# Patient Record
Sex: FEMALE | Race: WHITE | Employment: UNEMPLOYED | URBAN - METROPOLITAN AREA
[De-identification: names, ages, dates, MRNs, and addresses within clinical notes are randomized per-mention and may not be internally consistent; named-entity substitution may affect disease eponyms.]

---

## 2019-05-01 ENCOUNTER — OFFICE VISIT (OUTPATIENT)
Dept: FAMILY MEDICINE CLINIC | Facility: CLINIC | Age: 54
End: 2019-05-01
Payer: MEDICAID

## 2019-05-01 VITALS
SYSTOLIC BLOOD PRESSURE: 127 MMHG | WEIGHT: 168 LBS | HEART RATE: 50 BPM | HEIGHT: 61 IN | BODY MASS INDEX: 31.72 KG/M2 | TEMPERATURE: 98 F | DIASTOLIC BLOOD PRESSURE: 80 MMHG

## 2019-05-01 DIAGNOSIS — Z86.19 HISTORY OF HEPATITIS C: ICD-10-CM

## 2019-05-01 DIAGNOSIS — J01.90 ACUTE SINUSITIS, RECURRENCE NOT SPECIFIED, UNSPECIFIED LOCATION: ICD-10-CM

## 2019-05-01 DIAGNOSIS — F32.A ANXIETY AND DEPRESSION: ICD-10-CM

## 2019-05-01 DIAGNOSIS — J45.20 MILD INTERMITTENT ASTHMA WITHOUT COMPLICATION: ICD-10-CM

## 2019-05-01 DIAGNOSIS — F41.9 ANXIETY AND DEPRESSION: ICD-10-CM

## 2019-05-01 PROCEDURE — 99203 OFFICE O/P NEW LOW 30 MIN: CPT | Performed by: FAMILY MEDICINE

## 2019-05-01 PROCEDURE — 99212 OFFICE O/P EST SF 10 MIN: CPT | Performed by: FAMILY MEDICINE

## 2019-05-01 RX ORDER — IBUPROFEN 800 MG/1
800 TABLET ORAL EVERY 6 HOURS PRN
Status: ON HOLD | COMMUNITY
End: 2019-05-25

## 2019-05-01 RX ORDER — FLUTICASONE PROPIONATE AND SALMETEROL 250; 50 UG/1; UG/1
1 POWDER RESPIRATORY (INHALATION)
Status: ON HOLD | COMMUNITY
Start: 2018-05-01 | End: 2019-05-31

## 2019-05-01 RX ORDER — FLUOXETINE HYDROCHLORIDE 20 MG/1
CAPSULE ORAL
Refills: 2 | COMMUNITY
Start: 2019-01-24 | End: 2019-05-01

## 2019-05-01 RX ORDER — FLUOXETINE HYDROCHLORIDE 20 MG/1
60 CAPSULE ORAL DAILY
Qty: 90 CAPSULE | Refills: 2 | Status: SHIPPED | OUTPATIENT
Start: 2019-05-01 | End: 2019-07-02

## 2019-05-01 RX ORDER — FLUOXETINE HYDROCHLORIDE 20 MG/1
20 CAPSULE ORAL
COMMUNITY
End: 2019-05-01

## 2019-05-01 RX ORDER — ALBUTEROL SULFATE 90 UG/1
1 AEROSOL, METERED RESPIRATORY (INHALATION)
COMMUNITY
Start: 2018-05-01 | End: 2019-07-02 | Stop reason: ALTCHOICE

## 2019-05-01 RX ORDER — FLUTICASONE PROPIONATE 50 MCG
1 SPRAY, SUSPENSION (ML) NASAL DAILY
Qty: 1 INHALER | Refills: 2 | Status: SHIPPED | OUTPATIENT
Start: 2019-05-01 | End: 2019-07-02

## 2019-05-01 RX ORDER — BUDESONIDE AND FORMOTEROL FUMARATE DIHYDRATE 160; 4.5 UG/1; UG/1
2 AEROSOL RESPIRATORY (INHALATION)
Status: ON HOLD | COMMUNITY
Start: 2018-04-12 | End: 2019-05-31

## 2019-05-01 RX ORDER — ALBUTEROL SULFATE 90 UG/1
2 AEROSOL, METERED RESPIRATORY (INHALATION)
COMMUNITY
Start: 2018-05-09 | End: 2019-07-02 | Stop reason: ALTCHOICE

## 2019-05-01 RX ORDER — ALPRAZOLAM 1 MG/1
1 TABLET ORAL
COMMUNITY
End: 2019-05-01

## 2019-05-01 RX ORDER — ALPRAZOLAM 1 MG/1
TABLET ORAL
Refills: 0 | COMMUNITY
Start: 2019-02-22 | End: 2019-05-01

## 2019-05-01 RX ORDER — AMOXICILLIN AND CLAVULANATE POTASSIUM 875; 125 MG/1; MG/1
1 TABLET, FILM COATED ORAL 2 TIMES DAILY
Qty: 20 TABLET | Refills: 0 | Status: ON HOLD | OUTPATIENT
Start: 2019-05-01 | End: 2019-05-25

## 2019-05-01 RX ORDER — MONTELUKAST SODIUM 10 MG/1
TABLET ORAL
COMMUNITY
End: 2019-07-02

## 2019-05-01 RX ORDER — ALPRAZOLAM 1 MG/1
1 TABLET ORAL 3 TIMES DAILY PRN
Qty: 90 TABLET | Refills: 0 | Status: SHIPPED | OUTPATIENT
Start: 2019-05-01 | End: 2019-06-01

## 2019-05-01 NOTE — PROGRESS NOTES
HPI:    Patient ID: Cecile Babinski is a 48year old female. Pt presents to establish care and has hx of sinus/ allergy issues. Pt presents with sinus symptoms for several weeks. Pt has had post nasal drainage, congestion, sinus pressure and drainage.  No so Nasal route daily. One spray per each nostril daily. Disp: 1 Inhaler Rfl: 2   FLUoxetine HCl (PROZAC) 20 MG Oral Cap Take 3 capsules (60 mg total) by mouth daily.  Disp: 90 capsule Rfl: 2   ALPRAZolam (XANAX) 1 MG Oral Tab Take 1 tablet (1 mg total) by mout treatment; To call if problems; Routine follow up in 6-12 months. No orders of the defined types were placed in this encounter.       Meds This Visit:  Requested Prescriptions     Signed Prescriptions Disp Refills   • Amoxicillin-Pot Clavulanate Lisa Eaton

## 2019-05-15 ENCOUNTER — NURSE TRIAGE (OUTPATIENT)
Dept: FAMILY MEDICINE CLINIC | Facility: CLINIC | Age: 54
End: 2019-05-15

## 2019-05-15 NOTE — TELEPHONE ENCOUNTER
Action Requested: Summary for Provider     []  Critical Lab, Recommendations Needed  [] Need Additional Advice  [x]   FYI    []   Need Orders  [] Need Medications Sent to Pharmacy  []  Other     SUMMARY: Per protocol advised : OV within 3 days; scheduled o

## 2019-05-17 NOTE — PROGRESS NOTES
HPI:    Patient ID: Javier Candelaria is a 48year old female. Patient presents for follow-up and was given Augmentin about 2 weeks ago. She has not improved since then. She has a cough with yellow mucous.  Reports fever/chills, congestion, sinus pressure, ear Inhalation Aerosol Powder, Breath Activated Inhale 1 puff into the lungs. Disp:  Rfl:    Montelukast Sodium 10 MG Oral Tab Take by mouth. Disp:  Rfl:    ibuprofen 800 MG Oral Tab Take 800 mg by mouth every 6 (six) hours as needed for Pain.  Disp:  Rfl:    A Pulmonary/Chest: Effort normal and breath sounds normal. She has no wheezes. She has no rales. Chest sounds a little congested     Lymphadenopathy:     She has no cervical adenopathy.    Neurological: She is alert and oriented to person, place, and time Referrals:  PSYCHOLOGY - INTERNAL         ID#[id number]

## 2019-05-17 NOTE — TELEPHONE ENCOUNTER
Patient called did not go to appointment yesterday with Dr Hannah Blake because her ride never showed up. Patient currently across the street from [facility] office and wanted to be seen.  Patient still has the cough with yellow phlegm and has audible shortness of kaley

## 2019-05-24 ENCOUNTER — NURSE TRIAGE (OUTPATIENT)
Dept: OTHER | Age: 54
End: 2019-05-24

## 2019-05-24 DIAGNOSIS — R05.9 COUGH: Primary | ICD-10-CM

## 2019-05-24 NOTE — TELEPHONE ENCOUNTER
Message noted. If pain is truly this bad, then best that patient goes to ER to get further evaluation and treatment. She may need CT chest vs CXR. Please advise pt to go to ER for further eval and treatment.

## 2019-05-24 NOTE — TELEPHONE ENCOUNTER
Called and talked to patient. Advised pt that she needs to go to ER if her symptoms worsen between today and seeing me tomorrow. I will place order for CXR for her to get done in morning before seeing me.  She can to either [facility] or [place] to s

## 2019-05-24 NOTE — TELEPHONE ENCOUNTER
Please reply to pool: EM RN TRIAGE    Action Requested: Summary for Provider     []  Critical Lab, Recommendations Needed  [x] Need Additional Advice  []   FYI    [x]   Need Orders  [] Need Medications Sent to Pharmacy  []  Other     SUMMARY: Declines OV t

## 2019-05-25 ENCOUNTER — HOSPITAL ENCOUNTER (INPATIENT)
Facility: HOSPITAL | Age: 54
LOS: 6 days | Discharge: HOME | DRG: 190 | End: 2019-05-31
Attending: EMERGENCY MEDICINE | Admitting: HOSPITALIST
Payer: MEDICAID

## 2019-05-25 ENCOUNTER — APPOINTMENT (OUTPATIENT)
Dept: CT IMAGING | Facility: HOSPITAL | Age: 54
DRG: 190 | End: 2019-05-25
Attending: EMERGENCY MEDICINE
Payer: MEDICAID

## 2019-05-25 ENCOUNTER — APPOINTMENT (OUTPATIENT)
Dept: GENERAL RADIOLOGY | Facility: HOSPITAL | Age: 54
DRG: 190 | End: 2019-05-25
Attending: EMERGENCY MEDICINE
Payer: MEDICAID

## 2019-05-25 DIAGNOSIS — J96.02 ACUTE RESPIRATORY FAILURE WITH HYPOXIA AND HYPERCAPNIA (HCC): ICD-10-CM

## 2019-05-25 DIAGNOSIS — N30.00 ACUTE CYSTITIS WITHOUT HEMATURIA: ICD-10-CM

## 2019-05-25 DIAGNOSIS — J96.01 ACUTE RESPIRATORY FAILURE WITH HYPOXIA AND HYPERCAPNIA (HCC): ICD-10-CM

## 2019-05-25 DIAGNOSIS — J44.1 COPD EXACERBATION (HCC): Primary | ICD-10-CM

## 2019-05-25 DIAGNOSIS — F19.10 POLYSUBSTANCE ABUSE (HCC): ICD-10-CM

## 2019-05-25 PROBLEM — E87.2 RESPIRATORY ACIDOSIS: Status: ACTIVE | Noted: 2019-05-25

## 2019-05-25 PROBLEM — E87.29 RESPIRATORY ACIDOSIS: Status: ACTIVE | Noted: 2019-05-25

## 2019-05-25 PROCEDURE — 99254 IP/OBS CNSLTJ NEW/EST MOD 60: CPT | Performed by: INTERNAL MEDICINE

## 2019-05-25 PROCEDURE — 71046 X-RAY EXAM CHEST 2 VIEWS: CPT | Performed by: EMERGENCY MEDICINE

## 2019-05-25 PROCEDURE — 99222 1ST HOSP IP/OBS MODERATE 55: CPT | Performed by: HOSPITALIST

## 2019-05-25 PROCEDURE — 71260 CT THORAX DX C+: CPT | Performed by: EMERGENCY MEDICINE

## 2019-05-25 RX ORDER — METHADONE HYDROCHLORIDE 10 MG/1
130 TABLET ORAL DAILY
Status: DISCONTINUED | OUTPATIENT
Start: 2019-05-28 | End: 2019-05-31

## 2019-05-25 RX ORDER — METHADONE HYDROCHLORIDE 10 MG/5ML
130 SOLUTION ORAL DAILY
COMMUNITY

## 2019-05-25 RX ORDER — METHADONE HYDROCHLORIDE 10 MG/ML
131 CONCENTRATE ORAL DAILY
Status: DISCONTINUED | OUTPATIENT
Start: 2019-05-25 | End: 2019-05-25

## 2019-05-25 RX ORDER — CLINDAMYCIN HYDROCHLORIDE 300 MG/1
300 CAPSULE ORAL 3 TIMES DAILY
Status: ON HOLD | COMMUNITY
Start: 2019-05-17 | End: 2019-05-31

## 2019-05-25 RX ORDER — FLUOXETINE HYDROCHLORIDE 20 MG/1
60 CAPSULE ORAL DAILY
Status: DISCONTINUED | OUTPATIENT
Start: 2019-05-25 | End: 2019-05-31

## 2019-05-25 RX ORDER — ACETAMINOPHEN 325 MG/1
650 TABLET ORAL EVERY 6 HOURS PRN
Status: DISCONTINUED | OUTPATIENT
Start: 2019-05-25 | End: 2019-05-31

## 2019-05-25 RX ORDER — METHADONE HYDROCHLORIDE 10 MG/ML
131 CONCENTRATE ORAL DAILY
Status: COMPLETED | OUTPATIENT
Start: 2019-05-26 | End: 2019-05-27

## 2019-05-25 RX ORDER — METHYLPREDNISOLONE SODIUM SUCCINATE 125 MG/2ML
125 INJECTION, POWDER, LYOPHILIZED, FOR SOLUTION INTRAMUSCULAR; INTRAVENOUS ONCE
Status: COMPLETED | OUTPATIENT
Start: 2019-05-25 | End: 2019-05-25

## 2019-05-25 RX ORDER — METHOCARBAMOL 500 MG/1
500 TABLET, FILM COATED ORAL 4 TIMES DAILY
Status: DISCONTINUED | OUTPATIENT
Start: 2019-05-25 | End: 2019-05-31

## 2019-05-25 RX ORDER — METHYLPREDNISOLONE SODIUM SUCCINATE 125 MG/2ML
80 INJECTION, POWDER, LYOPHILIZED, FOR SOLUTION INTRAMUSCULAR; INTRAVENOUS EVERY 8 HOURS
Status: DISCONTINUED | OUTPATIENT
Start: 2019-05-25 | End: 2019-05-26

## 2019-05-25 RX ORDER — FLUTICASONE PROPIONATE 50 MCG
1 SPRAY, SUSPENSION (ML) NASAL DAILY
Status: DISCONTINUED | OUTPATIENT
Start: 2019-05-25 | End: 2019-05-31

## 2019-05-25 RX ORDER — IPRATROPIUM BROMIDE AND ALBUTEROL SULFATE 2.5; .5 MG/3ML; MG/3ML
3 SOLUTION RESPIRATORY (INHALATION) ONCE
Status: COMPLETED | OUTPATIENT
Start: 2019-05-25 | End: 2019-05-25

## 2019-05-25 RX ORDER — LORAZEPAM 1 MG/1
0.5 TABLET ORAL ONCE
Status: COMPLETED | OUTPATIENT
Start: 2019-05-25 | End: 2019-05-25

## 2019-05-25 RX ORDER — SODIUM CHLORIDE 0.9 % (FLUSH) 0.9 %
3 SYRINGE (ML) INJECTION AS NEEDED
Status: DISCONTINUED | OUTPATIENT
Start: 2019-05-25 | End: 2019-05-31

## 2019-05-25 RX ORDER — HEPARIN SODIUM 5000 [USP'U]/ML
5000 INJECTION, SOLUTION INTRAVENOUS; SUBCUTANEOUS EVERY 12 HOURS SCHEDULED
Status: DISCONTINUED | OUTPATIENT
Start: 2019-05-25 | End: 2019-05-31

## 2019-05-25 RX ORDER — ALPRAZOLAM 1 MG/1
1 TABLET ORAL 3 TIMES DAILY PRN
Status: DISCONTINUED | OUTPATIENT
Start: 2019-05-25 | End: 2019-05-31

## 2019-05-25 RX ORDER — IPRATROPIUM BROMIDE AND ALBUTEROL SULFATE 2.5; .5 MG/3ML; MG/3ML
3 SOLUTION RESPIRATORY (INHALATION)
Status: DISCONTINUED | OUTPATIENT
Start: 2019-05-25 | End: 2019-05-27

## 2019-05-25 RX ORDER — MONTELUKAST SODIUM 10 MG/1
10 TABLET ORAL NIGHTLY
Status: DISCONTINUED | OUTPATIENT
Start: 2019-05-25 | End: 2019-05-31

## 2019-05-25 NOTE — CONSULTS
[facility]    Consult Note    Date:  5/25/2019  Date of Admission:  5/25/2019      Chief Complaint:   Dallas Domingo is a(n) 48year old female with dyspnea and chest discomfort.     HPI:   The patient has a history of tobacco use smoking less temperature 97.2 °F (36.2 °C), temperature source Temporal, resp. rate 16, height 5' 2\" (1.575 m), weight 168 lb (76.2 kg), SpO2 99 %.      Lethargic white female  HEENT examination is unremarkable with pupils equal round and reactive to light and accommod heparin    3. History of hepatitis C    4. Chest discomforts–easily reproduced with palpation. I strongly suspect costochondritis although the patient certainly is at risk for ischemic cardiomyopathy.     Recommendations: Pain control, consideration to i

## 2019-05-25 NOTE — H&P
[address] [facility] Patient Status:  Emergency    1965 MRN [id number]   Location [address] Attending Tanner Carrion MD   Hosp Day # 0 PCP No primary care provider on file. Smokeless tobacco: Never Used    Alcohol use: Never      Frequency: Never    Drug use: Yes      Types: Cannabis    Allergies/Medications:    Allergies: No Known Allergies    (Not in a hospital admission)    Review of Systems:   Unable to obtain due to ab Ct Chest Pain/pe (iv Only) (cpt=71260)    Result Date: 5/25/2019  CONCLUSION:  1. Cardiomegaly. Extensive coronary artery atherosclerosis. 2. No pulmonary embolism. No aneurysmal dilatation or aortic dissection 3.  Bronchial wall thickening and/or muc

## 2019-05-25 NOTE — ED PROVIDER NOTES
Patient Seen in: [facility] 2w/sw    History   Patient presents with:  Chest Pain Angina (cardiovascular)    Stated Complaint: Chest pain    HPI    The patient is a 59-year-old female with a history of COPD and anxiety who presents with 3 weeks of kg/m²         Physical Exam   Constitutional: She is oriented to person, place, and time. She appears well-developed and well-nourished. She appears ill. Falls asleep mid sentence   HENT:   Head: Normocephalic and atraumatic.    Mouth/Throat: Oropharynx i (*)     Oxygen Content 14.4 (*)     All other components within normal limits   URINALYSIS WITH CULTURE REFLEX - Abnormal; Notable for the following components:    Clarity Urine Hazy (*)     Leukocyte Esterase Urine Large (*)     WBC Urine 21 (*)     Bacte Pulse ox 100% on BiPAP and patient more alert just prior to admission.   Discussed with hospitalist and Dr. Rogers Cruz and IV antibiotic started for bronchitis    Critical care  A total of 45 minutes of critical care time (exclusive of billable procedures) was a Impression:  COPD exacerbation ([facility]  (primary encounter diagnosis)  Acute respiratory failure with hypoxia and hypercapnia ([facility]  [facility]  Acute cystitis without hematuria    Disposition:  Admit  5/25/2019  3:37 pm    Follow-up:  No foll

## 2019-05-25 NOTE — ED INITIAL ASSESSMENT (HPI)
Right side chest pain x 2 days. Pt states she is currently undergoing treatment for bronchitis and COPD exacerbation.

## 2019-05-26 ENCOUNTER — APPOINTMENT (OUTPATIENT)
Dept: CV DIAGNOSTICS | Facility: HOSPITAL | Age: 54
DRG: 190 | End: 2019-05-26
Attending: HOSPITALIST
Payer: MEDICAID

## 2019-05-26 PROCEDURE — 99232 SBSQ HOSP IP/OBS MODERATE 35: CPT | Performed by: INTERNAL MEDICINE

## 2019-05-26 PROCEDURE — 93306 TTE W/DOPPLER COMPLETE: CPT | Performed by: HOSPITALIST

## 2019-05-26 PROCEDURE — 99233 SBSQ HOSP IP/OBS HIGH 50: CPT | Performed by: HOSPITALIST

## 2019-05-26 RX ORDER — POTASSIUM CHLORIDE 20 MEQ/1
40 TABLET, EXTENDED RELEASE ORAL ONCE
Status: COMPLETED | OUTPATIENT
Start: 2019-05-26 | End: 2019-05-26

## 2019-05-26 RX ORDER — IBUPROFEN 400 MG/1
800 TABLET ORAL 3 TIMES DAILY PRN
Status: DISCONTINUED | OUTPATIENT
Start: 2019-05-26 | End: 2019-05-29

## 2019-05-26 RX ORDER — NICOTINE 21 MG/24HR
1 PATCH, TRANSDERMAL 24 HOURS TRANSDERMAL DAILY
Status: DISCONTINUED | OUTPATIENT
Start: 2019-05-26 | End: 2019-05-31

## 2019-05-26 RX ORDER — METHYLPREDNISOLONE SODIUM SUCCINATE 40 MG/ML
40 INJECTION, POWDER, LYOPHILIZED, FOR SOLUTION INTRAMUSCULAR; INTRAVENOUS DAILY
Status: DISCONTINUED | OUTPATIENT
Start: 2019-05-27 | End: 2019-05-29
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
## 2019-05-26 NOTE — PROGRESS NOTES
[facility]    Progress Note      Assessment and Plan:   1. Acute exacerbation of COPD–had failed outpatient antibiotic. Ongoing active tobacco use. Mucus plugging seen on the chest CT. The patient is breathing much better.   She complain Connor Coley MD  Medical Director, Critical Care, [facility]  Medical Director, [address] [place] [address]. [phone]  Pager: [phone]

## 2019-05-27 PROCEDURE — 99233 SBSQ HOSP IP/OBS HIGH 50: CPT | Performed by: INTERNAL MEDICINE

## 2019-05-27 PROCEDURE — 99233 SBSQ HOSP IP/OBS HIGH 50: CPT | Performed by: HOSPITALIST

## 2019-05-27 RX ORDER — IPRATROPIUM BROMIDE AND ALBUTEROL SULFATE 2.5; .5 MG/3ML; MG/3ML
3 SOLUTION RESPIRATORY (INHALATION)
Status: DISCONTINUED | OUTPATIENT
Start: 2019-05-27 | End: 2019-05-31

## 2019-05-27 RX ORDER — IPRATROPIUM BROMIDE AND ALBUTEROL SULFATE 2.5; .5 MG/3ML; MG/3ML
3 SOLUTION RESPIRATORY (INHALATION) EVERY 6 HOURS PRN
Status: DISCONTINUED | OUTPATIENT
Start: 2019-05-27 | End: 2019-05-31

## 2019-05-27 RX ORDER — ONDANSETRON 2 MG/ML
4 INJECTION INTRAMUSCULAR; INTRAVENOUS EVERY 4 HOURS PRN
Status: DISCONTINUED | OUTPATIENT
Start: 2019-05-27 | End: 2019-05-31

## 2019-05-27 RX ORDER — POTASSIUM CHLORIDE 20 MEQ/1
40 TABLET, EXTENDED RELEASE ORAL ONCE
Status: COMPLETED | OUTPATIENT
Start: 2019-05-27 | End: 2019-05-27

## 2019-05-27 NOTE — PROGRESS NOTES
[facility]    Progress Note    Taylor Aldo Patient Status:  Inpatient    1965 MRN [id number]   Location [facility] 5SW/SE Attending Maryam Barry MD   Hosp Day # 1 PCP No primary care provider on file.        Subjective: • Fluticasone Furoate-Vilanterol  1 puff Inhalation Daily   • FLUoxetine HCl  60 mg Oral Daily   • Fluticasone Propionate  1 spray Each Nare Daily   • Montelukast Sodium  10 mg Oral Nightly   • [START ON 5/28/2019] Methadone HCl  130 mg Oral Daily   • me dissection 3. Bronchial wall thickening and/or mucous filled bronchi in the infrahilar and lower lobe regions consistent with bronchitis/bronchiolitis. Minimal reticular nodularity of the right basilar region consistent with bronchiolitis as well.   No acu

## 2019-05-27 NOTE — PROGRESS NOTES
[place] [facility]    Progress Note    [facility] Patient Status:  Inpatient    1965 MRN [id number]   Location [facility] 5SW/SE Attending Deandre Wooten MD   Hosp Day # 2 PCP No primary care provider on file.         Subjective:     C COPD exacerbation  As above   Failed out pt therapy   Since no leukocytosis or infiltrate on ct to avoid antibiotics     3- smoking addiction   Counseling provided   Complete smoking cessation     4- right chest wall pain likely musculoskeletal or pleureti Borderline splenomegaly.     Dictated by (CST): Val Fine MD on 5/25/2019 at 13:32     Approved by (CST): Val Fine MD on 5/25/2019 at 13:46          Ekg 12-lead    Result Date: 5/25/2019  ECG Report  Interpretation  --------------------

## 2019-05-27 NOTE — PLAN OF CARE
Problem: Patient Centered Care  Goal: Patient preferences are identified and integrated in the patient's plan of care  Description  Interventions:  - What would you like us to know as we care for you?  Patient brought in own medications that must be retur Monitor for signs/symptoms of CO2 retention  Outcome: Progressing     Problem: PAIN - ADULT  Goal: Verbalizes/displays adequate comfort level or patient's stated pain goal  Description  INTERVENTIONS:  - Encourage pt to monitor pain and request assistance

## 2019-05-27 NOTE — PLAN OF CARE
Problem: Patient Centered Care  Goal: Patient preferences are identified and integrated in the patient's plan of care  Description  Interventions:  - What would you like us to know as we care for you?   - Provide timely, complete, and accurate informatio is alert and oriented. Anxious at times. Provided reassurance, encouraged coping skills. Given PRN xanax. Reported R chest pain, worsens with deep breathing. Given PRN ibuprofen. Observed to be resting in bed upon reassessment. SCDs in place.  Pt discussed

## 2019-05-27 NOTE — PROGRESS NOTES
[facility]  Progress Note     Alfredo Ramirez  : 1965    Status: Inpatient  Day #: 2    Attending: Agustin Davis MD  PCP: No primary care provider on file.       Assessment and Plan     Acute respiratory failure, hypercapnic, hypoxemic    clubbing  Neurologic:  nonfocal  Psychiatric:  Normal affect, calm and appropriate  Skin:  No rash, no lesion    Intake/Output Summary (Last 24 hours) at 5/27/2019 1005  Last data filed at 5/26/2019 1856  Gross per 24 hour   Intake 1200 ml   Output 500 ml Manny Lorenzo MD on 5/25/2019 at 13:32     Approved by (CST): Manny Lorenzo MD on 5/25/2019 at 13:46          Ekg 12-lead    Result Date: 5/25/2019  ECG Report  Interpretation  -------------------------- Marked sinus Bradycardia BORDERLINE RHYT

## 2019-05-28 ENCOUNTER — APPOINTMENT (OUTPATIENT)
Dept: CV DIAGNOSTICS | Facility: HOSPITAL | Age: 54
DRG: 190 | End: 2019-05-28
Attending: HOSPITALIST
Payer: MEDICAID

## 2019-05-28 ENCOUNTER — APPOINTMENT (OUTPATIENT)
Dept: NUCLEAR MEDICINE | Facility: HOSPITAL | Age: 54
DRG: 190 | End: 2019-05-28
Attending: HOSPITALIST
Payer: MEDICAID

## 2019-05-28 PROCEDURE — 78452 HT MUSCLE IMAGE SPECT MULT: CPT | Performed by: HOSPITALIST

## 2019-05-28 PROCEDURE — 93017 CV STRESS TEST TRACING ONLY: CPT | Performed by: HOSPITALIST

## 2019-05-28 PROCEDURE — 99233 SBSQ HOSP IP/OBS HIGH 50: CPT | Performed by: INTERNAL MEDICINE

## 2019-05-28 PROCEDURE — 99233 SBSQ HOSP IP/OBS HIGH 50: CPT | Performed by: HOSPITALIST

## 2019-05-28 RX ORDER — LEVOFLOXACIN 5 MG/ML
750 INJECTION, SOLUTION INTRAVENOUS EVERY 24 HOURS
Status: DISCONTINUED | OUTPATIENT
Start: 2019-05-28 | End: 2019-05-29

## 2019-05-28 NOTE — CM/SW NOTE
MDO received per protocol. Pt presents w/ COPD. Pt is currently on room air. SW to remain available if needs arise. 3:30PM: MDO for substance abuse resources. Spoke w/ RN & requested psych liaison consult.     Brenda Izquierdo, [address]

## 2019-05-28 NOTE — IMAGING NOTE
1440pm - Patient here in cardiac diagnostic holding awaiting to have stress portion of nuclear regadenoson stress test.  Patient has nicotine patch on. Nicotine patch contraindicated for regadenoson stress test (needs to be off for 12 hours prior).  I call

## 2019-05-28 NOTE — PAYOR COMM NOTE
--------------  ADMISSION REVIEW     Payor: Demarco Mirza #:  [id number]  Authorization Number: [id number]      Admit date: 5/25/19  Admit time: [address]       Admitting Physician: Tiera Pradhan MD  Attending Physic Smokeless tobacco: Never Used    Alcohol use: Never      Frequency: Never    Drug use: Yes      Types: Cannabis      Review of Systems    Positive for stated complaint: Chest pain  Other systems are as noted in HPI.   Constitutional and vital signs revi Psychiatric: She has a normal mood and affect. Judgment normal.   Nursing note and vitals reviewed.     Differential diagnosis includes COPD, PE, medication overdose, pneumonia, CHF/acute coronary syndrome        ED Course     Labs Reviewed   BASIC METABOLI PROCALCITONIN   DRUG CONFIRMATION, COCAINE, UR   CANNABINOID CONFIRMATION, UR   METHADONE AND MET CONF, URN   RAINBOW DRAW BLUE   RAINBOW DRAW LAVENDER   RAINBOW DRAW LIGHT GREEN   RAINBOW DRAW GOLD   ED/MRSA SCREEN BY PCR-CC   URINE CULTURE, ROUTINE   RES CONCLUSION:  1. Cardiomegaly. Extensive coronary artery atherosclerosis. 2. No pulmonary embolism. No aneurysmal dilatation or aortic dissection 3.  Bronchial wall thickening and/or mucous filled bronchi in the infrahilar and lower lobe regions consistent Author:  Christi aFrah MD Service:  Chandler Silva Type:  Physician    Filed:  5/25/2019  3:45 PM Date of Service:  5/25/2019  3:26 PM Status:  Addendum    :  Christi Farah MD (Physician)    Related Notes:  Original Note by Christi Farah MD • PTSD (post-traumatic stress disorder)      Past Surgical History:   Procedure Laterality Date   • APPENDECTOMY       Family History   Problem Relation Age of Onset   • Cancer Father    • Hypertension Father    • Cancer Mother      Social History:  Social CONCLUSION:  1. Borderline cardiomegaly. 2. Atherosclerosis. 3. Hyperinflation. 4. Kyphoscoliosis. 5. Demineralization. 6. Osteoarthritis. 7. Chronic appearing wedging of multiple vertebra. Dictated by (CST):  Moisés Irwin MD on 5/25/2019 at 12:45 -xanax    VTE P: Heparin        Sreekanth Sotomayor MD              Electronically signed by Arlene Schultz MD on 5/25/2019  3:45 PM       Consults signed by Marline Stanley MD at 5/25/2019  4:12 PM     Author: Marline Stanley MD Service: Veronica Myers Smoking status: Current Every Day Smoker      Smokeless tobacco: Never Used    Alcohol use: Never      Frequency: Never    Drug use: Yes      Types: Cannabis     Allergies/Medications:    Allergies: No Known Allergies     (Not in a hospital admission) 3.  Bronchial wall thickening and/or mucous filled bronchi in the infrahilar and lower lobe regions consistent with bronchitis/bronchiolitis.  Minimal reticular nodularity of the right basilar region consistent with bronchiolitis as well.  No acute   pulmon BP: 136/68 122/67 126/61 125/74   BP Location: Right arm Right arm Right arm Right arm   Pulse: 75 68 78 78   Resp: 16 16 16 18   Temp:   97.9 °F (36.6 °C) 98.2 °F (36.8 °C) 97.8 °F (36.6 °C)   TempSrc:   Oral Oral Oral   SpO2: 98% 97% 98% 98%   Weight:     CREATSERUM 0.95 05/26/2019     BUN 14 05/26/2019      05/26/2019     K 3.7 05/26/2019      05/26/2019     CO2 25.0 05/26/2019      (H) 05/26/2019     CA 8.9 05/26/2019     T4F 1.0 05/26/2019     TSH 0.189 (L) 05/26/2019     DDIMER 1.86 Result Date: 5/25/2019  ECG Report  Interpretation  -------------------------- Marked sinus Bradycardia BORDERLINE RHYTHM No previous ECGs available Electronically signed on 05/26/2019 at 12:32 by Marshall Poon M.D.     Assessment and Plan:   Acute respir -was given single dose IV zosyn      Acute metabolic encephalopathy   2/2 hypercapnia   Polysubstance abuse  -mental status much improved today  -BiPAP helped  -monitor  -checked u/a  -utox + cocaine +methadone +cannabinoids     Substance abuse  -has been MCHC 35.2 34.8 34.8   RDW 12.5 12.7 12.6   NEPRELIM 4.35 5.49 5.71      Recent Labs   Lab 05/25/19  1144 05/25/19  1410 05/26/19  0428 05/27/19  0630   BUN 15  --  14 14   CREATSERUM 0.74  --  0.95 0.75   GFRAA 107  --  79 105   GFRNAA 93  --  69 91   CA 9 • methocarbamol  500 mg Oral QID   • Heparin Sodium (Porcine)  5,000 Units Subcutaneous 2 times per day   • ipratropium-albuterol  3 mL Nebulization Q6H WA   • Fluticasone Furoate-Vilanterol  1 puff Inhalation Daily   • FLUoxetine HCl  60 mg Oral Daily   • Neurological: She is oriented to person, place, and time. No sensory deficit or motor deficit.    Skin: Skin is dry.             Assessment and Plan:       1- Acute  On chronic respiratory failure, hypercapnic, hypoxemic   Slow improvement   bipap if needed 5/28/2019 0915 Given 1 puff Inhalation Stephany Georges RN      Fluticasone Propionate (FLONASE) 50 MCG/ACT nasal spray 1 spray     Date Action Dose Route User    5/28/2019 0915 Given 1 spray Each Nare Stephany Georges RN      ipratropium-albuterol (Franco Gould Umeclidinium Bromide (INCRUSE ELLIPTA) 62.5 MCG/INH inhaler 1 puff     Date Action Dose Route User    5/28/2019 0915 Given 1 puff Inhalation Rachel Sainz RN

## 2019-05-28 NOTE — PROGRESS NOTES
[facility]    Progress Note    Patricia Morgan Patient Status:  Inpatient    1965 MRN [id number]   Location [facility] 5SW/SE Attending Sourav Barber MD   Hosp Day # 3 PCP No primary care provider on file.         Subjectiv tele  -stress test      7- dvt prophylaxis    heparin sq                      Results:     Lab Results   Component Value Date    WBC 7.9 05/27/2019    HGB 13.0 05/27/2019    HCT 37.4 05/27/2019    PLT 95.0 (L) 05/27/2019    CREATSERUM 0.75 05/27/2019    BU

## 2019-05-28 NOTE — PLAN OF CARE
Pulmonary Rehab handout given to patient. Discussed features of Pulm Rehab, and answered questions. Instructed patient to discuss rehab options with Doctor after discharge, and obtain an order if appropriate.   Sumanth Handy RN

## 2019-05-28 NOTE — PROGRESS NOTES
[facility]  Progress Note     Chinedu Figueroa  : 1965    Status: Inpatient  Day #: 3    Attending: Zara Vu MD  PCP: No primary care provider on file.       Assessment and Plan     Acute respiratory failure, hypercapnic, hypoxemic    normal bowel sounds  Musculoskeletal:  No joint swelling  Extremities:  No edema, no cyanosis, no clubbing  Neurologic:  nonfocal  Psychiatric:  Normal affect, calm and appropriate  Skin:  No rash, no lesion    Intake/Output Summary (Last 24 hours) at 5/28 coordinating care. Discussed diagnosis, treatment plan, counseled on drug abuse cessation.       Serina Bonds

## 2019-05-29 ENCOUNTER — APPOINTMENT (OUTPATIENT)
Dept: NUCLEAR MEDICINE | Facility: HOSPITAL | Age: 54
DRG: 190 | End: 2019-05-29
Attending: HOSPITALIST
Payer: MEDICAID

## 2019-05-29 ENCOUNTER — APPOINTMENT (OUTPATIENT)
Dept: CV DIAGNOSTICS | Facility: HOSPITAL | Age: 54
DRG: 190 | End: 2019-05-29
Attending: HOSPITALIST
Payer: MEDICAID

## 2019-05-29 PROCEDURE — 99233 SBSQ HOSP IP/OBS HIGH 50: CPT | Performed by: HOSPITALIST

## 2019-05-29 PROCEDURE — 99232 SBSQ HOSP IP/OBS MODERATE 35: CPT | Performed by: INTERNAL MEDICINE

## 2019-05-29 RX ORDER — PREDNISONE 20 MG/1
40 TABLET ORAL
Status: DISCONTINUED | OUTPATIENT
Start: 2019-05-29 | End: 2019-05-29

## 2019-05-29 RX ORDER — ASPIRIN 81 MG/1
324 TABLET, CHEWABLE ORAL ONCE
Status: COMPLETED | OUTPATIENT
Start: 2019-05-30 | End: 2019-05-30

## 2019-05-29 RX ORDER — LEVOFLOXACIN 750 MG/1
750 TABLET ORAL DAILY
Status: DISCONTINUED | OUTPATIENT
Start: 2019-05-29 | End: 2019-05-31

## 2019-05-29 RX ORDER — CHLORHEXIDINE GLUCONATE 4 G/100ML
30 SOLUTION TOPICAL
Status: COMPLETED | OUTPATIENT
Start: 2019-05-30 | End: 2019-05-30

## 2019-05-29 RX ORDER — ASPIRIN 81 MG/1
81 TABLET, CHEWABLE ORAL DAILY
Status: DISCONTINUED | OUTPATIENT
Start: 2019-05-29 | End: 2019-05-31

## 2019-05-29 RX ORDER — PREDNISONE 50 MG/1
50 TABLET ORAL EVERY 6 HOURS
Status: COMPLETED | OUTPATIENT
Start: 2019-05-30 | End: 2019-05-30

## 2019-05-29 RX ORDER — SODIUM CHLORIDE 9 MG/ML
INJECTION, SOLUTION INTRAVENOUS CONTINUOUS
Status: DISCONTINUED | OUTPATIENT
Start: 2019-05-30 | End: 2019-05-31

## 2019-05-29 RX ORDER — PREDNISONE 20 MG/1
40 TABLET ORAL
Status: DISCONTINUED | OUTPATIENT
Start: 2019-05-31 | End: 2019-05-31

## 2019-05-29 RX ORDER — 0.9 % SODIUM CHLORIDE 0.9 %
VIAL (ML) INJECTION
Status: COMPLETED
Start: 2019-05-29 | End: 2019-05-29

## 2019-05-29 RX ORDER — DIPHENHYDRAMINE HCL 50 MG
50 CAPSULE ORAL ONCE
Status: COMPLETED | OUTPATIENT
Start: 2019-05-30 | End: 2019-05-30

## 2019-05-29 NOTE — PLAN OF CARE
Confirmed w/ RN in Stress Lab that patient can receive all her scheduled morning medications/neb tx today.  Nicotine patch to remain off until after test.

## 2019-05-29 NOTE — PLAN OF CARE
Problem: Patient Centered Care  Goal: Patient preferences are identified and integrated in the patient's plan of care  Description  Interventions:  - What would you like us to know as we care for you?  Patient brought in own medications that must be retur Achieves optimal ventilation and oxygenation  Description  INTERVENTIONS:  - Assess for changes in respiratory status  - Assess for changes in mentation and behavior  - Position to facilitate oxygenation and minimize respiratory effort  - Oxygen supplement assessment.  - Educate pt/family on patient safety including physical limitations  - Instruct pt to call for assistance with activity based on assessment  - Modify environment to reduce risk of injury  - Provide assistive devices as appropriate  - Consider

## 2019-05-29 NOTE — PROGRESS NOTES
[facility]  Progress Note     Luisana Regulus  : 1965    Status: Inpatient  Day #: 4    Attending: London Alva MD  PCP: No primary care provider on file.       Assessment and Plan     Acute respiratory failure, hypercapnic, hypoxe nonfocal  Psychiatric:  Normal affect, calm and appropriate  Skin:  No rash, no lesion    Intake/Output Summary (Last 24 hours) at 5/29/2019 1622  Last data filed at 5/29/2019 1254  Gross per 24 hour   Intake 960 ml   Output 2300 ml   Net -1340 ml counseled on drug abuse cessation. Tran Kwon.  Waldo Curling, 05/29/19

## 2019-05-29 NOTE — PROGRESS NOTES
[place] [facility]    Progress Note    Dallas Domingo Patient Status:  Inpatient    1965 MRN [id number]   Location [facility] 5SW/SE Attending Deborah Maxwell MD   Hosp Day # 4 PCP No primary care provider on file.         Subjecti CREATSERUM 0.74 05/29/2019    BUN 18 05/29/2019     05/29/2019    K 4.0 05/29/2019     05/29/2019    CO2 31.0 05/29/2019    GLU 99 05/29/2019    CA 9.0 05/29/2019    T4F 1.0 05/26/2019    TSH 0.189 (L) 05/26/2019    DDIMER 1.86 (H) 05/25/2019

## 2019-05-29 NOTE — BH PROGRESS NOTE
Behavioral Health Note  CHIEF COMPLAINT  Chest Pain Angina    REASON FOR ADMISSION  Chest Pain Angina    SOCIAL HISTORY  Sarah Laguna lives with a roommate/boyfriend.  She is unemployed and has no financial support although she is in the process of applying for d has a signifcant h/o drug addiction. She plans to continue to go to the methadone clinic and recognizes the need to stop using cocaine, but does not have a desire to stop smoking marijuana.   She is receptive to outpatient follow-up for psychiatry and ther

## 2019-05-29 NOTE — CONSULTS
[facility]    Report of Consultation    Nadine Ryan Patient Status:  Inpatient    1965 MRN [id number]   Location [facility] 5SW/SE Attending Ulises Damon MD   Hosp Day # 4 PCP No primary care provider on file.      Date Smoking status: Current Every Day Smoker        Packs/day: 0.50        Years: 38.00        Pack years: 23      Smokeless tobacco: Never Used    Alcohol use: Never      Frequency: Never    Drug use: Yes      Types: Cannabis      Allergies-patient states oral temperature is 98.4 °F (36.9 °C). Her blood pressure is 107/60 and her pulse is 58. Her respiration is 16 and oxygen saturation is 97%.    Physical Exam  Awake alert oriented  Moving around the room walking to the bathroom  Breathing comfortably  Normo orders per PCP    Thrombocytopenia  Per PCP    H/o Hepatitis  Per PCP    H/o reaction to IV contrast  Premeds before IV contrast/cath-RN asked to call update pulmonary with orders      Martin Tang MD  5/29/2019

## 2019-05-30 ENCOUNTER — APPOINTMENT (OUTPATIENT)
Dept: INTERVENTIONAL RADIOLOGY/VASCULAR | Facility: HOSPITAL | Age: 54
DRG: 190 | End: 2019-05-30
Attending: NURSE PRACTITIONER
Payer: MEDICAID

## 2019-05-30 PROCEDURE — 4A023N7 MEASUREMENT OF CARDIAC SAMPLING AND PRESSURE, LEFT HEART, PERCUTANEOUS APPROACH: ICD-10-PCS | Performed by: INTERNAL MEDICINE

## 2019-05-30 PROCEDURE — 99232 SBSQ HOSP IP/OBS MODERATE 35: CPT | Performed by: HOSPITALIST

## 2019-05-30 PROCEDURE — 99232 SBSQ HOSP IP/OBS MODERATE 35: CPT | Performed by: INTERNAL MEDICINE

## 2019-05-30 PROCEDURE — B2111ZZ FLUOROSCOPY OF MULTIPLE CORONARY ARTERIES USING LOW OSMOLAR CONTRAST: ICD-10-PCS | Performed by: INTERNAL MEDICINE

## 2019-05-30 RX ORDER — SODIUM CHLORIDE 9 MG/ML
INJECTION, SOLUTION INTRAVENOUS CONTINUOUS
Status: ACTIVE | OUTPATIENT
Start: 2019-05-30 | End: 2019-05-30

## 2019-05-30 RX ORDER — MIDAZOLAM HYDROCHLORIDE 1 MG/ML
INJECTION INTRAMUSCULAR; INTRAVENOUS
Status: COMPLETED
Start: 2019-05-30 | End: 2019-05-30

## 2019-05-30 RX ORDER — NITROGLYCERIN 20 MG/100ML
INJECTION INTRAVENOUS
Status: COMPLETED
Start: 2019-05-30 | End: 2019-05-30

## 2019-05-30 RX ORDER — LIDOCAINE HYDROCHLORIDE 20 MG/ML
INJECTION, SOLUTION EPIDURAL; INFILTRATION; INTRACAUDAL; PERINEURAL
Status: COMPLETED
Start: 2019-05-30 | End: 2019-05-30

## 2019-05-30 RX ORDER — HEPARIN SODIUM 1000 [USP'U]/ML
INJECTION, SOLUTION INTRAVENOUS; SUBCUTANEOUS
Status: COMPLETED
Start: 2019-05-30 | End: 2019-05-30

## 2019-05-30 RX ORDER — DIPHENHYDRAMINE HCL 50 MG
CAPSULE ORAL
Status: COMPLETED
Start: 2019-05-30 | End: 2019-05-30

## 2019-05-30 RX ORDER — VERAPAMIL HYDROCHLORIDE 2.5 MG/ML
INJECTION, SOLUTION INTRAVENOUS
Status: COMPLETED
Start: 2019-05-30 | End: 2019-05-30

## 2019-05-30 NOTE — PROGRESS NOTES
[facility]  Progress Note     Araceli Hubbard  : 1965    Status: Inpatient  Day #: 5    Attending: Demetrius Jimenez MD  PCP: No primary care provider on file.       Assessment and Plan     Acute respiratory failure, hypercapnic, hypoxe sounds  Musculoskeletal:  No joint swelling  Extremities:  No edema, no cyanosis, no clubbing  Neurologic:  nonfocal  Psychiatric:  Normal affect, calm and appropriate  Skin:  No rash, no lesion    Intake/Output Summary (Last 24 hours) at 5/30/2019 1726  L spray Each Nare Daily   • Montelukast Sodium  10 mg Oral Nightly   • Methadone HCl  130 mg Oral Daily      PRN Meds: ipratropium-albuterol, ondansetron HCl, Normal Saline Flush, acetaminophen, ALPRAZolam      Dimitris Olivo, 05/30/19

## 2019-05-30 NOTE — PROGRESS NOTES
Alberta Layer  [id number]  5/30/2019    Post procedure/ recovery hand-off report given to adalberto KING. Patient's vital signs stable, access site dry and intact, no signs and symptoms of bleeding/ hematoma.     Merton Lesch, RN

## 2019-05-30 NOTE — PROGRESS NOTES
[place] [facility]     Progress Note        Dallas Domingo Patient Status:  Inpatient    1965 MRN [id number]   Location [facility] 5SW/SE Attending Deborah Maxwell MD   Hosp Day # 5 PCP No primary care provider on file.        Subjec Oral Q6H PRN   Fluticasone Furoate-Vilanterol (BREO ELLIPTA) 200-25 MCG/INH inhaler 1 puff 1 puff Inhalation Daily   ALPRAZolam (XANAX) tab 1 mg 1 mg Oral TID PRN   FLUoxetine HCl (PROZAC) cap 60 mg 60 mg Oral Daily   Fluticasone Propionate (FLONASE) 50 MC

## 2019-05-30 NOTE — PLAN OF CARE
Problem: Patient Centered Care  Goal: Patient preferences are identified and integrated in the patient's plan of care  Description  Interventions:  - What would you like us to know as we care for you?  Patient brought in own medications that must be retur difficulty  - Respiratory Therapy support as indicated  - Manage/alleviate anxiety  - Monitor for signs/symptoms of CO2 retention  Outcome: Progressing  Note:   PO prednisone, neb tx     Problem: PAIN - ADULT  Goal: Verbalizes/displays adequate comfort lev

## 2019-05-31 ENCOUNTER — TELEPHONE (OUTPATIENT)
Dept: OTHER | Age: 54
End: 2019-05-31

## 2019-05-31 VITALS
BODY MASS INDEX: 31.78 KG/M2 | WEIGHT: 172.69 LBS | SYSTOLIC BLOOD PRESSURE: 129 MMHG | HEART RATE: 73 BPM | RESPIRATION RATE: 16 BRPM | HEIGHT: 62 IN | DIASTOLIC BLOOD PRESSURE: 71 MMHG | TEMPERATURE: 98 F | OXYGEN SATURATION: 93 %

## 2019-05-31 PROCEDURE — 99232 SBSQ HOSP IP/OBS MODERATE 35: CPT | Performed by: INTERNAL MEDICINE

## 2019-05-31 PROCEDURE — 99239 HOSP IP/OBS DSCHRG MGMT >30: CPT | Performed by: HOSPITALIST

## 2019-05-31 RX ORDER — ONDANSETRON 4 MG/1
4 TABLET, FILM COATED ORAL EVERY 8 HOURS PRN
Qty: 30 TABLET | Refills: 0 | Status: SHIPPED | OUTPATIENT
Start: 2019-05-31 | End: 2020-03-06

## 2019-05-31 RX ORDER — LEVOFLOXACIN 750 MG/1
750 TABLET ORAL DAILY
Qty: 4 TABLET | Refills: 0 | Status: SHIPPED | OUTPATIENT
Start: 2019-06-01 | End: 2019-06-05

## 2019-05-31 RX ORDER — ATORVASTATIN CALCIUM 20 MG/1
20 TABLET, FILM COATED ORAL NIGHTLY
Status: DISCONTINUED | OUTPATIENT
Start: 2019-05-31 | End: 2019-05-31

## 2019-05-31 RX ORDER — ATORVASTATIN CALCIUM 20 MG/1
20 TABLET, FILM COATED ORAL NIGHTLY
Qty: 30 TABLET | Refills: 0 | Status: SHIPPED | OUTPATIENT
Start: 2019-05-31 | End: 2020-04-18

## 2019-05-31 RX ORDER — ALPRAZOLAM 1 MG/1
1 TABLET ORAL NIGHTLY PRN
Qty: 30 TABLET | Refills: 0 | Status: SHIPPED | OUTPATIENT
Start: 2019-05-31 | End: 2019-05-31

## 2019-05-31 RX ORDER — IPRATROPIUM BROMIDE AND ALBUTEROL SULFATE 2.5; .5 MG/3ML; MG/3ML
3 SOLUTION RESPIRATORY (INHALATION) EVERY 6 HOURS PRN
Qty: 1 CONTAINER | Refills: 0 | Status: SHIPPED | OUTPATIENT
Start: 2019-05-31 | End: 2021-09-30 | Stop reason: ALTCHOICE

## 2019-05-31 RX ORDER — METHOCARBAMOL 500 MG/1
500 TABLET, FILM COATED ORAL 4 TIMES DAILY
Qty: 20 TABLET | Refills: 0 | Status: SHIPPED | OUTPATIENT
Start: 2019-05-31 | End: 2019-07-02

## 2019-05-31 RX ORDER — ASPIRIN 81 MG/1
81 TABLET, CHEWABLE ORAL DAILY
Qty: 30 TABLET | Refills: 0 | Status: SHIPPED | OUTPATIENT
Start: 2019-06-01 | End: 2019-11-04

## 2019-05-31 RX ORDER — PREDNISONE 10 MG/1
TABLET ORAL
Qty: 18 TABLET | Refills: 0 | Status: SHIPPED | OUTPATIENT
Start: 2019-05-31 | End: 2019-07-02 | Stop reason: ALTCHOICE

## 2019-05-31 RX ORDER — FLUOXETINE HYDROCHLORIDE 20 MG/1
20 CAPSULE ORAL DAILY
Qty: 30 CAPSULE | Refills: 0 | Status: SHIPPED | OUTPATIENT
Start: 2019-05-31 | End: 2019-05-31

## 2019-05-31 NOTE — PROGRESS NOTES
[facility]    Cardiology Progress Note    Juancarlos Ramirez Patient Status:  Inpatient    1965 MRN [id number]   Location [facility] 3W/SW Attending Pablito Hernandez MD   Hosp Day # 6 PCP No primary care provider on file.      Sub modification- cont aspirin 81 mg po daily, will start low dose statin  - encouraged smoking cessation    Plan:  Stable cardiac status for d/c home when okay with other services. Cardiology will sign off.   Pt will follow-up with [facility] in one week for rad

## 2019-05-31 NOTE — TELEPHONE ENCOUNTER
Message noted. Ok to refill times one with no additional refills. Pt can  scripts from the front office on her way home. Prozac has been sent to pharmacy. She needs to  the script for the Xanax. Please inform patient.

## 2019-05-31 NOTE — CM/SW NOTE
Per MD request, SW met w/ pt to discuss eventual discharge needs. Pt stated that she needs a ride home, walker and nebulizer. SW explained that request will be made w/ MD and that pt's medicar transport can be arranged (covered by insurance).      LEENA spoke

## 2019-05-31 NOTE — DISCHARGE SUMMARY
[facility] - [facility]    Discharge Summary    Juancarlos Ramirez Patient Status:  Inpatient    1965 MRN [id number]   Location [facility] 3W/SW Attending Pablito Hernandez MD   Hosp Day # 6 PCP No primary care provider on file.      Date of pain.  She was seen by her primary care doctor's physician assistant about 8 days ago for a persistent cough and upper respiratory infection that did not respond to Augmentin 2 weeks prior. She was started on clindamycin and given a prednisone taper.   At See above    Complications: None    Discharge Condition: Stable    Discharge Medications:      Discharge Medications      START taking these medications      Instructions Prescription details   aspirin 81 MG Chew      Chew 1 tablet (81 mg total) by mouth d you see here. Inhale 1 puff into the lungs. Refills:  0     PROVENTIL  (90 Base) MCG/ACT Aers  Generic drug:  Albuterol Sulfate HFA  What changed:  Another medication with the same name was removed.  Continue taking this medication, and follow prescription for each of these medications  · aspirin 81 MG Chew  · atorvastatin 20 MG Tabs  · Fluticasone Furoate-Vilanterol 200-25 MCG/INH Aepb  · ipratropium-albuterol 0.5-2.5 (3) MG/3ML Soln  · levofloxacin 750 MG Tabs  · methocarbamol 500 MG Tabs  · N

## 2019-05-31 NOTE — PLAN OF CARE
To whom it may concern:     Javier Mcneal last dose of methadone was 5/31/19 at 9:01. 130mg.      John Vera, [address]

## 2019-05-31 NOTE — PROGRESS NOTES
[facility]    Progress Note    Debra Mesa Patient Status:  Inpatient    1965 MRN [id number]   Location [facility] 3W/SW Attending Joselito Donnelly MD   Hosp Day # 6 PCP No primary care provider on file.         Subjectiv 05/25/2019    TROP <0.045 05/25/2019                        Esther Gandara Arm, MD  5/31/2019

## 2019-05-31 NOTE — TELEPHONE ENCOUNTER
Patient called stating she was admitted to [address] [place] [address] for COPD exacerbation and is being discharged now from the hospital. She states she is due for a refill on her Prozac and Alprazolam. Patient states she would like to  scripts for these medications on t

## 2019-05-31 NOTE — TELEPHONE ENCOUNTER
Patient informed of Charles's note below. Informed her script for alprazolam is ready for pick-up and prozac was sent to her pharmacy. Patient verbalized understanding.  Patient inquired about closing times of office and transferred phone call to office sit

## 2019-05-31 NOTE — PAYOR COMM NOTE
REF: [id number]    APPROVED 5/25/19- 5/30/19 , FAXING CLINICAL UPDATE FOR 5/30/19  REQUESTING ADDITIONAL DAYS        5/30/19   Assessment and Plan      Acute respiratory failure, hypercapnic, hypoxemic   COPD exacerbation  RESOLVED  -BiPAP in ER and PCU, w 5/30/2019 1131      Gross per 24 hour   Intake 480 ml   Output 4275 ml   Net -3795 ml                  Recent Labs   Lab 05/26/19  0428 05/27/19  0630 05/29/19  0630   WBC 5.9 7.9 7.8   HGB 13.9 13.0 14.0   HCT 40.0 37.4 41.2   PLT 90.0* 95.0* 113.0*   RBC

## 2019-06-01 RX ORDER — ALPRAZOLAM 1 MG/1
1 TABLET ORAL 3 TIMES DAILY PRN
Qty: 90 TABLET | Refills: 0 | Status: SHIPPED | OUTPATIENT
Start: 2019-06-01 | End: 2019-07-02

## 2019-06-01 NOTE — TELEPHONE ENCOUNTER
Message noted. Chart reviewed. May refill medication as requested. Script reviewed and signed/ printed. Given to nurse to give to patient who is office today to  #90.

## 2019-06-01 NOTE — TELEPHONE ENCOUNTER
Advised patient of Dr Sandip Liz note. Patient received the new prescription from the office staff and gave the old script for 30 tabs back to staff.

## 2019-06-03 ENCOUNTER — TELEPHONE (OUTPATIENT)
Dept: MEDSURG UNIT | Facility: HOSPITAL | Age: 54
End: 2019-06-03

## 2019-06-03 NOTE — PAYOR COMM NOTE
--------------  DISCHARGE REVIEW    Payor: Demarco Mirza #:  [id number]  Authorization Number: [id number]      Admit date: 5/25/19  Admit time:  5813  Discharge Date: 5/31/2019  7:05 PM     Admitting Physician: Holly Dance Regular rate, regular rhythm  Pulmonary:  B/l rhonchi, no wheeze  Gastrointestinal:  Soft, non-tender, normal bowel sounds  Musculoskeletal:  No joint swelling  Extremities:  No edema, no cyanosis, no clubbing  Neurologic:  nonfocal  Psychiatric:  Normal a outpt support     Substance abuse  -has been on methadone for a long time, also uses marijuana  -admits to smoking cocaine on most weekends - counseled on cessation     R sided chest pain  -suspect pleuritic vs MSK/chostocondritis   -robaxin helping     Br Nebulizer/Adult Mask Kit      1 Kit   Quantity:  1 kit  Refills:  0     Ondansetron HCl 4 mg tablet  Commonly known as:  ZOFRAN      Take 1 tablet (4 mg total) by mouth every 8 (eight) hours as needed for Nausea.    Quantity:  30 tablet  Refills:  0     U Methadone HCl 10 MG/5ML Soln  Commonly known as:  DOLOPHINE      Take 131 mg by mouth daily. Refills:  0     Montelukast Sodium 10 MG Tabs  Commonly known as:  SINGULAIR      Take by mouth.    Refills:  0        STOP taking these medications    Clindamy

## 2019-06-06 ENCOUNTER — LAB ENCOUNTER (OUTPATIENT)
Dept: LAB | Facility: HOSPITAL | Age: 54
End: 2019-06-06
Attending: NURSE PRACTITIONER
Payer: MEDICAID

## 2019-06-06 ENCOUNTER — OFFICE VISIT (OUTPATIENT)
Dept: CARDIOLOGY CLINIC | Facility: HOSPITAL | Age: 54
End: 2019-06-06
Attending: NURSE PRACTITIONER
Payer: MEDICAID

## 2019-06-06 VITALS
OXYGEN SATURATION: 91 % | WEIGHT: 173 LBS | SYSTOLIC BLOOD PRESSURE: 100 MMHG | HEART RATE: 69 BPM | DIASTOLIC BLOOD PRESSURE: 56 MMHG | BODY MASS INDEX: 32 KG/M2

## 2019-06-06 DIAGNOSIS — J44.1 COPD EXACERBATION (HCC): Primary | ICD-10-CM

## 2019-06-06 DIAGNOSIS — J44.9 COPD (CHRONIC OBSTRUCTIVE PULMONARY DISEASE) (HCC): ICD-10-CM

## 2019-06-06 PROBLEM — E87.6 HYPOKALEMIA: Status: ACTIVE | Noted: 2019-06-06

## 2019-06-06 PROCEDURE — 99214 OFFICE O/P EST MOD 30 MIN: CPT | Performed by: NURSE PRACTITIONER

## 2019-06-06 PROCEDURE — 36415 COLL VENOUS BLD VENIPUNCTURE: CPT

## 2019-06-06 PROCEDURE — 80048 BASIC METABOLIC PNL TOTAL CA: CPT

## 2019-06-06 PROCEDURE — 99212 OFFICE O/P EST SF 10 MIN: CPT | Performed by: NURSE PRACTITIONER

## 2019-06-06 PROCEDURE — 85025 COMPLETE CBC W/AUTO DIFF WBC: CPT

## 2019-06-06 RX ORDER — POTASSIUM CHLORIDE 20 MEQ/1
40 TABLET, EXTENDED RELEASE ORAL ONCE
Qty: 2 TABLET | Refills: 0 | Status: SHIPPED | OUTPATIENT
Start: 2019-06-06 | End: 2019-06-06

## 2019-06-06 NOTE — PROGRESS NOTES
[address]le Patient Status:  Outpatient    1965 MRN [id number]   Location [address] [place] [address] No primary care provider on file.          Ron Mariano is a 48year old female who presents to clinic after re CO2 31.0 05/29/2019 06:30 AM    GLU 99 05/29/2019 06:30 AM    CA 9.0 05/29/2019 06:30 AM    AST 34 05/31/2019 11:32 AM    ALT 52 05/31/2019 11:32 AM    PTT 26.7 05/30/2019 07:06 AM    INR 1.19 05/30/2019 07:06 AM    PTP 15.0 (H) 05/30/2019 07:06 AM    T4F use as prescribed.  -Completed abx yesterday  -WBC 7.1 , Hg 13.7  -Keep well hydrated 64-96 oz per day  -instructed to follow up with Dr. Caleb Taylor PCP    Tobacco abuse  -5 minutes counseled on tobacco     Hypokalemia  -K 3.3  -KDur 40 meq x 1 sent to patient's

## 2019-07-02 ENCOUNTER — OFFICE VISIT (OUTPATIENT)
Dept: FAMILY MEDICINE CLINIC | Facility: CLINIC | Age: 54
End: 2019-07-02
Payer: MEDICAID

## 2019-07-02 VITALS
WEIGHT: 172 LBS | DIASTOLIC BLOOD PRESSURE: 63 MMHG | BODY MASS INDEX: 31.65 KG/M2 | HEIGHT: 62 IN | HEART RATE: 60 BPM | SYSTOLIC BLOOD PRESSURE: 107 MMHG

## 2019-07-02 DIAGNOSIS — N76.0 BACTERIAL VAGINOSIS: ICD-10-CM

## 2019-07-02 DIAGNOSIS — J44.1 COPD EXACERBATION (HCC): ICD-10-CM

## 2019-07-02 DIAGNOSIS — F32.A ANXIETY AND DEPRESSION: ICD-10-CM

## 2019-07-02 DIAGNOSIS — B96.89 BACTERIAL VAGINOSIS: ICD-10-CM

## 2019-07-02 DIAGNOSIS — F41.9 ANXIETY AND DEPRESSION: ICD-10-CM

## 2019-07-02 PROCEDURE — 99214 OFFICE O/P EST MOD 30 MIN: CPT | Performed by: FAMILY MEDICINE

## 2019-07-02 RX ORDER — MONTELUKAST SODIUM 10 MG/1
10 TABLET ORAL NIGHTLY
Qty: 30 TABLET | Refills: 2 | Status: SHIPPED | OUTPATIENT
Start: 2019-07-02 | End: 2020-04-18

## 2019-07-02 RX ORDER — ALBUTEROL SULFATE 90 UG/1
2 AEROSOL, METERED RESPIRATORY (INHALATION) EVERY 4 HOURS PRN
Qty: 1 INHALER | Refills: 2 | Status: SHIPPED | OUTPATIENT
Start: 2019-07-02 | End: 2019-10-12

## 2019-07-02 RX ORDER — FLUTICASONE PROPIONATE 50 MCG
1 SPRAY, SUSPENSION (ML) NASAL DAILY
Qty: 1 INHALER | Refills: 2 | Status: SHIPPED | OUTPATIENT
Start: 2019-07-02

## 2019-07-02 RX ORDER — FLUOXETINE HYDROCHLORIDE 20 MG/1
60 CAPSULE ORAL DAILY
Qty: 90 CAPSULE | Refills: 2 | Status: SHIPPED | OUTPATIENT
Start: 2019-07-02 | End: 2019-11-04

## 2019-07-02 RX ORDER — METRONIDAZOLE 500 MG/1
500 TABLET ORAL 2 TIMES DAILY
Qty: 14 TABLET | Refills: 0 | Status: SHIPPED | OUTPATIENT
Start: 2019-07-02 | End: 2019-10-12

## 2019-07-02 RX ORDER — METHOCARBAMOL 500 MG/1
500 TABLET, FILM COATED ORAL 4 TIMES DAILY
Qty: 30 TABLET | Refills: 0 | Status: SHIPPED | OUTPATIENT
Start: 2019-07-02 | End: 2019-07-07

## 2019-07-02 RX ORDER — ALPRAZOLAM 1 MG/1
1 TABLET ORAL 3 TIMES DAILY PRN
Qty: 90 TABLET | Refills: 0 | Status: SHIPPED | OUTPATIENT
Start: 2019-07-02 | End: 2019-07-29

## 2019-07-02 RX ORDER — IBUPROFEN 400 MG/1
400 TABLET ORAL EVERY 6 HOURS PRN
Qty: 45 TABLET | Refills: 0 | Status: SHIPPED | OUTPATIENT
Start: 2019-07-02 | End: 2019-11-04 | Stop reason: CLARIF

## 2019-07-02 RX ORDER — PREDNISONE 10 MG/1
TABLET ORAL
Qty: 18 TABLET | Refills: 0 | Status: SHIPPED | OUTPATIENT
Start: 2019-07-02 | End: 2019-10-12

## 2019-07-02 NOTE — PROGRESS NOTES
HPI:    Patient ID: Cindy Rivera is a 48year old female. Patient is here for follow up for her COPD. Pt was hospitalized about 6 weeks ago. Pt still having some wheezing. Pt has multiple medical issues.  Pt requesting refills on her psych medications as for Pain.  Disp: 45 tablet Rfl: 0   predniSONE 10 MG Oral Tab PLEASE TAKE 30 MG (3 TABS) FOR 3 DAYSTHEN TAKE 20 MG (2 TABS) FOR 3 DAYSTHEN TAKE 10 MG (1 TAB) FOR 3 DAYS THEN STOP Disp: 18 tablet Rfl: 0   metRONIDAZOLE (FLAGYL) 500 MG Oral Tab Take 1 tablet follow up with pulmonary as discussed; Bacterial vaginosis:  - After discussion with patient, flagyl as directed; Over the counter remedies discussed; To call if worse or not better; Follow up in one week if not resolved or as needed if worse.     Anxie

## 2019-07-29 RX ORDER — ALPRAZOLAM 1 MG/1
1 TABLET ORAL 3 TIMES DAILY PRN
Qty: 90 TABLET | Refills: 0 | Status: SHIPPED
Start: 2019-07-29 | End: 2019-08-26

## 2019-07-29 NOTE — TELEPHONE ENCOUNTER
Controlled medication pending for review. If approved needs to be called in or faxed by on-site staff.     Last Rx: 7/2/19  LOV: 7/2/19

## 2019-07-29 NOTE — TELEPHONE ENCOUNTER
Pt is requesting a refill on medication       Current Outpatient Medications:  ALPRAZolam (XANAX) 1 MG Oral Tab Take 1 tablet (1 mg total) by mouth 3 (three) times daily as needed for Anxiety.  Disp: 90 tablet Rfl: 0       Pt stt she is completley out and r

## 2019-07-29 NOTE — TELEPHONE ENCOUNTER
Clinical site staff please f/u on Alprazolam script. Call in RX if need to be and notify patient, thank you.  Please respond to pool: JIHAN Mijares 62 LPN/CMA

## 2019-08-08 ENCOUNTER — HOSPITAL ENCOUNTER (EMERGENCY)
Facility: HOSPITAL | Age: 54
Discharge: HOME | End: 2019-08-09
Attending: EMERGENCY MEDICINE
Payer: MEDICAID

## 2019-08-08 ENCOUNTER — APPOINTMENT (OUTPATIENT)
Dept: GENERAL RADIOLOGY | Facility: HOSPITAL | Age: 54
End: 2019-08-08
Attending: EMERGENCY MEDICINE
Payer: MEDICAID

## 2019-08-08 DIAGNOSIS — E87.6 HYPOKALEMIA: ICD-10-CM

## 2019-08-08 DIAGNOSIS — J44.1 COPD EXACERBATION (HCC): Primary | ICD-10-CM

## 2019-08-08 LAB
ANION GAP SERPL CALC-SCNC: 7 MMOL/L (ref 0–18)
BASOPHILS # BLD AUTO: 0.02 X10(3) UL (ref 0–0.2)
BASOPHILS NFR BLD AUTO: 0.2 %
BILIRUB UR QL: NEGATIVE
BUN BLD-MCNC: 6 MG/DL (ref 7–18)
BUN/CREAT SERPL: 9.4 (ref 10–20)
CALCIUM BLD-MCNC: 9.3 MG/DL (ref 8.5–10.1)
CHLORIDE SERPL-SCNC: 102 MMOL/L (ref 98–112)
CO2 SERPL-SCNC: 32 MMOL/L (ref 21–32)
COLOR UR: YELLOW
CREAT BLD-MCNC: 0.64 MG/DL (ref 0.55–1.02)
DEPRECATED RDW RBC AUTO: 40.7 FL (ref 35.1–46.3)
EOSINOPHIL # BLD AUTO: 0.03 X10(3) UL (ref 0–0.7)
EOSINOPHIL NFR BLD AUTO: 0.3 %
ERYTHROCYTE [DISTWIDTH] IN BLOOD BY AUTOMATED COUNT: 12 % (ref 11–15)
GLUCOSE BLD-MCNC: 132 MG/DL (ref 70–99)
GLUCOSE UR-MCNC: NEGATIVE MG/DL
HCT VFR BLD AUTO: 41.1 % (ref 35–48)
HGB BLD-MCNC: 14.7 G/DL (ref 12–16)
IMM GRANULOCYTES # BLD AUTO: 0.04 X10(3) UL (ref 0–1)
IMM GRANULOCYTES NFR BLD: 0.4 %
KETONES UR-MCNC: 20 MG/DL
LYMPHOCYTES # BLD AUTO: 1.71 X10(3) UL (ref 1–4)
LYMPHOCYTES NFR BLD AUTO: 17.9 %
MCH RBC QN AUTO: 32.9 PG (ref 26–34)
MCHC RBC AUTO-ENTMCNC: 35.8 G/DL (ref 31–37)
MCV RBC AUTO: 91.9 FL (ref 80–100)
MONOCYTES # BLD AUTO: 0.47 X10(3) UL (ref 0.1–1)
MONOCYTES NFR BLD AUTO: 4.9 %
NEUTROPHILS # BLD AUTO: 7.27 X10 (3) UL (ref 1.5–7.7)
NEUTROPHILS # BLD AUTO: 7.27 X10(3) UL (ref 1.5–7.7)
NEUTROPHILS NFR BLD AUTO: 76.3 %
NITRITE UR QL STRIP.AUTO: NEGATIVE
OSMOLALITY SERPL CALC.SUM OF ELEC: 291 MOSM/KG (ref 275–295)
PH UR: 7 [PH] (ref 5–8)
PLATELET # BLD AUTO: 165 10(3)UL (ref 150–450)
POTASSIUM SERPL-SCNC: 2.6 MMOL/L (ref 3.5–5.1)
PROT UR-MCNC: NEGATIVE MG/DL
RBC # BLD AUTO: 4.47 X10(6)UL (ref 3.8–5.3)
RBC #/AREA URNS AUTO: 14 /HPF
SODIUM SERPL-SCNC: 141 MMOL/L (ref 136–145)
SP GR UR STRIP: 1.01 (ref 1–1.03)
TROPONIN I SERPL-MCNC: <0.045 NG/ML (ref ?–0.04)
UROBILINOGEN UR STRIP-ACNC: 4
VIT C UR-MCNC: NEGATIVE MG/DL
WBC # BLD AUTO: 9.5 X10(3) UL (ref 4–11)
WBC #/AREA URNS AUTO: 301 /HPF

## 2019-08-08 PROCEDURE — 94640 AIRWAY INHALATION TREATMENT: CPT

## 2019-08-08 PROCEDURE — 99285 EMERGENCY DEPT VISIT HI MDM: CPT

## 2019-08-08 PROCEDURE — 87086 URINE CULTURE/COLONY COUNT: CPT | Performed by: EMERGENCY MEDICINE

## 2019-08-08 PROCEDURE — 84484 ASSAY OF TROPONIN QUANT: CPT | Performed by: EMERGENCY MEDICINE

## 2019-08-08 PROCEDURE — 85025 COMPLETE CBC W/AUTO DIFF WBC: CPT | Performed by: EMERGENCY MEDICINE

## 2019-08-08 PROCEDURE — 80048 BASIC METABOLIC PNL TOTAL CA: CPT | Performed by: EMERGENCY MEDICINE

## 2019-08-08 PROCEDURE — 93005 ELECTROCARDIOGRAM TRACING: CPT

## 2019-08-08 PROCEDURE — 81001 URINALYSIS AUTO W/SCOPE: CPT | Performed by: EMERGENCY MEDICINE

## 2019-08-08 PROCEDURE — 71045 X-RAY EXAM CHEST 1 VIEW: CPT | Performed by: EMERGENCY MEDICINE

## 2019-08-08 PROCEDURE — 96365 THER/PROPH/DIAG IV INF INIT: CPT

## 2019-08-08 PROCEDURE — 93010 ELECTROCARDIOGRAM REPORT: CPT | Performed by: EMERGENCY MEDICINE

## 2019-08-08 PROCEDURE — 96366 THER/PROPH/DIAG IV INF ADDON: CPT

## 2019-08-08 PROCEDURE — 96375 TX/PRO/DX INJ NEW DRUG ADDON: CPT

## 2019-08-08 RX ORDER — PREDNISONE 20 MG/1
40 TABLET ORAL DAILY
Qty: 6 TABLET | Refills: 0 | Status: SHIPPED | OUTPATIENT
Start: 2019-08-08 | End: 2019-08-11

## 2019-08-08 RX ORDER — IPRATROPIUM BROMIDE AND ALBUTEROL SULFATE 2.5; .5 MG/3ML; MG/3ML
3 SOLUTION RESPIRATORY (INHALATION) ONCE
Status: COMPLETED | OUTPATIENT
Start: 2019-08-08 | End: 2019-08-08

## 2019-08-08 RX ORDER — IPRATROPIUM BROMIDE AND ALBUTEROL SULFATE 2.5; .5 MG/3ML; MG/3ML
3 SOLUTION RESPIRATORY (INHALATION)
Qty: 20 VIAL | Refills: 1 | Status: SHIPPED | OUTPATIENT
Start: 2019-08-08 | End: 2019-10-12

## 2019-08-08 RX ORDER — POTASSIUM CHLORIDE 20 MEQ/1
40 TABLET, EXTENDED RELEASE ORAL ONCE
Status: COMPLETED | OUTPATIENT
Start: 2019-08-08 | End: 2019-08-08

## 2019-08-08 RX ORDER — POTASSIUM CHLORIDE 1.5 G/1.77G
40 POWDER, FOR SOLUTION ORAL 2 TIMES DAILY
Qty: 20 PACKET | Refills: 0 | Status: SHIPPED | OUTPATIENT
Start: 2019-08-08 | End: 2019-08-13

## 2019-08-08 RX ORDER — METHYLPREDNISOLONE SODIUM SUCCINATE 125 MG/2ML
60 INJECTION, POWDER, LYOPHILIZED, FOR SOLUTION INTRAMUSCULAR; INTRAVENOUS ONCE
Status: COMPLETED | OUTPATIENT
Start: 2019-08-08 | End: 2019-08-08

## 2019-08-08 RX ORDER — POTASSIUM CHLORIDE 14.9 MG/ML
20 INJECTION INTRAVENOUS ONCE
Status: COMPLETED | OUTPATIENT
Start: 2019-08-08 | End: 2019-08-09

## 2019-08-09 VITALS
HEIGHT: 62 IN | DIASTOLIC BLOOD PRESSURE: 89 MMHG | OXYGEN SATURATION: 96 % | WEIGHT: 160 LBS | BODY MASS INDEX: 29.44 KG/M2 | SYSTOLIC BLOOD PRESSURE: 125 MMHG | RESPIRATION RATE: 16 BRPM | TEMPERATURE: 99 F | HEART RATE: 51 BPM

## 2019-08-09 NOTE — ED INITIAL ASSESSMENT (HPI)
The patient reports 4 days of productive cough with green sputum, shortness of breath, and mid anterior chest pain that became worse today. The patient also complains of fever at home.

## 2019-08-09 NOTE — ED PROVIDER NOTES
Patient Seen in: [facility] Emergency Department    History   Patient presents with:  Fever (infectious)  Dyspnea CRYSTAL SOB (respiratory)    Stated Complaint: sob, fever,     HPI    HPI: Cecile Babinski is a 47year old female with a history of COPD who 30 MG (3 TABS) FOR 3 DAYS  THEN TAKE 20 MG (2 TABS) FOR 3 DAYS  THEN TAKE 10 MG (1 TAB) FOR 3 DAYS THEN STOP   metRONIDAZOLE (FLAGYL) 500 MG Oral Tab,  Take 1 tablet (500 mg total) by mouth 2 (two) times daily.    aspirin 81 MG Oral Chew Tab,  Chew 1 tablet PULSE OX         Physical Exam  Constitutional: active rhonchorous cough, no sig resp distress, non-toxic appearance. Eyes: EOMI, PERRL, conjunctiva normal.  HENT: Atraumatic, oropharynx moist. Neck supple.   Respiratory: prolonged exp with b exp wheezin [facility]       Monitor Interpretation:  nsr 76    Radiology Interpretation:  Xr Chest Ap Portable  (cpt=71045)    Result Date: 8/8/2019  CONCLUSION: No acute cardiopulmonary abnormality.     Dictated by (CST): Mark Jose MD on 8/08/2019 at 21:27     Approv Oral Powd Pack  Take 40 mEq by mouth 2 (two) times daily for 5 days. Qty: 20 packet Refills: 0    !! - Potential duplicate medications found. Please discuss with provider.

## 2019-08-09 NOTE — CM/SW NOTE
Called by ED Tech for assistance getting patient to her daughters work. Pt is currently staying with her daughter and waited in the waiting room last night as she could not meet her daughter until this morning.  Pt states she has no money, no transportation

## 2019-08-26 RX ORDER — ALPRAZOLAM 1 MG/1
TABLET ORAL
Qty: 90 TABLET | Refills: 0 | Status: SHIPPED
Start: 2019-08-26 | End: 2019-09-24

## 2019-08-26 NOTE — TELEPHONE ENCOUNTER
Medication pending for review. If approved, needs to be called in or faxed by onsite staff. Please respond to pool: JIHAN [facility] LPN/CMA      Last office visit 7/2/19 with Dr Neptali Sky  Last refill 7/29/19 90 tablets with 0 refill.

## 2019-08-26 NOTE — TELEPHONE ENCOUNTER
Patient is out of medication and requesting medication be refilled today and called into the pharmacy

## 2019-09-25 RX ORDER — ALPRAZOLAM 1 MG/1
TABLET ORAL
Qty: 90 TABLET | Refills: 0 | Status: SHIPPED | OUTPATIENT
Start: 2019-09-25 | End: 2019-10-12

## 2019-09-25 NOTE — TELEPHONE ENCOUNTER
Message noted: Chart reviewed and may refill alpraozolam as requested times one. Script sent to listed pharmacy by secure method. Please notify patient. [place]. No concerns noted.

## 2019-09-25 NOTE — TELEPHONE ENCOUNTER
Controlled medications pending for review. If approved needs to be called in or faxed by on site staff.     Last Rx: 8-26-19 # 80  LOV: 7-2-19    Requested Prescriptions     Pending Prescriptions Disp Refills   • ALPRAZOLAM 1 MG Oral Tab [Pharmacy Med Name:

## 2019-09-28 ENCOUNTER — HOSPITAL ENCOUNTER (EMERGENCY)
Facility: HOSPITAL | Age: 54
Discharge: HOME | End: 2019-09-28
Attending: EMERGENCY MEDICINE
Payer: MEDICAID

## 2019-09-28 VITALS
HEIGHT: 62 IN | BODY MASS INDEX: 28.39 KG/M2 | SYSTOLIC BLOOD PRESSURE: 110 MMHG | DIASTOLIC BLOOD PRESSURE: 70 MMHG | RESPIRATION RATE: 18 BRPM | TEMPERATURE: 99 F | WEIGHT: 154.31 LBS | HEART RATE: 72 BPM | OXYGEN SATURATION: 99 %

## 2019-09-28 DIAGNOSIS — J44.1 COPD EXACERBATION (HCC): Primary | ICD-10-CM

## 2019-09-28 PROCEDURE — 94640 AIRWAY INHALATION TREATMENT: CPT

## 2019-09-28 PROCEDURE — 99284 EMERGENCY DEPT VISIT MOD MDM: CPT

## 2019-09-28 RX ORDER — PREDNISONE 20 MG/1
40 TABLET ORAL DAILY
Qty: 10 TABLET | Refills: 0 | Status: SHIPPED | OUTPATIENT
Start: 2019-09-28 | End: 2019-10-03

## 2019-09-28 RX ORDER — PREDNISONE 20 MG/1
40 TABLET ORAL ONCE
Status: COMPLETED | OUTPATIENT
Start: 2019-09-28 | End: 2019-09-28

## 2019-09-28 RX ORDER — IPRATROPIUM BROMIDE AND ALBUTEROL SULFATE 2.5; .5 MG/3ML; MG/3ML
3 SOLUTION RESPIRATORY (INHALATION) ONCE
Status: COMPLETED | OUTPATIENT
Start: 2019-09-28 | End: 2019-09-28

## 2019-09-28 RX ORDER — ALBUTEROL SULFATE 90 UG/1
2 AEROSOL, METERED RESPIRATORY (INHALATION) EVERY 4 HOURS PRN
Qty: 1 INHALER | Refills: 0 | Status: SHIPPED | OUTPATIENT
Start: 2019-09-28 | End: 2019-10-12 | Stop reason: ALTCHOICE

## 2019-09-28 NOTE — ED NOTES
Pt provided with discharge instructions. Verbalized understanding for plan of care at home and follow up. All questions/concerns addressed prior to discharge. Pt ambulatory out of  Er with steady gait.

## 2019-09-28 NOTE — ED INITIAL ASSESSMENT (HPI)
Pt c/o chest congestion and \"I hurt all over\". Pt reports she has been sick with cold symptoms for a couple weeks.

## 2019-09-28 NOTE — ED PROVIDER NOTES
Patient Seen in: [facility] Emergency Department    History   Patient presents with:  Cough/URI      HPI    Patient presents to the ED complaining of chest congestion, coughing and shortness of breath.   She states that she been sick for the past w She appears well-developed and well-nourished. No distress. HENT:   Head: Normocephalic and atraumatic. Eyes: Conjunctivae are normal. Right eye exhibits no discharge. Left eye exhibits no discharge. Neck: No tracheal deviation present.    Cardiovascu exam, expiratory wheezing but no respiratory distress. Given oral steroids and a DuoNeb treatment and symptoms completely resolved.   Patient's lungs clear to auscultation on reexamination, she is comfortable going home with further steroids and bronchial

## 2019-10-07 ENCOUNTER — NURSE TRIAGE (OUTPATIENT)
Dept: FAMILY MEDICINE CLINIC | Facility: CLINIC | Age: 54
End: 2019-10-07

## 2019-10-07 NOTE — TELEPHONE ENCOUNTER
Action Requested: Summary for Provider     []  Critical Lab, Recommendations Needed  [] Need Additional Advice  [x]   FYI    []   Need Orders  [] Need Medications Sent to Pharmacy  []  Other     SUMMARY: Patient c/o shortness of breath for a few weeks.   Sh

## 2019-10-07 NOTE — TELEPHONE ENCOUNTER
Patient states that is congested, but, is also having shortness of breath. Transferred call to triage.

## 2019-10-12 ENCOUNTER — OFFICE VISIT (OUTPATIENT)
Dept: FAMILY MEDICINE CLINIC | Facility: CLINIC | Age: 54
End: 2019-10-12
Payer: MEDICAID

## 2019-10-12 VITALS
HEART RATE: 52 BPM | OXYGEN SATURATION: 92 % | TEMPERATURE: 98 F | HEIGHT: 62 IN | BODY MASS INDEX: 29.81 KG/M2 | DIASTOLIC BLOOD PRESSURE: 76 MMHG | SYSTOLIC BLOOD PRESSURE: 122 MMHG | WEIGHT: 162 LBS

## 2019-10-12 DIAGNOSIS — R30.0 DYSURIA: ICD-10-CM

## 2019-10-12 DIAGNOSIS — J44.1 COPD EXACERBATION (HCC): Primary | ICD-10-CM

## 2019-10-12 PROCEDURE — 81002 URINALYSIS NONAUTO W/O SCOPE: CPT | Performed by: PHYSICIAN ASSISTANT

## 2019-10-12 PROCEDURE — 99213 OFFICE O/P EST LOW 20 MIN: CPT | Performed by: PHYSICIAN ASSISTANT

## 2019-10-12 PROCEDURE — 94640 AIRWAY INHALATION TREATMENT: CPT | Performed by: PHYSICIAN ASSISTANT

## 2019-10-12 RX ORDER — CEPHALEXIN 500 MG/1
500 CAPSULE ORAL 2 TIMES DAILY
Qty: 14 CAPSULE | Refills: 0 | Status: SHIPPED | OUTPATIENT
Start: 2019-10-12 | End: 2019-12-10

## 2019-10-12 RX ORDER — ALBUTEROL SULFATE 90 UG/1
2 AEROSOL, METERED RESPIRATORY (INHALATION) EVERY 4 HOURS PRN
Qty: 1 INHALER | Refills: 2 | Status: SHIPPED | OUTPATIENT
Start: 2019-10-12 | End: 2021-02-23

## 2019-10-12 RX ORDER — AZITHROMYCIN 250 MG/1
TABLET, FILM COATED ORAL
Qty: 6 TABLET | Refills: 0 | Status: SHIPPED | OUTPATIENT
Start: 2019-10-12 | End: 2019-11-04

## 2019-10-12 RX ORDER — ALPRAZOLAM 1 MG/1
TABLET ORAL
Qty: 30 TABLET | Refills: 0 | Status: SHIPPED | OUTPATIENT
Start: 2019-10-12 | End: 2019-11-19

## 2019-10-12 RX ORDER — ALBUTEROL SULFATE 2.5 MG/3ML
2.5 SOLUTION RESPIRATORY (INHALATION) ONCE
Status: COMPLETED | OUTPATIENT
Start: 2019-10-12 | End: 2019-10-12

## 2019-10-12 RX ADMIN — ALBUTEROL SULFATE 2.5 MG: 2.5 SOLUTION RESPIRATORY (INHALATION) at 11:38:00

## 2019-10-12 NOTE — PATIENT INSTRUCTIONS
Z-pack  Take 2 tablets together today, then 1 tablet for the next 4 days. You take it for 5 days, but it lasts in your system for 10 days.      Albuterol as needed for the wheezing    See pulmonology       Keflex   Take 1 pill at night and at 1 pill in the

## 2019-10-12 NOTE — PROGRESS NOTES
HPI:    Patient ID: Ben Shi is a 47year old female. Pt presents for follow up from the urgent care/ ER for chest congestion. Was diagnosed with COPD exacerbation. Patient is being treated with prednisone and albuterol inhaler.  Patient states sympto Tobacco Use      Smoking status: Current Every Day Smoker        Packs/day: 0.50        Years: 38.00        Pack years: 23      Smokeless tobacco: Never Used    Substance and Sexual Activity      Alcohol use: Never        Frequency: Never      Drug use: Clarance Cam    MDM     Vitals    09/28/19  0449 09/28/19  0450 09/28/19  0645  BP: 110/63   110/70  Pulse: 70   72  Resp: 18   18  Temp: 97.9 °F (36.6 °C) 98.6 °F (37 °C)    TempSrc: Temporal Oral    SpO2: 96%   99%  Weight:   70 kg    Height:   157.5 cm (5' 2\")   every 4 (four) hours as needed for Wheezing. Qty: 1 Inhaler Refills: 0     !! - Potential duplicate medications found. Please discuss with provider. Review of Systems   Constitutional: Negative for chills and fever.    HENT: Negative for congestion, Powder, Breath Activated, Inhale 1 puff into the lungs daily. , Disp: 1 each, Rfl: 0  ipratropium-albuterol 0.5-2.5 (3) MG/3ML Inhalation Solution, Take 3 mL by nebulization every 6 (six) hours as needed. , Disp: 1 Container, Rfl: 0  Ondansetron HCl (ZOFRAN) No hernia. Lymphadenopathy:     She has no cervical adenopathy. Neurological: She is alert and oriented to person, place, and time. Skin: Skin is warm and dry. ASSESSMENT/PLAN:   1.  COPD exacerbation ([address]  -After discussion with patient

## 2019-10-15 NOTE — PROGRESS NOTES
Patient's daughter Gerald Owens returned call, not on HIPAA, she explained her mother is currently homeless, has no phone and is using her daughter's phone. Theodore Yeh that a presription for antibiotics was sent to [facility].  She will  the rx for h

## 2019-10-19 RX ORDER — ALPRAZOLAM 1 MG/1
TABLET ORAL
Qty: 90 TABLET | Refills: 0 | OUTPATIENT
Start: 2019-10-19

## 2019-10-21 NOTE — TELEPHONE ENCOUNTER
Per patient the prescription was not written correctly. Per patient it is supposed to be quantity 90 not 30.

## 2019-10-22 RX ORDER — ALPRAZOLAM 1 MG/1
TABLET ORAL
Qty: 90 TABLET | Refills: 0 | Status: SHIPPED | OUTPATIENT
Start: 2019-10-22 | End: 2019-11-04

## 2019-10-22 NOTE — TELEPHONE ENCOUNTER
BRAEDEN Dutton 29 minutes ago (8:23 AM)         Refill noted. Okay to fill times one with no additional refills. Further refills from Dr. Yanni Delcid as he is her PCP.

## 2019-10-22 NOTE — TELEPHONE ENCOUNTER
Refill noted. Okay to fill times one with no additional refills. Further refills from Dr. Camron Kellogg as he is her PCP.

## 2019-10-22 NOTE — TELEPHONE ENCOUNTER
Controlled medication pending for review. Please change to phone in, fax, or print script if not being sent electronically.     Last Rx: 10-12-19 # 30  LOV: 7-2-19    Requested Prescriptions     Pending Prescriptions Disp Refills   • ALPRAZOLAM 1 MG Oral T

## 2019-10-31 ENCOUNTER — TELEPHONE (OUTPATIENT)
Dept: FAMILY MEDICINE CLINIC | Facility: CLINIC | Age: 54
End: 2019-10-31

## 2019-10-31 DIAGNOSIS — R30.0 DYSURIA: Primary | ICD-10-CM

## 2019-10-31 NOTE — TELEPHONE ENCOUNTER
Patient reports seen in [address] [place] [address] Rd 10/12 with Sailaja DERAS, was started on antibiotic for UTI. Pt reports completed treatment however still having vaginal discomfort, pain with urination, reports fever, and back pain, also confirmed had back pain prior to UTI.  Dick

## 2019-10-31 NOTE — TELEPHONE ENCOUNTER
I need a urine culture and UA before I can treat her. I can place the orders she can drop off the sample when she can.

## 2019-10-31 NOTE — TELEPHONE ENCOUNTER
Spoke with patient, informed of the nee to have a UA done, provided with lab location and hours.  Patient verbalizes understanding and agrees

## 2019-11-04 ENCOUNTER — OFFICE VISIT (OUTPATIENT)
Dept: FAMILY MEDICINE CLINIC | Facility: CLINIC | Age: 54
End: 2019-11-04
Payer: MEDICAID

## 2019-11-04 VITALS
TEMPERATURE: 98 F | HEIGHT: 62 IN | HEART RATE: 73 BPM | DIASTOLIC BLOOD PRESSURE: 78 MMHG | SYSTOLIC BLOOD PRESSURE: 117 MMHG | BODY MASS INDEX: 29.81 KG/M2 | WEIGHT: 162 LBS

## 2019-11-04 DIAGNOSIS — F41.9 ANXIETY: ICD-10-CM

## 2019-11-04 DIAGNOSIS — J01.00 ACUTE NON-RECURRENT MAXILLARY SINUSITIS: ICD-10-CM

## 2019-11-04 DIAGNOSIS — R30.9 PAINFUL URINATION: Primary | ICD-10-CM

## 2019-11-04 DIAGNOSIS — R30.0 DYSURIA: ICD-10-CM

## 2019-11-04 PROCEDURE — 99213 OFFICE O/P EST LOW 20 MIN: CPT | Performed by: PHYSICIAN ASSISTANT

## 2019-11-04 RX ORDER — METHOCARBAMOL 500 MG/1
500 TABLET, FILM COATED ORAL 4 TIMES DAILY
Refills: 0 | Status: CANCELLED | OUTPATIENT
Start: 2019-11-04

## 2019-11-04 RX ORDER — METHOCARBAMOL 500 MG/1
500 TABLET, FILM COATED ORAL 4 TIMES DAILY
COMMUNITY
End: 2019-11-04

## 2019-11-04 RX ORDER — METHOCARBAMOL 500 MG/1
500 TABLET, FILM COATED ORAL 4 TIMES DAILY
Qty: 90 TABLET | Refills: 1 | Status: SHIPPED | OUTPATIENT
Start: 2019-11-04 | End: 2020-02-18

## 2019-11-04 RX ORDER — ASPIRIN 81 MG/1
81 TABLET, CHEWABLE ORAL DAILY
Qty: 30 TABLET | Refills: 0 | Status: CANCELLED | OUTPATIENT
Start: 2019-11-04

## 2019-11-04 RX ORDER — LEVOFLOXACIN 250 MG/1
250 TABLET ORAL DAILY
Qty: 7 TABLET | Refills: 0 | Status: SHIPPED | OUTPATIENT
Start: 2019-11-04 | End: 2019-11-11

## 2019-11-04 RX ORDER — ASPIRIN 81 MG/1
81 TABLET, CHEWABLE ORAL DAILY
Qty: 30 TABLET | Refills: 3 | Status: SHIPPED | OUTPATIENT
Start: 2019-11-04

## 2019-11-04 RX ORDER — FLUOXETINE HYDROCHLORIDE 20 MG/1
60 CAPSULE ORAL DAILY
Qty: 90 CAPSULE | Refills: 2 | Status: SHIPPED | OUTPATIENT
Start: 2019-11-04 | End: 2020-04-14

## 2019-11-04 RX ORDER — LEVOFLOXACIN 250 MG/1
250 TABLET ORAL DAILY
Qty: 4 TABLET | Refills: 0 | Status: SHIPPED | OUTPATIENT
Start: 2019-11-04 | End: 2019-11-04

## 2019-11-04 NOTE — TELEPHONE ENCOUNTER
Condition update: Pt finished Keflex as well as zpak. Urinary symptoms persist with dysuria and hematuria, intermittent chills, and flank pain. Not feeling better. Hx of past MRSA and most antibiotics don't work on her.    Did say clindamycin and levaquin w

## 2019-11-04 NOTE — PROGRESS NOTES
HPI:    Patient ID: Lamount Hashimoto is a 47year old female. Patient presents for follow-up on UTI. She has hematuria. She completed the keflex with no relief. She has fever/chills. Patient on refills of her prozac, aspirin, and methocarbamol as well.  Idalmis Grandchild Cap Take 3 capsules (60 mg total) by mouth daily. 90 capsule 2   • levofloxacin 250 MG Oral Tab Take 1 tablet (250 mg total) by mouth daily for 7 days.  7 tablet 0   • Albuterol Sulfate HFA (PROVENTIL HFA) 108 (90 Base) MCG/ACT Inhalation Aero Soln Inhale 2 Tympanic membrane is not erythematous. No cerumen present  Left Ear: Tympanic membrane and ear canal normal. Tympanic membrane is not erythematous.  No cerumen present  Nose: Nose normal.   Mouth/Throat: Oropharynx is clear and moist. No oropharyngeal exuda was agreeable to plan and will comply with discussion above.          Orders Placed This Encounter      POCT urinalysis dipstick      Meds This Visit:  Requested Prescriptions     Signed Prescriptions Disp Refills   • aspirin 81 MG Oral Chew Tab 30 tablet 3

## 2019-11-04 NOTE — PATIENT INSTRUCTIONS
Levaquin   Take 1 tablet by mouth for 7 days. Drink plenty of fluids  Cranberry juice    Return if not improving on Levaquin     Take prozac daily. Do not miss doses.

## 2019-11-04 NOTE — TELEPHONE ENCOUNTER
Called patient's cell phone and daughter Chucky Montejo picked up call. Daughter states her mother is homeless and gave her phone number as contact number. She is not on FYI and no verbal release was signed by patient for staff to speak to her daughter.  Per aravind

## 2019-11-19 ENCOUNTER — TELEPHONE (OUTPATIENT)
Dept: FAMILY MEDICINE CLINIC | Facility: CLINIC | Age: 54
End: 2019-11-19

## 2019-11-19 RX ORDER — ALPRAZOLAM 1 MG/1
TABLET ORAL
Qty: 30 TABLET | Refills: 0 | Status: SHIPPED | OUTPATIENT
Start: 2019-11-19 | End: 2019-12-19

## 2019-11-19 NOTE — TELEPHONE ENCOUNTER
Review pended refill request.  It does not fall under a protocol. Last rx 10/12/19.      Requested Prescriptions     Pending Prescriptions Disp Refills   • ALPRAZolam 1 MG Oral Tab 30 tablet 0     Sig: TAKE 1 TABLET BY MOUTH THREE TIMES DAILY AS NEEDED

## 2019-11-19 NOTE — TELEPHONE ENCOUNTER
Pt called requesting refill for   Current Outpatient Medications   Medication Sig Dispense Refill   • ALPRAZolam 1 MG Oral Tab TAKE 1 TABLET BY MOUTH THREE TIMES DAILY AS NEEDED FOR ANXIETY 30 tablet 0

## 2019-11-21 ENCOUNTER — TELEPHONE (OUTPATIENT)
Dept: FAMILY MEDICINE CLINIC | Facility: CLINIC | Age: 54
End: 2019-11-21

## 2019-11-21 NOTE — TELEPHONE ENCOUNTER
Message noted; will forward to EFREN Bustos to address alprazolam refill as she has been seeing patient.

## 2019-11-21 NOTE — TELEPHONE ENCOUNTER
Patient called and stated she had requested the refill to be 90 tablets and not 30. Requesting to speak with a nurse. Transferring to triage.

## 2019-11-21 NOTE — TELEPHONE ENCOUNTER
Patient called back because her ride is getting ready to leave and she needs the prescription filled now. Requesting to speak with nurse. Transferring to triage.

## 2019-11-21 NOTE — TELEPHONE ENCOUNTER
Patient at pharmacy waiting for response to below message. States \"she needs to wait here until the prescription is sent. \" Linked Dr. Ynes Zamorano.

## 2019-11-21 NOTE — TELEPHONE ENCOUNTER
Per pharmacy on file patient is asking for 90 tabs due to patient is taking 3 times a day NOT once a day.

## 2019-11-21 NOTE — TELEPHONE ENCOUNTER
Pt calling states did not want to  prescription for alprazolam at [facility] because the quantity is for 30 days. Pt states she normally gets a 90 day supply.   Pt states she did not  prescription because it will mess up the way it is prescrib

## 2019-11-21 NOTE — TELEPHONE ENCOUNTER
The patient called back again and stated needs to be refilled;    I called in the #90 day as the order below to the pharmacy. The pharmacy will refill.

## 2019-12-03 ENCOUNTER — TELEPHONE (OUTPATIENT)
Dept: FAMILY MEDICINE CLINIC | Facility: CLINIC | Age: 54
End: 2019-12-03

## 2019-12-03 DIAGNOSIS — N39.0 URINARY TRACT INFECTION WITH HEMATURIA, SITE UNSPECIFIED: Primary | ICD-10-CM

## 2019-12-03 DIAGNOSIS — R31.9 URINARY TRACT INFECTION WITH HEMATURIA, SITE UNSPECIFIED: Primary | ICD-10-CM

## 2019-12-03 NOTE — TELEPHONE ENCOUNTER
Advised patient on Dr. Shyanne Brown information and recommendations. Patient verbalized understanding.

## 2019-12-03 NOTE — TELEPHONE ENCOUNTER
danie seen in [address] on 11/4/19 for painful urination. She called in today with symptoms of abdominal discomfort, frequent urination with hematuria (\"tiny blood only seen on tissue\"), feeling like \"every time she urinates she has to go again. \"  Advised s

## 2019-12-03 NOTE — TELEPHONE ENCOUNTER
Patient states that she still has the feeling of persistent urination and nothing comes out. Per pt she is also having pain on her lower abdomen. Transferred call to triage.

## 2019-12-05 ENCOUNTER — APPOINTMENT (OUTPATIENT)
Dept: LAB | Age: 54
End: 2019-12-05
Attending: FAMILY MEDICINE
Payer: MEDICAID

## 2019-12-05 PROCEDURE — 87086 URINE CULTURE/COLONY COUNT: CPT | Performed by: FAMILY MEDICINE

## 2019-12-10 ENCOUNTER — OFFICE VISIT (OUTPATIENT)
Dept: FAMILY MEDICINE CLINIC | Facility: CLINIC | Age: 54
End: 2019-12-10
Payer: MEDICAID

## 2019-12-10 VITALS
HEART RATE: 64 BPM | TEMPERATURE: 98 F | OXYGEN SATURATION: 93 % | SYSTOLIC BLOOD PRESSURE: 111 MMHG | RESPIRATION RATE: 16 BRPM | HEIGHT: 62 IN | DIASTOLIC BLOOD PRESSURE: 77 MMHG | WEIGHT: 164 LBS | BODY MASS INDEX: 30.18 KG/M2

## 2019-12-10 DIAGNOSIS — J01.90 ACUTE SINUSITIS, RECURRENCE NOT SPECIFIED, UNSPECIFIED LOCATION: ICD-10-CM

## 2019-12-10 DIAGNOSIS — J44.1 COPD EXACERBATION (HCC): ICD-10-CM

## 2019-12-10 PROCEDURE — 99213 OFFICE O/P EST LOW 20 MIN: CPT | Performed by: FAMILY MEDICINE

## 2019-12-10 RX ORDER — LEVOFLOXACIN 500 MG/1
500 TABLET, FILM COATED ORAL DAILY
Qty: 10 TABLET | Refills: 0 | Status: SHIPPED | OUTPATIENT
Start: 2019-12-10 | End: 2020-03-06

## 2019-12-10 RX ORDER — PREDNISONE 20 MG/1
TABLET ORAL
Qty: 12 TABLET | Refills: 0 | Status: SHIPPED | OUTPATIENT
Start: 2019-12-10 | End: 2020-04-18

## 2019-12-10 RX ORDER — ALBUTEROL SULFATE 90 UG/1
2 AEROSOL, METERED RESPIRATORY (INHALATION) EVERY 4 HOURS PRN
Qty: 1 INHALER | Refills: 2 | Status: SHIPPED | OUTPATIENT
Start: 2019-12-10 | End: 2020-04-14

## 2019-12-10 NOTE — PROGRESS NOTES
HPI:    Patient ID: Araceli Ours is a 47year old female. Patient is here breathing issues for the last few days. Pt has hx of COPD. Pt needs a refill for her inhalers. No fevers but some PND / sinus symptoms.     No fevers but has had dental issues and spray by Nasal route daily. One spray per each nostril daily. 1 Inhaler 2   • Montelukast Sodium 10 MG Oral Tab Take 1 tablet (10 mg total) by mouth nightly. 30 tablet 2   • atorvastatin 20 MG Oral Tab Take 1 tablet (20 mg total) by mouth nightly.  30 table Also to follow up with pulmonary for follow up on her COPD. ER if any sig symptoms. No orders of the defined types were placed in this encounter.       Meds This Visit:  Requested Prescriptions     Signed Prescriptions Disp Refills   • predniSONE 20 MG

## 2019-12-19 RX ORDER — ALPRAZOLAM 1 MG/1
TABLET ORAL
Qty: 90 TABLET | Refills: 0 | Status: SHIPPED | OUTPATIENT
Start: 2019-12-19 | End: 2020-01-18

## 2019-12-26 ENCOUNTER — TELEPHONE (OUTPATIENT)
Dept: FAMILY MEDICINE CLINIC | Facility: CLINIC | Age: 54
End: 2019-12-26

## 2019-12-26 NOTE — TELEPHONE ENCOUNTER
Patient is requesting most physical and labs results to be faxed [facility], methadone clinic [address] in [place], [place] for treatment.     [phone], fax #   [phone], main #

## 2020-01-02 NOTE — TELEPHONE ENCOUNTER
Pt is requesting to speak with RN in regards to message below. Pt states she have not received the fax and wants an explanation.  Please advise thank you

## 2020-01-04 NOTE — TELEPHONE ENCOUNTER
Spoke with methadone clinic and informed them patient has not had physical with us, all visits ar were acute since 5/2019 and last labs were from the hospital in august, they said to fax last [address] 5/2019 visit and august labs, they will contact patient to sc

## 2020-01-14 ENCOUNTER — NURSE TRIAGE (OUTPATIENT)
Dept: FAMILY MEDICINE CLINIC | Facility: CLINIC | Age: 55
End: 2020-01-14

## 2020-01-14 NOTE — TELEPHONE ENCOUNTER
If not better, to make appointment for evaluation today or at least go to immediate care to evaluate if no appointments available.  If having sig SOB or exacerbation of COPD should go to ER

## 2020-01-16 NOTE — TELEPHONE ENCOUNTER
Can send letter, but since this is for an acute issue, better to get a hold of patient with recommendations and reassessment

## 2020-01-16 NOTE — TELEPHONE ENCOUNTER
Called patient at [phone] and [phone]    No answer at either number. Able to get in touch with patients daughter, Israel Branham at [phone] who said she would have her mother contact the office after 1pm.    Encounter routed to doctor. If no repsonse received, should we send a certified letter?     Please reply to pool: JIHAN Marte Channel

## 2020-01-16 NOTE — TELEPHONE ENCOUNTER
No response received from patient. Certified letter to be sent to patient.     Tracking number#[id number]    Sent to Sheree James as an [id number] [address]

## 2020-01-17 NOTE — TELEPHONE ENCOUNTER
Controlled medication pending for review. If approved needs to be called in or faxed by on-site staff.     Last Rx: 12/19/19  LOV: 12/10/19

## 2020-01-17 NOTE — TELEPHONE ENCOUNTER
Patient states she requested the refill on the Ibuprofen due to bilateral knee pain. Please advise if refill is appropriate. Refill passed per [facility] protocol.   Refill Protocol Appointment Criteria  · Appointment scheduled in the past 6 months

## 2020-01-18 RX ORDER — IBUPROFEN 400 MG/1
TABLET ORAL
Qty: 45 TABLET | Refills: 0 | Status: SHIPPED | OUTPATIENT
Start: 2020-01-18 | End: 2020-02-19

## 2020-01-18 RX ORDER — ALPRAZOLAM 1 MG/1
TABLET ORAL
Qty: 90 TABLET | Refills: 0 | Status: SHIPPED | OUTPATIENT
Start: 2020-01-18 | End: 2020-02-19

## 2020-01-18 NOTE — TELEPHONE ENCOUNTER
Message noted: Chart reviewed and may refill alprazolam /Iibuprofen as requested times one. Scripts sent to listed pharmacy by secure method. Please notify patient[place]  Triage was trying to contact pt about some acute

## 2020-01-23 ENCOUNTER — OFFICE VISIT (OUTPATIENT)
Dept: FAMILY MEDICINE CLINIC | Facility: CLINIC | Age: 55
End: 2020-01-23
Payer: MEDICAID

## 2020-01-23 VITALS
WEIGHT: 164 LBS | HEIGHT: 62 IN | HEART RATE: 71 BPM | DIASTOLIC BLOOD PRESSURE: 66 MMHG | SYSTOLIC BLOOD PRESSURE: 103 MMHG | TEMPERATURE: 98 F | BODY MASS INDEX: 30.18 KG/M2 | RESPIRATION RATE: 16 BRPM

## 2020-01-23 DIAGNOSIS — J44.1 COPD EXACERBATION (HCC): ICD-10-CM

## 2020-01-23 DIAGNOSIS — R53.81 MALAISE AND FATIGUE: Primary | ICD-10-CM

## 2020-01-23 DIAGNOSIS — R53.83 MALAISE AND FATIGUE: Primary | ICD-10-CM

## 2020-01-23 PROCEDURE — 99213 OFFICE O/P EST LOW 20 MIN: CPT | Performed by: FAMILY MEDICINE

## 2020-01-23 NOTE — PROGRESS NOTES
HPI:    Patient ID: Juancarlos Ramirez is a 47year old female. Pt presents with not feeling well over the last week. Pt has had hx of several medical issues. Pt has hx of CAD, COPD. Pt has had some SOB and dyspnea.  Has not been able to follow up with her va Oral Cap Take 3 capsules (60 mg total) by mouth daily. 90 capsule 2   • Albuterol Sulfate HFA (PROVENTIL HFA) 108 (90 Base) MCG/ACT Inhalation Aero Soln Inhale 2 puffs into the lungs every 4 (four) hours as needed for Wheezing.  1 Inhaler 2   • Fluticasone ER.     No orders of the defined types were placed in this encounter.       Meds This Visit:  Requested Prescriptions      No prescriptions requested or ordered in this encounter       Imaging & Referrals:  None       WD#[id number]

## 2020-01-25 ENCOUNTER — APPOINTMENT (OUTPATIENT)
Dept: GENERAL RADIOLOGY | Facility: HOSPITAL | Age: 55
End: 2020-01-25
Payer: MEDICAID

## 2020-01-25 ENCOUNTER — HOSPITAL ENCOUNTER (EMERGENCY)
Facility: HOSPITAL | Age: 55
Discharge: HOME | End: 2020-01-25
Payer: MEDICAID

## 2020-01-25 VITALS
RESPIRATION RATE: 18 BRPM | BODY MASS INDEX: 30.36 KG/M2 | HEART RATE: 75 BPM | WEIGHT: 165 LBS | TEMPERATURE: 99 F | OXYGEN SATURATION: 96 % | DIASTOLIC BLOOD PRESSURE: 70 MMHG | HEIGHT: 62 IN | SYSTOLIC BLOOD PRESSURE: 110 MMHG

## 2020-01-25 DIAGNOSIS — N30.00 ACUTE CYSTITIS WITHOUT HEMATURIA: Primary | ICD-10-CM

## 2020-01-25 LAB
ANION GAP SERPL CALC-SCNC: 6 MMOL/L (ref 0–18)
BASOPHILS # BLD AUTO: 0.01 X10(3) UL (ref 0–0.2)
BASOPHILS NFR BLD AUTO: 0.3 %
BILIRUB UR QL: NEGATIVE
BUN BLD-MCNC: 15 MG/DL (ref 7–18)
BUN/CREAT SERPL: 16.3 (ref 10–20)
CALCIUM BLD-MCNC: 9.3 MG/DL (ref 8.5–10.1)
CHLORIDE SERPL-SCNC: 101 MMOL/L (ref 98–112)
CLARITY UR: CLEAR
CO2 SERPL-SCNC: 29 MMOL/L (ref 21–32)
COLOR UR: YELLOW
CREAT BLD-MCNC: 0.92 MG/DL (ref 0.55–1.02)
DEPRECATED RDW RBC AUTO: 40.7 FL (ref 35.1–46.3)
EOSINOPHIL # BLD AUTO: 0.09 X10(3) UL (ref 0–0.7)
EOSINOPHIL NFR BLD AUTO: 2.4 %
ERYTHROCYTE [DISTWIDTH] IN BLOOD BY AUTOMATED COUNT: 12.3 % (ref 11–15)
GLUCOSE BLD-MCNC: 110 MG/DL (ref 70–99)
GLUCOSE UR-MCNC: NEGATIVE MG/DL
HCT VFR BLD AUTO: 39.2 % (ref 35–48)
HGB BLD-MCNC: 14.1 G/DL (ref 12–16)
HGB UR QL STRIP.AUTO: NEGATIVE
HYALINE CASTS #/AREA URNS AUTO: 12 /LPF
IMM GRANULOCYTES # BLD AUTO: 0.01 X10(3) UL (ref 0–1)
IMM GRANULOCYTES NFR BLD: 0.3 %
LYMPHOCYTES # BLD AUTO: 0.6 X10(3) UL (ref 1–4)
LYMPHOCYTES NFR BLD AUTO: 16.3 %
MCH RBC QN AUTO: 32.6 PG (ref 26–34)
MCHC RBC AUTO-ENTMCNC: 36 G/DL (ref 31–37)
MCV RBC AUTO: 90.5 FL (ref 80–100)
MONOCYTES # BLD AUTO: 0.34 X10(3) UL (ref 0.1–1)
MONOCYTES NFR BLD AUTO: 9.2 %
NEUTROPHILS # BLD AUTO: 2.64 X10 (3) UL (ref 1.5–7.7)
NEUTROPHILS # BLD AUTO: 2.64 X10(3) UL (ref 1.5–7.7)
NEUTROPHILS NFR BLD AUTO: 71.5 %
NITRITE UR QL STRIP.AUTO: NEGATIVE
OSMOLALITY SERPL CALC.SUM OF ELEC: 283 MOSM/KG (ref 275–295)
PH UR: 5 [PH] (ref 5–8)
PLATELET # BLD AUTO: 101 10(3)UL (ref 150–450)
POTASSIUM SERPL-SCNC: 3.6 MMOL/L (ref 3.5–5.1)
PROT UR-MCNC: 30 MG/DL
RBC # BLD AUTO: 4.33 X10(6)UL (ref 3.8–5.3)
RBC #/AREA URNS AUTO: 4 /HPF
SODIUM SERPL-SCNC: 136 MMOL/L (ref 136–145)
SP GR UR STRIP: 1.03 (ref 1–1.03)
TROPONIN I SERPL-MCNC: <0.045 NG/ML (ref ?–0.04)
UROBILINOGEN UR STRIP-ACNC: 4
WBC # BLD AUTO: 3.7 X10(3) UL (ref 4–11)
WBC #/AREA URNS AUTO: 26 /HPF

## 2020-01-25 PROCEDURE — 85025 COMPLETE CBC W/AUTO DIFF WBC: CPT

## 2020-01-25 PROCEDURE — 93010 ELECTROCARDIOGRAM REPORT: CPT | Performed by: INTERNAL MEDICINE

## 2020-01-25 PROCEDURE — 80048 BASIC METABOLIC PNL TOTAL CA: CPT

## 2020-01-25 PROCEDURE — 99284 EMERGENCY DEPT VISIT MOD MDM: CPT

## 2020-01-25 PROCEDURE — 87086 URINE CULTURE/COLONY COUNT: CPT

## 2020-01-25 PROCEDURE — 81001 URINALYSIS AUTO W/SCOPE: CPT

## 2020-01-25 PROCEDURE — 71046 X-RAY EXAM CHEST 2 VIEWS: CPT

## 2020-01-25 PROCEDURE — 84484 ASSAY OF TROPONIN QUANT: CPT

## 2020-01-25 PROCEDURE — 36415 COLL VENOUS BLD VENIPUNCTURE: CPT

## 2020-01-25 PROCEDURE — 93005 ELECTROCARDIOGRAM TRACING: CPT

## 2020-01-25 RX ORDER — SULFAMETHOXAZOLE AND TRIMETHOPRIM 800; 160 MG/1; MG/1
1 TABLET ORAL ONCE
Status: COMPLETED | OUTPATIENT
Start: 2020-01-25 | End: 2020-01-25

## 2020-01-25 RX ORDER — SULFAMETHOXAZOLE AND TRIMETHOPRIM 800; 160 MG/1; MG/1
1 TABLET ORAL 2 TIMES DAILY
Qty: 6 TABLET | Refills: 0 | Status: SHIPPED | OUTPATIENT
Start: 2020-01-25 | End: 2020-01-28

## 2020-01-25 NOTE — ED INITIAL ASSESSMENT (HPI)
Saw pcp 2 days ago for sob, right foot pain and chest pain. Was told to come to the ER then but didn't d/t not having a car. Also complains of dizziness and mouth pain.

## 2020-01-26 NOTE — ED PROVIDER NOTES
Patient Seen in: [facility] Emergency Department    History   Patient presents with:  Weakness      HPI    Patient presents to the ED complaining of fatigue, mild cough and dull chest pain for the past several days.   Symptoms mild in severity, not Normocephalic and atraumatic. Eyes: Conjunctivae are normal. Right eye exhibits no discharge. Left eye exhibits no discharge. Neck: No tracheal deviation present.    Cardiovascular: Normal rate, regular rhythm, normal heart sounds and intact distal puls Please view results for these tests on the individual orders.    RAINBOW DRAW BLUE   RAINBOW DRAW LAVENDER   RAINBOW DRAW LIGHT GREEN   RAINBOW DRAW GOLD   URINE CULTURE, ROUTINE     EKG    Rate, intervals and a follow-up.     Condition upon leaving the department: Stable    Disposition and Plan     Clinical Impression:  Acute cystitis without hematuria  (primary encounter diagnosis)    Disposition:  Discharge    Follow-up:  MD Jose Rodriguez [address]

## 2020-02-01 ENCOUNTER — TELEPHONE (OUTPATIENT)
Dept: FAMILY MEDICINE CLINIC | Facility: CLINIC | Age: 55
End: 2020-02-01

## 2020-02-01 NOTE — TELEPHONE ENCOUNTER
Patient states was seen in ER on 1/25 for Acute Cystitis and prescribed Bactrim. States \"I'm not any better today and finished my antibiotics. \"  States urine frequency, burning, fever. Denies seeing blood in urine.   Also states she's having a COPD ex

## 2020-02-03 ENCOUNTER — TELEPHONE (OUTPATIENT)
Dept: OTHER | Age: 55
End: 2020-02-03

## 2020-02-03 NOTE — TELEPHONE ENCOUNTER
Patient unable to make appointment today  Called to cancel and reschedule for 2/4/20 with Dr. Jennifer Louis.

## 2020-02-19 RX ORDER — ALPRAZOLAM 1 MG/1
TABLET ORAL
Qty: 90 TABLET | Refills: 0 | Status: SHIPPED | OUTPATIENT
Start: 2020-02-19 | End: 2020-03-17

## 2020-02-19 RX ORDER — IBUPROFEN 400 MG/1
TABLET ORAL
Qty: 45 TABLET | Refills: 0 | Status: SHIPPED | OUTPATIENT
Start: 2020-02-19 | End: 2020-03-17

## 2020-02-19 RX ORDER — METHOCARBAMOL 500 MG/1
TABLET, FILM COATED ORAL
Qty: 90 TABLET | Refills: 0 | Status: SHIPPED | OUTPATIENT
Start: 2020-02-19 | End: 2020-03-20

## 2020-02-19 NOTE — TELEPHONE ENCOUNTER
Review pended refill request as it does not fall under a protocol.     Last Rx: 1/18/20  LOV: 3 weeks ago

## 2020-02-19 NOTE — TELEPHONE ENCOUNTER
Message noted: Chart reviewed and may refill ibuprofen /alprazolam as requested times one. Script sent to listed pharmacy by secure method. Please notify [place] No concerns noted.

## 2020-02-19 NOTE — TELEPHONE ENCOUNTER
Review pended refill request as it does not fall under a protocol.     Last Rx: 11/4/19  LOV: 3 weeks ago

## 2020-02-24 ENCOUNTER — NURSE TRIAGE (OUTPATIENT)
Dept: FAMILY MEDICINE CLINIC | Facility: CLINIC | Age: 55
End: 2020-02-24

## 2020-02-24 ENCOUNTER — OFFICE VISIT (OUTPATIENT)
Dept: INTERNAL MEDICINE CLINIC | Facility: CLINIC | Age: 55
End: 2020-02-24
Payer: MEDICAID

## 2020-02-24 VITALS
TEMPERATURE: 98 F | HEART RATE: 54 BPM | WEIGHT: 169 LBS | DIASTOLIC BLOOD PRESSURE: 65 MMHG | SYSTOLIC BLOOD PRESSURE: 109 MMHG | HEIGHT: 62 IN | BODY MASS INDEX: 31.1 KG/M2

## 2020-02-24 DIAGNOSIS — R39.9 UTI SYMPTOMS: Primary | ICD-10-CM

## 2020-02-24 DIAGNOSIS — R31.0 GROSS HEMATURIA: ICD-10-CM

## 2020-02-24 LAB
MULTISTIX LOT#: ABNORMAL NUMERIC
PH, URINE: 8.5 (ref 4.5–8)
SPECIFIC GRAVITY: 1 (ref 1–1.03)
URINE-COLOR: YELLOW
UROBILINOGEN,SEMI-QN: 0.2 MG/DL (ref 0–1.9)

## 2020-02-24 PROCEDURE — 81002 URINALYSIS NONAUTO W/O SCOPE: CPT | Performed by: PHYSICIAN ASSISTANT

## 2020-02-24 PROCEDURE — 99203 OFFICE O/P NEW LOW 30 MIN: CPT | Performed by: PHYSICIAN ASSISTANT

## 2020-02-24 RX ORDER — NITROFURANTOIN 25; 75 MG/1; MG/1
100 CAPSULE ORAL 2 TIMES DAILY
Qty: 14 CAPSULE | Refills: 0 | Status: SHIPPED | OUTPATIENT
Start: 2020-02-24 | End: 2020-04-18

## 2020-02-24 NOTE — PROGRESS NOTES
HPI:    Patient ID: Chinedu Figueroa is a 47year old female. HPI  Patient presents with weakness, abdominal pain, foul odor in her urine, frequency and blood in her urine. Has had this for a week.  Has had similar episodes in the past. Does admit she does no Fluticasone Propionate 50 MCG/ACT Nasal Suspension 1 spray by Nasal route daily. One spray per each nostril daily. 1 Inhaler 2   • Montelukast Sodium 10 MG Oral Tab Take 1 tablet (10 mg total) by mouth nightly.  30 tablet 2   • atorvastatin 20 MG Oral Tab T She appears well-developed and well-nourished. HENT:   Head: Normocephalic and atraumatic.    Right Ear: Tympanic membrane normal.   Left Ear: Tympanic membrane normal.   Nose: Nose normal.   Mouth/Throat: Oropharynx is clear and moist.   Eyes: Pupils are

## 2020-02-24 NOTE — TELEPHONE ENCOUNTER
Action Requested: Summary for Provider     []  Critical Lab, Recommendations Needed  [] Need Additional Advice  []   FYI    []   Need Orders  [] Need Medications Sent to Pharmacy  []  Other     SUMMARY:  Per protocol advised office visit today.   Scheduled

## 2020-03-05 ENCOUNTER — NURSE TRIAGE (OUTPATIENT)
Dept: INTERNAL MEDICINE CLINIC | Facility: CLINIC | Age: 55
End: 2020-03-05

## 2020-03-05 NOTE — TELEPHONE ENCOUNTER
Action Requested: Summary for Provider     []  Critical Lab, Recommendations Needed  [] Need Additional Advice  []   FYI    []   Need Orders  [] Need Medications Sent to Pharmacy  []  Other     SUMMARY: appt made for Friday with Elizabeth Andersen.     Reason for

## 2020-03-06 ENCOUNTER — LAB ENCOUNTER (OUTPATIENT)
Dept: LAB | Age: 55
End: 2020-03-06
Attending: PHYSICIAN ASSISTANT
Payer: MEDICAID

## 2020-03-06 ENCOUNTER — OFFICE VISIT (OUTPATIENT)
Dept: FAMILY MEDICINE CLINIC | Facility: CLINIC | Age: 55
End: 2020-03-06
Payer: MEDICAID

## 2020-03-06 VITALS
DIASTOLIC BLOOD PRESSURE: 69 MMHG | BODY MASS INDEX: 31.06 KG/M2 | HEIGHT: 62 IN | OXYGEN SATURATION: 96 % | SYSTOLIC BLOOD PRESSURE: 119 MMHG | HEART RATE: 55 BPM | TEMPERATURE: 98 F | WEIGHT: 168.81 LBS

## 2020-03-06 DIAGNOSIS — R06.2 WHEEZING: ICD-10-CM

## 2020-03-06 DIAGNOSIS — R53.83 OTHER FATIGUE: ICD-10-CM

## 2020-03-06 DIAGNOSIS — R00.2 PALPITATIONS: ICD-10-CM

## 2020-03-06 DIAGNOSIS — R30.0 BURNING WITH URINATION: ICD-10-CM

## 2020-03-06 DIAGNOSIS — R30.0 BURNING WITH URINATION: Primary | ICD-10-CM

## 2020-03-06 PROBLEM — Z72.0 TOBACCO ABUSE: Status: ACTIVE | Noted: 2018-04-10

## 2020-03-06 PROBLEM — J96.01 ACUTE RESPIRATORY FAILURE WITH HYPOXIA AND HYPERCAPNIA (HCC): Status: RESOLVED | Noted: 2019-05-25 | Resolved: 2020-03-06

## 2020-03-06 PROBLEM — F14.90 CRACK COCAINE USE: Status: ACTIVE | Noted: 2020-03-06

## 2020-03-06 PROBLEM — E87.2 RESPIRATORY ACIDOSIS: Status: RESOLVED | Noted: 2019-05-25 | Resolved: 2020-03-06

## 2020-03-06 PROBLEM — E87.29 RESPIRATORY ACIDOSIS: Status: RESOLVED | Noted: 2019-05-25 | Resolved: 2020-03-06

## 2020-03-06 PROBLEM — F11.90 METHADONE USE: Status: ACTIVE | Noted: 2018-04-13

## 2020-03-06 PROBLEM — E66.9 OBESITY (BMI 30-39.9): Status: ACTIVE | Noted: 2018-04-10

## 2020-03-06 PROBLEM — J96.02 ACUTE RESPIRATORY FAILURE WITH HYPOXIA AND HYPERCAPNIA (HCC): Status: RESOLVED | Noted: 2019-05-25 | Resolved: 2020-03-06

## 2020-03-06 PROBLEM — R00.1 BRADYCARDIA BY ELECTROCARDIOGRAM: Status: ACTIVE | Noted: 2020-03-06

## 2020-03-06 LAB
ANION GAP SERPL CALC-SCNC: 3 MMOL/L (ref 0–18)
BASOPHILS # BLD AUTO: 0.01 X10(3) UL (ref 0–0.2)
BASOPHILS NFR BLD AUTO: 0.3 %
BILIRUBIN: NEGATIVE
BUN BLD-MCNC: 13 MG/DL (ref 7–18)
BUN/CREAT SERPL: 20.3 (ref 10–20)
CALCIUM BLD-MCNC: 9.4 MG/DL (ref 8.5–10.1)
CHLORIDE SERPL-SCNC: 106 MMOL/L (ref 98–112)
CO2 SERPL-SCNC: 31 MMOL/L (ref 21–32)
CREAT BLD-MCNC: 0.64 MG/DL (ref 0.55–1.02)
DEPRECATED RDW RBC AUTO: 43.5 FL (ref 35.1–46.3)
EOSINOPHIL # BLD AUTO: 0.04 X10(3) UL (ref 0–0.7)
EOSINOPHIL NFR BLD AUTO: 1.1 %
ERYTHROCYTE [DISTWIDTH] IN BLOOD BY AUTOMATED COUNT: 12.7 % (ref 11–15)
GLUCOSE (URINE DIPSTICK): NEGATIVE MG/DL
GLUCOSE BLD-MCNC: 79 MG/DL (ref 70–99)
HCT VFR BLD AUTO: 40.6 % (ref 35–48)
HGB BLD-MCNC: 14.1 G/DL (ref 12–16)
IMM GRANULOCYTES # BLD AUTO: 0.01 X10(3) UL (ref 0–1)
IMM GRANULOCYTES NFR BLD: 0.3 %
KETONES (URINE DIPSTICK): NEGATIVE MG/DL
LYMPHOCYTES # BLD AUTO: 0.92 X10(3) UL (ref 1–4)
LYMPHOCYTES NFR BLD AUTO: 24.7 %
MCH RBC QN AUTO: 32.1 PG (ref 26–34)
MCHC RBC AUTO-ENTMCNC: 34.7 G/DL (ref 31–37)
MCV RBC AUTO: 92.5 FL (ref 80–100)
MONOCYTES # BLD AUTO: 0.21 X10(3) UL (ref 0.1–1)
MONOCYTES NFR BLD AUTO: 5.6 %
NEUTROPHILS # BLD AUTO: 2.53 X10 (3) UL (ref 1.5–7.7)
NEUTROPHILS # BLD AUTO: 2.53 X10(3) UL (ref 1.5–7.7)
NEUTROPHILS NFR BLD AUTO: 68 %
NITRITE, URINE: NEGATIVE
OCCULT BLOOD: NEGATIVE
OSMOLALITY SERPL CALC.SUM OF ELEC: 289 MOSM/KG (ref 275–295)
PATIENT FASTING Y/N/NP: YES
PH, URINE: 8 (ref 4.5–8)
PLATELET # BLD AUTO: 110 10(3)UL (ref 150–450)
POTASSIUM SERPL-SCNC: 3.9 MMOL/L (ref 3.5–5.1)
PROTEIN (URINE DIPSTICK): NEGATIVE MG/DL
RBC # BLD AUTO: 4.39 X10(6)UL (ref 3.8–5.3)
SODIUM SERPL-SCNC: 140 MMOL/L (ref 136–145)
SPECIFIC GRAVITY: 1 (ref 1–1.03)
TSI SER-ACNC: 0.62 MIU/ML (ref 0.36–3.74)
UROBILINOGEN,SEMI-QN: 1 MG/DL (ref 0–1.9)
WBC # BLD AUTO: 3.7 X10(3) UL (ref 4–11)

## 2020-03-06 PROCEDURE — 99213 OFFICE O/P EST LOW 20 MIN: CPT | Performed by: PHYSICIAN ASSISTANT

## 2020-03-06 PROCEDURE — 36415 COLL VENOUS BLD VENIPUNCTURE: CPT

## 2020-03-06 PROCEDURE — 84443 ASSAY THYROID STIM HORMONE: CPT | Performed by: PHYSICIAN ASSISTANT

## 2020-03-06 PROCEDURE — 85025 COMPLETE CBC W/AUTO DIFF WBC: CPT

## 2020-03-06 PROCEDURE — 80048 BASIC METABOLIC PNL TOTAL CA: CPT

## 2020-03-06 PROCEDURE — 87086 URINE CULTURE/COLONY COUNT: CPT

## 2020-03-06 PROCEDURE — 81000 URINALYSIS NONAUTO W/SCOPE: CPT | Performed by: PHYSICIAN ASSISTANT

## 2020-03-06 PROCEDURE — 82306 VITAMIN D 25 HYDROXY: CPT

## 2020-03-06 RX ORDER — METHYLPREDNISOLONE 4 MG/1
TABLET ORAL
Qty: 1 KIT | Refills: 0 | Status: SHIPPED | OUTPATIENT
Start: 2020-03-06 | End: 2020-04-18

## 2020-03-06 NOTE — PROGRESS NOTES
On 3/6/2020 Poc Urine dip  Appearance: clear  Color: yellow  Addended because the encounter was closed

## 2020-03-06 NOTE — PROGRESS NOTES
HPI:    Patient ID: Kirti Santana is a 47year old female. Patient presents for fatigue for the past few months. Has some fever and chills. Does have chronic hepatitis C. No chest pain, shortness of breath, or cough noted. No wheezing noted.  Does have jackelyn MG Oral Tab TAKE 1 TABLET(400 MG) BY MOUTH EVERY 6 HOURS AS NEEDED FOR PAIN 45 tablet 0   • predniSONE 20 MG Oral Tab To take 2 tablets by oral route for 3 days then 1 tablet by oral route for 3 days then 1/2 tablet by oral route for 3 days 12 tablet 0   • Ht 5' 2\" (1.575 m)   Wt 168 lb 12.8 oz (76.6 kg)   LMP 02/21/2020 (Approximate)   SpO2 96%   BMI 30.87 kg/m²   Body mass index is 30.87 kg/m². PHYSICAL EXAM:   Physical Exam    Constitutional: She is oriented to person, place, and time.  She appears well FREE T4  - CBC WITH DIFFERENTIAL WITH PLATELET; Future  - BASIC METABOLIC PANEL (8); Future  - VITAMIN D, 25-HYDROXY; Future    3.  Wheezing  -After discussion with patient, started medrol dose pack.  -Educated pt on how to take medication.   -Told to take

## 2020-03-09 LAB — 25(OH)D3 SERPL-MCNC: 22.1 NG/ML (ref 30–100)

## 2020-03-17 ENCOUNTER — OFFICE VISIT (OUTPATIENT)
Dept: SURGERY | Facility: CLINIC | Age: 55
End: 2020-03-17
Payer: MEDICAID

## 2020-03-17 VITALS — TEMPERATURE: 98 F | DIASTOLIC BLOOD PRESSURE: 64 MMHG | HEART RATE: 60 BPM | SYSTOLIC BLOOD PRESSURE: 103 MMHG

## 2020-03-17 DIAGNOSIS — R35.0 URINARY FREQUENCY: ICD-10-CM

## 2020-03-17 DIAGNOSIS — Z87.891 SMOKING HISTORY: ICD-10-CM

## 2020-03-17 DIAGNOSIS — R31.29 MICROSCOPIC HEMATURIA: ICD-10-CM

## 2020-03-17 DIAGNOSIS — R82.90 URINE FINDING: Primary | ICD-10-CM

## 2020-03-17 DIAGNOSIS — R31.0 GROSS HEMATURIA: ICD-10-CM

## 2020-03-17 LAB
BILIRUB UR QL: NEGATIVE
COLOR UR: YELLOW
GLUCOSE UR-MCNC: NEGATIVE MG/DL
HGB UR QL STRIP.AUTO: NEGATIVE
KETONES UR-MCNC: NEGATIVE MG/DL
MULTISTIX LOT#: NORMAL NUMERIC
NITRITE UR QL STRIP.AUTO: NEGATIVE
PH UR: 5 [PH] (ref 5–8)
PH, URINE: 5.5 (ref 4.5–8)
PROT UR-MCNC: 30 MG/DL
RBC #/AREA URNS AUTO: 2 /HPF
SP GR UR STRIP: 1.02 (ref 1–1.03)
SPECIFIC GRAVITY: >-1.03 (ref 1–1.03)
UROBILINOGEN UR STRIP-ACNC: <2
UROBILINOGEN,SEMI-QN: 0.2 MG/DL (ref 0–1.9)
WBC #/AREA URNS AUTO: 25 /HPF

## 2020-03-17 PROCEDURE — 99243 OFF/OP CNSLTJ NEW/EST LOW 30: CPT | Performed by: UROLOGY

## 2020-03-17 PROCEDURE — 81003 URINALYSIS AUTO W/O SCOPE: CPT | Performed by: UROLOGY

## 2020-03-17 RX ORDER — IBUPROFEN 400 MG/1
TABLET ORAL
Qty: 45 TABLET | Refills: 0 | Status: SHIPPED | OUTPATIENT
Start: 2020-03-17 | End: 2020-04-14

## 2020-03-17 RX ORDER — ALPRAZOLAM 1 MG/1
TABLET ORAL
Qty: 90 TABLET | Refills: 0 | Status: SHIPPED | OUTPATIENT
Start: 2020-03-17 | End: 2020-04-14

## 2020-03-17 NOTE — PROGRESS NOTES
Rooming Clinician:     Kyaw Davis is a 47year old female.   Patient presents with:  UTI: c/o odorous urine, nausea, fatique  Miscellaneous Urology:  Chief Complaint: Patient presents with:  UTI: c/o odorous urine, nausea, fatique    Date of Onset: 3-4 m Suspension 1 spray by Nasal route daily. One spray per each nostril daily. 1 Inhaler 2   • Montelukast Sodium 10 MG Oral Tab Take 1 tablet (10 mg total) by mouth nightly.  30 tablet 2   • Umeclidinium Bromide 62.5 MCG/INH Inhalation Aerosol Powder, Breath A HPI  NEURO: no sensory or motor complaint    EXAM:     /64   Pulse 60   Temp 98.2 °F (36.8 °C)   LMP 02/21/2020 (Approximate)   GENERAL: well developed, well nourished,in no apparent distress  SKIN: no rashes,no suspicious lesions  HEENT: atraumatic, understanding of the diagnosis, causes, and treatment for      I reviewed the patient's smoking history and suggested that the patient stop smoking.  I reviewed various potential health hazards in general with the patient including pulmonary and cardiovascu

## 2020-03-17 NOTE — TELEPHONE ENCOUNTER
Message noted: Chart reviewed and may refill alprazolam/ ibuprofen as requested times one. Script sent to listed pharmacy by secure method. Please notify [place] No concerns noted.

## 2020-03-18 ENCOUNTER — TELEPHONE (OUTPATIENT)
Dept: FAMILY MEDICINE CLINIC | Facility: CLINIC | Age: 55
End: 2020-03-18

## 2020-03-18 ENCOUNTER — TELEPHONE (OUTPATIENT)
Dept: SURGERY | Facility: CLINIC | Age: 55
End: 2020-03-18

## 2020-03-18 DIAGNOSIS — Z01.419 ROUTINE GYNECOLOGICAL EXAMINATION: Primary | ICD-10-CM

## 2020-03-18 NOTE — PROGRESS NOTES
Your recent cytology is negative and is normal.  Recommend follow up in the office as directed.     Sincerely,  Camron Smith MD

## 2020-03-18 NOTE — TELEPHONE ENCOUNTER
Left message to patient to inform that her recent cytology is negative and is normal.    Also reminded patient of her cysto 3/31 at 1pm.

## 2020-03-18 NOTE — TELEPHONE ENCOUNTER
Pt contacted and wanted to schedule well woman exam/ pap smear. Explained that we have been trying to limit routine visits due to the COVID 19. After discussion, information for gyne provided and referral generated. No sig acute issues now as per pt.

## 2020-03-18 NOTE — TELEPHONE ENCOUNTER
Your patient called with UTI symptoms and sore throat,    Primary concern is UTI     Please contact patient to determine if they should be treated over the phone by you or requires further testing. Please refer to the \"Testing and Travel Recommendations\" provided by the health system leadership.     Best contact number for patient is:  [phone]

## 2020-03-20 RX ORDER — METHOCARBAMOL 500 MG/1
TABLET, FILM COATED ORAL
Qty: 90 TABLET | Refills: 0 | Status: SHIPPED | OUTPATIENT
Start: 2020-03-20 | End: 2020-05-11

## 2020-03-23 ENCOUNTER — TELEPHONE (OUTPATIENT)
Dept: SURGERY | Facility: CLINIC | Age: 55
End: 2020-03-23

## 2020-03-24 ENCOUNTER — TELEPHONE (OUTPATIENT)
Dept: SURGERY | Facility: CLINIC | Age: 55
End: 2020-03-24

## 2020-03-24 NOTE — TELEPHONE ENCOUNTER
Left numerical message. Patient requesting to reschedule 3/30 scheduled cysto. RN awaiting call back.

## 2020-03-24 NOTE — TELEPHONE ENCOUNTER
Called patient. Mailbox full. RN will attempt to call at later time to reschedule cysto.     She is currently scheduled on 3/30 930 am.

## 2020-03-25 ENCOUNTER — TELEPHONE (OUTPATIENT)
Dept: SURGERY | Facility: CLINIC | Age: 55
End: 2020-03-25

## 2020-03-25 NOTE — TELEPHONE ENCOUNTER
Spoke to patient. Requesting to change date of cysto because she happened to have her Urogram on the same day, 3/30 at 11am. Her cysto was scheduled at 9am.     She asked to cancel the appointment and she is hoping she does not need it.  She wants to wait f

## 2020-03-27 NOTE — TELEPHONE ENCOUNTER
Certified letter returned. Letter mailed on 01/16/2020. Outpatient Physical Therapy        [x] Phone: [phone]   Fax: [phone]   Pediatric Therapy          [] Phone: [phone]   Fax: [phone]  Pediatric [facility]          [] Phone: [phone]   Fax: [phone]      To: Referring Practitioner: Rodrigo Wu    From: Najma Oneill PT     Patient: Stephania Chen       : 1991  Diagnosis: S/P scope L knee with lateral release   Treatment Diagnosis: s/p scope and lateral release L knee; weakness, swelling, pain, and decreased functional use of L knee   Date: 2017    Physical Therapy Certification/Re-Certification Form  Dear Dr. Benito Lies following patient has been evaluated for physical therapy services and for therapy to continue, Please review the attached evaluation and/or summary of the patient's plan of care, and verify that you agree therapy should continue by signing the attached document and sending it back to our office.     Assessment: Patient is a 33 yo female who presents s/p lateral release L Knee  which impacts on ROM, strength, and functional use of L knee;patient's goal is to decrease pain, swelling, ROM and functional use of L knee ;patient reports that pain, weakness, and swelling limits activities including transfers, gait and functional use of L knee ; PT to address patient's goals, impairments and activity limitations with skilled interventions checked in plan of care;patient's level of function prior to  surgery was limited due to PF pain; did not observe any barriers to learning during PT eval; learning preferences include demonstration, practice, and handouts; patient expressed understanding of HEP; patient appears to be motivated to participate in an active PT program and to be compliant with HEP expectations;patient assisted in developing treatment plan and goals; axillary crutches are currently being used;      Current functional level (based on )   : Eval      Plan of Care/Treatment to date:  [x] Therapeutic Exercise    []

## 2020-04-14 ENCOUNTER — TELEPHONE (OUTPATIENT)
Dept: OBGYN CLINIC | Facility: CLINIC | Age: 55
End: 2020-04-14

## 2020-04-14 ENCOUNTER — TELEPHONE (OUTPATIENT)
Dept: FAMILY MEDICINE CLINIC | Facility: CLINIC | Age: 55
End: 2020-04-14

## 2020-04-14 DIAGNOSIS — N93.9 ABNORMAL VAGINAL BLEEDING: ICD-10-CM

## 2020-04-14 RX ORDER — FLUOXETINE HYDROCHLORIDE 20 MG/1
60 CAPSULE ORAL DAILY
Qty: 90 CAPSULE | Refills: 1 | Status: SHIPPED | OUTPATIENT
Start: 2020-04-14 | End: 2020-09-08

## 2020-04-14 RX ORDER — ALBUTEROL SULFATE 90 UG/1
2 AEROSOL, METERED RESPIRATORY (INHALATION) EVERY 4 HOURS PRN
Qty: 1 INHALER | Refills: 2 | Status: SHIPPED | OUTPATIENT
Start: 2020-04-14 | End: 2020-06-08

## 2020-04-14 RX ORDER — IBUPROFEN 800 MG/1
TABLET ORAL
Qty: 45 TABLET | Refills: 0 | Status: SHIPPED | OUTPATIENT
Start: 2020-04-14 | End: 2020-05-11

## 2020-04-14 RX ORDER — ALPRAZOLAM 1 MG/1
1 TABLET ORAL 3 TIMES DAILY
Qty: 90 TABLET | Refills: 0 | Status: SHIPPED | OUTPATIENT
Start: 2020-04-14 | End: 2020-05-11

## 2020-04-14 NOTE — TELEPHONE ENCOUNTER
Spoke with patient; reviewed medications and situation. Pt has had anxiety due to COVID 19 and current home situation. Discussed medications; alprazolam and fluoxetine and after discussion, will continue medications.   Pt also has had vaginal bleeding wh

## 2020-04-14 NOTE — TELEPHONE ENCOUNTER
MA from Dr. Johnson Citizen office originally contacted the office, pt was referred and wondering if she can be seen for vaginal bleeding. Pt is a NP.  Please advise

## 2020-04-14 NOTE — TELEPHONE ENCOUNTER
Can we call gyne to see if this patient can be seen for vaginal bleeding soon. Referral already generated.

## 2020-04-14 NOTE — TELEPHONE ENCOUNTER
Dr Pablo Rivera, please advise. 1. Refills requested, all reviewed with her. Allergy list, pharmacy verified. See scripts. Regarding the ibuprofen, patient is asking to return to the 800 mg, rather than the 400 mg.  She takes 2-3 day for arthritis, backache, an

## 2020-04-14 NOTE — TELEPHONE ENCOUNTER
PT NEEDS 3 DAYS TO SCHEDULE TRANSPORTATION SO BOOKED APPT WITH KELLY ON SAT, 4-18. C/O HYSTERECTOMY IN 2010 AT [facility], BUT STILL HAS CERVIX AND OVARIES.  2 YEARS AFTER HYST, HAD RIGHT OVARIAN CYST THAT REQUIRED SURGERY AND HAD APPENDIX REMOVED DURING THAT SURGERY. STATES INTERMITTENT RED SPOTTING OVER THE PAST 2 YEARS, USUALLY ON THE DAY FOLLOWING INTERCOURSE.   NOW WITH MOSTLY DARK BROWN, FOUL SMELLING DISCHARGE INTERMITTENTLY OVER THE PAST 2 MONTHS. PT WITH COPD AND HEP C SO HAS SYMPTOMS OF COUGH AND ABDOMINAL PAIN EVEN BEFORE COVID. DENIES ANY OTHER COVID SYMPTOMS AT THIS TIME.

## 2020-04-14 NOTE — TELEPHONE ENCOUNTER
THIS IS PART OF DR. DAVENPORT'S NOTE. 2.  Patient tried to follow-up with GYN for the vaginal bleeding, but they told her that they are not seeing anyone for routine exams (the referral said routine GYN exam). Patient said she is still \"spotting\" with \"old brown-pink, foul smelling\" drainage from somewhere, as she stated she had a hysterectomy. She is asking for help with the referral so she can be seen by GYN. New referral created, see diagnosis, advise.

## 2020-04-18 ENCOUNTER — APPOINTMENT (OUTPATIENT)
Dept: LAB | Facility: HOSPITAL | Age: 55
End: 2020-04-18
Attending: OBSTETRICS & GYNECOLOGY
Payer: MEDICAID

## 2020-04-18 ENCOUNTER — OFFICE VISIT (OUTPATIENT)
Dept: OBGYN CLINIC | Facility: CLINIC | Age: 55
End: 2020-04-18
Payer: MEDICAID

## 2020-04-18 VITALS
BODY MASS INDEX: 30 KG/M2 | HEART RATE: 59 BPM | WEIGHT: 164 LBS | DIASTOLIC BLOOD PRESSURE: 73 MMHG | SYSTOLIC BLOOD PRESSURE: 120 MMHG

## 2020-04-18 DIAGNOSIS — N89.8 VAGINAL DISCHARGE: ICD-10-CM

## 2020-04-18 DIAGNOSIS — N93.0 PCB (POST COITAL BLEEDING): ICD-10-CM

## 2020-04-18 DIAGNOSIS — N95.0 POST-MENOPAUSAL BLEEDING: ICD-10-CM

## 2020-04-18 DIAGNOSIS — N93.9 VAGINAL BLEEDING: Primary | ICD-10-CM

## 2020-04-18 DIAGNOSIS — Z11.3 SCREENING EXAMINATION FOR VENEREAL DISEASE: ICD-10-CM

## 2020-04-18 DIAGNOSIS — Z12.4 SCREENING FOR MALIGNANT NEOPLASM OF CERVIX: ICD-10-CM

## 2020-04-18 PROCEDURE — 99203 OFFICE O/P NEW LOW 30 MIN: CPT | Performed by: OBSTETRICS & GYNECOLOGY

## 2020-04-18 PROCEDURE — 82670 ASSAY OF TOTAL ESTRADIOL: CPT

## 2020-04-18 PROCEDURE — 83001 ASSAY OF GONADOTROPIN (FSH): CPT

## 2020-04-18 PROCEDURE — 36415 COLL VENOUS BLD VENIPUNCTURE: CPT

## 2020-04-18 NOTE — PROGRESS NOTES
HPI:    Patient ID: Ella Hunt is a 47year old female. HPI   and amenorrheic post laparoscopic supra-cervical hyst for fibroids and bleeding in . Ovaries were preserved as well. She is here with several complaints.  First is some intermitte tablet 0   • Albuterol Sulfate  (90 Base) MCG/ACT Inhalation Aero Soln Inhale 2 puffs into the lungs every 4 (four) hours as needed for Wheezing or Shortness of Breath.  1 Inhaler 2   • METHOCARBAMOL 500 MG Oral Tab TAKE 1 TABLET BY MOUTH FOUR TIMES ONLY      Chlamydia/GC PCR Combo      Vaginal Culture      Meds This Visit:  Requested Prescriptions      No prescriptions requested or ordered in this encounter       Imaging & Referrals:  None       BN#[id number]

## 2020-04-19 ENCOUNTER — TELEPHONE (OUTPATIENT)
Dept: OBGYN CLINIC | Facility: CLINIC | Age: 55
End: 2020-04-19

## 2020-04-19 DIAGNOSIS — N93.9 VAGINAL BLEEDING, ABNORMAL: Primary | ICD-10-CM

## 2020-04-19 DIAGNOSIS — N92.4 ABNORMAL PERIMENOPAUSAL BLEEDING: ICD-10-CM

## 2020-04-19 RX ORDER — METRONIDAZOLE 7.5 MG/G
1 GEL VAGINAL NIGHTLY
Qty: 1 TUBE | Refills: 0 | Status: SHIPPED | OUTPATIENT
Start: 2020-04-19 | End: 2020-04-24

## 2020-04-19 NOTE — TELEPHONE ENCOUNTER
Juarez Espinosa was aware of call but not . She paged through service to discuss results. We discussed culture and lab. I also discussed indication for US. Order generated and and please mail to her home. I asked her to schedule in about a month. Thank you.

## 2020-04-21 ENCOUNTER — TELEPHONE (OUTPATIENT)
Dept: OBGYN CLINIC | Facility: CLINIC | Age: 55
End: 2020-04-21

## 2020-04-21 NOTE — TELEPHONE ENCOUNTER
----- Message from Natan Cooper DO sent at 4/21/2020  2:43 PM CDT -----  Please let Martina Zavala know the pap shows ASCUS / positive HPV.  She'll need Colpo but we can easily wait 2 months ( until June )

## 2020-05-11 RX ORDER — IBUPROFEN 800 MG/1
TABLET ORAL
Qty: 45 TABLET | Refills: 0 | Status: SHIPPED | OUTPATIENT
Start: 2020-05-11 | End: 2020-06-10

## 2020-05-11 RX ORDER — METHOCARBAMOL 500 MG/1
TABLET, FILM COATED ORAL
Qty: 90 TABLET | Refills: 0 | Status: SHIPPED | OUTPATIENT
Start: 2020-05-11 | End: 2020-06-10

## 2020-05-11 RX ORDER — ALPRAZOLAM 1 MG/1
TABLET ORAL
Qty: 90 TABLET | Refills: 0 | Status: SHIPPED | OUTPATIENT
Start: 2020-05-11 | End: 2020-06-10

## 2020-05-11 NOTE — TELEPHONE ENCOUNTER
Received a call from the patient requesting a refill on the pended medications. Routed to provider for review.

## 2020-05-11 NOTE — TELEPHONE ENCOUNTER
Message noted: Chart reviewed and may refill medications as requested. Script sent to listed pharmacy by secure method. Please notify pt.

## 2020-05-12 ENCOUNTER — TELEPHONE (OUTPATIENT)
Dept: OBGYN CLINIC | Facility: CLINIC | Age: 55
End: 2020-05-12

## 2020-05-12 NOTE — TELEPHONE ENCOUNTER
Patient has an appointment at [facility] on 05/13 for Elías Minor (cpts [id number] and [id number]). Her [employer] would like a pre authorization from [place]. Please call [phone] to obtain pre auth. Thank you very much.

## 2020-05-20 ENCOUNTER — TELEPHONE (OUTPATIENT)
Dept: OBGYN CLINIC | Facility: CLINIC | Age: 55
End: 2020-05-20

## 2020-05-20 ENCOUNTER — HOSPITAL ENCOUNTER (OUTPATIENT)
Dept: ULTRASOUND IMAGING | Facility: HOSPITAL | Age: 55
Discharge: HOME | End: 2020-05-20
Attending: OBSTETRICS & GYNECOLOGY
Payer: MEDICAID

## 2020-05-20 ENCOUNTER — HOSPITAL ENCOUNTER (OUTPATIENT)
Dept: CT IMAGING | Facility: HOSPITAL | Age: 55
Discharge: HOME | End: 2020-05-20
Attending: UROLOGY
Payer: MEDICAID

## 2020-05-20 DIAGNOSIS — N93.9 VAGINAL BLEEDING, ABNORMAL: ICD-10-CM

## 2020-05-20 DIAGNOSIS — R31.29 MICROSCOPIC HEMATURIA: ICD-10-CM

## 2020-05-20 DIAGNOSIS — R31.0 GROSS HEMATURIA: ICD-10-CM

## 2020-05-20 DIAGNOSIS — N92.4 ABNORMAL PERIMENOPAUSAL BLEEDING: ICD-10-CM

## 2020-05-20 PROCEDURE — 76376 3D RENDER W/INTRP POSTPROCES: CPT | Performed by: UROLOGY

## 2020-05-20 PROCEDURE — 74178 CT ABD&PLV WO CNTR FLWD CNTR: CPT | Performed by: UROLOGY

## 2020-05-20 PROCEDURE — 82565 ASSAY OF CREATININE: CPT

## 2020-05-20 NOTE — TELEPHONE ENCOUNTER
Pt is scheduled for a pelvic US today today and needs a PA through [employer], CPT codes 04491, 48660.  Please advise

## 2020-05-20 NOTE — TELEPHONE ENCOUNTER
VIA [facility] CORE WEBSITE, OBTAINED AUTHORIZATION. REFERRAL TAB UPDATED. APPROVAL TO Sahra Shabazz NOTIFIED.

## 2020-06-03 ENCOUNTER — TELEPHONE (OUTPATIENT)
Dept: SURGERY | Facility: CLINIC | Age: 55
End: 2020-06-03

## 2020-06-03 DIAGNOSIS — R31.9 HEMATURIA, UNSPECIFIED TYPE: ICD-10-CM

## 2020-06-03 DIAGNOSIS — R35.0 URINARY FREQUENCY: Primary | ICD-10-CM

## 2020-06-03 NOTE — TELEPHONE ENCOUNTER
Patient is requesting cystoscopy be done under anesthesia at [facility] instead of in the office. Ok to schedule?

## 2020-06-05 NOTE — TELEPHONE ENCOUNTER
Spoke with patient and she is scheduled for Cysto, poss bx or TURBT at [address] on 6/10/20. Reviewed pre op instructions. Verbalized understanding.   The hospital has been notified that the patient needs to know 3 days prior what time her s 87F walks independent lives in daughter Amirah([phone]) in family house with PMH of restless leg syndrome (on Ropinirole), HTN (was on lisinopril which was discontinued likely 2/2 chronic dry cough), HLD presented with SOB and COLVIN of one day.

## 2020-06-08 NOTE — TELEPHONE ENCOUNTER
She had supracervical hysterectomy and only has a cervix. She will not need pregnancy test. Thank you.

## 2020-06-09 NOTE — H&P
Patient comes to the office with a longstanding history over at least 6 months of urinary frequency and urgency and some dark-colored urine which she thinks may have been blood. She voids every 30 minutes.   She has had multiple urine cultures which ar daily. 1 Inhaler 2   • Montelukast Sodium 10 MG Oral Tab Take 1 tablet (10 mg total) by mouth nightly. 30 tablet 2   • Umeclidinium Bromide 62.5 MCG/INH Inhalation Aerosol Powder, Breath Activated Inhale 1 puff into the lungs daily.  1 each 0   • ipratropiu complaint     EXAM:      /64   Pulse 60   Temp 98.2 °F (36.8 °C)   LMP 02/21/2020 (Approximate)   GENERAL: well developed, well nourished,in no apparent distress  SKIN: no rashes,no suspicious lesions  HEENT: atraumatic, normocephalic,ears and throat origin. KIDNEYS:          No renal or ureteral calculi are identified. There is no hydronephrosis or hydroureter. No perinephric or periureteric fat stranding is appreciated. No suspicious enhancing renal lesions are evident.  The ureters are normal in cou cm (series 7, image 53). BONES:             Slight levoscoliosis of the lumbar spine is present. Multilevel degenerative changes are seen throughout the spine. 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  Sclerosis and vacuum phenomenon of the sa diseases, excessive exercise, prostate enlargement in men, urinary tract obstruction, injuries, hematospermia in men, and faux hematuria.  I discussed the workup for the diagnosis of hematuria including x-rays such as IVP or CT scan or possibly ultrasounds,

## 2020-06-10 ENCOUNTER — HOSPITAL ENCOUNTER (OUTPATIENT)
Facility: HOSPITAL | Age: 55
Setting detail: HOSPITAL OUTPATIENT SURGERY
Discharge: HOME | End: 2020-06-10
Attending: UROLOGY | Admitting: UROLOGY
Payer: MEDICAID

## 2020-06-10 ENCOUNTER — ANESTHESIA (OUTPATIENT)
Dept: SURGERY | Facility: HOSPITAL | Age: 55
End: 2020-06-10
Payer: MEDICAID

## 2020-06-10 ENCOUNTER — ANESTHESIA EVENT (OUTPATIENT)
Dept: SURGERY | Facility: HOSPITAL | Age: 55
End: 2020-06-10
Payer: MEDICAID

## 2020-06-10 ENCOUNTER — TELEPHONE (OUTPATIENT)
Dept: SURGERY | Facility: CLINIC | Age: 55
End: 2020-06-10

## 2020-06-10 VITALS
DIASTOLIC BLOOD PRESSURE: 75 MMHG | HEIGHT: 64 IN | BODY MASS INDEX: 27.63 KG/M2 | WEIGHT: 161.81 LBS | SYSTOLIC BLOOD PRESSURE: 145 MMHG | HEART RATE: 50 BPM | TEMPERATURE: 99 F | OXYGEN SATURATION: 98 % | RESPIRATION RATE: 12 BRPM

## 2020-06-10 DIAGNOSIS — R35.0 URINARY FREQUENCY: ICD-10-CM

## 2020-06-10 DIAGNOSIS — R31.9 HEMATURIA, UNSPECIFIED TYPE: ICD-10-CM

## 2020-06-10 PROCEDURE — 52204 CYSTOSCOPY W/BIOPSY(S): CPT | Performed by: UROLOGY

## 2020-06-10 PROCEDURE — 0T5B8ZZ DESTRUCTION OF BLADDER, VIA NATURAL OR ARTIFICIAL OPENING ENDOSCOPIC: ICD-10-PCS | Performed by: UROLOGY

## 2020-06-10 RX ORDER — HYDROCODONE BITARTRATE AND ACETAMINOPHEN 5; 325 MG/1; MG/1
1 TABLET ORAL AS NEEDED
Status: DISCONTINUED | OUTPATIENT
Start: 2020-06-10 | End: 2020-06-10

## 2020-06-10 RX ORDER — OXYBUTYNIN CHLORIDE 10 MG/1
10 TABLET, EXTENDED RELEASE ORAL DAILY
Qty: 90 TABLET | Refills: 3 | Status: SHIPPED | OUTPATIENT
Start: 2020-06-10 | End: 2021-09-30 | Stop reason: ALTCHOICE

## 2020-06-10 RX ORDER — LIDOCAINE HYDROCHLORIDE 20 MG/ML
JELLY TOPICAL AS NEEDED
Status: DISCONTINUED | OUTPATIENT
Start: 2020-06-10 | End: 2020-06-10 | Stop reason: HOSPADM

## 2020-06-10 RX ORDER — FAMOTIDINE 20 MG/1
20 TABLET ORAL ONCE
Status: DISCONTINUED | OUTPATIENT
Start: 2020-06-10 | End: 2020-06-10 | Stop reason: HOSPADM

## 2020-06-10 RX ORDER — ONDANSETRON 2 MG/ML
4 INJECTION INTRAMUSCULAR; INTRAVENOUS ONCE AS NEEDED
Status: DISCONTINUED | OUTPATIENT
Start: 2020-06-10 | End: 2020-06-10

## 2020-06-10 RX ORDER — NALOXONE HYDROCHLORIDE 0.4 MG/ML
80 INJECTION, SOLUTION INTRAMUSCULAR; INTRAVENOUS; SUBCUTANEOUS AS NEEDED
Status: DISCONTINUED | OUTPATIENT
Start: 2020-06-10 | End: 2020-06-10

## 2020-06-10 RX ORDER — SODIUM CHLORIDE, SODIUM LACTATE, POTASSIUM CHLORIDE, CALCIUM CHLORIDE 600; 310; 30; 20 MG/100ML; MG/100ML; MG/100ML; MG/100ML
INJECTION, SOLUTION INTRAVENOUS CONTINUOUS
Status: DISCONTINUED | OUTPATIENT
Start: 2020-06-10 | End: 2020-06-10

## 2020-06-10 RX ORDER — HYDROMORPHONE HYDROCHLORIDE 1 MG/ML
0.6 INJECTION, SOLUTION INTRAMUSCULAR; INTRAVENOUS; SUBCUTANEOUS EVERY 5 MIN PRN
Status: DISCONTINUED | OUTPATIENT
Start: 2020-06-10 | End: 2020-06-10

## 2020-06-10 RX ORDER — METHOCARBAMOL 500 MG/1
TABLET, FILM COATED ORAL
Qty: 90 TABLET | Refills: 0 | Status: SHIPPED | OUTPATIENT
Start: 2020-06-10 | End: 2020-07-04

## 2020-06-10 RX ORDER — MORPHINE SULFATE 4 MG/ML
2 INJECTION, SOLUTION INTRAMUSCULAR; INTRAVENOUS EVERY 10 MIN PRN
Status: DISCONTINUED | OUTPATIENT
Start: 2020-06-10 | End: 2020-06-10

## 2020-06-10 RX ORDER — CEFAZOLIN SODIUM/WATER 2 G/20 ML
2 SYRINGE (ML) INTRAVENOUS ONCE
Status: COMPLETED | OUTPATIENT
Start: 2020-06-10 | End: 2020-06-10

## 2020-06-10 RX ORDER — ONDANSETRON 2 MG/ML
INJECTION INTRAMUSCULAR; INTRAVENOUS AS NEEDED
Status: DISCONTINUED | OUTPATIENT
Start: 2020-06-10 | End: 2020-06-10 | Stop reason: SURG

## 2020-06-10 RX ORDER — MORPHINE SULFATE 4 MG/ML
4 INJECTION, SOLUTION INTRAMUSCULAR; INTRAVENOUS EVERY 10 MIN PRN
Status: DISCONTINUED | OUTPATIENT
Start: 2020-06-10 | End: 2020-06-10

## 2020-06-10 RX ORDER — LIDOCAINE HYDROCHLORIDE 10 MG/ML
INJECTION, SOLUTION EPIDURAL; INFILTRATION; INTRACAUDAL; PERINEURAL AS NEEDED
Status: DISCONTINUED | OUTPATIENT
Start: 2020-06-10 | End: 2020-06-10 | Stop reason: SURG

## 2020-06-10 RX ORDER — HYDROMORPHONE HYDROCHLORIDE 1 MG/ML
0.4 INJECTION, SOLUTION INTRAMUSCULAR; INTRAVENOUS; SUBCUTANEOUS EVERY 5 MIN PRN
Status: DISCONTINUED | OUTPATIENT
Start: 2020-06-10 | End: 2020-06-10

## 2020-06-10 RX ORDER — IBUPROFEN 800 MG/1
TABLET ORAL
Qty: 45 TABLET | Refills: 0 | Status: SHIPPED | OUTPATIENT
Start: 2020-06-10 | End: 2020-07-04

## 2020-06-10 RX ORDER — DEXAMETHASONE SODIUM PHOSPHATE 4 MG/ML
VIAL (ML) INJECTION AS NEEDED
Status: DISCONTINUED | OUTPATIENT
Start: 2020-06-10 | End: 2020-06-10 | Stop reason: SURG

## 2020-06-10 RX ORDER — PROCHLORPERAZINE EDISYLATE 5 MG/ML
5 INJECTION INTRAMUSCULAR; INTRAVENOUS ONCE AS NEEDED
Status: DISCONTINUED | OUTPATIENT
Start: 2020-06-10 | End: 2020-06-10

## 2020-06-10 RX ORDER — HALOPERIDOL 5 MG/ML
0.25 INJECTION INTRAMUSCULAR ONCE AS NEEDED
Status: DISCONTINUED | OUTPATIENT
Start: 2020-06-10 | End: 2020-06-10

## 2020-06-10 RX ORDER — ALPRAZOLAM 1 MG/1
TABLET ORAL
Qty: 90 TABLET | Refills: 0 | Status: SHIPPED | OUTPATIENT
Start: 2020-06-10 | End: 2020-07-04

## 2020-06-10 RX ORDER — HYDROMORPHONE HYDROCHLORIDE 1 MG/ML
0.2 INJECTION, SOLUTION INTRAMUSCULAR; INTRAVENOUS; SUBCUTANEOUS EVERY 5 MIN PRN
Status: DISCONTINUED | OUTPATIENT
Start: 2020-06-10 | End: 2020-06-10

## 2020-06-10 RX ORDER — HYDROCODONE BITARTRATE AND ACETAMINOPHEN 5; 325 MG/1; MG/1
2 TABLET ORAL AS NEEDED
Status: DISCONTINUED | OUTPATIENT
Start: 2020-06-10 | End: 2020-06-10

## 2020-06-10 RX ORDER — ACETAMINOPHEN 500 MG
1000 TABLET ORAL ONCE
Status: COMPLETED | OUTPATIENT
Start: 2020-06-10 | End: 2020-06-10

## 2020-06-10 RX ORDER — MORPHINE SULFATE 10 MG/ML
6 INJECTION, SOLUTION INTRAMUSCULAR; INTRAVENOUS EVERY 10 MIN PRN
Status: DISCONTINUED | OUTPATIENT
Start: 2020-06-10 | End: 2020-06-10

## 2020-06-10 RX ADMIN — LIDOCAINE HYDROCHLORIDE 50 MG: 10 INJECTION, SOLUTION EPIDURAL; INFILTRATION; INTRACAUDAL; PERINEURAL at 09:16:00

## 2020-06-10 RX ADMIN — SODIUM CHLORIDE, SODIUM LACTATE, POTASSIUM CHLORIDE, CALCIUM CHLORIDE: 600; 310; 30; 20 INJECTION, SOLUTION INTRAVENOUS at 09:13:00

## 2020-06-10 RX ADMIN — CEFAZOLIN SODIUM/WATER 2 G: 2 G/20 ML SYRINGE (ML) INTRAVENOUS at 09:19:00

## 2020-06-10 RX ADMIN — SODIUM CHLORIDE, SODIUM LACTATE, POTASSIUM CHLORIDE, CALCIUM CHLORIDE: 600; 310; 30; 20 INJECTION, SOLUTION INTRAVENOUS at 09:49:00

## 2020-06-10 RX ADMIN — DEXAMETHASONE SODIUM PHOSPHATE 4 MG: 4 MG/ML VIAL (ML) INJECTION at 09:16:00

## 2020-06-10 RX ADMIN — ONDANSETRON 4 MG: 2 INJECTION INTRAMUSCULAR; INTRAVENOUS at 09:16:00

## 2020-06-10 NOTE — ANESTHESIA PREPROCEDURE EVALUATION
Anesthesia PreOp Note    HPI:     Claretta Aschoff is a 47year old female who presents for preoperative consultation requested by: Shyam Randhawa MD    Date of Surgery: 6/10/2020    Procedure(s):  CYSTOSCOPY  Indication: Urinary frequency [R35.0]  Hematuri Oral Tab, TAKE 1 TABLET BY MOUTH FOUR TIMES DAILY, Disp: 90 tablet, Rfl: 0, 6/9/2020 at 1300  ALPRAZOLAM 1 MG Oral Tab, TAKE 1 TABLET(1 MG) BY MOUTH THREE TIMES DAILY (Patient taking differently: 2 mg 3 (three) times daily.  ), Disp: 90 tablet, Rfl: 0, 6/9       Spouse name: Not on file      Number of children: Not on file      Years of education: Not on file      Highest education level: Not on file    Occupational History      Not on file    Social Needs      Financial resource strain: Not on file is 98%.     06/08/20  1436 06/10/20  0734   BP:  130/65   Pulse:  61   Resp:  15   Temp:  99.3 °F (37.4 °C)   TempSrc:  Oral   SpO2:  98%   Weight: 68 kg (150 lb) 73.4 kg (161 lb 12.8 oz)   Height: 1.626 m (5' 4\") 1.626 m (5' 4\")        Anesthesia Evaluat

## 2020-06-10 NOTE — TELEPHONE ENCOUNTER
Message noted: Chart reviewed and may refill alprazolam and medications as requested. Script sent to listed pharmacy by secure method. [place]. No concerns noted. Please notify patient/ Pt notified by [facility].

## 2020-06-10 NOTE — OPERATIVE REPORT
Operative Note    Patient Name: Javier Candelaria    Date of Procedure: 6/10/2020    Preoperative Diagnosis: Microscopic hematuria, urinary frequency    Postoperative Diagnosis: Same, urethral stenosis    Primary Surgeon: Sarah Bee. Rudi Maldonado M.D.      Procedure Pe Patient did have evidence of cystourethrocele, at least grade 1. Then the bladder was hydraulically dilated to a capacity of 900 to 1000 cc without any evidence of any glomerulation of the bladder mucosa.   Then a biopsy of the posterior surface of the kevin

## 2020-06-10 NOTE — INTERVAL H&P NOTE
Pre-op Diagnosis: Urinary frequency [R35.0]  Hematuria, unspecified type [R31.9]    The above referenced H&P was reviewed by Olinda Grover MD on 6/10/2020, the patient was examined and no significant changes have occurred in the patient's condition sin

## 2020-06-10 NOTE — ANESTHESIA PROCEDURE NOTES
Airway  Urgency: Elective      General Information and Staff    Patient location during procedure: OR  Anesthesiologist: Viktoria Brooks MD  Performed: anesthesiologist     Indications and Patient Condition  Indications for airway management: anesthesia  Sp

## 2020-06-10 NOTE — ANESTHESIA POSTPROCEDURE EVALUATION
Patient: Kirti Santana    Procedure Summary     Date:  06/10/20 Room / Location:  [address] [place] [address] MAIN OR 14 / [address] [place] [address] MAIN OR    Anesthesia Start:  7292 Anesthesia Stop:      Procedure:  CYSTOSCOPY (N/A ) Diagnosis:       Urinary frequency      Hematuria, unspecified type

## 2020-06-11 NOTE — PROGRESS NOTES
Your recent letter biopsy is normal.  Shows no evidence of cancer. Recommend follow up in the office as directed.     Sincerely,  Gokul Corral MD

## 2020-06-16 ENCOUNTER — TELEPHONE (OUTPATIENT)
Dept: SURGERY | Facility: CLINIC | Age: 55
End: 2020-06-16

## 2020-06-18 ENCOUNTER — TELEPHONE (OUTPATIENT)
Dept: OBGYN CLINIC | Facility: CLINIC | Age: 55
End: 2020-06-18

## 2020-06-18 NOTE — TELEPHONE ENCOUNTER
Pt had an appt today for a garcia w/ KELLY. He was unable to see her due to her being 45min late. KELLY asked me to have one of his nurses route him a msg to see what slots she can be squeezed into.  Please forward msg and we can contact her with an available appt sometime soon

## 2020-07-03 ENCOUNTER — NURSE TRIAGE (OUTPATIENT)
Dept: FAMILY MEDICINE CLINIC | Facility: CLINIC | Age: 55
End: 2020-07-03

## 2020-07-04 RX ORDER — METHOCARBAMOL 500 MG/1
TABLET, FILM COATED ORAL
Qty: 90 TABLET | Refills: 0 | Status: SHIPPED | OUTPATIENT
Start: 2020-07-04 | End: 2020-08-11

## 2020-07-04 RX ORDER — ALPRAZOLAM 1 MG/1
TABLET ORAL
Qty: 90 TABLET | Refills: 0 | Status: SHIPPED | OUTPATIENT
Start: 2020-07-04 | End: 2020-08-11

## 2020-07-04 RX ORDER — IBUPROFEN 800 MG/1
TABLET ORAL
Qty: 45 TABLET | Refills: 0 | Status: SHIPPED | OUTPATIENT
Start: 2020-07-04 | End: 2020-08-11

## 2020-07-04 NOTE — TELEPHONE ENCOUNTER
Message noted: Chart reviewed and may refill medications as requested. Script sent to listed pharmacy by secure method. Pharmacy to notify pt.

## 2020-07-21 ENCOUNTER — OFFICE VISIT (OUTPATIENT)
Dept: SURGERY | Facility: CLINIC | Age: 55
End: 2020-07-21
Payer: MEDICAID

## 2020-07-21 VITALS — DIASTOLIC BLOOD PRESSURE: 75 MMHG | SYSTOLIC BLOOD PRESSURE: 117 MMHG | HEART RATE: 54 BPM

## 2020-07-21 DIAGNOSIS — R35.0 URINARY FREQUENCY: Primary | ICD-10-CM

## 2020-07-21 DIAGNOSIS — R63.1 POLYDIPSIA: ICD-10-CM

## 2020-07-21 PROCEDURE — 99213 OFFICE O/P EST LOW 20 MIN: CPT | Performed by: UROLOGY

## 2020-07-21 PROCEDURE — 3074F SYST BP LT 130 MM HG: CPT | Performed by: UROLOGY

## 2020-07-21 PROCEDURE — 3078F DIAST BP <80 MM HG: CPT | Performed by: UROLOGY

## 2020-07-21 NOTE — PROGRESS NOTES
Rooming Clinician:     Cindy Rivera is a 47year old female. Patient presents with:  Surgical Followup: s/p cysto 6/10/20. Urine is spraying everywhere since the procedure. HPI:     Patient returns for a postoperative visit.   She has history of daily.          No Known Allergies   Past Medical History:   Diagnosis Date   • Anxiety    • Colitis     ulcerative colitis   • COPD (chronic obstructive pulmonary disease) ([facility] [address]    • Coronary atherosclerosis    • Depression    • Hepatitis     Hep C   • Hep 03/06/2020    .0 (L) 03/06/2020    CREATSERUM 0.64 03/06/2020    BUN 13 03/06/2020     03/06/2020    K 3.9 03/06/2020     03/06/2020    CO2 31.0 03/06/2020    GLU 79 03/06/2020    CA 9.4 03/06/2020    AST 34 05/31/2019    ALT 52 05/31/20

## 2020-08-10 ENCOUNTER — TELEPHONE (OUTPATIENT)
Dept: FAMILY MEDICINE CLINIC | Facility: CLINIC | Age: 55
End: 2020-08-10

## 2020-08-11 RX ORDER — ALPRAZOLAM 1 MG/1
TABLET ORAL
Qty: 90 TABLET | Refills: 0 | Status: SHIPPED
Start: 2020-08-11 | End: 2020-09-08

## 2020-08-11 RX ORDER — IBUPROFEN 800 MG/1
TABLET ORAL
Qty: 45 TABLET | Refills: 0 | Status: SHIPPED | OUTPATIENT
Start: 2020-08-11 | End: 2020-10-05

## 2020-08-11 RX ORDER — METHOCARBAMOL 500 MG/1
TABLET, FILM COATED ORAL
Qty: 90 TABLET | Refills: 0 | Status: SHIPPED | OUTPATIENT
Start: 2020-08-11 | End: 2020-10-05

## 2020-08-11 NOTE — TELEPHONE ENCOUNTER
Message noted: Chart reviewed and may refill medications as requested. Script sent to listed pharmacy by secure method.     Please notify pt to follow up for appointment soon

## 2020-08-12 NOTE — TELEPHONE ENCOUNTER
Called Pt pharmacy to verify Rx order. Alprazolam order was not received . Pharmacist took verbal order. Called Pt to inform that Rx was sent.

## 2020-08-12 NOTE — TELEPHONE ENCOUNTER
Patient is out of meds, has been trying to request through pharmacy for several days. Patient spoke with pharmacy this morning, pharmacy told patient they have not received scripts, please advise.

## 2020-09-08 RX ORDER — ALPRAZOLAM 1 MG/1
TABLET ORAL
Qty: 90 TABLET | Refills: 0 | Status: SHIPPED | OUTPATIENT
Start: 2020-09-08 | End: 2020-10-05

## 2020-09-08 RX ORDER — FLUOXETINE HYDROCHLORIDE 20 MG/1
CAPSULE ORAL
Qty: 90 CAPSULE | Refills: 1 | Status: SHIPPED | OUTPATIENT
Start: 2020-09-08 | End: 2020-12-02

## 2020-09-09 RX ORDER — ALPRAZOLAM 1 MG/1
TABLET ORAL
Qty: 90 TABLET | Refills: 0 | OUTPATIENT
Start: 2020-09-09

## 2020-09-17 ENCOUNTER — TELEPHONE (OUTPATIENT)
Dept: FAMILY MEDICINE CLINIC | Facility: CLINIC | Age: 55
End: 2020-09-17

## 2020-09-17 RX ORDER — PREDNISONE 20 MG/1
TABLET ORAL
Qty: 12 TABLET | Refills: 0 | Status: ON HOLD | OUTPATIENT
Start: 2020-09-17 | End: 2021-08-18

## 2020-09-17 NOTE — TELEPHONE ENCOUNTER
Patient complains of chest congestion, no cough. States she gets this way every fall. No fever. Hx of COPD and she smokes. She is asking for prednisone. States she is due to have a covid test done tomorrow morning at a behavioral health clinic.

## 2020-09-17 NOTE — TELEPHONE ENCOUNTER
If she is COVID positive we cannot see patient in office. . May start prednisone as requested. Erx sent to listed pharmacy.  To follow up for appointment if not better and COVID negative; Please notify patient

## 2020-09-24 ENCOUNTER — TELEPHONE (OUTPATIENT)
Dept: FAMILY MEDICINE CLINIC | Facility: CLINIC | Age: 55
End: 2020-09-24

## 2020-09-24 NOTE — TELEPHONE ENCOUNTER
Patient call message left on pt voice mail to call us back to obtain more information regard her Chest congestion. Per Dr. Janel Oneill want to see if this need to be a Virtual Phone visit or video visit.

## 2020-09-25 NOTE — TELEPHONE ENCOUNTER
Left message to call back, noted appt today 3:10PM, appt was not cancelled, no documentation of telemedicine encounter at this time, advised pt to call back and discuss sx's and if there was an appt to still call back and inform us (so that we may close th

## 2020-10-05 RX ORDER — IBUPROFEN 800 MG/1
TABLET ORAL
Qty: 45 TABLET | Refills: 0 | Status: SHIPPED | OUTPATIENT
Start: 2020-10-05 | End: 2020-11-03

## 2020-10-05 RX ORDER — ALPRAZOLAM 1 MG/1
TABLET ORAL
Qty: 90 TABLET | Refills: 0 | Status: SHIPPED | OUTPATIENT
Start: 2020-10-05 | End: 2020-11-03

## 2020-10-05 RX ORDER — METHOCARBAMOL 500 MG/1
TABLET, FILM COATED ORAL
Qty: 90 TABLET | Refills: 0 | Status: SHIPPED | OUTPATIENT
Start: 2020-10-05 | End: 2020-11-03

## 2020-10-05 NOTE — TELEPHONE ENCOUNTER
Message noted: Chart reviewed and may refill medications as requested. Scripts sent to listed pharmacy by secure method. Pharmacy to notify pt.

## 2020-11-03 RX ORDER — METHOCARBAMOL 500 MG/1
TABLET, FILM COATED ORAL
Qty: 90 TABLET | Refills: 0 | Status: SHIPPED | OUTPATIENT
Start: 2020-11-03 | End: 2020-12-02

## 2020-11-03 RX ORDER — IBUPROFEN 800 MG/1
TABLET ORAL
Qty: 45 TABLET | Refills: 0 | Status: SHIPPED | OUTPATIENT
Start: 2020-11-03 | End: 2020-12-02

## 2020-11-03 RX ORDER — ALPRAZOLAM 1 MG/1
TABLET ORAL
Qty: 90 TABLET | Refills: 0 | Status: SHIPPED | OUTPATIENT
Start: 2020-11-03 | End: 2020-12-02

## 2020-11-12 ENCOUNTER — HOSPITAL ENCOUNTER (EMERGENCY)
Facility: HOSPITAL | Age: 55
Discharge: HOME | End: 2020-11-12
Attending: EMERGENCY MEDICINE
Payer: MEDICAID

## 2020-11-12 ENCOUNTER — APPOINTMENT (OUTPATIENT)
Dept: GENERAL RADIOLOGY | Facility: HOSPITAL | Age: 55
End: 2020-11-12
Attending: EMERGENCY MEDICINE
Payer: MEDICAID

## 2020-11-12 VITALS
SYSTOLIC BLOOD PRESSURE: 135 MMHG | HEART RATE: 64 BPM | DIASTOLIC BLOOD PRESSURE: 69 MMHG | BODY MASS INDEX: 28 KG/M2 | RESPIRATION RATE: 18 BRPM | TEMPERATURE: 99 F | WEIGHT: 160.94 LBS | OXYGEN SATURATION: 99 %

## 2020-11-12 DIAGNOSIS — I83.93 VARICOSE VEINS OF BOTH LOWER EXTREMITIES, UNSPECIFIED WHETHER COMPLICATED: ICD-10-CM

## 2020-11-12 DIAGNOSIS — F32.A DEPRESSION, UNSPECIFIED DEPRESSION TYPE: Primary | ICD-10-CM

## 2020-11-12 DIAGNOSIS — Z59.00 HOMELESSNESS: ICD-10-CM

## 2020-11-12 PROCEDURE — 99285 EMERGENCY DEPT VISIT HI MDM: CPT

## 2020-11-12 PROCEDURE — 93005 ELECTROCARDIOGRAM TRACING: CPT

## 2020-11-12 PROCEDURE — 71045 X-RAY EXAM CHEST 1 VIEW: CPT | Performed by: EMERGENCY MEDICINE

## 2020-11-12 PROCEDURE — 93010 ELECTROCARDIOGRAM REPORT: CPT | Performed by: EMERGENCY MEDICINE

## 2020-11-12 RX ORDER — ACETAMINOPHEN 325 MG/1
650 TABLET ORAL ONCE
Status: COMPLETED | OUTPATIENT
Start: 2020-11-12 | End: 2020-11-12

## 2020-11-12 SDOH — ECONOMIC STABILITY - HOUSING INSECURITY: HOMELESSNESS UNSPECIFIED: Z59.00

## 2020-11-12 NOTE — ED NOTES
Pt's daughter called and spoke with this RN expressing concern that her mother's \"chest pain and shortness of breath\" has not been addressed.  This RN explained to pt's daughter that particular complaint was not communicated to staff in triage or this RN

## 2020-11-12 NOTE — ED PROVIDER NOTES
Patient Seen in: [facility] AND [facility] Emergency Department    History   Patient presents with:  Eval-P    Stated Complaint: \"broken veins rights ankle\"     HPI    71-year-old female with past medical history of colitis, COPD, hepatitis C, opiate abuse on me route for 3 days   FLUOXETINE HCL 20 MG Oral Cap,  TAKE 3 CAPSULES(60 MG) BY MOUTH DAILY   Oxybutynin Chloride ER 10 MG Oral Tablet 24 Hr,  Take 1 tablet (10 mg total) by mouth daily.    aspirin 81 MG Oral Chew Tab,  Chew 1 tablet (81 mg total) by mouth bud (36.6 °C) (Temporal)   Resp 20   Wt 73 kg   LMP 02/21/2020 (Approximate)   SpO2 97%   BMI 27.62 kg/m²         Physical Exam   Constitutional: No distress. Nontoxic, flat affect. HEENT: MMM. Head: Normocephalic. Eyes: No injection.    Cardiovascular: RR Negative for radiographically evident acute intrathoracic process.   Dictated by (CST): Milena Richards MD on 11/12/2020 at 6:26 PM     Finalized by (CST): Milena Richards MD on 11/12/2020 at 6:28 PM               MDM     DIFFERENTIAL DIAGNOSIS: After hist both lower extremities, unspecified whether complicated  Homelessness    Disposition:  Discharge    Follow-up:  Alma Little, [address] [facility] [address] [phone]    Call  As needed for followup and re-evaluation.       Medications Pr

## 2020-11-12 NOTE — CM/SW NOTE
Per ABHILASH, patient stated that she goes to her methodone clinic, [facility] and the [facility] shelter arranged for her to go to post discharge is in [place] and patient does not want to be that far away from her Methadone clinic.     Patient stated josh

## 2020-11-12 NOTE — CM/SW NOTE
CHRISTIAN called [facility] in [place] and inquired on bed status for this pt. Sonu Pradeepjed stated that the pt needs a negative COVID and they will accept her today. I spoke to the pt and she is thrilled to go to [facility].      Dr Jonel Umana and Sixto Cortez RN updated wi
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
## 2020-11-12 NOTE — CM/SW NOTE
Faxed Negative Covid Test results to [facility] in [facility] as requested. Faxed to:  [phone]. Patient has decided to go to [facility] at [address], [facility], [address] [place] [address] tired calling [facility] at [phone] but no reply.

## 2020-11-12 NOTE — ED INITIAL ASSESSMENT (HPI)
Pt states she is currently on a methadone program, is experiencing extreme anxiety, was \"displaced\" due to [place] and has been unable to find shelter so she is on the street. Pt states her legs are swollen and feet are painful to walk on.  Visible varicose

## 2020-11-13 NOTE — CM/SW NOTE
1925: [facility]ANS ordered for patient since there is a time constraint on arrival at [facility] and Ethan Bynum would take too long to arrive in ER. [facility] tried calling [facility] at [phone] but no reply.     Patient received all her belongings, [facility] arrived

## 2020-11-30 ENCOUNTER — NURSE TRIAGE (OUTPATIENT)
Dept: FAMILY MEDICINE CLINIC | Facility: CLINIC | Age: 55
End: 2020-11-30

## 2020-11-30 NOTE — TELEPHONE ENCOUNTER
Action Requested: Summary for Provider     []  Critical Lab, Recommendations Needed  [] Need Additional Advice  []   FYI    []   Need Orders  [] Need Medications Sent to Pharmacy  []  Other     SUMMARY: Patient requesting in office appt with Dr. Ester Vang for si

## 2020-12-02 RX ORDER — FLUOXETINE HYDROCHLORIDE 20 MG/1
CAPSULE ORAL
Qty: 90 CAPSULE | Refills: 1 | Status: SHIPPED | OUTPATIENT
Start: 2020-12-02 | End: 2021-01-28

## 2020-12-02 RX ORDER — METHOCARBAMOL 500 MG/1
TABLET, FILM COATED ORAL
Qty: 90 TABLET | Refills: 0 | Status: SHIPPED | OUTPATIENT
Start: 2020-12-02 | End: 2020-12-30

## 2020-12-02 RX ORDER — ALPRAZOLAM 1 MG/1
TABLET ORAL
Qty: 90 TABLET | Refills: 0 | Status: SHIPPED | OUTPATIENT
Start: 2020-12-02 | End: 2020-12-30

## 2020-12-02 RX ORDER — IBUPROFEN 800 MG/1
TABLET ORAL
Qty: 45 TABLET | Refills: 0 | Status: SHIPPED | OUTPATIENT
Start: 2020-12-02 | End: 2020-12-30

## 2020-12-08 ENCOUNTER — NURSE TRIAGE (OUTPATIENT)
Dept: FAMILY MEDICINE CLINIC | Facility: CLINIC | Age: 55
End: 2020-12-08

## 2020-12-08 NOTE — TELEPHONE ENCOUNTER
Action Requested: Summary for Provider     []  Critical Lab, Recommendations Needed  [] Need Additional Advice  []   FYI    []   Need Orders  [] Need Medications Sent to Pharmacy  []  Other     SUMMARY: facial swelling, onset about 2 days, R side , tender

## 2020-12-09 ENCOUNTER — OFFICE VISIT (OUTPATIENT)
Dept: FAMILY MEDICINE CLINIC | Facility: CLINIC | Age: 55
End: 2020-12-09
Payer: MEDICAID

## 2020-12-09 VITALS
TEMPERATURE: 98 F | HEIGHT: 64 IN | RESPIRATION RATE: 16 BRPM | DIASTOLIC BLOOD PRESSURE: 81 MMHG | SYSTOLIC BLOOD PRESSURE: 124 MMHG | WEIGHT: 157 LBS | BODY MASS INDEX: 26.8 KG/M2 | HEART RATE: 54 BPM

## 2020-12-09 DIAGNOSIS — K04.7 TOOTH INFECTION: ICD-10-CM

## 2020-12-09 DIAGNOSIS — I83.891 VARICOSE VEINS OF RIGHT LEG WITH EDEMA: Primary | ICD-10-CM

## 2020-12-09 PROCEDURE — 3079F DIAST BP 80-89 MM HG: CPT | Performed by: FAMILY MEDICINE

## 2020-12-09 PROCEDURE — 99213 OFFICE O/P EST LOW 20 MIN: CPT | Performed by: FAMILY MEDICINE

## 2020-12-09 PROCEDURE — 3074F SYST BP LT 130 MM HG: CPT | Performed by: FAMILY MEDICINE

## 2020-12-09 PROCEDURE — 3008F BODY MASS INDEX DOCD: CPT | Performed by: FAMILY MEDICINE

## 2020-12-09 RX ORDER — CLINDAMYCIN HYDROCHLORIDE 300 MG/1
300 CAPSULE ORAL 3 TIMES DAILY
Qty: 21 CAPSULE | Refills: 0 | Status: ON HOLD | OUTPATIENT
Start: 2020-12-09 | End: 2021-08-18

## 2020-12-09 NOTE — PROGRESS NOTES
HPI:    Patient ID: Henry Huang is a 54year old female. Pt presents with concerns of varicose veins of right ankles. No more swollen and painful. Pt is currently homeless and is staying in a shelter.    Pt has had tooth issues and was planning of gettin by mouth daily. Allergies:No Known Allergies   PHYSICAL EXAM:   Physical Exam   Constitutional: She appears well-developed and well-nourished. Musculoskeletal:      Comments: Right ankle; sig varicose veins/ spider veins.  No sig pains or tenderne

## 2020-12-30 RX ORDER — ALPRAZOLAM 1 MG/1
TABLET ORAL
Qty: 90 TABLET | Refills: 0 | Status: SHIPPED | OUTPATIENT
Start: 2020-12-30 | End: 2021-01-28

## 2020-12-30 RX ORDER — IBUPROFEN 800 MG/1
TABLET ORAL
Qty: 45 TABLET | Refills: 0 | Status: SHIPPED | OUTPATIENT
Start: 2020-12-30 | End: 2021-01-28

## 2020-12-30 RX ORDER — METHOCARBAMOL 500 MG/1
TABLET, FILM COATED ORAL
Qty: 90 TABLET | Refills: 0 | Status: SHIPPED | OUTPATIENT
Start: 2020-12-30 | End: 2021-01-28

## 2021-01-28 RX ORDER — METHOCARBAMOL 500 MG/1
TABLET, FILM COATED ORAL
Qty: 90 TABLET | Refills: 0 | Status: SHIPPED | OUTPATIENT
Start: 2021-01-28 | End: 2021-02-23

## 2021-01-28 RX ORDER — FLUOXETINE HYDROCHLORIDE 20 MG/1
CAPSULE ORAL
Qty: 90 CAPSULE | Refills: 1 | Status: SHIPPED | OUTPATIENT
Start: 2021-01-28 | End: 2021-03-31

## 2021-01-28 RX ORDER — ALPRAZOLAM 1 MG/1
TABLET ORAL
Qty: 90 TABLET | Refills: 0 | Status: SHIPPED | OUTPATIENT
Start: 2021-01-28 | End: 2021-02-23

## 2021-01-28 RX ORDER — IBUPROFEN 800 MG/1
TABLET ORAL
Qty: 45 TABLET | Refills: 0 | Status: SHIPPED | OUTPATIENT
Start: 2021-01-28 | End: 2021-02-23

## 2021-01-29 NOTE — TELEPHONE ENCOUNTER
Message noted: Chart reviewed and may refill medications as requested. Prescription sent to listed pharmacy. Pharmacy to notify patient.

## 2021-02-01 ENCOUNTER — NURSE TRIAGE (OUTPATIENT)
Dept: FAMILY MEDICINE CLINIC | Facility: CLINIC | Age: 56
End: 2021-02-01

## 2021-02-01 NOTE — TELEPHONE ENCOUNTER
Action Requested: Summary for Provider     []  Critical Lab, Recommendations Needed  [] Need Additional Advice  [x]   FYI    []   Need Orders  [] Need Medications Sent to Pharmacy  []  Other     SUMMARY:     Spoke with pt,  verified, pt c/o left shoulde

## 2021-02-08 ENCOUNTER — VIRTUAL PHONE E/M (OUTPATIENT)
Dept: FAMILY MEDICINE CLINIC | Facility: CLINIC | Age: 56
End: 2021-02-08
Payer: MEDICAID

## 2021-02-08 ENCOUNTER — TELEPHONE (OUTPATIENT)
Dept: FAMILY MEDICINE CLINIC | Facility: CLINIC | Age: 56
End: 2021-02-08

## 2021-02-08 DIAGNOSIS — G62.9 NEUROPATHY: ICD-10-CM

## 2021-02-08 DIAGNOSIS — M25.512 ACUTE PAIN OF LEFT SHOULDER: ICD-10-CM

## 2021-02-08 PROCEDURE — 99213 OFFICE O/P EST LOW 20 MIN: CPT | Performed by: FAMILY MEDICINE

## 2021-02-08 NOTE — PROGRESS NOTES
HPI:    Patient ID: Javier Candelaria is a 54year old female. Virtual Telephone Check-In    Javier Candelaria verbally consents to a Virtual/Telephone Check-In visit on 02/08/21.   Patient has been referred to the Health system website at [email or web address] to review Clindamycin HCl 300 MG Oral Cap Take 1 capsule (300 mg total) by mouth 3 (three) times daily.  21 capsule 0   • predniSONE 20 MG Oral Tab To take 2 tablets by oral route for 3 days then 1 tablet by oral route for 3 days then 1/2 tablet by oral route for 3 d

## 2021-02-17 NOTE — PROCEDURES
[facility] HOSP - [facility]    [facility] Cardiac Cath Procedure Note  Jun Doctor Patient Status:  Inpatient    1965 MRN [id number]   Location [facility] Attending Kashif Matias MD   Hosp Day # 5 PCP No primary care was assessed and monitoring of oxygen, heart rate and blood pressure by nurse and myself during the exam 30 minutes.       Shane Coera MD  05/30/19 menorrhagia

## 2021-02-23 RX ORDER — METHOCARBAMOL 500 MG/1
500 TABLET, FILM COATED ORAL 4 TIMES DAILY
Qty: 90 TABLET | Refills: 0 | Status: SHIPPED | OUTPATIENT
Start: 2021-02-23 | End: 2021-03-30

## 2021-02-23 RX ORDER — ALPRAZOLAM 1 MG/1
1 TABLET ORAL 3 TIMES DAILY
Qty: 90 TABLET | Refills: 0 | Status: SHIPPED | OUTPATIENT
Start: 2021-02-23 | End: 2021-03-30

## 2021-02-23 RX ORDER — IBUPROFEN 800 MG/1
800 TABLET ORAL EVERY 6 HOURS PRN
Qty: 45 TABLET | Refills: 0 | Status: SHIPPED | OUTPATIENT
Start: 2021-02-23 | End: 2021-03-30

## 2021-02-23 RX ORDER — ALBUTEROL SULFATE 90 UG/1
2 AEROSOL, METERED RESPIRATORY (INHALATION) EVERY 4 HOURS PRN
Qty: 1 INHALER | Refills: 2 | Status: SHIPPED | OUTPATIENT
Start: 2021-02-23 | End: 2021-07-28

## 2021-02-23 NOTE — TELEPHONE ENCOUNTER
Current Outpatient Medications:     •  METHOCARBAMOL 500 MG Oral Tab, TAKE 1 TABLET BY MOUTH FOUR TIMES DAILY, Disp: 90 tablet, Rfl: 0    •  IBUPROFEN 800 MG Oral Tab, TAKE 1 TABLET BY MOUTH EVERY 6 HOURS AS NEEDED FOR PAIN, Disp: 45 tablet, Rfl: 0  •  A

## 2021-03-18 ENCOUNTER — TELEPHONE (OUTPATIENT)
Dept: OBGYN CLINIC | Facility: CLINIC | Age: 56
End: 2021-03-18

## 2021-03-18 NOTE — TELEPHONE ENCOUNTER
Pt has appt with MAF today at 4pm for pelvic pain/cramping. Pt stated that about 5 days ago she woke up from her sleep screaming with RLQ pain. Pt stated she is wondering if she had a cyst rupture.  Pt stated that since then she has been experiencing persis

## 2021-03-18 NOTE — TELEPHONE ENCOUNTER
Patient has appointment this evening requesting order for ultra sound for her pain and cramping. Patient believes a cyst may have burst. Please call at:[phone],thanks.

## 2021-03-19 ENCOUNTER — TELEPHONE (OUTPATIENT)
Dept: OBGYN CLINIC | Facility: CLINIC | Age: 56
End: 2021-03-19

## 2021-03-19 ENCOUNTER — HOSPITAL ENCOUNTER (EMERGENCY)
Facility: HOSPITAL | Age: 56
Discharge: HOME | End: 2021-03-19
Attending: EMERGENCY MEDICINE
Payer: MEDICAID

## 2021-03-19 ENCOUNTER — APPOINTMENT (OUTPATIENT)
Dept: CT IMAGING | Facility: HOSPITAL | Age: 56
End: 2021-03-19
Attending: EMERGENCY MEDICINE
Payer: MEDICAID

## 2021-03-19 ENCOUNTER — APPOINTMENT (OUTPATIENT)
Dept: ULTRASOUND IMAGING | Facility: HOSPITAL | Age: 56
End: 2021-03-19
Attending: EMERGENCY MEDICINE
Payer: MEDICAID

## 2021-03-19 ENCOUNTER — APPOINTMENT (OUTPATIENT)
Dept: GENERAL RADIOLOGY | Facility: HOSPITAL | Age: 56
End: 2021-03-19
Attending: EMERGENCY MEDICINE
Payer: MEDICAID

## 2021-03-19 VITALS
HEART RATE: 69 BPM | RESPIRATION RATE: 18 BRPM | OXYGEN SATURATION: 99 % | SYSTOLIC BLOOD PRESSURE: 121 MMHG | TEMPERATURE: 98 F | DIASTOLIC BLOOD PRESSURE: 75 MMHG

## 2021-03-19 DIAGNOSIS — R10.2 PELVIC PAIN IN FEMALE: Primary | ICD-10-CM

## 2021-03-19 LAB
ANION GAP SERPL CALC-SCNC: 6 MMOL/L (ref 0–18)
BASOPHILS # BLD AUTO: 0.01 X10(3) UL (ref 0–0.2)
BASOPHILS NFR BLD AUTO: 0.2 %
BILIRUB UR QL: NEGATIVE
BUN BLD-MCNC: 6 MG/DL (ref 7–18)
BUN/CREAT SERPL: 8.8 (ref 10–20)
CALCIUM BLD-MCNC: 9.3 MG/DL (ref 8.5–10.1)
CHLORIDE SERPL-SCNC: 103 MMOL/L (ref 98–112)
CLARITY UR: CLEAR
CO2 SERPL-SCNC: 27 MMOL/L (ref 21–32)
COLOR UR: YELLOW
CREAT BLD-MCNC: 0.68 MG/DL
DEPRECATED RDW RBC AUTO: 40.5 FL (ref 35.1–46.3)
EOSINOPHIL # BLD AUTO: 0.08 X10(3) UL (ref 0–0.7)
EOSINOPHIL NFR BLD AUTO: 1.5 %
ERYTHROCYTE [DISTWIDTH] IN BLOOD BY AUTOMATED COUNT: 12.3 % (ref 11–15)
GLUCOSE BLD-MCNC: 127 MG/DL (ref 70–99)
GLUCOSE UR-MCNC: NEGATIVE MG/DL
HCT VFR BLD AUTO: 38.7 %
HGB BLD-MCNC: 13.9 G/DL
HGB UR QL STRIP.AUTO: NEGATIVE
IMM GRANULOCYTES # BLD AUTO: 0.01 X10(3) UL (ref 0–1)
IMM GRANULOCYTES NFR BLD: 0.2 %
KETONES UR-MCNC: NEGATIVE MG/DL
LYMPHOCYTES # BLD AUTO: 1.44 X10(3) UL (ref 1–4)
LYMPHOCYTES NFR BLD AUTO: 26.8 %
MCH RBC QN AUTO: 32.3 PG (ref 26–34)
MCHC RBC AUTO-ENTMCNC: 35.9 G/DL (ref 31–37)
MCV RBC AUTO: 90 FL
MONOCYTES # BLD AUTO: 0.3 X10(3) UL (ref 0.1–1)
MONOCYTES NFR BLD AUTO: 5.6 %
NEUTROPHILS # BLD AUTO: 3.54 X10 (3) UL (ref 1.5–7.7)
NEUTROPHILS # BLD AUTO: 3.54 X10(3) UL (ref 1.5–7.7)
NEUTROPHILS NFR BLD AUTO: 65.7 %
NITRITE UR QL STRIP.AUTO: NEGATIVE
OSMOLALITY SERPL CALC.SUM OF ELEC: 281 MOSM/KG (ref 275–295)
PH UR: 7 [PH] (ref 5–8)
PLATELET # BLD AUTO: 110 10(3)UL (ref 150–450)
POTASSIUM SERPL-SCNC: 4 MMOL/L (ref 3.5–5.1)
PROT UR-MCNC: NEGATIVE MG/DL
RBC # BLD AUTO: 4.3 X10(6)UL
SODIUM SERPL-SCNC: 136 MMOL/L (ref 136–145)
SP GR UR STRIP: 1 (ref 1–1.03)
UROBILINOGEN UR STRIP-ACNC: 4
WBC # BLD AUTO: 5.4 X10(3) UL (ref 4–11)

## 2021-03-19 PROCEDURE — 80048 BASIC METABOLIC PNL TOTAL CA: CPT | Performed by: EMERGENCY MEDICINE

## 2021-03-19 PROCEDURE — 96374 THER/PROPH/DIAG INJ IV PUSH: CPT

## 2021-03-19 PROCEDURE — 85025 COMPLETE CBC W/AUTO DIFF WBC: CPT | Performed by: EMERGENCY MEDICINE

## 2021-03-19 PROCEDURE — 76830 TRANSVAGINAL US NON-OB: CPT | Performed by: EMERGENCY MEDICINE

## 2021-03-19 PROCEDURE — 76856 US EXAM PELVIC COMPLETE: CPT | Performed by: EMERGENCY MEDICINE

## 2021-03-19 PROCEDURE — 93975 VASCULAR STUDY: CPT | Performed by: EMERGENCY MEDICINE

## 2021-03-19 PROCEDURE — 99285 EMERGENCY DEPT VISIT HI MDM: CPT

## 2021-03-19 PROCEDURE — 71045 X-RAY EXAM CHEST 1 VIEW: CPT | Performed by: EMERGENCY MEDICINE

## 2021-03-19 PROCEDURE — 74177 CT ABD & PELVIS W/CONTRAST: CPT | Performed by: EMERGENCY MEDICINE

## 2021-03-19 PROCEDURE — 81001 URINALYSIS AUTO W/SCOPE: CPT | Performed by: EMERGENCY MEDICINE

## 2021-03-19 RX ORDER — KETOROLAC TROMETHAMINE 15 MG/ML
15 INJECTION, SOLUTION INTRAMUSCULAR; INTRAVENOUS ONCE
Status: COMPLETED | OUTPATIENT
Start: 2021-03-19 | End: 2021-03-19

## 2021-03-19 NOTE — ED PROVIDER NOTES
Patient Seen in: [facility] AND [facility] Emergency Department      History   Patient presents with:  Pelvic Pain  Abdomen/Flank Pain    Stated Complaint: cyst burst; pt sent here for ultrasound    HPI/Subjective:   HPI  51-year-old female with history of ovari Systems    Positive for stated complaint: cyst burst; pt sent here for ultrasound  Other systems are as noted in HPI. Constitutional and vital signs reviewed. All other systems reviewed and negative except as noted above.     Physical Exam     ED Tria Squamous Epi.  Cells Few (*)     All other components within normal limits   CBC W/ DIFFERENTIAL - Abnormal; Notable for the following components:    .0 (*)     All other components within normal limits   CBC WITH DIFFERENTIAL WITH PLATELET    Narrat

## 2021-03-19 NOTE — TELEPHONE ENCOUNTER
Patient has missed her last two appointments scheduled. Patient informed first available 3/29. Patient is having pelvic pain. Please call at:[phone],thanks.

## 2021-03-19 NOTE — ED INITIAL ASSESSMENT (HPI)
Right lower abd / right pelvic pain x 6 days unrelieved with motrin 800mg today. Advised ED eval by gynecologist office. Reporting low grade temps at home.

## 2021-03-19 NOTE — TELEPHONE ENCOUNTER
Pt reports \"cyst popped on right side\" about 6 days ago. Pt states she has been having RLQ pain x 6 days. Pt states she has been able to make her 2 past appts due to transportation issues. Pt reports RLQ pain and cramping 7-8/10.  Pt states she has taken

## 2021-03-20 NOTE — ED PROVIDER NOTES
Signed out to me to check CT scan which did not have an acute finding to explain her pain.   Patient instructed follow-up with her doctor for other incidental findings on CT scan

## 2021-03-30 RX ORDER — IBUPROFEN 800 MG/1
800 TABLET ORAL EVERY 6 HOURS PRN
Qty: 45 TABLET | Refills: 0 | Status: SHIPPED | OUTPATIENT
Start: 2021-03-30 | End: 2021-04-30

## 2021-03-30 RX ORDER — METHOCARBAMOL 500 MG/1
500 TABLET, FILM COATED ORAL 4 TIMES DAILY
Qty: 90 TABLET | Refills: 0 | Status: SHIPPED | OUTPATIENT
Start: 2021-03-30 | End: 2021-04-30

## 2021-03-30 RX ORDER — ALPRAZOLAM 1 MG/1
1 TABLET ORAL 3 TIMES DAILY
Qty: 90 TABLET | Refills: 0 | Status: SHIPPED | OUTPATIENT
Start: 2021-03-30 | End: 2021-04-30

## 2021-04-01 RX ORDER — FLUOXETINE HYDROCHLORIDE 20 MG/1
60 CAPSULE ORAL DAILY
Qty: 90 CAPSULE | Refills: 1 | Status: SHIPPED | OUTPATIENT
Start: 2021-04-01 | End: 2021-09-27

## 2021-04-30 RX ORDER — ALPRAZOLAM 1 MG/1
1 TABLET ORAL 3 TIMES DAILY
Qty: 90 TABLET | Refills: 0 | Status: SHIPPED | OUTPATIENT
Start: 2021-04-30 | End: 2021-06-01

## 2021-04-30 RX ORDER — IBUPROFEN 800 MG/1
800 TABLET ORAL EVERY 6 HOURS PRN
Qty: 45 TABLET | Refills: 0 | Status: SHIPPED | OUTPATIENT
Start: 2021-04-30 | End: 2021-06-01

## 2021-04-30 RX ORDER — METHOCARBAMOL 500 MG/1
500 TABLET, FILM COATED ORAL 4 TIMES DAILY
Qty: 90 TABLET | Refills: 0 | Status: SHIPPED | OUTPATIENT
Start: 2021-04-30 | End: 2021-06-23

## 2021-04-30 NOTE — TELEPHONE ENCOUNTER
Pt following up on call. States she is leaving for out of town in 2 hours. Pt states she is out of medications, and these medications are not new to her. Informed Pt, request was forwarded to Dr. Corey Saenz and waiting on response.   Pt asking if can send to pro

## 2021-04-30 NOTE — TELEPHONE ENCOUNTER
•  ibuprofen 800 MG Oral Tab, Take 1 tablet (800 mg total) by mouth every 6 (six) hours as needed for Pain., Disp: 45 tablet, Rfl: 0  •  methocarbamol 500 MG Oral Tab, Take 1 tablet (500 mg total) by mouth 4 (four) times daily. , Disp: 90 tablet, Rfl: 0  •

## 2021-05-12 ENCOUNTER — TELEPHONE (OUTPATIENT)
Dept: OBGYN CLINIC | Facility: CLINIC | Age: 56
End: 2021-05-12

## 2021-05-12 NOTE — TELEPHONE ENCOUNTER
Pt's last pap on 4/18/2020 was positive for HPV. KELLY's recs were to have a colpo in 6/2020. This was never done. Message to KELLY, does pt need colpo or repeat pap since it has been over a year since last pap?

## 2021-05-12 NOTE — TELEPHONE ENCOUNTER
Spoke with pt and informed her that she is overdue for her annual.  Pt states she actually wants to make an appt with KELLY for pelvic pain. She has an appt in April but had to cancel. Pt states she is having pain on the right side in her pelvic area.   Pt

## 2021-06-01 RX ORDER — ALPRAZOLAM 1 MG/1
TABLET ORAL
Qty: 90 TABLET | Refills: 0 | Status: SHIPPED | OUTPATIENT
Start: 2021-06-01 | End: 2021-06-23

## 2021-06-01 RX ORDER — IBUPROFEN 800 MG/1
TABLET ORAL
Qty: 45 TABLET | Refills: 0 | Status: SHIPPED | OUTPATIENT
Start: 2021-06-01 | End: 2021-06-23

## 2021-06-02 ENCOUNTER — TELEPHONE (OUTPATIENT)
Dept: FAMILY MEDICINE CLINIC | Facility: CLINIC | Age: 56
End: 2021-06-02

## 2021-06-02 NOTE — TELEPHONE ENCOUNTER
Called pt no answer LVM in regards to patient prescription request approved and refilled sent to pharmacy on file

## 2021-06-03 NOTE — TELEPHONE ENCOUNTER
Patient reports pharmacy denied receiving refill for Alprazolam. Contacted pharmacy, patient's script is ready for , left vm for patient advising script ready.

## 2021-06-23 RX ORDER — METHOCARBAMOL 500 MG/1
TABLET, FILM COATED ORAL
Qty: 90 TABLET | Refills: 0 | Status: SHIPPED | OUTPATIENT
Start: 2021-06-23 | End: 2021-07-28

## 2021-06-23 RX ORDER — IBUPROFEN 800 MG/1
TABLET ORAL
Qty: 45 TABLET | Refills: 0 | Status: SHIPPED | OUTPATIENT
Start: 2021-06-23 | End: 2021-07-28

## 2021-06-23 RX ORDER — ALPRAZOLAM 1 MG/1
TABLET ORAL
Qty: 90 TABLET | Refills: 0 | Status: SHIPPED | OUTPATIENT
Start: 2021-06-23 | End: 2021-07-28

## 2021-07-28 RX ORDER — ALPRAZOLAM 1 MG/1
TABLET ORAL
Qty: 90 TABLET | Refills: 0 | Status: SHIPPED | OUTPATIENT
Start: 2021-07-28 | End: 2021-08-24

## 2021-07-28 RX ORDER — IBUPROFEN 800 MG/1
TABLET ORAL
Qty: 45 TABLET | Refills: 0 | Status: ON HOLD | OUTPATIENT
Start: 2021-07-28 | End: 2021-08-18

## 2021-07-28 RX ORDER — ALBUTEROL SULFATE 90 UG/1
AEROSOL, METERED RESPIRATORY (INHALATION)
Qty: 8.5 G | Refills: 0 | OUTPATIENT
Start: 2021-07-28

## 2021-07-28 RX ORDER — ALBUTEROL SULFATE 90 UG/1
AEROSOL, METERED RESPIRATORY (INHALATION)
Qty: 18 G | Refills: 0 | Status: ON HOLD | OUTPATIENT
Start: 2021-07-28 | End: 2021-08-18

## 2021-07-28 RX ORDER — METHOCARBAMOL 500 MG/1
TABLET, FILM COATED ORAL
Qty: 90 TABLET | Refills: 0 | Status: SHIPPED | OUTPATIENT
Start: 2021-07-28 | End: 2021-10-25

## 2021-07-28 NOTE — TELEPHONE ENCOUNTER
Refill passed per [facility], [facility] protocol.   Requested Prescriptions   Pending Prescriptions Disp Refills    ALBUTEROL SULFATE  (90 Base) MCG/ACT Inhalation Aero Soln [Pharmacy Med Name: ALBUTEROL HFA INH (200 PUFFS)8.5GM] 8.5 g 0     Sig: INHALE 2 year ago Vaginal bleeding    [facility], [address], [place] Aditya Espinoza,     Office Visit    1 year ago Urine finding    Estela Richter MD    Office Visit

## 2021-07-28 NOTE — TELEPHONE ENCOUNTER
Message noted: Chart reviewed and may refill medications as requested. Script sent to listed pharmacy by secure method. Pharmacy to notify.

## 2021-07-28 NOTE — TELEPHONE ENCOUNTER
Please review. Protocol failed / No protocol.   Requested Prescriptions   Pending Prescriptions Disp Refills    IBUPROFEN 800 MG Oral Tab [Pharmacy Med Name: IBUPROFEN 800MG TABLETS] 45 tablet 0     Sig: TAKE 1 TABLET(800 MG) BY MOUTH EVERY 6 HOURS AS NEED Office Visit    1 year ago Vaginal bleeding    [facility], [address] [place], Ryesera Landry DO    Office Visit    1 year ago Urine finding    Jewel Warren MD    Office Visit

## 2021-08-11 ENCOUNTER — APPOINTMENT (OUTPATIENT)
Dept: CT IMAGING | Facility: HOSPITAL | Age: 56
DRG: 384 | End: 2021-08-11
Attending: EMERGENCY MEDICINE
Payer: MEDICAID

## 2021-08-11 ENCOUNTER — APPOINTMENT (OUTPATIENT)
Dept: GENERAL RADIOLOGY | Facility: HOSPITAL | Age: 56
DRG: 384 | End: 2021-08-11
Attending: HOSPITALIST
Payer: MEDICAID

## 2021-08-11 ENCOUNTER — APPOINTMENT (OUTPATIENT)
Dept: GENERAL RADIOLOGY | Facility: HOSPITAL | Age: 56
DRG: 384 | End: 2021-08-11
Attending: EMERGENCY MEDICINE
Payer: MEDICAID

## 2021-08-11 ENCOUNTER — HOSPITAL ENCOUNTER (INPATIENT)
Facility: HOSPITAL | Age: 56
LOS: 6 days | Discharge: HOME | DRG: 384 | End: 2021-08-18
Attending: EMERGENCY MEDICINE | Admitting: HOSPITALIST
Payer: MEDICAID

## 2021-08-11 ENCOUNTER — APPOINTMENT (OUTPATIENT)
Dept: CV DIAGNOSTICS | Facility: HOSPITAL | Age: 56
DRG: 384 | End: 2021-08-11
Attending: HOSPITALIST
Payer: MEDICAID

## 2021-08-11 DIAGNOSIS — R93.3 ABNORMAL MAGNETIC RESONANCE CHOLANGIOPANCREATOGRAPHY (MRCP): ICD-10-CM

## 2021-08-11 DIAGNOSIS — R07.9 EXERTIONAL CHEST PAIN: Primary | ICD-10-CM

## 2021-08-11 LAB
A1AT SERPL-MCNC: 173 MG/DL (ref 90–200)
AFP-TM SERPL-MCNC: 6.1 NG/ML (ref ?–8)
ALBUMIN SERPL-MCNC: 3.6 G/DL (ref 3.4–5)
ALP LIVER SERPL-CCNC: 103 U/L
ALT SERPL-CCNC: 72 U/L
ANION GAP SERPL CALC-SCNC: 3 MMOL/L (ref 0–18)
AST SERPL-CCNC: 84 U/L (ref 15–37)
BASOPHILS # BLD AUTO: 0.01 X10(3) UL (ref 0–0.2)
BASOPHILS NFR BLD AUTO: 0.2 %
BILIRUB DIRECT SERPL-MCNC: 0.4 MG/DL (ref 0–0.2)
BILIRUB SERPL-MCNC: 0.9 MG/DL (ref 0.1–2)
BILIRUB UR QL: NEGATIVE
BUN BLD-MCNC: 11 MG/DL (ref 7–18)
BUN/CREAT SERPL: 17.2 (ref 10–20)
CALCIUM BLD-MCNC: 9.3 MG/DL (ref 8.5–10.1)
CERULOPLASMIN SERPL-MCNC: 29.8 MG/DL (ref 20–60)
CHLORIDE SERPL-SCNC: 103 MMOL/L (ref 98–112)
CHOLEST SMN-MCNC: 118 MG/DL (ref ?–200)
CLARITY UR: CLEAR
CO2 SERPL-SCNC: 33 MMOL/L (ref 21–32)
COLOR UR: YELLOW
CREAT BLD-MCNC: 0.64 MG/DL
D DIMER PPP FEU-MCNC: 1.99 UG/ML FEU (ref ?–0.56)
DEPRECATED RDW RBC AUTO: 42.3 FL (ref 35.1–46.3)
EOSINOPHIL # BLD AUTO: 0.07 X10(3) UL (ref 0–0.7)
EOSINOPHIL NFR BLD AUTO: 1.3 %
ERYTHROCYTE [DISTWIDTH] IN BLOOD BY AUTOMATED COUNT: 12.1 % (ref 11–15)
ERYTHROCYTE [SEDIMENTATION RATE] IN BLOOD: 22 MM/HR
GLUCOSE BLD-MCNC: 70 MG/DL (ref 70–99)
GLUCOSE UR-MCNC: NEGATIVE MG/DL
HAV AB SER QL IA: NONREACTIVE
HBV CORE AB SERPL QL IA: NONREACTIVE
HBV SURFACE AB SER QL: NONREACTIVE
HBV SURFACE AB SERPL IA-ACNC: 3.29 MIU/ML
HBV SURFACE AG SERPL QL IA: NONREACTIVE
HCT VFR BLD AUTO: 40.4 %
HDLC SERPL-MCNC: 31 MG/DL (ref 40–59)
HGB BLD-MCNC: 13.7 G/DL
HGB UR QL STRIP.AUTO: NEGATIVE
IGA SERPL-MCNC: 175 MG/DL (ref 70–312)
IGM SERPL-MCNC: 352 MG/DL (ref 43–279)
IMM GRANULOCYTES # BLD AUTO: 0.02 X10(3) UL (ref 0–1)
IMM GRANULOCYTES NFR BLD: 0.4 %
IMMUNOGLOBULIN PNL SER-MCNC: 1760 MG/DL (ref 791–1643)
INR BLD: 1.3 (ref 0.9–1.2)
IRON SATURATION: 28 %
IRON SERPL-MCNC: 103 UG/DL
KETONES UR-MCNC: NEGATIVE MG/DL
LDLC SERPL CALC-MCNC: 71 MG/DL (ref ?–100)
LIPASE SERPL-CCNC: 119 U/L (ref 73–393)
LYMPHOCYTES # BLD AUTO: 1.75 X10(3) UL (ref 1–4)
LYMPHOCYTES NFR BLD AUTO: 33.4 %
M PROTEIN MFR SERPL ELPH: 8.4 G/DL (ref 6.4–8.2)
MCH RBC QN AUTO: 31.7 PG (ref 26–34)
MCHC RBC AUTO-ENTMCNC: 33.9 G/DL (ref 31–37)
MCV RBC AUTO: 93.5 FL
MONOCYTES # BLD AUTO: 0.34 X10(3) UL (ref 0.1–1)
MONOCYTES NFR BLD AUTO: 6.5 %
NEUTROPHILS # BLD AUTO: 3.05 X10 (3) UL (ref 1.5–7.7)
NEUTROPHILS # BLD AUTO: 3.05 X10(3) UL (ref 1.5–7.7)
NEUTROPHILS NFR BLD AUTO: 58.2 %
NITRITE UR QL STRIP.AUTO: NEGATIVE
NONHDLC SERPL-MCNC: 87 MG/DL (ref ?–130)
OSMOLALITY SERPL CALC.SUM OF ELEC: 286 MOSM/KG (ref 275–295)
PH UR: 6 [PH] (ref 5–8)
PLATELET # BLD AUTO: 126 10(3)UL (ref 150–450)
POTASSIUM SERPL-SCNC: 3.5 MMOL/L (ref 3.5–5.1)
PROT UR-MCNC: NEGATIVE MG/DL
PROTHROMBIN TIME: 16 SECONDS (ref 11.8–14.5)
RBC # BLD AUTO: 4.32 X10(6)UL
SARS-COV-2 RNA RESP QL NAA+PROBE: NOT DETECTED
SODIUM SERPL-SCNC: 139 MMOL/L (ref 136–145)
SP GR UR STRIP: 1.01 (ref 1–1.03)
TOTAL IRON BINDING CAPACITY: 362 UG/DL (ref 240–450)
TRANSFERRIN SERPL-MCNC: 243 MG/DL (ref 200–360)
TRIGL SERPL-MCNC: 79 MG/DL (ref 30–149)
TROPONIN I SERPL-MCNC: <0.045 NG/ML (ref ?–0.04)
UROBILINOGEN UR STRIP-ACNC: 4
VLDLC SERPL CALC-MCNC: 12 MG/DL (ref 0–30)
WBC # BLD AUTO: 5.2 X10(3) UL (ref 4–11)

## 2021-08-11 PROCEDURE — 99244 OFF/OP CNSLTJ NEW/EST MOD 40: CPT | Performed by: SURGERY

## 2021-08-11 PROCEDURE — 71045 X-RAY EXAM CHEST 1 VIEW: CPT | Performed by: EMERGENCY MEDICINE

## 2021-08-11 PROCEDURE — 93306 TTE W/DOPPLER COMPLETE: CPT | Performed by: HOSPITALIST

## 2021-08-11 PROCEDURE — 74177 CT ABD & PELVIS W/CONTRAST: CPT | Performed by: EMERGENCY MEDICINE

## 2021-08-11 PROCEDURE — 71260 CT THORAX DX C+: CPT | Performed by: EMERGENCY MEDICINE

## 2021-08-11 PROCEDURE — 99254 IP/OBS CNSLTJ NEW/EST MOD 60: CPT | Performed by: PHYSICIAN ASSISTANT

## 2021-08-11 PROCEDURE — 99220 INITIAL OBSERVATION CARE,LEVL III: CPT | Performed by: HOSPITALIST

## 2021-08-11 PROCEDURE — 73630 X-RAY EXAM OF FOOT: CPT | Performed by: HOSPITALIST

## 2021-08-11 RX ORDER — OXYBUTYNIN CHLORIDE 10 MG/1
10 TABLET, EXTENDED RELEASE ORAL DAILY
Status: DISCONTINUED | OUTPATIENT
Start: 2021-08-11 | End: 2021-08-11 | Stop reason: ALTCHOICE

## 2021-08-11 RX ORDER — HYDROCODONE BITARTRATE AND ACETAMINOPHEN 5; 325 MG/1; MG/1
2 TABLET ORAL EVERY 4 HOURS PRN
Status: DISCONTINUED | OUTPATIENT
Start: 2021-08-11 | End: 2021-08-11 | Stop reason: ALTCHOICE

## 2021-08-11 RX ORDER — ASPIRIN 325 MG
325 TABLET ORAL DAILY
Status: DISCONTINUED | OUTPATIENT
Start: 2021-08-12 | End: 2021-08-18

## 2021-08-11 RX ORDER — ASPIRIN 81 MG/1
324 TABLET, CHEWABLE ORAL ONCE
Status: COMPLETED | OUTPATIENT
Start: 2021-08-11 | End: 2021-08-11

## 2021-08-11 RX ORDER — ASPIRIN 81 MG/1
81 TABLET, CHEWABLE ORAL DAILY
Status: DISCONTINUED | OUTPATIENT
Start: 2021-08-11 | End: 2021-08-11 | Stop reason: ALTCHOICE

## 2021-08-11 RX ORDER — METHOCARBAMOL 500 MG/1
500 TABLET, FILM COATED ORAL 4 TIMES DAILY
Status: DISCONTINUED | OUTPATIENT
Start: 2021-08-11 | End: 2021-08-18

## 2021-08-11 RX ORDER — ONDANSETRON 2 MG/ML
4 INJECTION INTRAMUSCULAR; INTRAVENOUS EVERY 6 HOURS PRN
Status: DISCONTINUED | OUTPATIENT
Start: 2021-08-11 | End: 2021-08-18

## 2021-08-11 RX ORDER — NITROGLYCERIN 0.4 MG/1
0.4 TABLET SUBLINGUAL EVERY 5 MIN PRN
Status: DISCONTINUED | OUTPATIENT
Start: 2021-08-11 | End: 2021-08-18

## 2021-08-11 RX ORDER — POLYETHYLENE GLYCOL 3350 17 G/17G
17 POWDER, FOR SOLUTION ORAL DAILY PRN
Status: DISCONTINUED | OUTPATIENT
Start: 2021-08-11 | End: 2021-08-15

## 2021-08-11 RX ORDER — POTASSIUM CHLORIDE 20 MEQ/1
40 TABLET, EXTENDED RELEASE ORAL EVERY 4 HOURS
Status: COMPLETED | OUTPATIENT
Start: 2021-08-11 | End: 2021-08-12

## 2021-08-11 RX ORDER — METHADONE HYDROCHLORIDE 10 MG/5ML
117 SOLUTION ORAL DAILY
Status: DISCONTINUED | OUTPATIENT
Start: 2021-08-11 | End: 2021-08-18

## 2021-08-11 RX ORDER — BISACODYL 10 MG
10 SUPPOSITORY, RECTAL RECTAL
Status: DISCONTINUED | OUTPATIENT
Start: 2021-08-11 | End: 2021-08-15

## 2021-08-11 RX ORDER — ACETAMINOPHEN 325 MG/1
650 TABLET ORAL EVERY 4 HOURS PRN
Status: DISCONTINUED | OUTPATIENT
Start: 2021-08-11 | End: 2021-08-18

## 2021-08-11 RX ORDER — PROCHLORPERAZINE EDISYLATE 5 MG/ML
5 INJECTION INTRAMUSCULAR; INTRAVENOUS EVERY 8 HOURS PRN
Status: DISCONTINUED | OUTPATIENT
Start: 2021-08-11 | End: 2021-08-18

## 2021-08-11 RX ORDER — FLUOXETINE HYDROCHLORIDE 20 MG/1
60 CAPSULE ORAL DAILY
Status: DISCONTINUED | OUTPATIENT
Start: 2021-08-11 | End: 2021-08-18

## 2021-08-11 RX ORDER — IBUPROFEN 800 MG/1
800 TABLET ORAL EVERY 6 HOURS PRN
Status: DISCONTINUED | OUTPATIENT
Start: 2021-08-11 | End: 2021-08-14

## 2021-08-11 RX ORDER — IPRATROPIUM BROMIDE AND ALBUTEROL SULFATE 2.5; .5 MG/3ML; MG/3ML
3 SOLUTION RESPIRATORY (INHALATION) EVERY 6 HOURS PRN
Status: DISCONTINUED | OUTPATIENT
Start: 2021-08-11 | End: 2021-08-18

## 2021-08-11 RX ORDER — DOCUSATE SODIUM 100 MG/1
100 CAPSULE, LIQUID FILLED ORAL 2 TIMES DAILY
Status: DISCONTINUED | OUTPATIENT
Start: 2021-08-11 | End: 2021-08-15

## 2021-08-11 RX ORDER — MORPHINE SULFATE 4 MG/ML
4 INJECTION, SOLUTION INTRAMUSCULAR; INTRAVENOUS ONCE
Status: COMPLETED | OUTPATIENT
Start: 2021-08-11 | End: 2021-08-11

## 2021-08-11 RX ORDER — SODIUM PHOSPHATE, DIBASIC AND SODIUM PHOSPHATE, MONOBASIC 7; 19 G/133ML; G/133ML
1 ENEMA RECTAL ONCE AS NEEDED
Status: DISCONTINUED | OUTPATIENT
Start: 2021-08-11 | End: 2021-08-15

## 2021-08-11 RX ORDER — FLUTICASONE PROPIONATE 50 MCG
1 SPRAY, SUSPENSION (ML) NASAL DAILY
Status: DISCONTINUED | OUTPATIENT
Start: 2021-08-11 | End: 2021-08-18

## 2021-08-11 RX ORDER — ALPRAZOLAM 1 MG/1
1 TABLET ORAL 3 TIMES DAILY
Status: DISCONTINUED | OUTPATIENT
Start: 2021-08-11 | End: 2021-08-12

## 2021-08-11 RX ORDER — HYDROCODONE BITARTRATE AND ACETAMINOPHEN 5; 325 MG/1; MG/1
1 TABLET ORAL EVERY 4 HOURS PRN
Status: DISCONTINUED | OUTPATIENT
Start: 2021-08-11 | End: 2021-08-11 | Stop reason: ALTCHOICE

## 2021-08-11 NOTE — ED QUICK NOTES
Orders for admission, patient is aware of plan and ready to go upstairs.  Any questions please call ED RN Carrillo Alvarado at extension [phone]    Type of COVID test sent: covid test negative  COVID Suspicion level: LOW    Titratable drugs infusing:  None  Rate:    LOC

## 2021-08-11 NOTE — BH PROGRESS NOTE
Behavioral Health Note:  REASON FOR ADMISSION:   Chest pain, shortness of breath    REASON FOR CONSULT:  Psychiatry consultation requested for evaluation and advice d/t anxiety and methadone management    OBJECTIVE:  Mone Duenas is a  64year old female intrusive thoughts. Krystal Kim reports that she has hx of sleep apnea but has never had a CPAP or BiPAP and reports that she's open to all recommendations. Otherwise she reports disturbances for the bathroom every hour d/t benign bladder tumor.  Krystal Kim gets now. She has one daughter. Tran Arnold qualified for a medical cannabis card and has a referrals but cannot afford the $300 fee for the card. She uses cannabis daily for pain and appetite stimulation.  She has been clean from cocaine since 1-2/2021 and clean fro [place], [place]

## 2021-08-11 NOTE — CONSULTS
[facility]    Report of Consultation    Kyaw Davis Patient Status:  Observation    1965 MRN [id number]   Location [facility] 3W/SW Attending Scott Taylor MD   Hosp Day # 0 PCP Veronica Villasenor MD     Date of Admission:   Procedure Laterality Date   • ANGIOPLASTY (CORONARY)  2019    3 total   • APPENDECTOMY      2012   • CATH PERCUTANEOUS  TRANSLUMINAL CORONARY ANGIOPLASTY     • HYSTERECTOMY  2010    partial hysterectomy     Family History   Problem Relation Age of Onset daily.  Respiratory Therapy Supplies (NEBULIZER/ADULT MASK) Does not apply Kit, 1 Kit        Review of Systems:   Review of Systems    Physical Exam:   Vital Signs:   weight is 156 lb 15.5 oz (71.2 kg). Her oral temperature is 98.1 °F (36.7 °C).  Her blood 22 08/11/2021    TROP <0.045 08/11/2021       XR FOOT, COMPLETE (MIN 3 VIEWS), RIGHT (CPT=73630)    Result Date: 8/11/2021  CONCLUSION:  1. No acute findings.     Dictated by (CST): Lurdes Warner MD on 8/11/2021 at 12:27 PM     Finalized by (CST): Post hysterectomy. Ovoid calcified focus in the pouch of Royal which may represent calcified ovary or benign calcified adnexal structure.  4.  Short segments of airway occlusion/mucous plugging in the visualized lung bases suggestive of bronchitis or as

## 2021-08-11 NOTE — CONSULTS
[facility]    PATIENT'S NAME: Shira Mendoza   ATTENDING PHYSICIAN: Samia Naylor MD   CONSULTING PHYSICIAN: Paul Mon.  Christina Dailey MD   PATIENT ACCOUNT#:   [id number]    LOCATION:  [address] [place] [address] #:   [id number]       DATE OF BIRTH:  [birth year] except for the above. PHYSICAL EXAMINATION:    GENERAL:  Well-developed, well-nourished female in no acute distress, alert and oriented x3. Patient looks depressed.   VITAL SIGNS:  Blood pressure 150/74; pulse 54, regular rhythm; respirations 15, alesha pericarditis. Thank you, will follow. Dictated By Radha Kaiser.  Rocael Mcclure MD  d: 08/11/2021 11:01:40  t: 08/11/2021 14:16:44  [facility] [id number]  CZSUKUMAR/

## 2021-08-11 NOTE — PLAN OF CARE
Problem: Patient/Family Goals  Goal: Patient/Family Long Term Goal  Description: Patient's Long Term Goal: to be discharge to home    Interventions:  - discharge planning  - provide education re new meds and disease process  - discuss plan of care and on

## 2021-08-11 NOTE — ED PROVIDER NOTES
Patient Seen in: [facility] AND [facility] Emergency Department      History   No chief complaint on file.     Stated Complaint: left breast pain short of breath    HPI/Subjective:   HPI  Patient is a 60-year-old female history of COPD, CAD status post angioplast short of breath  Other systems are as noted in HPI. Constitutional and vital signs reviewed. All other systems reviewed and negative except as noted above.     Physical Exam     ED Triage Vitals [08/11/21 0515]   /87   Pulse 55   Resp    Temp Bilirubin, Direct 0.4 (*)     Total Protein 8.4 (*)     All other components within normal limits   URINALYSIS WITH CULTURE REFLEX - Abnormal; Notable for the following components:    Urobilinogen Urine 4.0 (*)     Leukocyte Esterase Urine Trace (*)     Sq aneurysm or dissection. EKG    Rate, intervals and axes as noted on EKG Report. Rate: 55  Rhythm: Sinus Rhythm  Reading: Sinus bradycardia, normal axis, normal intervals, no ST or T wave changes.                          MDM      Patient is a 56-yea

## 2021-08-11 NOTE — CONSULTS
[facility]   Gastroenterology Consultation Note    Ella Hunt  Patient Status:    Observation  Date of Admission:         8/11/2021, Hospital day #0  Attending:   Gali Kyle MD  PCP:     Nestor Goodpasture, MD    Reason for Consultat Diagnosis Date   • Anxiety    • Colitis     ulcerative colitis   • COPD (chronic obstructive pulmonary disease) ([facility] [address]    • Coronary atherosclerosis    • Depression    • Hepatitis     Hep C   • Hepatitis C     1989   • History of blood transfusion 2010 docusate sodium (COLACE) cap 100 mg, 100 mg, Oral, BID  •  PEG 3350 (MIRALAX) powder packet 17 g, 17 g, Oral, Daily PRN  •  magnesium hydroxide (MILK OF MAGNESIA) 400 MG/5ML suspension 30 mL, 30 mL, Oral, Daily PRN  •  bisacodyl (DULCOLAX) rectal supposit 08/11/2021    .0 08/11/2021    CREATSERUM 0.64 08/11/2021    BUN 11 08/11/2021     08/11/2021    K 3.5 08/11/2021     08/11/2021    CO2 33.0 08/11/2021    GLU 70 08/11/2021    CA 9.3 08/11/2021    ALB 3.6 08/11/2021    ALKPHO 103 08/11/2 PELVIS IV CONTRAST, NO ORAL (ER)    Result Date: 8/11/2021  CONCLUSION:  1. Hepatic cirrhosis. Splenomegaly, suggestive of portal hypertension. Spleen is 15.4 cm in length. 2.  Gallbladder shows prominent distention.   Common bile duct is again enlarged appreciated on physical exam. Suspect 2/2 untreated Hep C, however will obtain chronic LFT w/u to further categorize if other contributing component.   - EV: needs EGD at one point  - Ascites: N/A  - PSE: N/A  - HCC: obtain AFP - no discrete lesion on imag

## 2021-08-11 NOTE — CONSULTS
Cardiology (consult dictated). Assessment:  1. Patient admitted with chest pain. Mostly atypical features. Some features suggest possible pericarditis. 2.  History of mild nonobstructive CAD at a cath 2 years ago. Negative troponin x2.   EKG pendi

## 2021-08-11 NOTE — H&P
[address] [facility] Patient Status:  Observation    1965 MRN [id number]   Location [facility] 3W/SW Attending Logan Overton MD   Hosp Day # 0 PCP Isreal Baca MD     Date:  2021  Date of HYSTERECTOMY  2010    partial hysterectomy     Family History   Problem Relation Age of Onset   • Cancer Father    • Hypertension Father    • Cancer Mother      Social History:  Social History    Tobacco Use      Smoking status: Current Every Day Smoker Signs: Blood pressure 151/74, pulse 56, temperature 98.1 °F (36.7 °C), temperature source Oral, resp. rate 15, SpO2 98 %. GENERAL:  The patient appeared to be in no distress. Lying in bed, comfortably.   SKIN:  Warm and hydrated  PSYCHIATRIC: Calm and segments of airway occlusion/mucous plugging in the peripheral airways. Perihilar bronchial wall thickening. Findings suggest bronchitis or asthma. 2.  No acute pulmonary embolus to the subsegmental pulmonary artery level.   No acute aortic injury; no dis normal EF, mild LVH, mild MR, mild LAE  -given current symptoms plan repeat ekg, echocardiogram and cardiology eval    COPD   -Have wheezing on exam   -But no signs of acute exacerbation, on room air   -Plan nebulizers   -Hold off on steroids    Cholecysti

## 2021-08-12 ENCOUNTER — APPOINTMENT (OUTPATIENT)
Dept: GENERAL RADIOLOGY | Facility: HOSPITAL | Age: 56
DRG: 384 | End: 2021-08-12
Attending: HOSPITALIST
Payer: MEDICAID

## 2021-08-12 PROBLEM — F11.20 POLYSUBSTANCE DEPENDENCE INCLUDING OPIOID DRUG WITH DAILY USE (HCC): Status: ACTIVE | Noted: 2021-08-12

## 2021-08-12 PROBLEM — F19.20 POLYSUBSTANCE DEPENDENCE INCLUDING OPIOID DRUG WITH DAILY USE (HCC): Status: ACTIVE | Noted: 2021-08-12

## 2021-08-12 PROBLEM — F31.9 BIPOLAR DEPRESSION (HCC): Status: ACTIVE | Noted: 2021-08-12

## 2021-08-12 PROBLEM — F31.32 BIPOLAR AFFECTIVE DISORDER, CURRENTLY DEPRESSED, MODERATE (HCC): Status: ACTIVE | Noted: 2021-08-12

## 2021-08-12 PROBLEM — F41.1 GENERALIZED ANXIETY DISORDER: Status: ACTIVE | Noted: 2021-08-12

## 2021-08-12 LAB
ALBUMIN SERPL-MCNC: 3.5 G/DL (ref 3.4–5)
ALBUMIN/GLOB SERPL: 0.7 {RATIO} (ref 1–2)
ALP LIVER SERPL-CCNC: 99 U/L
ALT SERPL-CCNC: 74 U/L
ANION GAP SERPL CALC-SCNC: 5 MMOL/L (ref 0–18)
AST SERPL-CCNC: 92 U/L (ref 15–37)
BASOPHILS # BLD AUTO: 0.01 X10(3) UL (ref 0–0.2)
BASOPHILS NFR BLD AUTO: 0.2 %
BILIRUB SERPL-MCNC: 1 MG/DL (ref 0.1–2)
BUN BLD-MCNC: 9 MG/DL (ref 7–18)
BUN/CREAT SERPL: 13.8 (ref 10–20)
CALCIUM BLD-MCNC: 9.3 MG/DL (ref 8.5–10.1)
CHLORIDE SERPL-SCNC: 104 MMOL/L (ref 98–112)
CO2 SERPL-SCNC: 30 MMOL/L (ref 21–32)
CREAT BLD-MCNC: 0.65 MG/DL
DEPRECATED RDW RBC AUTO: 42.4 FL (ref 35.1–46.3)
EOSINOPHIL # BLD AUTO: 0.11 X10(3) UL (ref 0–0.7)
EOSINOPHIL NFR BLD AUTO: 2.2 %
ERYTHROCYTE [DISTWIDTH] IN BLOOD BY AUTOMATED COUNT: 12.3 % (ref 11–15)
GLOBULIN PLAS-MCNC: 4.7 G/DL (ref 2.8–4.4)
GLUCOSE BLD-MCNC: 94 MG/DL (ref 70–99)
HAV IGM SER QL: 2.2 MG/DL (ref 1.6–2.6)
HCT VFR BLD AUTO: 40.6 %
HGB BLD-MCNC: 13.9 G/DL
IMM GRANULOCYTES # BLD AUTO: 0.01 X10(3) UL (ref 0–1)
IMM GRANULOCYTES NFR BLD: 0.2 %
LYMPHOCYTES # BLD AUTO: 1.56 X10(3) UL (ref 1–4)
LYMPHOCYTES NFR BLD AUTO: 31.2 %
M PROTEIN MFR SERPL ELPH: 8.2 G/DL (ref 6.4–8.2)
MCH RBC QN AUTO: 32 PG (ref 26–34)
MCHC RBC AUTO-ENTMCNC: 34.2 G/DL (ref 31–37)
MCV RBC AUTO: 93.3 FL
MONOCYTES # BLD AUTO: 0.29 X10(3) UL (ref 0.1–1)
MONOCYTES NFR BLD AUTO: 5.8 %
NEUTROPHILS # BLD AUTO: 3.02 X10 (3) UL (ref 1.5–7.7)
NEUTROPHILS # BLD AUTO: 3.02 X10(3) UL (ref 1.5–7.7)
NEUTROPHILS NFR BLD AUTO: 60.4 %
NUCLEAR IGG TITR SER IF: NEGATIVE {TITER}
OSMOLALITY SERPL CALC.SUM OF ELEC: 286 MOSM/KG (ref 275–295)
PLATELET # BLD AUTO: 120 10(3)UL (ref 150–450)
POTASSIUM SERPL-SCNC: 3.8 MMOL/L (ref 3.5–5.1)
POTASSIUM SERPL-SCNC: 3.8 MMOL/L (ref 3.5–5.1)
RBC # BLD AUTO: 4.35 X10(6)UL
SODIUM SERPL-SCNC: 139 MMOL/L (ref 136–145)
WBC # BLD AUTO: 5 X10(3) UL (ref 4–11)

## 2021-08-12 PROCEDURE — 99233 SBSQ HOSP IP/OBS HIGH 50: CPT | Performed by: SURGERY

## 2021-08-12 PROCEDURE — 73560 X-RAY EXAM OF KNEE 1 OR 2: CPT | Performed by: HOSPITALIST

## 2021-08-12 PROCEDURE — 99233 SBSQ HOSP IP/OBS HIGH 50: CPT | Performed by: HOSPITALIST

## 2021-08-12 PROCEDURE — 99232 SBSQ HOSP IP/OBS MODERATE 35: CPT | Performed by: PHYSICIAN ASSISTANT

## 2021-08-12 PROCEDURE — 90792 PSYCH DIAG EVAL W/MED SRVCS: CPT | Performed by: OTHER

## 2021-08-12 RX ORDER — POTASSIUM CHLORIDE 20 MEQ/1
40 TABLET, EXTENDED RELEASE ORAL ONCE
Status: COMPLETED | OUTPATIENT
Start: 2021-08-12 | End: 2021-08-12

## 2021-08-12 RX ORDER — ALPRAZOLAM 1 MG/1
1 TABLET ORAL NIGHTLY
Status: DISCONTINUED | OUTPATIENT
Start: 2021-08-12 | End: 2021-08-18

## 2021-08-12 RX ORDER — ARIPIPRAZOLE 2 MG/1
2 TABLET ORAL EVERY EVENING
Status: DISCONTINUED | OUTPATIENT
Start: 2021-08-12 | End: 2021-08-18

## 2021-08-12 RX ORDER — ALPRAZOLAM 0.5 MG/1
0.5 TABLET ORAL 2 TIMES DAILY
Status: DISCONTINUED | OUTPATIENT
Start: 2021-08-12 | End: 2021-08-17

## 2021-08-12 NOTE — PROGRESS NOTES
[facility]     Gastroenterology Progress Note    Taylor Carlson Patient Status:  Observation    1965 MRN [id number]   Location [facility] 3W/SW Attending Tim Vargas MD   Hosp Day # 0 PCP Loco Stokes MD       Sub cirrhosis. Will need outpatient establishment of care with hepatology (Dr. Lucero Early).      #cirrhosis: with splenomegaly and mild thrombocytopenia - no ascites on imaging nor appreciated on physical exam. Suspect 2/2 untreated Hep C, however will obtain chroni by (CST): Jalyn Warner MD on 8/11/2021 at 12:27 PM     Finalized by (CST): Jalyn Warner MD on 8/11/2021 at 12:28 PM          XR CHEST AP PORTABLE  (CPT=71045)    Result Date: 8/11/2021  CONCLUSION: No acute cardiopulmonary abnormality occlusion/mucous plugging in the visualized lung bases suggestive of bronchitis or asthma. [facility] provided a preliminary report for this examination. This final report agrees with their preliminary findings.    Dictated by (CST): Veronica Sam MD

## 2021-08-12 NOTE — PLAN OF CARE
Plan for abdominal doppler utrasound today. Pt tolerating diet. No nausea no vomiting noted. Continue antibiotics as ordered.    Problem: Patient/Family Goals  Goal: Patient/Family Short Term Goal  Description: Patient's Short Term Goal: no longer have ches

## 2021-08-12 NOTE — PROGRESS NOTES
[facility]    Progress Note    Kim Bradfordrohan Patient Status:  Observation    1965 MRN [id number]   Location [facility] 3W/SW Attending Kyara Bell MD   Hosp Day # 0 PCP Britney Blanton MD     Date of Admission:  2021 3 total   • APPENDECTOMY      2012   • CATH PERCUTANEOUS  TRANSLUMINAL CORONARY ANGIOPLASTY     • HYSTERECTOMY  2010    partial hysterectomy     Family History   Problem Relation Age of Onset   • Cancer Father    • Hypertension Father    • Cancer Mother Kit, 1 Kit        Review of Systems:   Review of Systems    Physical Exam:   Vital Signs:   weight is 168 lb 8 oz (76.4 kg). Her oral temperature is 98 °F (36.7 °C). Her blood pressure is 112/64 and her pulse is 49 (abnormal).  Her respiration is 15 and oxy 05/30/2019    INR 1.30 (H) 08/11/2021    T4F 1.0 05/26/2019    TSH 0.618 03/06/2020     08/11/2021    DDIMER 1.99 (H) 08/11/2021    ESRML 22 08/11/2021    MG 2.2 08/12/2021    TROP <0.045 08/11/2021       XR FOOT, COMPLETE (MIN 3 VIEWS), RIGHT (CPT= diameter. Distal common bile duct appears heterogeneous in density raising the possibility of debris or choledocholithiasis. Correlate for biliary lab abnormality. 3.  Post hysterectomy.   Ovoid calcified focus in the pouch of [place] which may represent

## 2021-08-12 NOTE — PROGRESS NOTES
[facility]    Progress Note    Araceli Hubbard Patient Status:  Observation    1965 MRN [id number]   Location [facility] 3W/SW Attending Hina Solano MD   Hosp Day # 0 PCP Daxa Morton MD       Subjective:   Araceli Hubbard is TP 8.2 08/12/2021    AST 92 (H) 08/12/2021    ALT 74 (H) 08/12/2021    PTT 26.7 05/30/2019    INR 1.30 (H) 08/11/2021    T4F 1.0 05/26/2019    TSH 0.618 03/06/2020     08/11/2021    DDIMER 1.99 (H) 08/11/2021    ESRML 22 08/11/2021    MG 2.2 08/12/2 shows prominent distention. Common bile duct is again enlarged at 16 mm in diameter. Distal common bile duct appears heterogeneous in density raising the possibility of debris or choledocholithiasis. Correlate for biliary lab abnormality.  3.  Post hyste at 16 mm distal common bile duct appears heterogeneous in density raising the possibility of debris or choledocholithiasis  -Appreciate GI evaluation  -Appreciate surgical evaluation  -Clear liquid diet for now  -Plan ultrasound gallbladder liver, possible

## 2021-08-12 NOTE — PLAN OF CARE
No acute changes overnight. IV ABX as ordered. Plan is to have psych and GI consult patient.     Problem: Patient Centered Care  Goal: Patient preferences are identified and integrated in the patient's plan of care  Description: Interventions:  - What would baseline  Description: INTERVENTIONS:  - Continuous cardiac monitoring, monitor vital signs, obtain 12 lead EKG if indicated  - Evaluate effectiveness of antiarrhythmic and heart rate control medications as ordered  - Initiate emergency measures for life t

## 2021-08-12 NOTE — PROGRESS NOTES
[address] PROGRESS NOTE  Elvira Marcum Patient Status:  Observation    1965 MRN [id number]   [facility] 3W/SW Attendin 08/12/2021    K 3.8 08/12/2021    K 3.8 08/12/2021     08/12/2021    CO2 30.0 08/12/2021    BUN 9 08/12/2021    CREATSERUM 0.65 08/12/2021    GLU 94 08/12/2021    MG 2.2 08/12/2021    CA 9.3 08/12/2021    ALT 74 08/12/2021    AST 92 08/12/2021    ALB

## 2021-08-12 NOTE — RESPIRATORY THERAPY NOTE
AMANDA ASSESSMENT:    Pt does have a previous diagnosis of AMANDA. Pt does not routinely use a CPAP device at home. This pt is not suspected to be at high risk for AMANDA and sleep lab packet was not provided to patient for outpatient follow-up.     CPAP INITIATION:

## 2021-08-13 ENCOUNTER — APPOINTMENT (OUTPATIENT)
Dept: ULTRASOUND IMAGING | Facility: HOSPITAL | Age: 56
DRG: 384 | End: 2021-08-13
Attending: INTERNAL MEDICINE
Payer: MEDICAID

## 2021-08-13 LAB
ALBUMIN SERPL-MCNC: 3.4 G/DL (ref 3.4–5)
ALBUMIN/GLOB SERPL: 0.8 {RATIO} (ref 1–2)
ALP LIVER SERPL-CCNC: 96 U/L
ALT SERPL-CCNC: 66 U/L
ANION GAP SERPL CALC-SCNC: 4 MMOL/L (ref 0–18)
AST SERPL-CCNC: 87 U/L (ref 15–37)
BASOPHILS # BLD AUTO: 0.01 X10(3) UL (ref 0–0.2)
BASOPHILS NFR BLD AUTO: 0.2 %
BILIRUB SERPL-MCNC: 0.7 MG/DL (ref 0.1–2)
BUN BLD-MCNC: 15 MG/DL (ref 7–18)
BUN/CREAT SERPL: 21.4 (ref 10–20)
CALCIUM BLD-MCNC: 9.3 MG/DL (ref 8.5–10.1)
CHLORIDE SERPL-SCNC: 104 MMOL/L (ref 98–112)
CO2 SERPL-SCNC: 30 MMOL/L (ref 21–32)
CREAT BLD-MCNC: 0.7 MG/DL
DEPRECATED RDW RBC AUTO: 42.5 FL (ref 35.1–46.3)
EOSINOPHIL # BLD AUTO: 0.07 X10(3) UL (ref 0–0.7)
EOSINOPHIL NFR BLD AUTO: 1.5 %
ERYTHROCYTE [DISTWIDTH] IN BLOOD BY AUTOMATED COUNT: 12.3 % (ref 11–15)
GLOBULIN PLAS-MCNC: 4.4 G/DL (ref 2.8–4.4)
GLUCOSE BLD-MCNC: 78 MG/DL (ref 70–99)
HAV IGM SER QL: 2.3 MG/DL (ref 1.6–2.6)
HCT VFR BLD AUTO: 39.7 %
HCV QNT BY NAAT (LOG IU/ML): 6.27 LOG IU/ML
HCV QNT BY NAAT INTERP: DETECTED
HGB BLD-MCNC: 13.4 G/DL
IMM GRANULOCYTES # BLD AUTO: 0.02 X10(3) UL (ref 0–1)
IMM GRANULOCYTES NFR BLD: 0.4 %
LYMPHOCYTES # BLD AUTO: 1.61 X10(3) UL (ref 1–4)
LYMPHOCYTES NFR BLD AUTO: 35.5 %
M PROTEIN MFR SERPL ELPH: 7.8 G/DL (ref 6.4–8.2)
MCH RBC QN AUTO: 31.8 PG (ref 26–34)
MCHC RBC AUTO-ENTMCNC: 33.8 G/DL (ref 31–37)
MCV RBC AUTO: 94.3 FL
MITOCHONDRIA AB TITR SER: <20 {TITER}
MONOCYTES # BLD AUTO: 0.23 X10(3) UL (ref 0.1–1)
MONOCYTES NFR BLD AUTO: 5.1 %
NEUTROPHILS # BLD AUTO: 2.59 X10 (3) UL (ref 1.5–7.7)
NEUTROPHILS # BLD AUTO: 2.59 X10(3) UL (ref 1.5–7.7)
NEUTROPHILS NFR BLD AUTO: 57.3 %
OSMOLALITY SERPL CALC.SUM OF ELEC: 286 MOSM/KG (ref 275–295)
PLATELET # BLD AUTO: 97 10(3)UL (ref 150–450)
POTASSIUM SERPL-SCNC: 4.6 MMOL/L (ref 3.5–5.1)
RBC # BLD AUTO: 4.21 X10(6)UL
SMOOTH MUSCLE AB TITR SER: <20 {TITER}
SODIUM SERPL-SCNC: 138 MMOL/L (ref 136–145)
WBC # BLD AUTO: 4.5 X10(3) UL (ref 4–11)

## 2021-08-13 PROCEDURE — 99232 SBSQ HOSP IP/OBS MODERATE 35: CPT | Performed by: PHYSICIAN ASSISTANT

## 2021-08-13 PROCEDURE — 76705 ECHO EXAM OF ABDOMEN: CPT | Performed by: INTERNAL MEDICINE

## 2021-08-13 PROCEDURE — 99233 SBSQ HOSP IP/OBS HIGH 50: CPT | Performed by: HOSPITALIST

## 2021-08-13 PROCEDURE — 99233 SBSQ HOSP IP/OBS HIGH 50: CPT | Performed by: SURGERY

## 2021-08-13 RX ORDER — POLYETHYLENE GLYCOL 3350 17 G/17G
17 POWDER, FOR SOLUTION ORAL 2 TIMES DAILY
Status: DISCONTINUED | OUTPATIENT
Start: 2021-08-13 | End: 2021-08-15

## 2021-08-13 NOTE — PROGRESS NOTES
[facility]     Gastroenterology Progress Note    Luisana Regulus Patient Status:  Observation    1965 MRN [id number]   Location [facility] 3W/SW Attending Cristiana Schmitt MD   Hosp Day # 1 PCP Vianca Gdoinez MD       Sub cirrhosis. Will need outpatient establishment of care with hepatology (Dr. Judit Law).      #cirrhosis: with splenomegaly and mild thrombocytopenia - no ascites on imaging nor appreciated on physical exam. Suspect 2/2 untreated Hep C, however will obtain chroni (XZK=11712)    Result Date: 8/12/2021  CONCLUSION:  1. No fracture or dislocation. 2. Moderate to severe tricompartmental right knee joint osteoarthritis with greatest involvement of the lateral and patellofemoral compartments.  3. Small right knee joint ef

## 2021-08-13 NOTE — BH PROGRESS NOTE
Psych Liaison provided outpatient therapy and psychiatry resources previously discussed with [place] and Dr. Madison Ng in Anastasiia’s paper chart standing vertically with pink post-it specifying that the resources need to be provided to her.     Psych Liaison als

## 2021-08-13 NOTE — PROGRESS NOTES
[facility]    Progress Note    Javier Mcneal Patient Status:  Observation    1965 MRN [id number]   Location [facility] 3W/SW Attending Ralph Landis MD   Hosp Day # 1 PCP Diana Zambrano MD     Date of Admission:  2021 3 total   • APPENDECTOMY      2012   • CATH PERCUTANEOUS  TRANSLUMINAL CORONARY ANGIOPLASTY     • HYSTERECTOMY  2010    partial hysterectomy     Family History   Problem Relation Age of Onset   • Cancer Father    • Hypertension Father    • Cancer Mother Kit, 1 Kit        Review of Systems:   Review of Systems    Review of Systems:   GENERAL: feels generally well  SKIN: no ulcerated or worrisome skin lesions  EYES:denies blurred vision or double vision  HEENT: denies new nasal congestion, sinus pain or ST cholecystitis and her clinical condition worsens, percutaneous drainage of the gallbladder would be the best approach as surgery will be very high risk, potentially complicated, and with a high morbidity.   MRCP pending, will review  Continue low fat diet

## 2021-08-13 NOTE — CONSULTS
[facility] - [facility]    Report of Consultation    [facility] Patient Status:  Observation    1965 MRN [id number]   Location [facility] 3W/SW Attending Cliff Jimenez MD   Hosp Day # 0 PCP Fredrick Holloway MD     Date of Admission: daily.    The patient today reporting intense mood instability with increased depressed mood, impulsive behavior, increased anxiety and panicky feeling, leaning to Xanax, marijuana and occasional cocaine recently to help with her emotion.   Patient reported Otherwise she reports disturbances for the bathroom every hour d/t benign bladder tumor.  Dorrine Lefort gets 3-7 hours of unrestful sleep per night.     Dorrine Lefort reports that she's had a poor appetite for a while and this was part of the reason she was referred for cannabis daily for pain and appetite stimulation. She has been clean from cocaine since 1-2/2021 and clean from heroin for 12-13 years and has been on managed methadone since, she smokes 8 cigarettes daily, weaning herself down from 2 packs daily.  She has Nebulization, Q6H PRN  methadone solution (DOLOPHINE) 10 mg/5mL, 117 mg, Oral, Daily  methocarbamol (ROBAXIN) tab 500 mg, 500 mg, Oral, QID  aspirin tab 325 mg, 325 mg, Oral, Daily  nitroGLYCERIN (NITROSTAT) SL tab 0.4 mg, 0.4 mg, Sublingual, Q5 Min PRN  a by oral route for 3 days then 1/2 tablet by oral route for 3 days  Oxybutynin Chloride ER 10 MG Oral Tablet 24 Hr, Take 1 tablet (10 mg total) by mouth daily.   Respiratory Therapy Supplies (NEBULIZER/ADULT MASK) Does not apply Kit, 1 Kit        Allergies this examination. This final report agrees with their preliminary findings.    Dictated by (CST): Maryam Tee MD on 8/11/2021 at 6:07 AM     Finalized by (CST): Maryam Tee MD on 8/11/2021 at 6:07 AM          CT CHEST PE AORTA (IV ONLY) (CPT=71260)    Res Amari Kc MD on 8/11/2021 at 6:35 AM            Vital Signs:     Blood pressure 114/56, pulse (!) 45, temperature 98 °F (36.7 °C), temperature source Oral, resp. rate 18, weight 76.4 kg (168 lb 8 oz), SpO2 96 %.     Mental Status Exam:     Appearance: st severity. At this point, I would recommend the following approach:     1. Focus on education and support. 2.  Focus on insight orientation and confronted the patient that the way she has been treating her mood has not been effective.   3.  Start Abilify

## 2021-08-13 NOTE — PROGRESS NOTES
[facility]    Progress Note    Angelaadan Jose L Patient Status:  Observation    1965 MRN [id number]   Location [facility] 3W/SW Attending Leopold Mcalpine, MD   Hosp Day # 1 PCP Ronnie Smart MD       Subjective:   Angelalamfredrick Domingo is 08/13/2021    CA 9.3 08/13/2021    ALB 3.4 08/13/2021    ALKPHO 96 08/13/2021    BILT 0.7 08/13/2021    TP 7.8 08/13/2021    AST 87 (H) 08/13/2021    ALT 66 (H) 08/13/2021    PTT 26.7 05/30/2019    INR 1.30 (H) 08/11/2021    T4F 1.0 05/26/2019    TSH 0.618 normal EF, mild LVH, mild MR, mild LAE  -jack cardiology eval, likely GI source of pain     COPD   -Have wheezing on exam   -But no signs of acute exacerbation, on room air   -Plan nebulizers   -Hold off on steroids     History of polysubstance drug abuse

## 2021-08-13 NOTE — PLAN OF CARE
Plan for MRCP tomorrow morning, NPO after midnight, patient aware and agreeable. Continue zosyn, will continue to monitor.     Problem: Patient Centered Care  Goal: Patient preferences are identified and integrated in the patient's plan of care  Description arrhythmias or at baseline  Description: INTERVENTIONS:  - Continuous cardiac monitoring, monitor vital signs, obtain 12 lead EKG if indicated  - Evaluate effectiveness of antiarrhythmic and heart rate control medications as ordered  - Initiate emergency m

## 2021-08-13 NOTE — PLAN OF CARE
No complaints overnight. Plan is to have US of the abdomen done today.     Problem: Patient Centered Care  Goal: Patient preferences are identified and integrated in the patient's plan of care  Description: Interventions:  - What would you like us to know a INTERVENTIONS:  - Continuous cardiac monitoring, monitor vital signs, obtain 12 lead EKG if indicated  - Evaluate effectiveness of antiarrhythmic and heart rate control medications as ordered  - Initiate emergency measures for life threatening arrhythmias

## 2021-08-14 ENCOUNTER — APPOINTMENT (OUTPATIENT)
Dept: MRI IMAGING | Facility: HOSPITAL | Age: 56
DRG: 384 | End: 2021-08-14
Attending: PHYSICIAN ASSISTANT
Payer: MEDICAID

## 2021-08-14 LAB
ALBUMIN SERPL-MCNC: 3.8 G/DL (ref 3.4–5)
ALP LIVER SERPL-CCNC: 98 U/L
ALT SERPL-CCNC: 70 U/L
AST SERPL-CCNC: 85 U/L (ref 15–37)
BILIRUB DIRECT SERPL-MCNC: 0.3 MG/DL (ref 0–0.2)
BILIRUB SERPL-MCNC: 0.6 MG/DL (ref 0.1–2)
M PROTEIN MFR SERPL ELPH: 9.2 G/DL (ref 6.4–8.2)

## 2021-08-14 PROCEDURE — 99253 IP/OBS CNSLTJ NEW/EST LOW 45: CPT | Performed by: ORTHOPAEDIC SURGERY

## 2021-08-14 PROCEDURE — 99233 SBSQ HOSP IP/OBS HIGH 50: CPT | Performed by: HOSPITALIST

## 2021-08-14 PROCEDURE — 99232 SBSQ HOSP IP/OBS MODERATE 35: CPT | Performed by: INTERNAL MEDICINE

## 2021-08-14 PROCEDURE — 99232 SBSQ HOSP IP/OBS MODERATE 35: CPT | Performed by: SURGERY

## 2021-08-14 PROCEDURE — 76376 3D RENDER W/INTRP POSTPROCES: CPT | Performed by: PHYSICIAN ASSISTANT

## 2021-08-14 PROCEDURE — 74181 MRI ABDOMEN W/O CONTRAST: CPT | Performed by: PHYSICIAN ASSISTANT

## 2021-08-14 PROCEDURE — 20610 DRAIN/INJ JOINT/BURSA W/O US: CPT | Performed by: ORTHOPAEDIC SURGERY

## 2021-08-14 RX ORDER — LIDOCAINE HYDROCHLORIDE 10 MG/ML
5 INJECTION, SOLUTION EPIDURAL; INFILTRATION; INTRACAUDAL; PERINEURAL ONCE
Status: COMPLETED | OUTPATIENT
Start: 2021-08-14 | End: 2021-08-14

## 2021-08-14 RX ORDER — IBUPROFEN 400 MG/1
600 TABLET ORAL EVERY 8 HOURS PRN
Status: DISCONTINUED | OUTPATIENT
Start: 2021-08-14 | End: 2021-08-18

## 2021-08-14 RX ORDER — TRAMADOL HYDROCHLORIDE 50 MG/1
50 TABLET ORAL EVERY 6 HOURS PRN
Status: DISCONTINUED | OUTPATIENT
Start: 2021-08-14 | End: 2021-08-18

## 2021-08-14 RX ORDER — TRIAMCINOLONE ACETONIDE 40 MG/ML
40 INJECTION, SUSPENSION INTRA-ARTICULAR; INTRAMUSCULAR ONCE
Status: COMPLETED | OUTPATIENT
Start: 2021-08-14 | End: 2021-08-14

## 2021-08-14 NOTE — PROGRESS NOTES
[facility]    Progress Note    Santos Wheeler Patient Status:  Observation    1965 MRN [id number]   Location [facility] 3W/SW Attending Boris Mendes MD   Hosp Day # 2 PCP Rekha Cordero MD       Subjective:   Santos Wheeler is 9.3 08/13/2021    ALB 3.4 08/13/2021    ALKPHO 96 08/13/2021    BILT 0.7 08/13/2021    TP 7.8 08/13/2021    AST 87 (H) 08/13/2021    ALT 66 (H) 08/13/2021    PTT 26.7 05/30/2019    INR 1.30 (H) 08/11/2021    T4F 1.0 05/26/2019    TSH 0.618 03/06/2020    LI Laine Muhammad MD on 8/14/2021 at 8:44 AM     Finalized by (CST): Scot Pruitt MD on 8/14/2021 at 8:54 AM                  Assessment and Plan:      Cholecystitis  Elevated transaminases  Hepatitis C  -Per patient hep C has not been treated in the past has been to marissa.      Bonnetta Najjar MD

## 2021-08-14 NOTE — PLAN OF CARE
IV ABX infusing as ordered. Ibuprofen given for pain. Patient is upset she can't have something stronger prescribed. She states that \"Ibuprofen is worse than alcohol and is ruining my liver. \" Plan is to go for MRCP today. Patient is NPO.     Problem: Serina Angina  - Evaluate fluid balance, assess for edema, trend weights  Outcome: Progressing  Goal: Absence of cardiac arrhythmias or at baseline  Description: INTERVENTIONS:  - Continuous cardiac monitoring, monitor vital signs, obtain 12 lead EKG if indicated pre-medicate as appropriate  Outcome: Progressing

## 2021-08-14 NOTE — CONSULTS
[facility]    Report of Consultation    Elvira Marcum Patient Status:  Inpatient    1965 MRN [id number]   Location [facility] 3W/SW Attending Cindy Aviles MD   Hosp Day # 2 PCP Neville Stanton MD     Date of Admission:   PRN  PEG 3350 (MIRALAX) powder packet 17 g, 17 g, Oral, BID  Pantoprazole Sodium (PROTONIX) 40 mg in Sodium Chloride (PF) 0.9 % 10 mL IV push, 40 mg, Intravenous, Q24H  ALPRAZolam (XANAX) tab 0.5 mg, 0.5 mg, Oral, BID  ALPRAZolam (XANAX) tab 1 mg, 1 mg, Or tablet (81 mg total) by mouth daily. Fluticasone Propionate 50 MCG/ACT Nasal Suspension, 1 spray by Nasal route daily. One spray per each nostril daily.   ipratropium-albuterol 0.5-2.5 (3) MG/3ML Inhalation Solution, Take 3 mL by nebulization every 6 (six) CREATSERUM 0.70 08/13/2021    BUN 15 08/13/2021     08/13/2021    K 4.6 08/13/2021     08/13/2021    CO2 30.0 08/13/2021    GLU 78 08/13/2021    CA 9.3 08/13/2021    ALB 3.4 08/13/2021    ALKPHO 96 08/13/2021    TP 7.8 08/13/2021    AST 87 (H) The knee was injected with 40 mg of Kenalog and 4 mL of 1% lidocaine. The patient tolerated the procedure well. A soft dressing was applied. Thank you for allowing me to participate in the care of your patient.     Lynne Puente MD  8/14/2021

## 2021-08-14 NOTE — PLAN OF CARE
Pain to the R knee. Xray previously done. Orthopedics on board. Corticosteroid shot given by Dr. Raiza Hong to R knee. Consent signed by patient and the doctor. Time out done before procedure. 4x4 gauze to knee in place.    Problem: PAIN - ADULT  Goal: Tracie Ricardo

## 2021-08-14 NOTE — PROGRESS NOTES
[facility]    [facility] Note    Araceli Hubbard Patient Status:  Inpatient    1965 MRN [id number]   Location [facility] 3W/SW Attending Hina Solano MD   Hosp Day # 2 PCP Daxa Morton MD     Assessment and Plan:       Luis Angel Jack 97.0 (L) 08/13/2021    CREATSERUM 0.70 08/13/2021    BUN 15 08/13/2021     08/13/2021    K 4.6 08/13/2021     08/13/2021    CO2 30.0 08/13/2021    GLU 78 08/13/2021    CA 9.3 08/13/2021    ALB 3.4 08/13/2021    ALKPHO 96 08/13/2021    BILT 0.7 the liver. 4.  Splenomegaly. 5.  Subcentimeter foci within the kidneys likely representing cysts however they are too small to definitively characterize.    Dictated by (CST): Osmin Hernandez MD on 8/14/2021 at 8:44 AM     Finalized by (CST): Osmin Hernandez MD o

## 2021-08-14 NOTE — PROGRESS NOTES
[facility]     Gastroenterology Progress Note    Yadirabernadine Mcneal Patient Status:  Inpatient    1965 MRN [id number]   Location [facility] 3W/SW Attending Ralph Landis MD   Hosp Day # 2 PCP MD Aurelia Henry lateral and patellofemoral compartments. 3. Small right knee joint effusion.     Dictated by (CST): Ana Helton MD on 8/12/2021 at 7:39 PM     Finalized by (CST): Ana Helton MD on 8/12/2021 at 7:40 PM          MRI ABDOMEN&MRCP W/3D (CPT=74181 splenomegaly and prominent GB distension in the absence of hypoalbuminemia; possible CBD stone however transaminases are mildly elevated with normal total bilirubin and absence of abdominal pain on admission, now with intermittent RUQ discomfort.     S/p MR patient’s satisfaction. The patient elected to proceed with EUS with intervention [i.e. FNA, etc.] as indicated.     ERCP Consent: I have discussed the risks, benefits, and alternatives to ERCP with the patient [who demonstrated understanding], including bu

## 2021-08-15 LAB
ANION GAP SERPL CALC-SCNC: 7 MMOL/L (ref 0–18)
BASOPHILS # BLD AUTO: 0 X10(3) UL (ref 0–0.2)
BASOPHILS NFR BLD AUTO: 0 %
BUN BLD-MCNC: 24 MG/DL (ref 7–18)
BUN/CREAT SERPL: 28.9 (ref 10–20)
CALCIUM BLD-MCNC: 10.1 MG/DL (ref 8.5–10.1)
CHLORIDE SERPL-SCNC: 99 MMOL/L (ref 98–112)
CO2 SERPL-SCNC: 24 MMOL/L (ref 21–32)
CREAT BLD-MCNC: 0.83 MG/DL
DEPRECATED RDW RBC AUTO: 41 FL (ref 35.1–46.3)
EOSINOPHIL # BLD AUTO: 0 X10(3) UL (ref 0–0.7)
EOSINOPHIL NFR BLD AUTO: 0 %
ERYTHROCYTE [DISTWIDTH] IN BLOOD BY AUTOMATED COUNT: 12.1 % (ref 11–15)
GLUCOSE BLD-MCNC: 165 MG/DL (ref 70–99)
HAV IGM SER QL: 2.4 MG/DL (ref 1.6–2.6)
HCT VFR BLD AUTO: 41.4 %
HGB BLD-MCNC: 14.3 G/DL
IMM GRANULOCYTES # BLD AUTO: 0.04 X10(3) UL (ref 0–1)
IMM GRANULOCYTES NFR BLD: 0.5 %
LYMPHOCYTES # BLD AUTO: 0.92 X10(3) UL (ref 1–4)
LYMPHOCYTES NFR BLD AUTO: 11.7 %
MCH RBC QN AUTO: 31.8 PG (ref 26–34)
MCHC RBC AUTO-ENTMCNC: 34.5 G/DL (ref 31–37)
MCV RBC AUTO: 92.2 FL
MONOCYTES # BLD AUTO: 0.19 X10(3) UL (ref 0.1–1)
MONOCYTES NFR BLD AUTO: 2.4 %
NEUTROPHILS # BLD AUTO: 6.7 X10 (3) UL (ref 1.5–7.7)
NEUTROPHILS # BLD AUTO: 6.7 X10(3) UL (ref 1.5–7.7)
NEUTROPHILS NFR BLD AUTO: 85.4 %
OSMOLALITY SERPL CALC.SUM OF ELEC: 278 MOSM/KG (ref 275–295)
PLATELET # BLD AUTO: 110 10(3)UL (ref 150–450)
POTASSIUM SERPL-SCNC: 4.5 MMOL/L (ref 3.5–5.1)
RBC # BLD AUTO: 4.49 X10(6)UL
SARS-COV-2 RNA RESP QL NAA+PROBE: NOT DETECTED
SODIUM SERPL-SCNC: 130 MMOL/L (ref 136–145)
WBC # BLD AUTO: 7.9 X10(3) UL (ref 4–11)

## 2021-08-15 PROCEDURE — 99232 SBSQ HOSP IP/OBS MODERATE 35: CPT | Performed by: ORTHOPAEDIC SURGERY

## 2021-08-15 PROCEDURE — 99233 SBSQ HOSP IP/OBS HIGH 50: CPT | Performed by: HOSPITALIST

## 2021-08-15 RX ORDER — SODIUM CHLORIDE 1000 MG
1 TABLET, SOLUBLE MISCELLANEOUS
Status: DISPENSED | OUTPATIENT
Start: 2021-08-15 | End: 2021-08-16

## 2021-08-15 NOTE — PLAN OF CARE
Problem: Patient Centered Care  Goal: Patient preferences are identified and integrated in the patient's plan of care  Description: Interventions:  - What would you like us to know as we care for you?  History of opiate abuse, on Methadone  - Provide time interventions  Outcome: Not Progressing     Problem: CARDIOVASCULAR - ADULT  Goal: Maintains optimal cardiac output and hemodynamic stability  Description: INTERVENTIONS:  - Monitor vital signs, rhythm, and trends  - Monitor for bleeding, hypotension and s complain of pain. Medicated patient as ordered.  Continue to monitor patient

## 2021-08-15 NOTE — PLAN OF CARE
Problem: Patient Centered Care  Goal: Patient preferences are identified and integrated in the patient's plan of care  Description: Interventions:  - What would you like us to know as we care for you?  History of opiate abuse, on Methadone  - Provide time EKG if indicated  - Evaluate effectiveness of antiarrhythmic and heart rate control medications as ordered  - Initiate emergency measures for life threatening arrhythmias  - Monitor electrolytes and administer replacement therapy as ordered  Outcome: Not P

## 2021-08-15 NOTE — PROGRESS NOTES
[facility]    Progress Note    [facility] Patient Status:  Inpatient    1965 MRN [id number]   Location [facility] 5SW/SE Attending Luis E Duque Day # 3 PCP Fredrick Holloway MD     HPI/Subjective:     Cons AST 85 (H) 08/14/2021    ALT 70 (H) 08/14/2021    PTT 26.7 05/30/2019    INR 1.30 (H) 08/11/2021    T4F 1.0 05/26/2019    TSH 0.618 03/06/2020     08/11/2021    DDIMER 1.99 (H) 08/11/2021    ESRML 22 08/11/2021    MG 2.4 08/15/2021    TROP <0.045 debris or choledocholithiasis  -Appreciate GI evaluation  -Appreciate surgical evaluation  -With underlying cirrhosis patient is at higher risk for surgery, continue medical management at this point  -iv kym Delgadillo noting dilated bile duct, question

## 2021-08-16 ENCOUNTER — ANESTHESIA (OUTPATIENT)
Dept: ENDOSCOPY | Facility: HOSPITAL | Age: 56
DRG: 384 | End: 2021-08-16
Payer: MEDICAID

## 2021-08-16 ENCOUNTER — ANESTHESIA EVENT (OUTPATIENT)
Dept: ENDOSCOPY | Facility: HOSPITAL | Age: 56
DRG: 384 | End: 2021-08-16
Payer: MEDICAID

## 2021-08-16 LAB
ALBUMIN SERPL-MCNC: 3.9 G/DL (ref 3.4–5)
ALBUMIN/GLOB SERPL: 0.8 {RATIO} (ref 1–2)
ALP LIVER SERPL-CCNC: 96 U/L
ALT SERPL-CCNC: 57 U/L
ANION GAP SERPL CALC-SCNC: 6 MMOL/L (ref 0–18)
AST SERPL-CCNC: 47 U/L (ref 15–37)
BASOPHILS # BLD AUTO: 0.01 X10(3) UL (ref 0–0.2)
BASOPHILS NFR BLD AUTO: 0.1 %
BILIRUB SERPL-MCNC: 0.6 MG/DL (ref 0.1–2)
BUN BLD-MCNC: 20 MG/DL (ref 7–18)
BUN/CREAT SERPL: 30.3 (ref 10–20)
CALCIUM BLD-MCNC: 9.2 MG/DL (ref 8.5–10.1)
CHLORIDE SERPL-SCNC: 103 MMOL/L (ref 98–112)
CO2 SERPL-SCNC: 28 MMOL/L (ref 21–32)
CREAT BLD-MCNC: 0.66 MG/DL
DEPRECATED RDW RBC AUTO: 41.1 FL (ref 35.1–46.3)
EOSINOPHIL # BLD AUTO: 0.01 X10(3) UL (ref 0–0.7)
EOSINOPHIL NFR BLD AUTO: 0.1 %
ERYTHROCYTE [DISTWIDTH] IN BLOOD BY AUTOMATED COUNT: 12.3 % (ref 11–15)
GLOBULIN PLAS-MCNC: 5 G/DL (ref 2.8–4.4)
GLUCOSE BLD-MCNC: 115 MG/DL (ref 70–99)
HAV IGM SER QL: 2.5 MG/DL (ref 1.6–2.6)
HCT VFR BLD AUTO: 41.3 %
HGB BLD-MCNC: 14.2 G/DL
IMM GRANULOCYTES # BLD AUTO: 0.05 X10(3) UL (ref 0–1)
IMM GRANULOCYTES NFR BLD: 0.5 %
LYMPHOCYTES # BLD AUTO: 1.46 X10(3) UL (ref 1–4)
LYMPHOCYTES NFR BLD AUTO: 13.9 %
M PROTEIN MFR SERPL ELPH: 8.9 G/DL (ref 6.4–8.2)
MCH RBC QN AUTO: 31.5 PG (ref 26–34)
MCHC RBC AUTO-ENTMCNC: 34.4 G/DL (ref 31–37)
MCV RBC AUTO: 91.6 FL
MONOCYTES # BLD AUTO: 0.43 X10(3) UL (ref 0.1–1)
MONOCYTES NFR BLD AUTO: 4.1 %
NEUTROPHILS # BLD AUTO: 8.55 X10 (3) UL (ref 1.5–7.7)
NEUTROPHILS # BLD AUTO: 8.55 X10(3) UL (ref 1.5–7.7)
NEUTROPHILS NFR BLD AUTO: 81.3 %
OSMOLALITY SERPL CALC.SUM OF ELEC: 288 MOSM/KG (ref 275–295)
PHOSPHATE SERPL-MCNC: 3.1 MG/DL (ref 2.5–4.9)
PLATELET # BLD AUTO: 143 10(3)UL (ref 150–450)
POTASSIUM SERPL-SCNC: 4.1 MMOL/L (ref 3.5–5.1)
RBC # BLD AUTO: 4.51 X10(6)UL
SODIUM SERPL-SCNC: 137 MMOL/L (ref 136–145)
WBC # BLD AUTO: 10.5 X10(3) UL (ref 4–11)

## 2021-08-16 PROCEDURE — 43259 EGD US EXAM DUODENUM/JEJUNUM: CPT | Performed by: INTERNAL MEDICINE

## 2021-08-16 PROCEDURE — 0DB68ZX EXCISION OF STOMACH, VIA NATURAL OR ARTIFICIAL OPENING ENDOSCOPIC, DIAGNOSTIC: ICD-10-PCS | Performed by: INTERNAL MEDICINE

## 2021-08-16 PROCEDURE — 0DJ08ZZ INSPECTION OF UPPER INTESTINAL TRACT, VIA NATURAL OR ARTIFICIAL OPENING ENDOSCOPIC: ICD-10-PCS | Performed by: INTERNAL MEDICINE

## 2021-08-16 PROCEDURE — 0DB58ZX EXCISION OF ESOPHAGUS, VIA NATURAL OR ARTIFICIAL OPENING ENDOSCOPIC, DIAGNOSTIC: ICD-10-PCS | Performed by: INTERNAL MEDICINE

## 2021-08-16 PROCEDURE — 43239 EGD BIOPSY SINGLE/MULTIPLE: CPT | Performed by: INTERNAL MEDICINE

## 2021-08-16 PROCEDURE — 99233 SBSQ HOSP IP/OBS HIGH 50: CPT | Performed by: HOSPITALIST

## 2021-08-16 PROCEDURE — BD42ZZZ ULTRASONOGRAPHY OF STOMACH: ICD-10-PCS | Performed by: INTERNAL MEDICINE

## 2021-08-16 RX ORDER — SODIUM CHLORIDE, SODIUM LACTATE, POTASSIUM CHLORIDE, CALCIUM CHLORIDE 600; 310; 30; 20 MG/100ML; MG/100ML; MG/100ML; MG/100ML
INJECTION, SOLUTION INTRAVENOUS CONTINUOUS
Status: DISCONTINUED | OUTPATIENT
Start: 2021-08-16 | End: 2021-08-18

## 2021-08-16 RX ORDER — SODIUM CHLORIDE, SODIUM LACTATE, POTASSIUM CHLORIDE, CALCIUM CHLORIDE 600; 310; 30; 20 MG/100ML; MG/100ML; MG/100ML; MG/100ML
INJECTION, SOLUTION INTRAVENOUS CONTINUOUS PRN
Status: DISCONTINUED | OUTPATIENT
Start: 2021-08-16 | End: 2021-08-16 | Stop reason: SURG

## 2021-08-16 RX ORDER — LIDOCAINE HYDROCHLORIDE 10 MG/ML
INJECTION, SOLUTION EPIDURAL; INFILTRATION; INTRACAUDAL; PERINEURAL AS NEEDED
Status: DISCONTINUED | OUTPATIENT
Start: 2021-08-16 | End: 2021-08-16 | Stop reason: SURG

## 2021-08-16 RX ORDER — NALOXONE HYDROCHLORIDE 0.4 MG/ML
80 INJECTION, SOLUTION INTRAMUSCULAR; INTRAVENOUS; SUBCUTANEOUS AS NEEDED
Status: DISCONTINUED | OUTPATIENT
Start: 2021-08-16 | End: 2021-08-16 | Stop reason: HOSPADM

## 2021-08-16 RX ADMIN — SODIUM CHLORIDE, SODIUM LACTATE, POTASSIUM CHLORIDE, CALCIUM CHLORIDE: 600; 310; 30; 20 INJECTION, SOLUTION INTRAVENOUS at 15:07:00

## 2021-08-16 RX ADMIN — SODIUM CHLORIDE, SODIUM LACTATE, POTASSIUM CHLORIDE, CALCIUM CHLORIDE: 600; 310; 30; 20 INJECTION, SOLUTION INTRAVENOUS at 15:31:00

## 2021-08-16 RX ADMIN — LIDOCAINE HYDROCHLORIDE 40 MG: 10 INJECTION, SOLUTION EPIDURAL; INFILTRATION; INTRACAUDAL; PERINEURAL at 15:08:00

## 2021-08-16 NOTE — PROGRESS NOTES
[facility]    Progress Note    [facility] Patient Status:  [facility]    1965 MRN [id number]   Location [facility] 5SW/SE Attending Luis E Duque Day # 4 PCP Marline Merhcant MD     HPI/Subjective:     Cons (H) 08/16/2021    AST 47 (H) 08/16/2021    ALT 57 (H) 08/16/2021    PTT 26.7 05/30/2019    INR 1.30 (H) 08/11/2021    T4F 1.0 05/26/2019    TSH 0.618 03/06/2020     08/11/2021    DDIMER 1.99 (H) 08/11/2021    ESRML 22 08/11/2021    MG 2.5 08/16/2021 steroids    Diarrhea all night from laxatives; held; if fever or if diarrhea persists check cdiff    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

## 2021-08-16 NOTE — PLAN OF CARE
Reminded patient and reinforced that pt is NPO after 0000 for planned ERCP. Pt in noted in bathroom for extended periods of time. Pain managed with Tramadol. Patient's activity/mobility level is independent. Patient is voiding freely.   Fall precautions in sites for bleeding and/or hematoma  - Assess quality of pulses, skin color and temperature  - Assess for signs of decreased coronary artery perfusion - ex.  Angina  - Evaluate fluid balance, assess for edema, trend weights  Outcome: Progressing  Goal: Absen techniques  - Monitor for opioid side effects  - Notify MD/LIP if interventions unsuccessful or patient reports new pain  - Anticipate increased pain with activity and pre-medicate as appropriate  Outcome: Progressing

## 2021-08-16 NOTE — ANESTHESIA PREPROCEDURE EVALUATION
Anesthesia PreOp Note    HPI:     Tasia Basurto is a 64year old female who presents for preoperative consultation requested by: Meryl Noriega MD    Date of Surgery: 8/11/2021 - 8/16/2021    Procedure(s):  ESOPHAGOGASTRODUODENOSCOPY (EGD)  ENDOSCOPIC U ulcerative colitis   • COPD (chronic obstructive pulmonary disease) (HCC)    • Coronary atherosclerosis    • Depression    • Hepatitis     Hep C   • Hepatitis C     1989   • High blood pressure    • History of blood transfusion 2010    no reactions   • Rupert capsule (300 mg total) by mouth 3 (three) times daily. , Disp: 21 capsule, Rfl: 0, Unknown at Unknown time  predniSONE 20 MG Oral Tab, To take 2 tablets by oral route for 3 days then 1 tablet by oral route for 3 days then 1/2 tablet by oral route for 3 days Ha Marion MD  acetaminophen (TYLENOL) tab 650 mg, 650 mg, Oral, Q4H PRN, Arin Muse MD  magnesium hydroxide (MILK OF MAGNESIA) 400 MG/5ML suspension 30 mL, 30 mL, Oral, Daily PRN, Arin Muse MD  ondansetron [facility]) injection 4 mg, 4 mg,   Days of Exercise per Week:       Minutes of Exercise per Session:   Stress:       Feeling of Stress :   Social Connections:       Frequency of Communication with Friends and Family:       Frequency of Social Gatherings with Friends and Family:       Kaylee comment: Current everyday smoker  Cardiovascular - normal exam  (+) hypertension, CAD, CABG/stent (2019),     Neuro/Psych    (+)  anxiety/panic attacks,  bipolar disorder, depression,       GI/Hepatic/Renal    (+) hepatitis C, liver disease,     Endo/Other

## 2021-08-16 NOTE — ANESTHESIA POSTPROCEDURE EVALUATION
Patient: Fatuma Martinez    Procedure Summary     Date: 08/16/21 Room / Location: [facility] ENDOSCOPY 01 / [facility] ENDOSCOPY    Anesthesia Start: 4337 Anesthesia Stop: 9571    Procedures:       ESOPHAGOGASTRODUODENOSCOPY (EGD) (N/A )      ENDOSCOPIC ULTRASOUND (EUS) [10

## 2021-08-16 NOTE — OPERATIVE REPORT
Esophagogastroduodenoscopy (EGD) & Endoscopic Ultrasound (EUS) Report    Elvira Marcum     1965 Age 64year old   PCP Neville Stanton MD Endoscopist Alice Murphy MD     Date of procedure: 21    Procedure: EGD w/ biopsy and EUS esophagus, s junction was noted appeared unremarkable. There were no esophageal varices. There was extensive punctate white exudates throughout the esophagus most consistent with candida esophagitis. Multiple cold forceps biopsies were obtained.  The esophageal mucosa a your pathology results either by phone or letter within 2 weeks, please call our office at [phone].        Inocencio Rai MD  [facility] - Gastroenterology  8/16/2021

## 2021-08-16 NOTE — H&P
History & Physical Examination    Patient Name: Zaki Husbands  MRN: [id number]  CSN: [id number]  YOB: 1965    Diagnosis: RUQ abdominal pain, abnormal MRCP    IBUPROFEN 800 MG Oral Tab, TAKE 1 TABLET(800 MG) BY MOUTH EVERY 6 HOURS AS NEEDED FOR P a month at Unknown time  Respiratory Therapy Supplies (NEBULIZER/ADULT MASK) Does not apply Kit, 1 Kit, Disp: 1 kit, Rfl: 0, Unknown at Unknown time      ibuprofen (MOTRIN) tab 600 mg, 600 mg, Oral, Q8H PRN  traMADol (ULTRAM) tab 50 mg, 50 mg, Oral, Q6H NV APPENDECTOMY      2012   • CATH PERCUTANEOUS  TRANSLUMINAL CORONARY ANGIOPLASTY     • HYSTERECTOMY  2010    partial hysterectomy   • TONSILLECTOMY       Family History   Problem Relation Age of Onset   • Cancer Father    • Hypertension Father    • Cancer M

## 2021-08-16 NOTE — PAYOR COMM NOTE
--------------  ADMISSION REVIEW     Payor: Demarco Mirza #:  [id number]  Authorization Number: [id number]    Admit date: 8/12/21  Admit time:  3:44 PM  Admit Orders (From admission, onward)     Start     Ordered RRR   LUNGS:  B/l wheezing  ABDOMEN: BS+,  Soft +tender. Bowel sounds were present. MUSCULOSKELETAL:  There was no deformity. There was full range of motion in all the extremities.    EXTREMITIES: There was no edema, clubbing or cyanosis  NEUROLOGICAL: 16 mm in diameter. Distal common bile duct appears heterogeneous in density raising the possibility of debris or choledocholithiasis. Correlate for biliary lab abnormality. 3.  Post hysterectomy.   Ovoid calcified focus in the pouch of Royal which may and right upper quadrant pain   chest pain has resolved   right upper quadrant pain persist  now tired and very sleepy at time of exam  Denies shortness of breath or dizziness     Objective:   Blood pressure 112/64, pulse (!) 49, temperature 98 °F (36.7 °C past has been told she had active disease 10 years ago CT abdomen reviewed shows hepatic cirrhosis, Splenomegaly suggestive of portal hypertension,  Gallbladder shows prominent distention Common bile duct is enlarged at 16 mm distal common bile duct appear 08/13/2021     ALB 3.4 08/13/2021     ALKPHO 96 08/13/2021     BILT 0.7 08/13/2021     TP 7.8 08/13/2021     AST 87 (H) 08/13/2021     ALT 66 (H) 08/13/2021     Assessment and Plan:      Cholecystitis  Elevated transaminases  Hepatitis C  -Per patient hep pulse 57, temperature 98.6 °F (37 °C), temperature source Oral, resp. rate 15, weight 169 lb (76.7 kg), SpO2 95 %.     GENERAL:  The patient appeared to be in no distress and was comfortable.   SKIN:  Warm and hydrated  PSYCHIATRIC: Calm and cooperative   H heterogeneous in density raising the possibility of debris or choledocholithiasis  -Appreciate GI evaluation  -Appreciate surgical evaluation  -Clear liquid diet for now  -Plan ultrasound gallbladder liver, possible further imaging with MRI versus HIDA sca Date Action Dose Route User    8/16/2021 0901 Given 117 mg Oral Patricia Mckinney RN      methocarbamol (ROBAXIN) tab 500 mg     Date Action Dose Route User    8/16/2021 0902 Given 500 mg Oral Patricia Mckinney RN    8/15/2021 2016 Given 500 mg Oral Reigle,

## 2021-08-17 LAB
ALBUMIN SERPL-MCNC: 3.5 G/DL (ref 3.4–5)
ALBUMIN/GLOB SERPL: 0.7 {RATIO} (ref 1–2)
ALP LIVER SERPL-CCNC: 86 U/L
ALT SERPL-CCNC: 58 U/L
ANION GAP SERPL CALC-SCNC: 2 MMOL/L (ref 0–18)
AST SERPL-CCNC: 53 U/L (ref 15–37)
BASOPHILS # BLD AUTO: 0.01 X10(3) UL (ref 0–0.2)
BASOPHILS NFR BLD AUTO: 0.2 %
BILIRUB SERPL-MCNC: 0.6 MG/DL (ref 0.1–2)
BUN BLD-MCNC: 18 MG/DL (ref 7–18)
BUN/CREAT SERPL: 22.2 (ref 10–20)
CALCIUM BLD-MCNC: 8.9 MG/DL (ref 8.5–10.1)
CHLORIDE SERPL-SCNC: 103 MMOL/L (ref 98–112)
CO2 SERPL-SCNC: 31 MMOL/L (ref 21–32)
CREAT BLD-MCNC: 0.81 MG/DL
DEPRECATED RDW RBC AUTO: 42.9 FL (ref 35.1–46.3)
EOSINOPHIL # BLD AUTO: 0.02 X10(3) UL (ref 0–0.7)
EOSINOPHIL NFR BLD AUTO: 0.3 %
ERYTHROCYTE [DISTWIDTH] IN BLOOD BY AUTOMATED COUNT: 12.5 % (ref 11–15)
GLOBULIN PLAS-MCNC: 4.7 G/DL (ref 2.8–4.4)
GLUCOSE BLD-MCNC: 133 MG/DL (ref 70–99)
HAV IGM SER QL: 2.2 MG/DL (ref 1.6–2.6)
HCT VFR BLD AUTO: 40 %
HGB BLD-MCNC: 13.8 G/DL
IMM GRANULOCYTES # BLD AUTO: 0.02 X10(3) UL (ref 0–1)
IMM GRANULOCYTES NFR BLD: 0.3 %
LYMPHOCYTES # BLD AUTO: 1.74 X10(3) UL (ref 1–4)
LYMPHOCYTES NFR BLD AUTO: 29 %
M PROTEIN MFR SERPL ELPH: 8.2 G/DL (ref 6.4–8.2)
MCH RBC QN AUTO: 32.3 PG (ref 26–34)
MCHC RBC AUTO-ENTMCNC: 34.5 G/DL (ref 31–37)
MCV RBC AUTO: 93.7 FL
MONOCYTES # BLD AUTO: 0.44 X10(3) UL (ref 0.1–1)
MONOCYTES NFR BLD AUTO: 7.3 %
NEUTROPHILS # BLD AUTO: 3.76 X10 (3) UL (ref 1.5–7.7)
NEUTROPHILS # BLD AUTO: 3.76 X10(3) UL (ref 1.5–7.7)
NEUTROPHILS NFR BLD AUTO: 62.9 %
OSMOLALITY SERPL CALC.SUM OF ELEC: 286 MOSM/KG (ref 275–295)
PHOSPHATE SERPL-MCNC: 2.4 MG/DL (ref 2.5–4.9)
PLATELET # BLD AUTO: 121 10(3)UL (ref 150–450)
POTASSIUM SERPL-SCNC: 4.1 MMOL/L (ref 3.5–5.1)
RBC # BLD AUTO: 4.27 X10(6)UL
SODIUM SERPL-SCNC: 136 MMOL/L (ref 136–145)
WBC # BLD AUTO: 6 X10(3) UL (ref 4–11)

## 2021-08-17 PROCEDURE — 99233 SBSQ HOSP IP/OBS HIGH 50: CPT | Performed by: PHYSICIAN ASSISTANT

## 2021-08-17 PROCEDURE — 99232 SBSQ HOSP IP/OBS MODERATE 35: CPT | Performed by: SURGERY

## 2021-08-17 PROCEDURE — 99233 SBSQ HOSP IP/OBS HIGH 50: CPT | Performed by: HOSPITALIST

## 2021-08-17 RX ORDER — ALPRAZOLAM 0.5 MG/1
0.5 TABLET ORAL 2 TIMES DAILY PRN
Status: DISCONTINUED | OUTPATIENT
Start: 2021-08-17 | End: 2021-08-18

## 2021-08-17 RX ORDER — FLUCONAZOLE 200 MG/1
400 TABLET ORAL ONCE
Status: DISCONTINUED | OUTPATIENT
Start: 2021-08-17 | End: 2021-08-17

## 2021-08-17 NOTE — PROGRESS NOTES
[facility] [address]     Gastroenterology Progress Note    Santos Wheeler Patient Status:  Inpatient    1965 MRN [id number]   Location [facility] 5SW/SE Attending Hunter Fair MD   Hosp Day # 5 PCP MD Griffin Hernandezepjennifer Childress - no ascites on imaging nor appreciated on physical exam. Suspect 2/2 untreated Hep C, however will obtain chronic LFT w/u to further categorize if other contributing component.   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 · No goblet cell metaplasia or dysplasia identified.     Additionally, given concern for borderline prolonged QT and drug interaction of diflucan with methadone, p

## 2021-08-17 NOTE — PLAN OF CARE
Problem: Patient Centered Care  Goal: Patient preferences are identified and integrated in the patient's plan of care  Description: Interventions:  - What would you like us to know as we care for you?  History of opiate abuse, on Methadone  - Provide time EKG if indicated  - Evaluate effectiveness of antiarrhythmic and heart rate control medications as ordered  - Initiate emergency measures for life threatening arrhythmias  - Monitor electrolytes and administer replacement therapy as ordered  Outcome: Progr

## 2021-08-17 NOTE — PAYOR COMM NOTE
--------------  8/15- 16 CONTINUED STAY REVIEW    Payor: Demarco Mirza #:  [id number]  Authorization Number: [id number]       8/15:    HOSPITALIST:  Constitutional: Positive for fatigue.  Negative for chills and diaph dilatation of the common hepatic duct, and prominent distention of the cystic duct. The pattern of ductal dilatation is otherwise similar to prior CT from 3/19/21. Gallbladder is without stones or wall thickening. 3.  Hepatic cirrhosis.   Mild fatty infil of anxiety disorder history of PTSD  -Plan psych evaluation     Right knee pain  X-ray shows severe osteoarthritis  Lidoderm patch     DVT prophylaxis-no chemoprophylaxis with possible surgery scd's     Dispo-pending     MRI images reviewed    NURSING:  Pt airway occlusion and mucous plugging and perihilar bronchial wall thickening, no PE  -plan ekg   -echocardiogram--> normal  -cardiology eval appreciated  -LHC 5/2019 with non-obstructive CAD, 30% narrowing in RCA  -ECHO 5/2019 with normal EF, mild LVH, mil revealed a normal fundus and cardia. There were no gastric varices. 3. Duodenum: There was bile stained mucosa throughout and visualized bile coming from the ampulla.  Otherwise, the duodenal mucosa appeared normal in the 1st and 2nd portion of the duodenu 8/17/2021 0810 Given 1 spray Each Nare Ritika Cat RN      lactated ringers infusion     Date Action Dose Route User    8/16/2021 1507 New Bag (none) Intravenous Nisha Cordoba CRNA      lidocaine PF (XYLOCAINE) 1% injection     Date Action Dose Route mg Oral Leigh Ann Sanchez RN          Procedures:      Plan:

## 2021-08-17 NOTE — PROGRESS NOTES
[facility]    Progress Note    Chinedu Figueroa Patient Status:  Inpatient    1965 MRN [id number]   Location [facility] 5SW/SE Attending Domenico Vanessa MD   Hosp Day # 5 PCP Bethany Ramirez MD       Subjective:   Chinedu Figueroa is a( 08/17/2021    ALT 58 (H) 08/17/2021    PTT 26.7 05/30/2019    INR 1.30 (H) 08/11/2021    T4F 1.0 05/26/2019    TSH 0.618 03/06/2020     08/11/2021    DDIMER 1.99 (H) 08/11/2021    ESRML 22 08/11/2021    MG 2.2 08/17/2021    PHOS 2.4 (L) 08/17/2021

## 2021-08-18 VITALS
SYSTOLIC BLOOD PRESSURE: 132 MMHG | WEIGHT: 160 LBS | HEART RATE: 85 BPM | DIASTOLIC BLOOD PRESSURE: 72 MMHG | BODY MASS INDEX: 29.44 KG/M2 | OXYGEN SATURATION: 90 % | TEMPERATURE: 98 F | HEIGHT: 62 IN | RESPIRATION RATE: 17 BRPM

## 2021-08-18 LAB
ALBUMIN SERPL-MCNC: 3.5 G/DL (ref 3.4–5)
ALBUMIN/GLOB SERPL: 0.7 {RATIO} (ref 1–2)
ALP LIVER SERPL-CCNC: 87 U/L
ALT SERPL-CCNC: 70 U/L
ANION GAP SERPL CALC-SCNC: 7 MMOL/L (ref 0–18)
AST SERPL-CCNC: 72 U/L (ref 15–37)
BILIRUB SERPL-MCNC: 0.6 MG/DL (ref 0.1–2)
BUN BLD-MCNC: 14 MG/DL (ref 7–18)
BUN/CREAT SERPL: 20.6 (ref 10–20)
CALCIUM BLD-MCNC: 9.1 MG/DL (ref 8.5–10.1)
CHLORIDE SERPL-SCNC: 104 MMOL/L (ref 98–112)
CO2 SERPL-SCNC: 27 MMOL/L (ref 21–32)
CREAT BLD-MCNC: 0.68 MG/DL
DEPRECATED RDW RBC AUTO: 42 FL (ref 35.1–46.3)
ERYTHROCYTE [DISTWIDTH] IN BLOOD BY AUTOMATED COUNT: 12.3 % (ref 11–15)
GLOBULIN PLAS-MCNC: 4.7 G/DL (ref 2.8–4.4)
GLUCOSE BLD-MCNC: 95 MG/DL (ref 70–99)
HCT VFR BLD AUTO: 40.1 %
HGB BLD-MCNC: 14.1 G/DL
M PROTEIN MFR SERPL ELPH: 8.2 G/DL (ref 6.4–8.2)
MCH RBC QN AUTO: 32.4 PG (ref 26–34)
MCHC RBC AUTO-ENTMCNC: 35.2 G/DL (ref 31–37)
MCV RBC AUTO: 92.2 FL
OSMOLALITY SERPL CALC.SUM OF ELEC: 286 MOSM/KG (ref 275–295)
PHOSPHATE SERPL-MCNC: 2 MG/DL (ref 2.5–4.9)
PLATELET # BLD AUTO: 128 10(3)UL (ref 150–450)
POTASSIUM SERPL-SCNC: 3.8 MMOL/L (ref 3.5–5.1)
RBC # BLD AUTO: 4.35 X10(6)UL
SODIUM SERPL-SCNC: 138 MMOL/L (ref 136–145)
WBC # BLD AUTO: 4.6 X10(3) UL (ref 4–11)

## 2021-08-18 PROCEDURE — 99239 HOSP IP/OBS DSCHRG MGMT >30: CPT | Performed by: HOSPITALIST

## 2021-08-18 RX ORDER — PANTOPRAZOLE SODIUM 40 MG/1
40 TABLET, DELAYED RELEASE ORAL
Qty: 180 TABLET | Refills: 0 | Status: SHIPPED | OUTPATIENT
Start: 2021-08-18 | End: 2021-11-16

## 2021-08-18 RX ORDER — ALBUTEROL SULFATE 90 UG/1
2 AEROSOL, METERED RESPIRATORY (INHALATION) EVERY 4 HOURS PRN
Qty: 18 G | Refills: 0 | Status: SHIPPED | OUTPATIENT
Start: 2021-08-18 | End: 2021-09-27

## 2021-08-18 RX ORDER — PANTOPRAZOLE SODIUM 40 MG/1
40 TABLET, DELAYED RELEASE ORAL DAILY
Qty: 30 TABLET | Refills: 0 | Status: SHIPPED | OUTPATIENT
Start: 2021-08-18 | End: 2021-08-18

## 2021-08-18 RX ORDER — ARIPIPRAZOLE 2 MG/1
2 TABLET ORAL EVERY EVENING
Qty: 30 TABLET | Refills: 0 | Status: SHIPPED | OUTPATIENT
Start: 2021-08-18

## 2021-08-18 NOTE — PROGRESS NOTES
medicar placed on will call philip pt needs a ride    Address:   [address]      PCS form in the chart  [place] [phone]  Spoke with :Meg Mojica

## 2021-08-18 NOTE — DISCHARGE SUMMARY
[facility]    Discharge Summary    Araceli Hubbard Patient Status:  Inpatient    1965 MRN [id number]   Location [facility] 5SW/SE Attending Janell Easton MD   [facility] Day # 6 PCP Daxa Morton MD     Date of Admission: 2021 appears heterogeneous in density raising the possibility of debris or choledocholithiasis  -Appreciate GI evaluation  -Appreciate surgical evaluation  -With underlying cirrhosis patient is at higher risk for surgery, continue medical management at this poi 8/16/21 ESOPHAGOGASTRODUODENOSCOPY (EGD) Ilan Blanco  [facility] ENDOSCOPY Constanza            Discharge Plan:   Discharge Condition: Stable    Current Discharge Medication List    New Orders    nystatin 036480 UNIT/ML Mouth/Throat Suspension  Take 5 mL (500  (90 Base) MCG/ACT Aers      INHALE 2 PUFFS INTO THE LUNGS EVERY 4 HOURS AS NEEDED FOR WHEEZING   Quantity: 18 g  Refills: 0     ALPRAZolam 1 MG Tabs  Commonly known as: XANAX      TAKE 1 TABLET(1 MG) BY MOUTH THREE TIMES DAILY   Quantity: 90 tablet [id number]  [phone]                   Follow up Labs and imaging: Other Discharge Instructions: To find additional resources to those provided on paper, please go to your insurance company, [employer], web site [email or web address]

## 2021-08-18 NOTE — PROGRESS NOTES
Discharge RN Summary: Patient has discharge order in. Patient to discharge home with friend. IV removed by this RN. Understands to follow up with PCP in 1 week. Patient understands to  meds with printed scripts.  On AVS it was written pt should have

## 2021-08-19 ENCOUNTER — TELEPHONE (OUTPATIENT)
Dept: FAMILY MEDICINE CLINIC | Facility: CLINIC | Age: 56
End: 2021-08-19

## 2021-08-19 ENCOUNTER — PATIENT OUTREACH (OUTPATIENT)
Dept: CASE MANAGEMENT | Age: 56
End: 2021-08-19

## 2021-08-19 DIAGNOSIS — Z02.9 ENCOUNTERS FOR UNSPECIFIED ADMINISTRATIVE PURPOSE: ICD-10-CM

## 2021-08-19 NOTE — TELEPHONE ENCOUNTER
NCM spoke to the patient for a HFU call today. The patient was recently admitted for cholecystitis, elevated transaminases, and hepatitis C. The patient has a HFU appointment scheduled with Dr. Chikis Yeh on 8/26/2021.  The patient declined scheduling with PCP par

## 2021-08-19 NOTE — PROGRESS NOTES
Initial Post Discharge Follow Up   Discharge Date: 8/18/21  Contact Date: 8/19/2021    Consent Verification:  Assessment Completed With: Patient  HIPAA Verified?   Yes    Discharge Dx:     Cholecystitis  Elevated transaminases  Hepatitis C from tatoo on possible left knee injections with the PCP in the near future. NCM did provide the PCP office with a condition update. NCM stressed the importance of picking up newly prescribed medications, and monitoring symptoms closely.  NCM did review sxs that would re Base) MCG/ACT Inhalation Aero Soln Inhale 2 puffs into the lungs every 4 (four) hours as needed for Wheezing. 18 g 0   • pantoprazole 40 MG Oral Tab EC Take 1 tablet (40 mg total) by mouth 2 (two) times daily before meals.  180 tablet 0   • ALPRAZOLAM 1 MG need of a new nebulizer machine. This request was sent to the PCP office. Are you having any concerns with constipation? No    Referrals/orders at D/C:  Home Health/Services ordered at D/C?   No     Except for Home Health Services mentioned above, have yo medication question (the patient was concerned antibiotics were not prescribed at discharge), and a request for a nebulizer machine. The patient is aware the office will FU with the patient directly.       Follow up appointments:      PCP TCM/HFU appointmen understanding and will contact the office with any further questions or concerns.      CCM referral placed:  Not Applicable    BOOK BY DATE: 9/1/2021    [x]  Discharge Summary, Discharge medications reviewed/discussed/and reconciled against outpatient medic

## 2021-08-19 NOTE — TELEPHONE ENCOUNTER
Followed up with patient, she did agree to re-schedule appt 1 day sooner for Wed 8/25 3:30pm with Dr. Max Talavera as recommended by [facility], unable to schedule any sooner appt due to having to arrange transportation.      Patient continues with respiratory symptoms, re

## 2021-08-19 NOTE — TELEPHONE ENCOUNTER
Appointment request: Spoke to the pt today for a HFU call. The patient was recently admitted for cholecystitis, elevated transaminases, and hepatitis C. A HFU appointment (non-TCM) was scheduled for 8/26/2021.  A HFU appt is recommended by 8/25/2021, as the

## 2021-08-20 RX ORDER — NEBULIZER ACCESSORIES
KIT MISCELLANEOUS
Qty: 1 EACH | Refills: 0 | Status: SHIPPED | OUTPATIENT
Start: 2021-08-20

## 2021-08-20 NOTE — TELEPHONE ENCOUNTER
Patient called back to check to see if nebulizer machine order was sent. Order is still pending. Advised patient that order has not been signed yet. Dr. Pablo Rivear is out of the office, sent to provider at the office.      Patient would like a call when order

## 2021-08-23 NOTE — TELEPHONE ENCOUNTER
Advised patient order for neb machine has already been sent to [facility], patient reports they said it was never received.  Contacted [facility] now, order was received on 8/20 however the neb brand they currently have is not covered by patient's insurance,

## 2021-08-23 NOTE — TELEPHONE ENCOUNTER
Patient has appt 8/25 with Dr. Coty Avila. See other encounter 8/19, order for neb has already been sent to pharmacy, Melissa confirmed it was received.

## 2021-08-24 RX ORDER — ALPRAZOLAM 1 MG/1
1 TABLET ORAL 3 TIMES DAILY PRN
Qty: 90 TABLET | Refills: 0 | Status: SHIPPED | OUTPATIENT
Start: 2021-08-24 | End: 2021-09-27

## 2021-08-24 RX ORDER — IBUPROFEN 800 MG/1
800 TABLET ORAL EVERY 8 HOURS PRN
Qty: 45 TABLET | Refills: 0 | Status: SHIPPED | OUTPATIENT
Start: 2021-08-24 | End: 2021-09-27

## 2021-08-24 NOTE — TELEPHONE ENCOUNTER
(late entry from 8/23/21) Patient called to request refill of Alprazolam, also requesting a script for Ibuprofen 800mg, reports chronic pain due to her arthritis. Patient has appt tomorrow with Dr. Pablo Rivera, please advise.

## 2021-08-24 NOTE — TELEPHONE ENCOUNTER
Message noted: Chart reviewed and may refill medications as requested. Script sent to listed pharmacy by secure method.     Pt notified through [facility]

## 2021-08-25 ENCOUNTER — OFFICE VISIT (OUTPATIENT)
Dept: FAMILY MEDICINE CLINIC | Facility: CLINIC | Age: 56
End: 2021-08-25
Payer: MEDICAID

## 2021-08-25 VITALS
WEIGHT: 174 LBS | BODY MASS INDEX: 32.02 KG/M2 | HEIGHT: 62 IN | TEMPERATURE: 97 F | HEART RATE: 65 BPM | RESPIRATION RATE: 16 BRPM | DIASTOLIC BLOOD PRESSURE: 70 MMHG | SYSTOLIC BLOOD PRESSURE: 112 MMHG

## 2021-08-25 DIAGNOSIS — G62.9 NEUROPATHY: ICD-10-CM

## 2021-08-25 DIAGNOSIS — K74.60 CIRRHOSIS OF LIVER WITHOUT ASCITES, UNSPECIFIED HEPATIC CIRRHOSIS TYPE (HCC): ICD-10-CM

## 2021-08-25 DIAGNOSIS — B37.81 ESOPHAGEAL CANDIDIASIS (HCC): ICD-10-CM

## 2021-08-25 DIAGNOSIS — J44.1 COPD EXACERBATION (HCC): ICD-10-CM

## 2021-08-25 DIAGNOSIS — R05.9 COUGH: Primary | ICD-10-CM

## 2021-08-25 DIAGNOSIS — M25.562 LEFT KNEE PAIN, UNSPECIFIED CHRONICITY: ICD-10-CM

## 2021-08-25 PROCEDURE — 3008F BODY MASS INDEX DOCD: CPT | Performed by: FAMILY MEDICINE

## 2021-08-25 PROCEDURE — 3078F DIAST BP <80 MM HG: CPT | Performed by: FAMILY MEDICINE

## 2021-08-25 PROCEDURE — 99214 OFFICE O/P EST MOD 30 MIN: CPT | Performed by: FAMILY MEDICINE

## 2021-08-25 PROCEDURE — 3074F SYST BP LT 130 MM HG: CPT | Performed by: FAMILY MEDICINE

## 2021-08-25 RX ORDER — AMOXICILLIN AND CLAVULANATE POTASSIUM 875; 125 MG/1; MG/1
1 TABLET, FILM COATED ORAL 2 TIMES DAILY
Qty: 20 TABLET | Refills: 0 | Status: SHIPPED | OUTPATIENT
Start: 2021-08-25 | End: 2021-10-27

## 2021-08-25 NOTE — PROGRESS NOTES
HPI/Subjective:   Patient ID: Geoffrey Rodriguez is a 64year old female. Patient is here for follow up from recent hospitalization at [facility]. The patient was admitted for evaluation for exertional chest pains and found to have candidal esophagitis.  Also has hx o affective disorder, currently depressed, moderate (HCC)    Polysubstance dependence including opioid drug with daily use (HCC)    Generalized anxiety disorder    RUQ pain    Primary osteoarthritis of right knee    Abnormal magnetic resonance cholangiopancr EF, mild LVH, mild MR, mild LAE  -jack cardiology eval, likely GI source of pain     COPD   -Have wheezing on exam   -But no signs of acute exacerbation, on room air   -Plan nebulizers   -Hold off on steroids     Diarrhea   - resolved     Candidal esophagit MG Oral Chew Tab  Chew 1 tablet (81 mg total) by mouth daily.     Fluticasone Propionate 50 MCG/ACT Nasal Suspension  1 spray by Nasal route daily.  One spray per each nostril daily.     ipratropium-albuterol 0.5-2.5 (3) MG/3ML Inhalation Solution  Take 3 m (3) MG/3ML Soln  Commonly known as: DUONEB       Take 3 mL by nebulization every 6 (six) hours as needed.    Quantity: 1 Container  Refills: 0     Methadone HCl 10 MG/5ML Soln  Commonly known as: DOLOPHINE       Take 130 mg by mouth daily.    Refills: 0    have had a colonoscopy in the past to this appointment (Dr. Margie Brantley or Purvi MOHAMUD)  - follow up outpatient for repeat upper endoscopy to evaluate for healing of ulcer with GI team  - nystatin 500,000 (swallowed) four times daily x 14 days to MOUTH FOUR TIMES DAILY 90 tablet 0   • FLUoxetine HCl 20 MG Oral Cap Take 3 capsules (60 mg total) by mouth daily. 90 capsule 1   • Oxybutynin Chloride ER 10 MG Oral Tablet 24 Hr Take 1 tablet (10 mg total) by mouth daily.  90 tablet 3   • aspirin 81 MG Ora notes reviewed. - After discussion, will send to Dr Barb Fernández for further evaluation and treatment; To call if any significant symptoms. Letter for ENSURE plus provided as requested.     Left knee pain, unspecified chronicity:  - After discussion, will send to

## 2021-08-26 ENCOUNTER — TELEPHONE (OUTPATIENT)
Dept: FAMILY MEDICINE CLINIC | Facility: CLINIC | Age: 56
End: 2021-08-26

## 2021-08-26 NOTE — TELEPHONE ENCOUNTER
Pharmacy contacted and clarified # for one week for nystatin as original script only good for 3 days.

## 2021-09-27 RX ORDER — ALBUTEROL SULFATE 90 UG/1
AEROSOL, METERED RESPIRATORY (INHALATION)
Qty: 18 G | Refills: 0 | Status: SHIPPED | OUTPATIENT
Start: 2021-09-27 | End: 2021-10-25

## 2021-09-27 RX ORDER — ALPRAZOLAM 1 MG/1
TABLET ORAL
Qty: 90 TABLET | Refills: 0 | Status: SHIPPED | OUTPATIENT
Start: 2021-09-27 | End: 2021-10-19

## 2021-09-27 RX ORDER — IBUPROFEN 800 MG/1
TABLET ORAL
Qty: 45 TABLET | Refills: 0 | Status: SHIPPED | OUTPATIENT
Start: 2021-09-27 | End: 2021-10-25

## 2021-09-27 RX ORDER — FLUOXETINE HYDROCHLORIDE 20 MG/1
CAPSULE ORAL
Qty: 90 CAPSULE | Refills: 1 | Status: SHIPPED | OUTPATIENT
Start: 2021-09-27

## 2021-09-27 NOTE — TELEPHONE ENCOUNTER
Message noted: Chart reviewed and may refill medication times one 90 day supply as requested with 1 additional refill. Prescription sent to listed pharmacy. Pharmacy to notify patient.  Pt notified through [facility]

## 2021-09-27 NOTE — TELEPHONE ENCOUNTER
Message noted: Chart reviewed and may refill medications as requested. Script sent to listed pharmacy by secure method.     Pt notified through [facility]

## 2021-09-30 ENCOUNTER — OFFICE VISIT (OUTPATIENT)
Dept: SURGERY | Facility: CLINIC | Age: 56
End: 2021-09-30
Payer: MEDICAID

## 2021-09-30 VITALS
DIASTOLIC BLOOD PRESSURE: 70 MMHG | RESPIRATION RATE: 16 BRPM | OXYGEN SATURATION: 95 % | HEIGHT: 62 IN | SYSTOLIC BLOOD PRESSURE: 108 MMHG | HEART RATE: 57 BPM | BODY MASS INDEX: 32.02 KG/M2 | WEIGHT: 174 LBS

## 2021-09-30 DIAGNOSIS — B18.2 CHRONIC HEPATITIS C WITHOUT HEPATIC COMA (HCC): Primary | ICD-10-CM

## 2021-09-30 RX ORDER — FUROSEMIDE 20 MG/1
20 TABLET ORAL DAILY
Qty: 30 TABLET | Refills: 11 | Status: SHIPPED | OUTPATIENT
Start: 2021-09-30

## 2021-09-30 NOTE — PROGRESS NOTES
[facility]  [address], [address]  [address], [address]  [phone] ([phone]) Current Outpatient Medications:   •  ALPRAZOLAM 1 MG Oral Tab, TAKE 1 TABLET(1 MG) BY MOUTH THREE TIMES DAILY AS NEEDED, Disp: 90 tablet, Rfl: 0  •  IBUPROFEN 800 MG Oral Tab, TAKE 1 TABLET(800 MG) BY MOUTH EVERY 8 HOURS AS NEEDED FOR PAIN, Disp: 45 swelling; will start lasix 20 mg daily.  Continue Na restriction  - No encephalopathy   - Regarding HCC screening, MRI (Aug '21) with no masses  - Continue to avoid alcohol   - HAV susceptible, HBsAb level low ; would vaccinate against HAV/HBV  - Return in

## 2021-10-19 RX ORDER — ALPRAZOLAM 1 MG/1
TABLET ORAL
Qty: 90 TABLET | Refills: 0 | Status: SHIPPED | OUTPATIENT
Start: 2021-10-19 | End: 2021-11-22

## 2021-10-19 NOTE — TELEPHONE ENCOUNTER
Message noted: Chart reviewed and may refill medication as requested. Script sent to listed pharmacy by secure method.     Pt notified through [facility]

## 2021-10-25 RX ORDER — IBUPROFEN 800 MG/1
TABLET ORAL
Qty: 45 TABLET | Refills: 0 | Status: SHIPPED | OUTPATIENT
Start: 2021-10-25 | End: 2021-11-22

## 2021-10-25 RX ORDER — METHOCARBAMOL 500 MG/1
TABLET, FILM COATED ORAL
Qty: 90 TABLET | Refills: 0 | Status: SHIPPED | OUTPATIENT
Start: 2021-10-25 | End: 2021-11-22

## 2021-10-25 RX ORDER — ALBUTEROL SULFATE 90 UG/1
AEROSOL, METERED RESPIRATORY (INHALATION)
Qty: 18 G | Refills: 0 | Status: SHIPPED | OUTPATIENT
Start: 2021-10-25 | End: 2021-10-27

## 2021-10-25 NOTE — TELEPHONE ENCOUNTER
Message noted: Chart reviewed and may refill medications as requested. Prescription sent to listed pharmacy. Pharmacy to notify patient.  Pt notified through [facility]

## 2021-10-25 NOTE — TELEPHONE ENCOUNTER
The patient is calling asking about her refills.   The Alprazolam.  Per our records was sent on 10/19/2 at 12:40 pm.  She will contact the pharmacy

## 2021-10-27 ENCOUNTER — OFFICE VISIT (OUTPATIENT)
Dept: FAMILY MEDICINE CLINIC | Facility: CLINIC | Age: 56
End: 2021-10-27
Payer: MEDICAID

## 2021-10-27 VITALS
WEIGHT: 177 LBS | OXYGEN SATURATION: 96 % | DIASTOLIC BLOOD PRESSURE: 65 MMHG | RESPIRATION RATE: 16 BRPM | BODY MASS INDEX: 32.57 KG/M2 | SYSTOLIC BLOOD PRESSURE: 100 MMHG | HEART RATE: 60 BPM | HEIGHT: 62 IN | TEMPERATURE: 97 F

## 2021-10-27 DIAGNOSIS — J32.9 RECURRENT SINUSITIS: ICD-10-CM

## 2021-10-27 DIAGNOSIS — J44.9 CHRONIC OBSTRUCTIVE PULMONARY DISEASE, UNSPECIFIED COPD TYPE (HCC): ICD-10-CM

## 2021-10-27 PROCEDURE — 3008F BODY MASS INDEX DOCD: CPT | Performed by: FAMILY MEDICINE

## 2021-10-27 PROCEDURE — 3078F DIAST BP <80 MM HG: CPT | Performed by: FAMILY MEDICINE

## 2021-10-27 PROCEDURE — 3074F SYST BP LT 130 MM HG: CPT | Performed by: FAMILY MEDICINE

## 2021-10-27 PROCEDURE — 99213 OFFICE O/P EST LOW 20 MIN: CPT | Performed by: FAMILY MEDICINE

## 2021-10-27 RX ORDER — ALBUTEROL SULFATE 90 UG/1
2 AEROSOL, METERED RESPIRATORY (INHALATION) EVERY 4 HOURS PRN
Qty: 18 G | Refills: 5 | Status: SHIPPED | OUTPATIENT
Start: 2021-10-27 | End: 2021-12-14

## 2021-10-27 RX ORDER — AMOXICILLIN AND CLAVULANATE POTASSIUM 875; 125 MG/1; MG/1
1 TABLET, FILM COATED ORAL 2 TIMES DAILY
Qty: 20 TABLET | Refills: 0 | Status: SHIPPED | OUTPATIENT
Start: 2021-10-27 | End: 2022-01-13

## 2021-10-27 RX ORDER — PREDNISONE 20 MG/1
TABLET ORAL
Qty: 10 TABLET | Refills: 0 | Status: SHIPPED | OUTPATIENT
Start: 2021-10-27

## 2021-10-27 NOTE — PROGRESS NOTES
Subjective:   Patient ID: Tasia Basurto is a 64year old female. Pt presents with hx of COPD and sinus issues. Pt has had a flare up with the season change recently. No fevers but has had ear aches and also some wheezing/ SOB.  Needs refills on albuterol Oral Chew Tab Chew 1 tablet (81 mg total) by mouth daily. 30 tablet 3   • Fluticasone Propionate 50 MCG/ACT Nasal Suspension 1 spray by Nasal route daily. One spray per each nostril daily.  1 Inhaler 2   • Methadone HCl 10 MG/5ML Oral Solution Take 130 mg b

## 2021-11-02 ENCOUNTER — OFFICE VISIT (OUTPATIENT)
Dept: OTOLARYNGOLOGY | Facility: CLINIC | Age: 56
End: 2021-11-02
Payer: MEDICAID

## 2021-11-02 ENCOUNTER — OFFICE VISIT (OUTPATIENT)
Dept: AUDIOLOGY | Facility: CLINIC | Age: 56
End: 2021-11-02
Payer: MEDICAID

## 2021-11-02 VITALS — BODY MASS INDEX: 32.57 KG/M2 | HEIGHT: 62 IN | WEIGHT: 177 LBS

## 2021-11-02 DIAGNOSIS — H91.90 HEARING LOSS, UNSPECIFIED HEARING LOSS TYPE, UNSPECIFIED LATERALITY: Primary | ICD-10-CM

## 2021-11-02 DIAGNOSIS — H93.13 TINNITUS, BILATERAL: Primary | ICD-10-CM

## 2021-11-02 PROCEDURE — 92567 TYMPANOMETRY: CPT | Performed by: AUDIOLOGIST

## 2021-11-02 PROCEDURE — 3008F BODY MASS INDEX DOCD: CPT | Performed by: OTOLARYNGOLOGY

## 2021-11-02 PROCEDURE — 92557 COMPREHENSIVE HEARING TEST: CPT | Performed by: AUDIOLOGIST

## 2021-11-02 PROCEDURE — 99243 OFF/OP CNSLTJ NEW/EST LOW 30: CPT | Performed by: OTOLARYNGOLOGY

## 2021-11-02 RX ORDER — MELOXICAM 15 MG/1
15 TABLET ORAL DAILY
Qty: 30 TABLET | Refills: 3 | Status: SHIPPED | OUTPATIENT
Start: 2021-11-02

## 2021-11-02 NOTE — PROGRESS NOTES
Kirti Santana is a 64year old female.   Patient presents with:  Ear Problem: Pt was ref by Kwais Marie , Dr told pt that she might have fluid in ears, Pt says she feels a roaring sound in ear       HISTORY OF PRESENT ILLNESS  She presents with a history of fullne reactions   • Migraines    • Osteoarthritis    • PTSD (post-traumatic stress disorder)    • Sleep apnea     NO CPAP       Past Surgical History:   Procedure Laterality Date   • ANGIOPLASTY (CORONARY)  2019    3 total   • APPENDECTOMY      2012   • CATH PER Detail Normal Submental. Submandibular. Anterior cervical. Posterior cervical. Supraclavicular.         Nose/Mouth/Throat Normal External nose - Normal. Lips/teeth-very poor dentition with many missing teeth and roots that appeared to be retained./gums - No Disp: , Rfl:   •  Respiratory Therapy Supplies (NEBULIZER/TUBING/MOUTHPIECE) Does not apply Kit, For use with Albuterol solution as directed (Patient not taking: No sig reported), Disp: 1 each, Rfl: 0  ASSESSMENT AND PLAN    1.  Hearing loss, unspecified he

## 2021-11-22 RX ORDER — METHOCARBAMOL 500 MG/1
TABLET, FILM COATED ORAL
Qty: 90 TABLET | Refills: 0 | Status: SHIPPED | OUTPATIENT
Start: 2021-11-22

## 2021-11-22 RX ORDER — ALPRAZOLAM 1 MG/1
TABLET ORAL
Qty: 90 TABLET | Refills: 0 | Status: SHIPPED | OUTPATIENT
Start: 2021-11-22 | End: 2021-12-14

## 2021-11-22 RX ORDER — IBUPROFEN 800 MG/1
TABLET ORAL
Qty: 45 TABLET | Refills: 0 | Status: SHIPPED | OUTPATIENT
Start: 2021-11-22 | End: 2021-12-14

## 2021-11-23 NOTE — PROGRESS NOTES
[facility]    Progress Note    Juancarlos Ramirez Patient Status:  Inpatient    1965 MRN [id number]   Location [facility] 5SW/SE Attending Corinne Blount MD   Hosp Day # 5 PCP Markie Day MD       Subjective:   Juancarlos Ramirez state note 1 spray Each Nare Daily   • Methadone HCl  117 mg Oral Daily   • methocarbamol  500 mg Oral QID   • aspirin  325 mg Oral Daily   • piperacillin-tazobactam  3.375 g Intravenous Q8H         Assessment and Plan:      Cholecystitis  Elevated transaminases  Hep with possible surgery scd's     Dispo-pending      >35 min spent with patient. > 50% time was spent counseling patient, discussing plan of care, discussing labs and imaging findings. Spoke with consultant. All questions answered.          Kiley Salazar MD

## 2021-12-13 ENCOUNTER — TELEPHONE (OUTPATIENT)
Dept: FAMILY MEDICINE CLINIC | Facility: CLINIC | Age: 56
End: 2021-12-13

## 2021-12-14 RX ORDER — ALBUTEROL SULFATE 90 UG/1
AEROSOL, METERED RESPIRATORY (INHALATION)
Qty: 18 G | Refills: 5 | Status: SHIPPED | OUTPATIENT
Start: 2021-12-14

## 2021-12-14 RX ORDER — IBUPROFEN 800 MG/1
TABLET ORAL
Qty: 45 TABLET | Refills: 0 | Status: SHIPPED | OUTPATIENT
Start: 2021-12-14 | End: 2022-01-18

## 2021-12-14 RX ORDER — ALPRAZOLAM 1 MG/1
TABLET ORAL
Qty: 90 TABLET | Refills: 0 | Status: SHIPPED | OUTPATIENT
Start: 2021-12-14 | End: 2022-01-18

## 2021-12-14 NOTE — TELEPHONE ENCOUNTER
Message noted: Chart reviewed and may refill medications as requested. Script sent to listed pharmacy by secure method.     Pt notified through [facility]

## 2021-12-18 NOTE — PROGRESS NOTES
[facility]  [address], [address]  [address]  [phone] ([phone]) Father    • Hypertension Father    • Cancer Mother       Social History    Tobacco Use      Smoking status: Current Every Day Smoker        Packs/day: 0.50        Years: 42.00        Pack years: 24      Smokeless tobacco: Never Used    Vaping Use      Vapi daily. One spray per each nostril daily. , Disp: 1 Inhaler, Rfl: 2  •  Methadone HCl 10 MG/5ML Oral Solution, Take 130 mg by mouth daily.   , Disp: , Rfl:     Exam:  /72 (BP Location: Right arm, Patient Position: Sitting, Cuff Size: adult)   Pulse 59

## 2021-12-20 ENCOUNTER — LAB ENCOUNTER (OUTPATIENT)
Dept: LAB | Age: 56
End: 2021-12-20
Attending: INTERNAL MEDICINE
Payer: MEDICAID

## 2021-12-20 ENCOUNTER — OFFICE VISIT (OUTPATIENT)
Dept: SURGERY | Facility: CLINIC | Age: 56
End: 2021-12-20
Payer: MEDICAID

## 2021-12-20 ENCOUNTER — TELEPHONE (OUTPATIENT)
Dept: FAMILY MEDICINE CLINIC | Facility: CLINIC | Age: 56
End: 2021-12-20

## 2021-12-20 VITALS
WEIGHT: 187 LBS | HEART RATE: 59 BPM | OXYGEN SATURATION: 96 % | SYSTOLIC BLOOD PRESSURE: 114 MMHG | DIASTOLIC BLOOD PRESSURE: 72 MMHG | RESPIRATION RATE: 16 BRPM | BODY MASS INDEX: 34 KG/M2

## 2021-12-20 DIAGNOSIS — B18.2 CHRONIC HEPATITIS C VIRUS INFECTION WITH CIRRHOSIS (HCC): ICD-10-CM

## 2021-12-20 DIAGNOSIS — K74.60 CHRONIC HEPATITIS C VIRUS INFECTION WITH CIRRHOSIS (HCC): ICD-10-CM

## 2021-12-20 PROCEDURE — 87522 HEPATITIS C REVRS TRNSCRPJ: CPT | Performed by: INTERNAL MEDICINE

## 2021-12-20 PROCEDURE — 85610 PROTHROMBIN TIME: CPT | Performed by: INTERNAL MEDICINE

## 2021-12-20 PROCEDURE — 80053 COMPREHEN METABOLIC PANEL: CPT | Performed by: INTERNAL MEDICINE

## 2021-12-20 PROCEDURE — 85025 COMPLETE CBC W/AUTO DIFF WBC: CPT | Performed by: INTERNAL MEDICINE

## 2021-12-20 PROCEDURE — 36415 COLL VENOUS BLD VENIPUNCTURE: CPT | Performed by: INTERNAL MEDICINE

## 2021-12-20 NOTE — TELEPHONE ENCOUNTER
Dr. Madison Callejas recd release of information requested from [facility].   Release Papers was fax to our JERMAINE dept @ [phone] confirmation recd. @ 10.20am  And set to scanning dept.

## 2021-12-21 NOTE — TELEPHONE ENCOUNTER
PA Required    Key: [id number]      •  ALPRAZOLAM 1 MG Oral Tab, TAKE 1 TABLET(1 MG) BY MOUTH THREE TIMES DAILY AS NEEDED, Disp: 90 tablet, Rfl: 0

## 2021-12-29 ENCOUNTER — TELEPHONE (OUTPATIENT)
Dept: FAMILY MEDICINE CLINIC | Facility: CLINIC | Age: 56
End: 2021-12-29

## 2021-12-29 NOTE — TELEPHONE ENCOUNTER
Dr. Irizarry Po recd request for medical record from [facility].   Form was fax to [address] [facility] to address the original sent for scanning

## 2022-01-13 ENCOUNTER — OFFICE VISIT (OUTPATIENT)
Dept: FAMILY MEDICINE CLINIC | Facility: CLINIC | Age: 57
End: 2022-01-13
Payer: MEDICAID

## 2022-01-13 VITALS
TEMPERATURE: 98 F | BODY MASS INDEX: 34.96 KG/M2 | HEIGHT: 62 IN | DIASTOLIC BLOOD PRESSURE: 77 MMHG | RESPIRATION RATE: 16 BRPM | HEART RATE: 66 BPM | WEIGHT: 190 LBS | SYSTOLIC BLOOD PRESSURE: 131 MMHG

## 2022-01-13 DIAGNOSIS — M62.838 MUSCLE SPASM: Primary | ICD-10-CM

## 2022-01-13 PROCEDURE — 3075F SYST BP GE 130 - 139MM HG: CPT | Performed by: FAMILY MEDICINE

## 2022-01-13 PROCEDURE — 3008F BODY MASS INDEX DOCD: CPT | Performed by: FAMILY MEDICINE

## 2022-01-13 PROCEDURE — 3078F DIAST BP <80 MM HG: CPT | Performed by: FAMILY MEDICINE

## 2022-01-13 PROCEDURE — 99213 OFFICE O/P EST LOW 20 MIN: CPT | Performed by: FAMILY MEDICINE

## 2022-01-13 NOTE — PROGRESS NOTES
Subjective:   Patient ID: Lisette Henderson is a 64year old female. Pt presents some discomfort / spasm of the area under the left arm. Pt denies any trauma or injury. Pt has been carrying some things around a lot. No fall or sig injury.  No chest pains or si • amoxicillin clavulanate (AUGMENTIN) 875-125 MG Oral Tab Take 1 tablet by mouth 2 (two) times daily.  (Patient not taking: Reported on 1/13/2022) 20 tablet 0     Allergies:No Known Allergies    Objective:   Physical Exam  Constitutional:       Appearance

## 2022-01-18 RX ORDER — IBUPROFEN 800 MG/1
800 TABLET ORAL EVERY 8 HOURS PRN
Qty: 45 TABLET | Refills: 1 | Status: SHIPPED | OUTPATIENT
Start: 2022-01-18

## 2022-01-18 RX ORDER — ALPRAZOLAM 1 MG/1
1 TABLET ORAL 3 TIMES DAILY
Qty: 90 TABLET | Refills: 1 | Status: SHIPPED | OUTPATIENT
Start: 2022-01-18

## 2022-01-18 NOTE — TELEPHONE ENCOUNTER
Message noted: Chart reviewed and may refill medication as requested. Script sent to listed pharmacy by secure method.     Pt notified through [facility]

## 2022-01-18 NOTE — TELEPHONE ENCOUNTER
Please review; protocol failed/no protocol.      Requested Prescriptions   Pending Prescriptions Disp Refills    ALPRAZOLAM 1 MG Oral Tab [Pharmacy Med Name: ALPRAZOLAM 1MG TABLETS] 90 tablet 0     Sig: TAKE 1 TABLET(1 MG) BY MOUTH THREE TIMES DAILY AS NEED

## 2022-01-27 ENCOUNTER — TELEMEDICINE (OUTPATIENT)
Dept: FAMILY MEDICINE CLINIC | Facility: CLINIC | Age: 57
End: 2022-01-27

## 2022-01-27 ENCOUNTER — NURSE TRIAGE (OUTPATIENT)
Dept: FAMILY MEDICINE CLINIC | Facility: CLINIC | Age: 57
End: 2022-01-27

## 2022-01-27 ENCOUNTER — PATIENT OUTREACH (OUTPATIENT)
Dept: CASE MANAGEMENT | Age: 57
End: 2022-01-27

## 2022-01-27 ENCOUNTER — PATIENT MESSAGE (OUTPATIENT)
Dept: FAMILY MEDICINE CLINIC | Facility: CLINIC | Age: 57
End: 2022-01-27

## 2022-01-27 DIAGNOSIS — M17.10 ARTHRITIS OF KNEE: Primary | ICD-10-CM

## 2022-01-27 DIAGNOSIS — L03.119 CELLULITIS OF LOWER EXTREMITY, UNSPECIFIED LATERALITY: ICD-10-CM

## 2022-01-27 PROCEDURE — 99213 OFFICE O/P EST LOW 20 MIN: CPT | Performed by: FAMILY MEDICINE

## 2022-01-27 RX ORDER — AMOXICILLIN AND CLAVULANATE POTASSIUM 875; 125 MG/1; MG/1
1 TABLET, FILM COATED ORAL 2 TIMES DAILY
Qty: 14 TABLET | Refills: 0 | Status: SHIPPED | OUTPATIENT
Start: 2022-01-27

## 2022-01-27 NOTE — TELEPHONE ENCOUNTER
Pt stated that she has right knee pain about a 8/10. Pt was wanting to know if she can get injection like she did when she has in the hospital to help with the pain. Pt stated that there is swelling but no redness on the right knee.  Pt also mentioned that

## 2022-01-27 NOTE — PROGRESS NOTES
Subjective:   Patient ID: Adis Vivas is a 64year old female. This visit is conducted using Telemedicine with live, interactive video and audio during this Coronavirus pandemic.     Please note that the following visit was completed using two-way, real- MG Oral Tab Take 1 tablet (1 mg total) by mouth 3 (three) times daily. 90 tablet 1   • ibuprofen 800 MG Oral Tab Take 1 tablet (800 mg total) by mouth every 8 (eight) hours as needed for Pain.  45 tablet 1   • ALBUTEROL 108 (90 Base) MCG/ACT Inhalation Aero counter remedies discussed; To call if worse or not better; Follow up in one week if not resolved or as needed if worse. VIDEO VISIT done. No orders of the defined types were placed in this encounter.       Meds This Visit:  Requested Prescriptions

## 2022-01-27 NOTE — TELEPHONE ENCOUNTER
From: Haleigh Munoz  To: Ani Kwon MD  Sent: 1/27/2022 4:05 PM CST  Subject: Haleigh Munoz Knee    Here are the photos requested.

## 2022-02-01 ENCOUNTER — OFFICE VISIT (OUTPATIENT)
Dept: FAMILY MEDICINE CLINIC | Facility: CLINIC | Age: 57
End: 2022-02-01
Payer: MEDICAID

## 2022-02-01 ENCOUNTER — TELEPHONE (OUTPATIENT)
Dept: FAMILY MEDICINE CLINIC | Facility: CLINIC | Age: 57
End: 2022-02-01

## 2022-02-01 VITALS
SYSTOLIC BLOOD PRESSURE: 124 MMHG | DIASTOLIC BLOOD PRESSURE: 78 MMHG | RESPIRATION RATE: 16 BRPM | HEIGHT: 62 IN | TEMPERATURE: 98 F | HEART RATE: 69 BPM | BODY MASS INDEX: 34.6 KG/M2 | WEIGHT: 188 LBS

## 2022-02-01 DIAGNOSIS — M17.10 ARTHRITIS OF KNEE: ICD-10-CM

## 2022-02-01 DIAGNOSIS — R18.8 CIRRHOSIS OF LIVER WITH ASCITES, UNSPECIFIED HEPATIC CIRRHOSIS TYPE (HCC): ICD-10-CM

## 2022-02-01 DIAGNOSIS — K74.60 CIRRHOSIS OF LIVER WITH ASCITES, UNSPECIFIED HEPATIC CIRRHOSIS TYPE (HCC): ICD-10-CM

## 2022-02-01 DIAGNOSIS — L03.119 CELLULITIS OF LOWER EXTREMITY, UNSPECIFIED LATERALITY: ICD-10-CM

## 2022-02-01 PROCEDURE — 3078F DIAST BP <80 MM HG: CPT | Performed by: FAMILY MEDICINE

## 2022-02-01 PROCEDURE — 99213 OFFICE O/P EST LOW 20 MIN: CPT | Performed by: FAMILY MEDICINE

## 2022-02-01 PROCEDURE — 3074F SYST BP LT 130 MM HG: CPT | Performed by: FAMILY MEDICINE

## 2022-02-01 PROCEDURE — 3008F BODY MASS INDEX DOCD: CPT | Performed by: FAMILY MEDICINE

## 2022-02-01 NOTE — TELEPHONE ENCOUNTER
Patient calling to verify her appointment today. Advised below.      Future Appointments   Date Time Provider Luis E Tuckeristi   2/1/2022  4:10 PM Arleth El MD [facility]   2/14/2022  3:45 PM Ryan Dickerson MD [facility]   2/28/2022  3:30 PM Robert Kruger MD [facility]

## 2022-02-14 ENCOUNTER — OFFICE VISIT (OUTPATIENT)
Dept: ORTHOPEDICS CLINIC | Facility: CLINIC | Age: 57
End: 2022-02-14
Payer: MEDICAID

## 2022-02-14 VITALS — HEART RATE: 48 BPM | SYSTOLIC BLOOD PRESSURE: 102 MMHG | DIASTOLIC BLOOD PRESSURE: 61 MMHG

## 2022-02-14 DIAGNOSIS — M17.11 PRIMARY OSTEOARTHRITIS OF RIGHT KNEE: Primary | ICD-10-CM

## 2022-02-14 PROCEDURE — 20610 DRAIN/INJ JOINT/BURSA W/O US: CPT | Performed by: ORTHOPAEDIC SURGERY

## 2022-02-14 PROCEDURE — 3078F DIAST BP <80 MM HG: CPT | Performed by: ORTHOPAEDIC SURGERY

## 2022-02-14 PROCEDURE — 3074F SYST BP LT 130 MM HG: CPT | Performed by: ORTHOPAEDIC SURGERY

## 2022-02-14 PROCEDURE — 99243 OFF/OP CNSLTJ NEW/EST LOW 30: CPT | Performed by: ORTHOPAEDIC SURGERY

## 2022-02-14 RX ORDER — VELPATASVIR AND SOFOSBUVIR 100; 400 MG/1; MG/1
TABLET, FILM COATED ORAL
Status: ON HOLD | COMMUNITY
Start: 2022-01-14 | End: 2022-02-17

## 2022-02-14 RX ORDER — TRIAMCINOLONE ACETONIDE 40 MG/ML
40 INJECTION, SUSPENSION INTRA-ARTICULAR; INTRAMUSCULAR ONCE
Status: COMPLETED | OUTPATIENT
Start: 2022-02-14 | End: 2022-02-14

## 2022-02-14 NOTE — PROCEDURES
The patient identified the right knee(s) as the correct procedure site. This was verified with the consent and with 2 patient identifiers. The knee injection site was prepped with betadine and alcohol. A 22-gauge needle was introduced into the knee. The knee was  injected with 40 mg of Kenalog and 4 mL of 1% lidocaine. The patient tolerated the procedure well. A soft dressing was applied.

## 2022-02-14 NOTE — PROCEDURES
.Per verbal order draw up of  1 ml of Kenalog 40 and 4 ml Lidocaine  1%  Cortisone Injections   Chanelle, CMA   Education sheet provided to patient

## 2022-02-17 ENCOUNTER — APPOINTMENT (OUTPATIENT)
Dept: CT IMAGING | Facility: HOSPITAL | Age: 57
End: 2022-02-17
Attending: EMERGENCY MEDICINE
Payer: MEDICAID

## 2022-02-17 ENCOUNTER — HOSPITAL ENCOUNTER (OUTPATIENT)
Facility: HOSPITAL | Age: 57
Setting detail: OBSERVATION
Discharge: HOME | End: 2022-02-21
Attending: EMERGENCY MEDICINE | Admitting: HOSPITALIST
Payer: MEDICAID

## 2022-02-17 ENCOUNTER — APPOINTMENT (OUTPATIENT)
Dept: GENERAL RADIOLOGY | Facility: HOSPITAL | Age: 57
End: 2022-02-17
Attending: EMERGENCY MEDICINE
Payer: MEDICAID

## 2022-02-17 DIAGNOSIS — K21.00 REFLUX ESOPHAGITIS: ICD-10-CM

## 2022-02-17 DIAGNOSIS — R10.13 ABDOMINAL PAIN, EPIGASTRIC: Primary | ICD-10-CM

## 2022-02-17 DIAGNOSIS — K44.9 HIATAL HERNIA: ICD-10-CM

## 2022-02-17 DIAGNOSIS — R11.10 INTRACTABLE VOMITING: ICD-10-CM

## 2022-02-17 PROBLEM — R11.2 NAUSEA & VOMITING: Status: ACTIVE | Noted: 2022-02-17

## 2022-02-17 PROBLEM — N17.9 ACUTE KIDNEY INJURY (HCC): Status: ACTIVE | Noted: 2022-02-17

## 2022-02-17 PROBLEM — N17.9 ACUTE KIDNEY INJURY: Status: ACTIVE | Noted: 2022-02-17

## 2022-02-17 PROBLEM — D72.829 LEUKOCYTOSIS: Status: ACTIVE | Noted: 2022-02-17

## 2022-02-17 LAB
ALBUMIN SERPL-MCNC: 3.9 G/DL (ref 3.4–5)
ALBUMIN SERPL-MCNC: 3.9 G/DL (ref 3.4–5)
ALBUMIN/GLOB SERPL: 0.8 {RATIO} (ref 1–2)
ALP LIVER SERPL-CCNC: 93 U/L
ALP LIVER SERPL-CCNC: 93 U/L
ALT SERPL-CCNC: 23 U/L
ALT SERPL-CCNC: 23 U/L
AMMONIA PLAS-MCNC: 26 UMOL/L (ref 11–32)
ANION GAP SERPL CALC-SCNC: 10 MMOL/L (ref 0–18)
APTT PPP: 33.9 SECONDS (ref 23.3–35.6)
AST SERPL-CCNC: 27 U/L (ref 15–37)
AST SERPL-CCNC: 27 U/L (ref 15–37)
BASOPHILS # BLD AUTO: 0.02 X10(3) UL (ref 0–0.2)
BASOPHILS NFR BLD AUTO: 0.1 %
BILIRUB DIRECT SERPL-MCNC: 0.2 MG/DL (ref 0–0.2)
BILIRUB SERPL-MCNC: 1 MG/DL (ref 0.1–2)
BILIRUB SERPL-MCNC: 1 MG/DL (ref 0.1–2)
BILIRUB UR QL: NEGATIVE
BUN BLD-MCNC: 43 MG/DL (ref 7–18)
BUN/CREAT SERPL: 39.8 (ref 10–20)
CALCIUM BLD-MCNC: 9.4 MG/DL (ref 8.5–10.1)
CHLORIDE SERPL-SCNC: 95 MMOL/L (ref 98–112)
CO2 SERPL-SCNC: 28 MMOL/L (ref 21–32)
COLOR UR: YELLOW
CREAT BLD-MCNC: 1.08 MG/DL
DEPRECATED RDW RBC AUTO: 38.3 FL (ref 35.1–46.3)
EOSINOPHIL # BLD AUTO: 0 X10(3) UL (ref 0–0.7)
EOSINOPHIL NFR BLD AUTO: 0 %
ERYTHROCYTE [DISTWIDTH] IN BLOOD BY AUTOMATED COUNT: 11.9 % (ref 11–15)
GLOBULIN PLAS-MCNC: 4.8 G/DL (ref 2.8–4.4)
GLUCOSE BLD-MCNC: 156 MG/DL (ref 70–99)
GLUCOSE UR-MCNC: NEGATIVE MG/DL
HCT VFR BLD AUTO: 53.8 %
HGB BLD-MCNC: 19.5 G/DL
IMM GRANULOCYTES # BLD AUTO: 0.08 X10(3) UL (ref 0–1)
IMM GRANULOCYTES NFR BLD: 0.5 %
INR BLD: 1.47 (ref 0.8–1.2)
KETONES UR-MCNC: NEGATIVE MG/DL
LEUKOCYTE ESTERASE UR QL STRIP.AUTO: NEGATIVE
LIPASE SERPL-CCNC: 335 U/L (ref 73–393)
LYMPHOCYTES # BLD AUTO: 2.5 X10(3) UL (ref 1–4)
LYMPHOCYTES NFR BLD AUTO: 14.1 %
MCH RBC QN AUTO: 31.6 PG (ref 26–34)
MCHC RBC AUTO-ENTMCNC: 36.2 G/DL (ref 31–37)
MCV RBC AUTO: 87.1 FL
MONOCYTES # BLD AUTO: 1.19 X10(3) UL (ref 0.1–1)
MONOCYTES NFR BLD AUTO: 6.7 %
NEUTROPHILS # BLD AUTO: 13.95 X10(3) UL (ref 1.5–7.7)
NEUTROPHILS NFR BLD AUTO: 78.6 %
NITRITE UR QL STRIP.AUTO: NEGATIVE
OSMOLALITY SERPL CALC.SUM OF ELEC: 290 MOSM/KG (ref 275–295)
PH UR: 6 [PH] (ref 5–8)
PLATELET # BLD AUTO: 193 10(3)UL (ref 150–450)
POTASSIUM SERPL-SCNC: 3.3 MMOL/L (ref 3.5–5.1)
PROT SERPL-MCNC: 8.7 G/DL (ref 6.4–8.2)
PROT SERPL-MCNC: 8.7 G/DL (ref 6.4–8.2)
PROT UR-MCNC: >=500 MG/DL
PROTHROMBIN TIME: 18 SECONDS (ref 11.6–14.8)
RBC # BLD AUTO: 6.18 X10(6)UL
SARS-COV-2 RNA RESP QL NAA+PROBE: NOT DETECTED
SODIUM SERPL-SCNC: 133 MMOL/L (ref 136–145)
SP GR UR STRIP: >1.03 (ref 1–1.03)
UROBILINOGEN UR STRIP-ACNC: <2
WBC # BLD AUTO: 17.7 X10(3) UL (ref 4–11)

## 2022-02-17 PROCEDURE — 99220 INITIAL OBSERVATION CARE,LEVL III: CPT | Performed by: HOSPITALIST

## 2022-02-17 PROCEDURE — 71045 X-RAY EXAM CHEST 1 VIEW: CPT | Performed by: EMERGENCY MEDICINE

## 2022-02-17 PROCEDURE — 74177 CT ABD & PELVIS W/CONTRAST: CPT | Performed by: EMERGENCY MEDICINE

## 2022-02-17 PROCEDURE — 99214 OFFICE O/P EST MOD 30 MIN: CPT | Performed by: PHYSICIAN ASSISTANT

## 2022-02-17 RX ORDER — ARIPIPRAZOLE 2 MG/1
2 TABLET ORAL EVERY EVENING
Status: DISCONTINUED | OUTPATIENT
Start: 2022-02-17 | End: 2022-02-17

## 2022-02-17 RX ORDER — MORPHINE SULFATE 4 MG/ML
4 INJECTION, SOLUTION INTRAMUSCULAR; INTRAVENOUS EVERY 2 HOUR PRN
Status: DISCONTINUED | OUTPATIENT
Start: 2022-02-17 | End: 2022-02-21

## 2022-02-17 RX ORDER — METHOCARBAMOL 500 MG/1
500 TABLET, FILM COATED ORAL 3 TIMES DAILY PRN
Status: DISCONTINUED | OUTPATIENT
Start: 2022-02-17 | End: 2022-02-21

## 2022-02-17 RX ORDER — METHADONE HYDROCHLORIDE 10 MG/5ML
110 SOLUTION ORAL DAILY
Status: DISCONTINUED | OUTPATIENT
Start: 2022-02-17 | End: 2022-02-21

## 2022-02-17 RX ORDER — ALPRAZOLAM 0.5 MG/1
1 TABLET ORAL 3 TIMES DAILY
Status: DISCONTINUED | OUTPATIENT
Start: 2022-02-17 | End: 2022-02-21

## 2022-02-17 RX ORDER — FLUOXETINE HYDROCHLORIDE 20 MG/1
60 CAPSULE ORAL DAILY
Status: DISCONTINUED | OUTPATIENT
Start: 2022-02-17 | End: 2022-02-21

## 2022-02-17 RX ORDER — ACETAMINOPHEN 325 MG/1
650 TABLET ORAL EVERY 6 HOURS PRN
Status: DISCONTINUED | OUTPATIENT
Start: 2022-02-17 | End: 2022-02-21

## 2022-02-17 RX ORDER — MORPHINE SULFATE 2 MG/ML
1 INJECTION, SOLUTION INTRAMUSCULAR; INTRAVENOUS EVERY 2 HOUR PRN
Status: DISCONTINUED | OUTPATIENT
Start: 2022-02-17 | End: 2022-02-21

## 2022-02-17 RX ORDER — SODIUM CHLORIDE 9 MG/ML
INJECTION, SOLUTION INTRAVENOUS CONTINUOUS
Status: DISCONTINUED | OUTPATIENT
Start: 2022-02-17 | End: 2022-02-20

## 2022-02-17 RX ORDER — ONDANSETRON 2 MG/ML
4 INJECTION INTRAMUSCULAR; INTRAVENOUS EVERY 6 HOURS PRN
Status: DISCONTINUED | OUTPATIENT
Start: 2022-02-17 | End: 2022-02-18

## 2022-02-17 RX ORDER — SODIUM CHLORIDE 9 MG/ML
INJECTION, SOLUTION INTRAVENOUS CONTINUOUS
Status: ACTIVE | OUTPATIENT
Start: 2022-02-17 | End: 2022-02-17

## 2022-02-17 RX ORDER — PROCHLORPERAZINE EDISYLATE 5 MG/ML
5 INJECTION INTRAMUSCULAR; INTRAVENOUS EVERY 8 HOURS PRN
Status: DISCONTINUED | OUTPATIENT
Start: 2022-02-17 | End: 2022-02-18

## 2022-02-17 RX ORDER — ALBUTEROL SULFATE 90 UG/1
2 AEROSOL, METERED RESPIRATORY (INHALATION) EVERY 4 HOURS PRN
Status: DISCONTINUED | OUTPATIENT
Start: 2022-02-17 | End: 2022-02-21

## 2022-02-17 RX ORDER — MORPHINE SULFATE 2 MG/ML
2 INJECTION, SOLUTION INTRAMUSCULAR; INTRAVENOUS EVERY 2 HOUR PRN
Status: DISCONTINUED | OUTPATIENT
Start: 2022-02-17 | End: 2022-02-21

## 2022-02-17 NOTE — ED QUICK NOTES
Orders for admission, patient is aware of plan and ready to go upstairs. Any questions, please call ED RN Yamile Jones at extension [phone].      Patient Covid vaccination status: Fully vaccinated     COVID Test Ordered in ED: Rapid SARS-CoV-2 by PCR    COVID Suspicion at Admission: Low clinical suspicion for COVID    Running Infusions:      Mental Status/LOC at time of transport: aox3    Other pertinent information: Pt from home, on room air, independent with care  CIWA score: N/A   NIH score:  N/A

## 2022-02-17 NOTE — SPIRITUAL CARE NOTE
Pt lying in bed in a darkened room. Pt being treated for abdominal pain.  introduced herself while offering radical hospitality. The pt politely asked  to come back tomorrow as she was in pain and very tired.  will revisit pt. tomorrow and prayed for the pt outside the room.

## 2022-02-17 NOTE — H&P
[facility]    PATIENT'S NAME: Terryann Merlin   ATTENDING PHYSICIAN: Harjit. Jsoey Henning MD   PATIENT ACCOUNT#:   [id number]    LOCATION:  [address]  MEDICAL RECORD #:   [id number]       YOB: 1965  ADMISSION DATE:       02/17/2022    HISTORY AND PHYSICAL EXAMINATION    CHIEF COMPLAINT:  Intractable nausea and vomiting, dehydration. HISTORY OF PRESENT ILLNESS:  Patient is a 14-year-old  female with known underlying liver cirrhosis secondary to hepatitis C. Came in today to the emergency department for evaluation of 4 days of intractable nausea, vomiting, epigastric pain. CBC showed white blood cell count of 17.7 with left shift, hemoglobin 19.5, hemoconcentrated. Chemistry showed BUN 43 and creatinine 1.08, potassium 3.3, and sodium 133. Patient's liver function tests were unremarkable. Chest x-ray showed no acute findings. INR 1.47. CT scan of the abdomen is still pending. Sinus tachycardia on monitor. PAST MEDICAL HISTORY:  Hepatitis C, treated, with undetected viral load as of December 2021. Liver cirrhosis secondary to hepatitis C, well compensated. Generalized osteoarthritis. Depression. In August 2021, she was hospitalized with similar presentation and was found to have peptic ulcer disease. She has nonobstructive coronary artery disease and obstructive sleep apnea. PAST SURGICAL HISTORY:  Tonsillectomy, hysterectomy, appendectomy, cystoscopy and urethral dilation. ALLERGIES:  No known drug allergies. FAMILY HISTORY:  Mother had cancer. Father had hypertension and cancer. SOCIAL HISTORY:  Chronic tobacco use. No alcohol use. She had chronic drug use, multisubstance, including cocaine and history of heroin use, currently on methadone therapy. REVIEW OF SYSTEMS:  Patient reports that she does have chronic arthritis pain and she takes ibuprofen 800 mg twice a day on a chronic basis.   She had nausea, vomiting, and epigastric pain associated with dehydration, inability to have any oral intake for the last 3 to 4 days. She is passing a small amount of gas but no bowel movement. She has not eaten anything in the last 4 days per her report. Other 12-point review of systems is negative. PHYSICAL EXAMINATION:    GENERAL:  Alert. Oriented to time, place, and person. Restless, in moderate distress. VITAL SIGNS:  Temperature 98.3, pulse 78, respiratory rate 13, blood pressure 134/88, pulse ox 92% on room air. HEENT:  Atraumatic. Oropharynx clear. Dry mucous membranes. Normal hard and soft palate. Eyes:  Anicteric sclerae. NECK:  Supple. No lymphadenopathy. Trachea midline. Full range of motion. LUNGS:  Clear to auscultation bilaterally. Normal respiratory effort. HEART:  Regular rate and rhythm. S1, S2 auscultated. Tachycardic. ABDOMEN:  Soft, nondistended. No significant fluid wave, ascites. EXTREMITIES:  No peripheral edema, clubbing, or cyanosis. Varicose veins noted. NEUROLOGIC:  Motor and sensory intact. ASSESSMENT:    1. Intractable nausea, vomiting, and dehydration. Considering her recent use of NSAIDs, rule out recurrent peptic ulcer disease. 2.   Dehydration and hypokalemia. 3.   Compensated liver cirrhosis. PLAN:  Patient will be admitted to general medical floor, n.p.o. except sips of clear liquid, IV fluids, IV Protonix. Gastroenterology consult. Pain and nausea control. Further recommendations to follow.     Dictated By Alonso Walters MD  d: 02/17/2022 13:59:55  t: 02/17/2022 14:20:50  Job [id number]  /

## 2022-02-18 ENCOUNTER — ANESTHESIA EVENT (OUTPATIENT)
Dept: ENDOSCOPY | Facility: HOSPITAL | Age: 57
End: 2022-02-18
Payer: MEDICAID

## 2022-02-18 ENCOUNTER — ANESTHESIA (OUTPATIENT)
Dept: ENDOSCOPY | Facility: HOSPITAL | Age: 57
End: 2022-02-18
Payer: MEDICAID

## 2022-02-18 LAB
BASOPHILS # BLD AUTO: 0.02 X10(3) UL (ref 0–0.2)
BASOPHILS NFR BLD AUTO: 0.2 %
BUN BLD-MCNC: 24 MG/DL (ref 7–18)
BUN/CREAT SERPL: 31.2 (ref 10–20)
CALCIUM BLD-MCNC: 8.7 MG/DL (ref 8.5–10.1)
CHLORIDE SERPL-SCNC: 100 MMOL/L (ref 98–112)
CO2 SERPL-SCNC: 30 MMOL/L (ref 21–32)
CREAT BLD-MCNC: 0.77 MG/DL
DEPRECATED RDW RBC AUTO: 41.2 FL (ref 35.1–46.3)
EOSINOPHIL # BLD AUTO: 0.03 X10(3) UL (ref 0–0.7)
EOSINOPHIL NFR BLD AUTO: 0.3 %
ERYTHROCYTE [DISTWIDTH] IN BLOOD BY AUTOMATED COUNT: 12 % (ref 11–15)
GLUCOSE BLD-MCNC: 125 MG/DL (ref 70–99)
HCT VFR BLD AUTO: 48.2 %
HGB BLD-MCNC: 17.2 G/DL
IMM GRANULOCYTES # BLD AUTO: 0.05 X10(3) UL (ref 0–1)
IMM GRANULOCYTES NFR BLD: 0.4 %
LYMPHOCYTES # BLD AUTO: 3.09 X10(3) UL (ref 1–4)
LYMPHOCYTES NFR BLD AUTO: 27.2 %
MCH RBC QN AUTO: 33 PG (ref 26–34)
MCHC RBC AUTO-ENTMCNC: 35.7 G/DL (ref 31–37)
MCV RBC AUTO: 92.5 FL
MONOCYTES # BLD AUTO: 0.75 X10(3) UL (ref 0.1–1)
MONOCYTES NFR BLD AUTO: 6.6 %
NEUTROPHILS # BLD AUTO: 7.44 X10 (3) UL (ref 1.5–7.7)
NEUTROPHILS # BLD AUTO: 7.44 X10(3) UL (ref 1.5–7.7)
NEUTROPHILS NFR BLD AUTO: 65.3 %
OSMOLALITY SERPL CALC.SUM OF ELEC: 292 MOSM/KG (ref 275–295)
PLATELET # BLD AUTO: 134 10(3)UL (ref 150–450)
POTASSIUM SERPL-SCNC: 3.4 MMOL/L (ref 3.5–5.1)
RBC # BLD AUTO: 5.21 X10(6)UL
SODIUM SERPL-SCNC: 138 MMOL/L (ref 136–145)
WBC # BLD AUTO: 11.4 X10(3) UL (ref 4–11)

## 2022-02-18 PROCEDURE — 43239 EGD BIOPSY SINGLE/MULTIPLE: CPT | Performed by: INTERNAL MEDICINE

## 2022-02-18 PROCEDURE — 99213 OFFICE O/P EST LOW 20 MIN: CPT | Performed by: PHYSICIAN ASSISTANT

## 2022-02-18 PROCEDURE — 0DJ08ZZ INSPECTION OF UPPER INTESTINAL TRACT, VIA NATURAL OR ARTIFICIAL OPENING ENDOSCOPIC: ICD-10-PCS | Performed by: INTERNAL MEDICINE

## 2022-02-18 PROCEDURE — 99226 SUBSEQUENT OBSERVATION CARE: CPT | Performed by: HOSPITALIST

## 2022-02-18 RX ORDER — ONDANSETRON 2 MG/ML
8 INJECTION INTRAMUSCULAR; INTRAVENOUS EVERY 4 HOURS PRN
Status: DISCONTINUED | OUTPATIENT
Start: 2022-02-18 | End: 2022-02-21

## 2022-02-18 RX ORDER — PROCHLORPERAZINE EDISYLATE 5 MG/ML
10 INJECTION INTRAMUSCULAR; INTRAVENOUS EVERY 6 HOURS PRN
Status: DISCONTINUED | OUTPATIENT
Start: 2022-02-18 | End: 2022-02-21

## 2022-02-18 RX ORDER — SODIUM CHLORIDE, SODIUM LACTATE, POTASSIUM CHLORIDE, CALCIUM CHLORIDE 600; 310; 30; 20 MG/100ML; MG/100ML; MG/100ML; MG/100ML
INJECTION, SOLUTION INTRAVENOUS CONTINUOUS PRN
Status: DISCONTINUED | OUTPATIENT
Start: 2022-02-18 | End: 2022-02-18 | Stop reason: SURG

## 2022-02-18 RX ORDER — GLYCOPYRROLATE 0.2 MG/ML
INJECTION, SOLUTION INTRAMUSCULAR; INTRAVENOUS AS NEEDED
Status: DISCONTINUED | OUTPATIENT
Start: 2022-02-18 | End: 2022-02-18 | Stop reason: SURG

## 2022-02-18 RX ORDER — SODIUM CHLORIDE, SODIUM LACTATE, POTASSIUM CHLORIDE, CALCIUM CHLORIDE 600; 310; 30; 20 MG/100ML; MG/100ML; MG/100ML; MG/100ML
INJECTION, SOLUTION INTRAVENOUS CONTINUOUS
OUTPATIENT
Start: 2022-02-18

## 2022-02-18 RX ORDER — POTASSIUM CHLORIDE 20 MEQ/1
40 TABLET, EXTENDED RELEASE ORAL ONCE
Status: COMPLETED | OUTPATIENT
Start: 2022-02-18 | End: 2022-02-18

## 2022-02-18 RX ORDER — HYDRALAZINE HYDROCHLORIDE 20 MG/ML
10 INJECTION INTRAMUSCULAR; INTRAVENOUS EVERY 4 HOURS PRN
Status: DISCONTINUED | OUTPATIENT
Start: 2022-02-18 | End: 2022-02-21

## 2022-02-18 RX ORDER — CLONIDINE 0.1 MG/24H
1 PATCH, EXTENDED RELEASE TRANSDERMAL WEEKLY
Status: DISCONTINUED | OUTPATIENT
Start: 2022-02-18 | End: 2022-02-21

## 2022-02-18 RX ORDER — LIDOCAINE HYDROCHLORIDE 10 MG/ML
INJECTION, SOLUTION EPIDURAL; INFILTRATION; INTRACAUDAL; PERINEURAL AS NEEDED
Status: DISCONTINUED | OUTPATIENT
Start: 2022-02-18 | End: 2022-02-18 | Stop reason: SURG

## 2022-02-18 RX ORDER — NALOXONE HYDROCHLORIDE 0.4 MG/ML
80 INJECTION, SOLUTION INTRAMUSCULAR; INTRAVENOUS; SUBCUTANEOUS AS NEEDED
OUTPATIENT
Start: 2022-02-18 | End: 2022-02-19

## 2022-02-18 RX ADMIN — GLYCOPYRROLATE 0.2 MG: 0.2 INJECTION, SOLUTION INTRAMUSCULAR; INTRAVENOUS at 16:05:00

## 2022-02-18 RX ADMIN — SODIUM CHLORIDE, SODIUM LACTATE, POTASSIUM CHLORIDE, CALCIUM CHLORIDE: 600; 310; 30; 20 INJECTION, SOLUTION INTRAVENOUS at 16:00:00

## 2022-02-18 RX ADMIN — LIDOCAINE HYDROCHLORIDE 50 MG: 10 INJECTION, SOLUTION EPIDURAL; INFILTRATION; INTRACAUDAL; PERINEURAL at 16:07:00

## 2022-02-18 NOTE — SPIRITUAL CARE NOTE
Pt was sitting up in bed in a partially lit room watching TV. Pt being treated for abdominal pain.  introduced herself, while offering radical hospitality, which the pt requested bedside prayer, which the  provided. Pt stated she was having a lot of pain.  left the pt with a blessing and will revisit.  updated RN on pt's pain.

## 2022-02-18 NOTE — PLAN OF CARE
CLD, tolerating well. Bed in lowest position and call light within reach.    Problem: Patient Centered Care  Goal: Patient preferences are identified and integrated in the patient's plan of care  Description: Interventions:  - What would you like us to know as we care for you?   - Provide timely, complete, and accurate information to patient/family  - Incorporate patient and family knowledge, values, beliefs, and cultural backgrounds into the planning and delivery of care  - Encourage patient/family to participate in care and decision-making at the level they choose  - Honor patient and family perspectives and choices  Outcome: Progressing     Problem: PAIN - ADULT  Goal: Verbalizes/displays adequate comfort level or patient's stated pain goal  Description: INTERVENTIONS:  - Encourage pt to monitor pain and request assistance  - Assess pain using appropriate pain scale  - Administer analgesics based on type and severity of pain and evaluate response  - Implement non-pharmacological measures as appropriate and evaluate response  - Consider cultural and social influences on pain and pain management  - Manage/alleviate anxiety  - Utilize distraction and/or relaxation techniques  - Monitor for opioid side effects  - Notify MD/LIP if interventions unsuccessful or patient reports new pain  - Anticipate increased pain with activity and pre-medicate as appropriate  Outcome: Progressing     Problem: DISCHARGE PLANNING  Goal: Discharge to home or other facility with appropriate resources  Description: INTERVENTIONS:  - Identify barriers to discharge w/pt and caregiver  - Include patient/family/discharge partner in discharge planning  - Arrange for needed discharge resources and transportation as appropriate  - Identify discharge learning needs (meds, wound care, etc)  - Arrange for interpreters to assist at discharge as needed  - Consider post-discharge preferences of patient/family/discharge partner  - Complete POLST form as appropriate  - Assess patient's ability to be responsible for managing their own health  - Refer to Case Management Department for coordinating discharge planning if the patient needs post-hospital services based on physician/LIP order or complex needs related to functional status, cognitive ability or social support system  Outcome: Progressing     Problem: GASTROINTESTINAL - ADULT  Goal: Minimal or absence of nausea and vomiting  Description: INTERVENTIONS:  - Maintain adequate hydration with IV or PO as ordered and tolerated  - Nasogastric tube to low intermittent suction as ordered  - Evaluate effectiveness of ordered antiemetic medications  - Provide nonpharmacologic comfort measures as appropriate  - Advance diet as tolerated, if ordered  - Obtain nutritional consult as needed  - Evaluate fluid balance  Outcome: Progressing  Goal: Maintains or returns to baseline bowel function  Description: INTERVENTIONS:  - Assess bowel function  - Maintain adequate hydration with IV or PO as ordered and tolerated  - Evaluate effectiveness of GI medications  - Encourage mobilization and activity  - Obtain nutritional consult as needed  - Establish a toileting routine/schedule  - Consider collaborating with pharmacy to review patient's medication profile  Outcome: Progressing

## 2022-02-18 NOTE — ANESTHESIA POSTPROCEDURE EVALUATION
Patient: Nitza Maharaj    Procedure Summary     Date: 02/18/22 Room / Location: [address] [place] [address] ENDOSCOPY 04 / [address] [place] [address] ENDOSCOPY    Anesthesia Start: 1600 Anesthesia Stop: 0019    Procedure: ESOPHAGOGASTRODUODENOSCOPY (EGD) (N/A ) Diagnosis: (severe reflux esophagitis,  hiatal hernia)    Surgeons: Marleni Huston MD Anesthesiologist: Segundo Mercado CRNA    Anesthesia Type: MAC ASA Status: 3          Anesthesia Type: MAC    PACU Vitals    BP 91/55 (02/18/22 1619)    Temp      Pulse 70 (02/18/22 1618)   Resp 12 (02/18/22 1618)    SpO2 95 % (02/18/22 1618)        [facility] AN Post Evaluation:   Patient Evaluated in PACU  Patient Participation: complete - patient participated  Level of Consciousness: sleepy but conscious  Pain Management: adequate  Airway Patency:patent  Yes    Cardiovascular Status: acceptable  Respiratory Status: acceptable and face mask  Postoperative Hydration acceptable      Will Wu CRNA  2/18/2022 4:21 PM

## 2022-02-18 NOTE — PLAN OF CARE
Received pt in stable condition. 0.9 % running continuously at 100 ml/hr. Currently, on clear liquid diet. CT of abdomen negative, CXR negative. Frequent rounding on pt. Will continue to monitor for any new clinical changes/updates  Problem: Patient Centered Care  Goal: Patient preferences are identified and integrated in the patient's plan of care  Description: Interventions:  - What would you like us to know as we care for you?  I want to get better and go back home with my family.  - Provide timely, complete, and accurate information to patient/family  - Incorporate patient and family knowledge, values, beliefs, and cultural backgrounds into the planning and delivery of care  - Encourage patient/family to participate in care and decision-making at the level they choose  - Honor patient and family perspectives and choices  Outcome: Progressing     Problem: PAIN - ADULT  Goal: Verbalizes/displays adequate comfort level or patient's stated pain goal  Description: INTERVENTIONS:  - Encourage pt to monitor pain and request assistance  - Assess pain using appropriate pain scale  - Administer analgesics based on type and severity of pain and evaluate response  - Implement non-pharmacological measures as appropriate and evaluate response  - Consider cultural and social influences on pain and pain management  - Manage/alleviate anxiety  - Utilize distraction and/or relaxation techniques  - Monitor for opioid side effects  - Notify MD/LIP if interventions unsuccessful or patient reports new pain  - Anticipate increased pain with activity and pre-medicate as appropriate  Outcome: Progressing     Problem: DISCHARGE PLANNING  Goal: Discharge to home or other facility with appropriate resources  Description: INTERVENTIONS:  - Identify barriers to discharge w/pt and caregiver  - Include patient/family/discharge partner in discharge planning  - Arrange for needed discharge resources and transportation as appropriate  - Identify discharge learning needs (meds, wound care, etc)  - Arrange for interpreters to assist at discharge as needed  - Consider post-discharge preferences of patient/family/discharge partner  - Complete POLST form as appropriate  - Assess patient's ability to be responsible for managing their own health  - Refer to Case Management Department for coordinating discharge planning if the patient needs post-hospital services based on physician/LIP order or complex needs related to functional status, cognitive ability or social support system  Outcome: Not Progressing     Problem: GASTROINTESTINAL - ADULT  Goal: Minimal or absence of nausea and vomiting  Description: INTERVENTIONS:  - Maintain adequate hydration with IV or PO as ordered and tolerated  - Nasogastric tube to low intermittent suction as ordered  - Evaluate effectiveness of ordered antiemetic medications  - Provide nonpharmacologic comfort measures as appropriate  - Advance diet as tolerated, if ordered  - Obtain nutritional consult as needed  - Evaluate fluid balance  Outcome: Progressing  Goal: Maintains or returns to baseline bowel function  Description: INTERVENTIONS:  - Assess bowel function  - Maintain adequate hydration with IV or PO as ordered and tolerated  - Evaluate effectiveness of GI medications  - Encourage mobilization and activity  - Obtain nutritional consult as needed  - Establish a toileting routine/schedule  - Consider collaborating with pharmacy to review patient's medication profile  Outcome: Progressing

## 2022-02-19 LAB
ALBUMIN SERPL-MCNC: 3.2 G/DL (ref 3.4–5)
ALBUMIN/GLOB SERPL: 1 {RATIO} (ref 1–2)
ALP LIVER SERPL-CCNC: 74 U/L
ALT SERPL-CCNC: 32 U/L
ANION GAP SERPL CALC-SCNC: 5 MMOL/L (ref 0–18)
AST SERPL-CCNC: 28 U/L (ref 15–37)
BASOPHILS # BLD AUTO: 0.01 X10(3) UL (ref 0–0.2)
BASOPHILS NFR BLD AUTO: 0.2 %
BILIRUB SERPL-MCNC: 0.9 MG/DL (ref 0.1–2)
BUN BLD-MCNC: 10 MG/DL (ref 7–18)
BUN/CREAT SERPL: 14.9 (ref 10–20)
CALCIUM BLD-MCNC: 8.6 MG/DL (ref 8.5–10.1)
CHLORIDE SERPL-SCNC: 104 MMOL/L (ref 98–112)
CO2 SERPL-SCNC: 31 MMOL/L (ref 21–32)
CREAT BLD-MCNC: 0.67 MG/DL
DEPRECATED RDW RBC AUTO: 41.7 FL (ref 35.1–46.3)
EOSINOPHIL # BLD AUTO: 0.07 X10(3) UL (ref 0–0.7)
EOSINOPHIL NFR BLD AUTO: 1.2 %
ERYTHROCYTE [DISTWIDTH] IN BLOOD BY AUTOMATED COUNT: 11.9 % (ref 11–15)
GLOBULIN PLAS-MCNC: 3.3 G/DL (ref 2.8–4.4)
GLUCOSE BLD-MCNC: 88 MG/DL (ref 70–99)
HCT VFR BLD AUTO: 43 %
HGB BLD-MCNC: 14.7 G/DL
IMM GRANULOCYTES # BLD AUTO: 0.03 X10(3) UL (ref 0–1)
IMM GRANULOCYTES NFR BLD: 0.5 %
LYMPHOCYTES # BLD AUTO: 2.28 X10(3) UL (ref 1–4)
LYMPHOCYTES NFR BLD AUTO: 38.1 %
MAGNESIUM SERPL-MCNC: 2.2 MG/DL (ref 1.6–2.6)
MCH RBC QN AUTO: 32.2 PG (ref 26–34)
MCHC RBC AUTO-ENTMCNC: 34.2 G/DL (ref 31–37)
MCV RBC AUTO: 94.3 FL
MONOCYTES # BLD AUTO: 0.4 X10(3) UL (ref 0.1–1)
MONOCYTES NFR BLD AUTO: 6.7 %
NEUTROPHILS # BLD AUTO: 3.19 X10 (3) UL (ref 1.5–7.7)
NEUTROPHILS # BLD AUTO: 3.19 X10(3) UL (ref 1.5–7.7)
NEUTROPHILS NFR BLD AUTO: 53.3 %
OSMOLALITY SERPL CALC.SUM OF ELEC: 288 MOSM/KG (ref 275–295)
PHOSPHATE SERPL-MCNC: 2.2 MG/DL (ref 2.5–4.9)
PLATELET # BLD AUTO: 82 10(3)UL (ref 150–450)
POTASSIUM SERPL-SCNC: 3.8 MMOL/L (ref 3.5–5.1)
PROT SERPL-MCNC: 6.5 G/DL (ref 6.4–8.2)
RBC # BLD AUTO: 4.56 X10(6)UL
SODIUM SERPL-SCNC: 140 MMOL/L (ref 136–145)
WBC # BLD AUTO: 6 X10(3) UL (ref 4–11)

## 2022-02-19 PROCEDURE — 99226 SUBSEQUENT OBSERVATION CARE: CPT | Performed by: HOSPITALIST

## 2022-02-19 RX ORDER — NICOTINE POLACRILEX 4 MG/1
20 GUM, CHEWING ORAL 2 TIMES DAILY
Qty: 60 TABLET | Refills: 0 | Status: SHIPPED | OUTPATIENT
Start: 2022-02-19 | End: 2022-02-23

## 2022-02-19 RX ORDER — POTASSIUM CHLORIDE 750 MG/1
10 TABLET, EXTENDED RELEASE ORAL
Qty: 15 TABLET | Refills: 0 | Status: SHIPPED | OUTPATIENT
Start: 2022-02-21 | End: 2022-02-24

## 2022-02-19 NOTE — PLAN OF CARE
Problem: PAIN - ADULT  Goal: Verbalizes/displays adequate comfort level or patient's stated pain goal  Description: INTERVENTIONS:  - Encourage pt to monitor pain and request assistance  - Assess pain using appropriate pain scale  - Administer analgesics based on type and severity of pain and evaluate response  - Implement non-pharmacological measures as appropriate and evaluate response  - Consider cultural and social influences on pain and pain management  - Manage/alleviate anxiety  - Utilize distraction and/or relaxation techniques  - Monitor for opioid side effects  - Notify MD/LIP if interventions unsuccessful or patient reports new pain  - Anticipate increased pain with activity and pre-medicate as appropriate  Outcome: Progressing     Problem: DISCHARGE PLANNING  Goal: Discharge to home or other facility with appropriate resources  Description: INTERVENTIONS:  - Identify barriers to discharge w/pt and caregiver  - Include patient/family/discharge partner in discharge planning  - Arrange for needed discharge resources and transportation as appropriate  - Identify discharge learning needs (meds, wound care, etc)  - Arrange for interpreters to assist at discharge as needed  - Consider post-discharge preferences of patient/family/discharge partner  - Complete POLST form as appropriate  - Assess patient's ability to be responsible for managing their own health  - Refer to Case Management Department for coordinating discharge planning if the patient needs post-hospital services based on physician/LIP order or complex needs related to functional status, cognitive ability or social support system  Outcome: Progressing     Problem: GASTROINTESTINAL - ADULT  Goal: Minimal or absence of nausea and vomiting  Description: INTERVENTIONS:  - Maintain adequate hydration with IV or PO as ordered and tolerated  - Nasogastric tube to low intermittent suction as ordered  - Evaluate effectiveness of ordered antiemetic medications  - Provide nonpharmacologic comfort measures as appropriate  - Advance diet as tolerated, if ordered  - Obtain nutritional consult as needed  - Evaluate fluid balance  Outcome: Progressing  Goal: Maintains or returns to baseline bowel function  Description: INTERVENTIONS:  - Assess bowel function  - Maintain adequate hydration with IV or PO as ordered and tolerated  - Evaluate effectiveness of GI medications  - Encourage mobilization and activity  - Obtain nutritional consult as needed  - Establish a toileting routine/schedule  - Consider collaborating with pharmacy to review patient's medication profile  Outcome: Progressing     Vital signs stable on room air. Methadone given as scheduled for chronic pain. Safety precautions in place, comfort rounding completed, call light within reach.

## 2022-02-19 NOTE — PLAN OF CARE
Received pt in stable condition. 0.9 % running continuously at 100 ml/hr. Upgraded to Regular, general diet with 2 Grams sodium. EGD completed today, showed Gastric reflux and hiatal hernia. Clonidine patch currently on R. Chest for elevated blood pressure. Pt has PRN IV Hydralazine 10 mg for Hypertension. Frequent rounding on pt. Will continue to monitor for any new clinical changes/updates  Problem: Patient Centered Care  Goal: Patient preferences are identified and integrated in the patient's plan of care  Description: Interventions:  - What would you like us to know as we care for you? I live at home alone, and 1 daughter, we are very close.   - Provide timely, complete, and accurate information to patient/family  - Incorporate patient and family knowledge, values, beliefs, and cultural backgrounds into the planning and delivery of care  - Encourage patient/family to participate in care and decision-making at the level they choose  - Honor patient and family perspectives and choices  Outcome: Progressing     Problem: PAIN - ADULT  Goal: Verbalizes/displays adequate comfort level or patient's stated pain goal  Description: INTERVENTIONS:  - Encourage pt to monitor pain and request assistance  - Assess pain using appropriate pain scale  - Administer analgesics based on type and severity of pain and evaluate response  - Implement non-pharmacological measures as appropriate and evaluate response  - Consider cultural and social influences on pain and pain management  - Manage/alleviate anxiety  - Utilize distraction and/or relaxation techniques  - Monitor for opioid side effects  - Notify MD/LIP if interventions unsuccessful or patient reports new pain  - Anticipate increased pain with activity and pre-medicate as appropriate  Outcome: Progressing     Problem: DISCHARGE PLANNING  Goal: Discharge to home or other facility with appropriate resources  Description: INTERVENTIONS:  - Identify barriers to discharge w/pt and caregiver  - Include patient/family/discharge partner in discharge planning  - Arrange for needed discharge resources and transportation as appropriate  - Identify discharge learning needs (meds, wound care, etc)  - Arrange for interpreters to assist at discharge as needed  - Consider post-discharge preferences of patient/family/discharge partner  - Complete POLST form as appropriate  - Assess patient's ability to be responsible for managing their own health  - Refer to Case Management Department for coordinating discharge planning if the patient needs post-hospital services based on physician/LIP order or complex needs related to functional status, cognitive ability or social support system  Outcome: Progressing     Problem: GASTROINTESTINAL - ADULT  Goal: Minimal or absence of nausea and vomiting  Description: INTERVENTIONS:  - Maintain adequate hydration with IV or PO as ordered and tolerated  - Nasogastric tube to low intermittent suction as ordered  - Evaluate effectiveness of ordered antiemetic medications  - Provide nonpharmacologic comfort measures as appropriate  - Advance diet as tolerated, if ordered  - Obtain nutritional consult as needed  - Evaluate fluid balance  Outcome: Progressing  Goal: Maintains or returns to baseline bowel function  Description: INTERVENTIONS:  - Assess bowel function  - Maintain adequate hydration with IV or PO as ordered and tolerated  - Evaluate effectiveness of GI medications  - Encourage mobilization and activity  - Obtain nutritional consult as needed  - Establish a toileting routine/schedule  - Consider collaborating with pharmacy to review patient's medication profile  Outcome: Progressing

## 2022-02-19 NOTE — PLAN OF CARE
Pt w/ persistent nausea this AM. PRNs utilized. EGD complete today. IV fluids complete.     Problem: Patient Centered Care  Goal: Patient preferences are identified and integrated in the patient's plan of care  Description: Interventions:  - Provide timely, complete, and accurate information to patient/family  - Incorporate patient and family knowledge, values, beliefs, and cultural backgrounds into the planning and delivery of care  - Encourage patient/family to participate in care and decision-making at the level they choose  - Honor patient and family perspectives and choices  Outcome: Progressing     Problem: PAIN - ADULT  Goal: Verbalizes/displays adequate comfort level or patient's stated pain goal  Description: INTERVENTIONS:  - Encourage pt to monitor pain and request assistance  - Assess pain using appropriate pain scale  - Administer analgesics based on type and severity of pain and evaluate response  - Implement non-pharmacological measures as appropriate and evaluate response  - Consider cultural and social influences on pain and pain management  - Manage/alleviate anxiety  - Utilize distraction and/or relaxation techniques  - Monitor for opioid side effects  - Notify MD/LIP if interventions unsuccessful or patient reports new pain  - Anticipate increased pain with activity and pre-medicate as appropriate  Outcome: Progressing     Problem: DISCHARGE PLANNING  Goal: Discharge to home or other facility with appropriate resources  Description: INTERVENTIONS:  - Identify barriers to discharge w/pt and caregiver  - Include patient/family/discharge partner in discharge planning  - Arrange for needed discharge resources and transportation as appropriate  - Identify discharge learning needs (meds, wound care, etc)  - Arrange for interpreters to assist at discharge as needed  - Consider post-discharge preferences of patient/family/discharge partner  - Complete POLST form as appropriate  - Assess patient's ability to be responsible for managing their own health  - Refer to Case Management Department for coordinating discharge planning if the patient needs post-hospital services based on physician/LIP order or complex needs related to functional status, cognitive ability or social support system  Outcome: Progressing     Problem: GASTROINTESTINAL - ADULT  Goal: Minimal or absence of nausea and vomiting  Description: INTERVENTIONS:  - Maintain adequate hydration with IV or PO as ordered and tolerated  - Nasogastric tube to low intermittent suction as ordered  - Evaluate effectiveness of ordered antiemetic medications  - Provide nonpharmacologic comfort measures as appropriate  - Advance diet as tolerated, if ordered  - Obtain nutritional consult as needed  - Evaluate fluid balance  Outcome: Progressing  Goal: Maintains or returns to baseline bowel function  Description: INTERVENTIONS:  - Assess bowel function  - Maintain adequate hydration with IV or PO as ordered and tolerated  - Evaluate effectiveness of GI medications  - Encourage mobilization and activity  - Obtain nutritional consult as needed  - Establish a toileting routine/schedule  - Consider collaborating with pharmacy to review patient's medication profile  Outcome: Progressing

## 2022-02-19 NOTE — DISCHARGE SUMMARY
Dc summary#  > 30 min spent on [address] Discharge Diagnoses: gastritis/esophagitis    Lace+ Score: 64  59-90 High Risk  29-58 Medium Risk  0-28   Low Risk. TCM Follow-Up Recommendation:  LACE > 58:  High Risk of readmission after discharge from the hospital.tcm fu recommended

## 2022-02-20 LAB
ANION GAP SERPL CALC-SCNC: 7 MMOL/L (ref 0–18)
BASOPHILS # BLD AUTO: 0.01 X10(3) UL (ref 0–0.2)
BUN BLD-MCNC: 10 MG/DL (ref 7–18)
BUN/CREAT SERPL: 15.9 (ref 10–20)
CALCIUM BLD-MCNC: 8.2 MG/DL (ref 8.5–10.1)
CHLORIDE SERPL-SCNC: 108 MMOL/L (ref 98–112)
CO2 SERPL-SCNC: 29 MMOL/L (ref 21–32)
CREAT BLD-MCNC: 0.63 MG/DL
DEPRECATED RDW RBC AUTO: 40.3 FL (ref 35.1–46.3)
EOSINOPHIL # BLD AUTO: 0.06 X10(3) UL (ref 0–0.7)
EOSINOPHIL NFR BLD AUTO: 1.4 %
ERYTHROCYTE [DISTWIDTH] IN BLOOD BY AUTOMATED COUNT: 11.9 % (ref 11–15)
GLUCOSE BLD-MCNC: 98 MG/DL (ref 70–99)
HCT VFR BLD AUTO: 35.4 %
HGB BLD-MCNC: 12.2 G/DL
IMM GRANULOCYTES # BLD AUTO: 0.02 X10(3) UL (ref 0–1)
IMM GRANULOCYTES NFR BLD: 0.5 %
LYMPHOCYTES # BLD AUTO: 1.83 X10(3) UL (ref 1–4)
LYMPHOCYTES NFR BLD AUTO: 41.8 %
MAGNESIUM SERPL-MCNC: 1.9 MG/DL (ref 1.6–2.6)
MCH RBC QN AUTO: 31.8 PG (ref 26–34)
MCHC RBC AUTO-ENTMCNC: 34.5 G/DL (ref 31–37)
MCV RBC AUTO: 92.2 FL
MONOCYTES # BLD AUTO: 0.25 X10(3) UL (ref 0.1–1)
MONOCYTES NFR BLD AUTO: 5.7 %
NEUTROPHILS # BLD AUTO: 2.21 X10 (3) UL (ref 1.5–7.7)
NEUTROPHILS # BLD AUTO: 2.21 X10(3) UL (ref 1.5–7.7)
NEUTROPHILS NFR BLD AUTO: 50.4 %
OSMOLALITY SERPL CALC.SUM OF ELEC: 297 MOSM/KG (ref 275–295)
PHOSPHATE SERPL-MCNC: 2.9 MG/DL (ref 2.5–4.9)
PLATELET # BLD AUTO: 64 10(3)UL (ref 150–450)
POTASSIUM SERPL-SCNC: 3.7 MMOL/L (ref 3.5–5.1)
RBC # BLD AUTO: 3.84 X10(6)UL
SODIUM SERPL-SCNC: 144 MMOL/L (ref 136–145)

## 2022-02-20 PROCEDURE — 99226 SUBSEQUENT OBSERVATION CARE: CPT | Performed by: HOSPITALIST

## 2022-02-20 RX ORDER — METHADONE HYDROCHLORIDE 10 MG/5ML
110 SOLUTION ORAL DAILY
Qty: 55 ML | Refills: 0 | Status: SHIPPED | OUTPATIENT
Start: 2022-02-20 | End: 2022-02-20

## 2022-02-20 RX ORDER — POLYETHYLENE GLYCOL 3350 17 G/17G
17 POWDER, FOR SOLUTION ORAL DAILY
Qty: 30 EACH | Refills: 0 | Status: SHIPPED | OUTPATIENT
Start: 2022-02-20 | End: 2022-02-24

## 2022-02-20 RX ORDER — NALOXONE HYDROCHLORIDE 4 MG/.1ML
4 SPRAY, METERED NASAL AS NEEDED
Qty: 1 KIT | Refills: 0 | Status: SHIPPED | OUTPATIENT
Start: 2022-02-20

## 2022-02-20 RX ORDER — POLYETHYLENE GLYCOL 3350 17 G/17G
17 POWDER, FOR SOLUTION ORAL DAILY
Status: DISCONTINUED | OUTPATIENT
Start: 2022-02-20 | End: 2022-02-21

## 2022-02-20 NOTE — CM/SW NOTE
CM received consult to assist with discharge and per scription for daily maintained dose of Methadone. This CM contacted patients regular Methadone Clinic and was informed by administrative staff, they are closed to patients until Tuesday February 22, due to Presidents Day. And will re-open to patients on Tuesday. Typically patients are given extra doses by the clinic for long Holiday weekends, however patient has been hospitalized and has not been to the Clinic since last   Thursday. CM also contacted the out patient Pharmacy at [facility] to determine if they  can dispense a out patient doses of methadone. they are also closed today and will not open until tomorrow. [address] [facility] open   Monday thru Friday 8 am to 6 pm    Informed patient and treatment team, patient will not be able to be discharge today, and can discharge tomorrow after  Morning does of  Methadone,  due to the long [address] weekend, Patient cannot miss a dose of her Methadone due to risk of withdrawal.       Plan: Discharge 2/21/21 after am Methadone Dose    F/U with your Methadone Clinic on Tuesday 2/22    [address] [facility] [address] [facility], [address]    / to remain available for support and/or discharge planning.        Rolepastor Louis RN Case Manager   Ext. [phone]

## 2022-02-20 NOTE — PLAN OF CARE
Problem: PAIN - ADULT  Goal: Verbalizes/displays adequate comfort level or patient's stated pain goal  Description: INTERVENTIONS:  - Encourage pt to monitor pain and request assistance  - Assess pain using appropriate pain scale  - Administer analgesics based on type and severity of pain and evaluate response  - Implement non-pharmacological measures as appropriate and evaluate response  - Consider cultural and social influences on pain and pain management  - Manage/alleviate anxiety  - Utilize distraction and/or relaxation techniques  - Monitor for opioid side effects  - Notify MD/LIP if interventions unsuccessful or patient reports new pain  - Anticipate increased pain with activity and pre-medicate as appropriate  Outcome: Progressing     Problem: GASTROINTESTINAL - ADULT  Goal: Minimal or absence of nausea and vomiting  Description: INTERVENTIONS:  - Maintain adequate hydration with IV or PO as ordered and tolerated  - Nasogastric tube to low intermittent suction as ordered  - Evaluate effectiveness of ordered antiemetic medications  - Provide nonpharmacologic comfort measures as appropriate  - Advance diet as tolerated, if ordered  - Obtain nutritional consult as needed  - Evaluate fluid balance  Outcome: Progressing  Goal: Maintains or returns to baseline bowel function  Description: INTERVENTIONS:  - Assess bowel function  - Maintain adequate hydration with IV or PO as ordered and tolerated  - Evaluate effectiveness of GI medications  - Encourage mobilization and activity  - Obtain nutritional consult as needed  - Establish a toileting routine/schedule  - Consider collaborating with pharmacy to review patient's medication profile  Outcome: Progressing     Vital signs stable on room air. Methadone given as scheduled for chronic pain. Tolerating general diet. Safety precautions in place, comfort rounding completed, call light within reach.

## 2022-02-20 NOTE — DISCHARGE SUMMARY
Dc summary#  > 30 min spent on [address] Discharge Diagnoses: esophagitis from vomiting    Lace+ Score: 65  59-90 High Risk  29-58 Medium Risk  0-28   Low Risk. TCM Follow-Up Recommendation:  LACE > 58:  High Risk of readmission after discharge from the hospital.tcm fu recommended

## 2022-02-20 NOTE — PLAN OF CARE
A/Ox4, IV morphine given once for epigastric pain with improvement. No nausea or vomiting noted overnight. Tolerating general diet. Call light within reach, frequent rounding done.     Problem: Patient Centered Care  Goal: Patient preferences are identified and integrated in the patient's plan of care  Description: Interventions:  - What would you like us to know as we care for you?   - Provide timely, complete, and accurate information to patient/family  - Incorporate patient and family knowledge, values, beliefs, and cultural backgrounds into the planning and delivery of care  - Encourage patient/family to participate in care and decision-making at the level they choose  - Honor patient and family perspectives and choices  Outcome: Progressing     Problem: PAIN - ADULT  Goal: Verbalizes/displays adequate comfort level or patient's stated pain goal  Description: INTERVENTIONS:  - Encourage pt to monitor pain and request assistance  - Assess pain using appropriate pain scale  - Administer analgesics based on type and severity of pain and evaluate response  - Implement non-pharmacological measures as appropriate and evaluate response  - Consider cultural and social influences on pain and pain management  - Manage/alleviate anxiety  - Utilize distraction and/or relaxation techniques  - Monitor for opioid side effects  - Notify MD/LIP if interventions unsuccessful or patient reports new pain  - Anticipate increased pain with activity and pre-medicate as appropriate  Outcome: Progressing     Problem: DISCHARGE PLANNING  Goal: Discharge to home or other facility with appropriate resources  Description: INTERVENTIONS:  - Identify barriers to discharge w/pt and caregiver  - Include patient/family/discharge partner in discharge planning  - Arrange for needed discharge resources and transportation as appropriate  - Identify discharge learning needs (meds, wound care, etc)  - Arrange for interpreters to assist at discharge as needed  - Consider post-discharge preferences of patient/family/discharge partner  - Complete POLST form as appropriate  - Assess patient's ability to be responsible for managing their own health  - Refer to Case Management Department for coordinating discharge planning if the patient needs post-hospital services based on physician/LIP order or complex needs related to functional status, cognitive ability or social support system  Outcome: Progressing     Problem: GASTROINTESTINAL - ADULT  Goal: Minimal or absence of nausea and vomiting  Description: INTERVENTIONS:  - Maintain adequate hydration with IV or PO as ordered and tolerated  - Nasogastric tube to low intermittent suction as ordered  - Evaluate effectiveness of ordered antiemetic medications  - Provide nonpharmacologic comfort measures as appropriate  - Advance diet as tolerated, if ordered  - Obtain nutritional consult as needed  - Evaluate fluid balance  Outcome: Progressing  Goal: Maintains or returns to baseline bowel function  Description: INTERVENTIONS:  - Assess bowel function  - Maintain adequate hydration with IV or PO as ordered and tolerated  - Evaluate effectiveness of GI medications  - Encourage mobilization and activity  - Obtain nutritional consult as needed  - Establish a toileting routine/schedule  - Consider collaborating with pharmacy to review patient's medication profile  Outcome: Progressing

## 2022-02-21 VITALS
TEMPERATURE: 98 F | SYSTOLIC BLOOD PRESSURE: 128 MMHG | BODY MASS INDEX: 36.8 KG/M2 | RESPIRATION RATE: 18 BRPM | HEART RATE: 57 BPM | HEIGHT: 62 IN | WEIGHT: 200 LBS | DIASTOLIC BLOOD PRESSURE: 72 MMHG | OXYGEN SATURATION: 95 %

## 2022-02-21 LAB
ANION GAP SERPL CALC-SCNC: 4 MMOL/L (ref 0–18)
BASOPHILS # BLD AUTO: 0.01 X10(3) UL (ref 0–0.2)
BASOPHILS NFR BLD AUTO: 0.2 %
BUN BLD-MCNC: 10 MG/DL (ref 7–18)
BUN/CREAT SERPL: 13.9 (ref 10–20)
CALCIUM BLD-MCNC: 9.1 MG/DL (ref 8.5–10.1)
CHLORIDE SERPL-SCNC: 105 MMOL/L (ref 98–112)
CO2 SERPL-SCNC: 33 MMOL/L (ref 21–32)
CREAT BLD-MCNC: 0.72 MG/DL
DEPRECATED RDW RBC AUTO: 41.1 FL (ref 35.1–46.3)
EOSINOPHIL # BLD AUTO: 0.09 X10(3) UL (ref 0–0.7)
EOSINOPHIL NFR BLD AUTO: 1.9 %
ERYTHROCYTE [DISTWIDTH] IN BLOOD BY AUTOMATED COUNT: 11.8 % (ref 11–15)
GLUCOSE BLD-MCNC: 96 MG/DL (ref 70–99)
HCT VFR BLD AUTO: 38.3 %
HGB BLD-MCNC: 13.1 G/DL
IMM GRANULOCYTES # BLD AUTO: 0.01 X10(3) UL (ref 0–1)
IMM GRANULOCYTES NFR BLD: 0.2 %
LYMPHOCYTES # BLD AUTO: 2.19 X10(3) UL (ref 1–4)
LYMPHOCYTES NFR BLD AUTO: 45.9 %
MAGNESIUM SERPL-MCNC: 1.9 MG/DL (ref 1.6–2.6)
MCH RBC QN AUTO: 32.5 PG (ref 26–34)
MCHC RBC AUTO-ENTMCNC: 34.2 G/DL (ref 31–37)
MCV RBC AUTO: 95 FL
MONOCYTES # BLD AUTO: 0.33 X10(3) UL (ref 0.1–1)
MONOCYTES NFR BLD AUTO: 6.9 %
NEUTROPHILS # BLD AUTO: 2.14 X10 (3) UL (ref 1.5–7.7)
NEUTROPHILS # BLD AUTO: 2.14 X10(3) UL (ref 1.5–7.7)
NEUTROPHILS NFR BLD AUTO: 44.9 %
OSMOLALITY SERPL CALC.SUM OF ELEC: 293 MOSM/KG (ref 275–295)
PHOSPHATE SERPL-MCNC: 3.8 MG/DL (ref 2.5–4.9)
PLATELET # BLD AUTO: 74 10(3)UL (ref 150–450)
POTASSIUM SERPL-SCNC: 4.1 MMOL/L (ref 3.5–5.1)
RBC # BLD AUTO: 4.03 X10(6)UL
SODIUM SERPL-SCNC: 142 MMOL/L (ref 136–145)
WBC # BLD AUTO: 4.8 X10(3) UL (ref 4–11)

## 2022-02-21 PROCEDURE — 99213 OFFICE O/P EST LOW 20 MIN: CPT | Performed by: PHYSICIAN ASSISTANT

## 2022-02-21 PROCEDURE — 99217 OBSERVATION CARE DISCHARGE: CPT | Performed by: HOSPITALIST

## 2022-02-21 NOTE — DISCHARGE SUMMARY
Dc summary#[id number]  > 30 min spent on [address] [facility] Discharge Diagnoses: esophagitis    Lace+ Score: 66  59-90 High Risk  29-58 Medium Risk  0-28   Low Risk. TCM Follow-Up Recommendation:  LACE > 58:  High Risk of readmission after discharge from the hospital.tcm fu recommended

## 2022-02-21 NOTE — PLAN OF CARE
Problem: Patient Centered Care  Goal: Patient preferences are identified and integrated in the patient's plan of care  Description: Interventions:  - What would you like us to know as we care for you?  From home alone  - Provide timely, complete, and accurate information to patient/family  - Incorporate patient and family knowledge, values, beliefs, and cultural backgrounds into the planning and delivery of care  - Encourage patient/family to participate in care and decision-making at the level they choose  - Honor patient and family perspectives and choices  Outcome: Progressing     Problem: PAIN - ADULT  Goal: Verbalizes/displays adequate comfort level or patient's stated pain goal  Description: INTERVENTIONS:  - Encourage pt to monitor pain and request assistance  - Assess pain using appropriate pain scale  - Administer analgesics based on type and severity of pain and evaluate response  - Implement non-pharmacological measures as appropriate and evaluate response  - Consider cultural and social influences on pain and pain management  - Manage/alleviate anxiety  - Utilize distraction and/or relaxation techniques  - Monitor for opioid side effects  - Notify MD/LIP if interventions unsuccessful or patient reports new pain  - Anticipate increased pain with activity and pre-medicate as appropriate  Outcome: Progressing     Problem: DISCHARGE PLANNING  Goal: Discharge to home or other facility with appropriate resources  Description: INTERVENTIONS:  - Identify barriers to discharge w/pt and caregiver  - Include patient/family/discharge partner in discharge planning  - Arrange for needed discharge resources and transportation as appropriate  - Identify discharge learning needs (meds, wound care, etc)  - Arrange for interpreters to assist at discharge as needed  - Consider post-discharge preferences of patient/family/discharge partner  - Complete POLST form as appropriate  - Assess patient's ability to be responsible for managing their own health  - Refer to Case Management Department for coordinating discharge planning if the patient needs post-hospital services based on physician/LIP order or complex needs related to functional status, cognitive ability or social support system  Outcome: Progressing     Problem: GASTROINTESTINAL - ADULT  Goal: Minimal or absence of nausea and vomiting  Description: INTERVENTIONS:  - Maintain adequate hydration with IV or PO as ordered and tolerated  - Nasogastric tube to low intermittent suction as ordered  - Evaluate effectiveness of ordered antiemetic medications  - Provide nonpharmacologic comfort measures as appropriate  - Advance diet as tolerated, if ordered  - Obtain nutritional consult as needed  - Evaluate fluid balance  Outcome: Progressing  Goal: Maintains or returns to baseline bowel function  Description: INTERVENTIONS:  - Assess bowel function  - Maintain adequate hydration with IV or PO as ordered and tolerated  - Evaluate effectiveness of GI medications  - Encourage mobilization and activity  - Obtain nutritional consult as needed  - Establish a toileting routine/schedule  - Consider collaborating with pharmacy to review patient's medication profile  Outcome: Progressing     Problem: SAFETY ADULT - FALL  Goal: Free from fall injury  Description: INTERVENTIONS:  - Assess pt frequently for physical needs  - Identify cognitive and physical deficits and behaviors that affect risk of falls.   - [place] fall precautions as indicated by assessment.  - Educate pt/family on patient safety including physical limitations  - Instruct pt to call for assistance with activity based on assessment  - Modify environment to reduce risk of injury  - Provide assistive devices as appropriate  - Consider OT/PT consult to assist with strengthening/mobility  - Encourage toileting schedule  Outcome: Progressing     Problem: SKIN/TISSUE INTEGRITY - ADULT  Goal: Skin integrity remains intact  Description: INTERVENTIONS  - Assess and document risk factors for pressure ulcer development  - Assess and document skin integrity  - Monitor for areas of redness and/or skin breakdown  - Initiate interventions, skin care algorithm/standards of care as needed  Outcome: Progressing     Problem: Patient/Family Goals  Goal: Patient/Family Long Term Goal  Description: Patient's Long Term Goal: Go back home    Interventions:  - Monitor vital signs  - Monitor appropriate labs  - Pain management  - Administer medications per order  - Follow MD orders  - Diagnostics per order  - Update / inform patient and family on plan of care  - Discharge planning  - See additional Care Plan goals for specific interventions  Outcome: Progressing  Goal: Patient/Family Short Term Goal  Description: Patient's Short Term Goal: Feel better    Interventions:   - Monitor vital signs  - Monitor appropriate labs  - Pain management  - Administer medications per order  - Follow MD orders  - Diagnostics per order  - Update / inform patient and family on plan of care  - See additional Care Plan goals for specific interventions  Outcome: Progressing      Monitoring vital signs- stable at this time. Pain medication provided as needed. No acute changes noted at this time. Safety and fall precautions maintained- I-bed awareness on, bed locked in lowest position, call light within reach. Frequent rounding by nursing staff.

## 2022-02-21 NOTE — CM/SW NOTE
SW received MDO for discharge. Patient was discussed during rounds and RN reports that patient requesting transportation assistance home. SW contacted patient to confirm need for transportation and address. Patient confirmed both. Patient reports that she needs to stop at [facility] for prescription. SW notified patient that Tamra Kramer typically does not make stops during transport. Patient understands and is agreeable to plan. Plan: Home  [facility] arranged via [facility] for next available ETA 60-90 minutes. PCS complete. [facility] confirmed that no stops can be made during transport. RN/patient informed. SW/CM to remain available for support and/or discharge planning.      LAURA Seo, [facility]   [id number]

## 2022-02-21 NOTE — PLAN OF CARE
Pt ok to discharge per MD order. Iv removed by this rn. All paperwork discussed with pt. All questions answered. Pt taken to home via medicar  Problem: Patient Centered Care  Goal: Patient preferences are identified and integrated in the patient's plan of care  Description: Interventions:  - What would you like us to know as we care for you?  From home alone  - Provide timely, complete, and accurate information to patient/family  - Incorporate patient and family knowledge, values, beliefs, and cultural backgrounds into the planning and delivery of care  - Encourage patient/family to participate in care and decision-making at the level they choose  - Honor patient and family perspectives and choices  Outcome: Adequate for Discharge     Problem: PAIN - ADULT  Goal: Verbalizes/displays adequate comfort level or patient's stated pain goal  Description: INTERVENTIONS:  - Encourage pt to monitor pain and request assistance  - Assess pain using appropriate pain scale  - Administer analgesics based on type and severity of pain and evaluate response  - Implement non-pharmacological measures as appropriate and evaluate response  - Consider cultural and social influences on pain and pain management  - Manage/alleviate anxiety  - Utilize distraction and/or relaxation techniques  - Monitor for opioid side effects  - Notify MD/LIP if interventions unsuccessful or patient reports new pain  - Anticipate increased pain with activity and pre-medicate as appropriate  Outcome: Adequate for Discharge     Problem: DISCHARGE PLANNING  Goal: Discharge to home or other facility with appropriate resources  Description: INTERVENTIONS:  - Identify barriers to discharge w/pt and caregiver  - Include patient/family/discharge partner in discharge planning  - Arrange for needed discharge resources and transportation as appropriate  - Identify discharge learning needs (meds, wound care, etc)  - Arrange for interpreters to assist at discharge as needed  - Consider post-discharge preferences of patient/family/discharge partner  - Complete POLST form as appropriate  - Assess patient's ability to be responsible for managing their own health  - Refer to Case Management Department for coordinating discharge planning if the patient needs post-hospital services based on physician/LIP order or complex needs related to functional status, cognitive ability or social support system  Outcome: Adequate for Discharge     Problem: GASTROINTESTINAL - ADULT  Goal: Minimal or absence of nausea and vomiting  Description: INTERVENTIONS:  - Maintain adequate hydration with IV or PO as ordered and tolerated  - Nasogastric tube to low intermittent suction as ordered  - Evaluate effectiveness of ordered antiemetic medications  - Provide nonpharmacologic comfort measures as appropriate  - Advance diet as tolerated, if ordered  - Obtain nutritional consult as needed  - Evaluate fluid balance  Outcome: Adequate for Discharge  Goal: Maintains or returns to baseline bowel function  Description: INTERVENTIONS:  - Assess bowel function  - Maintain adequate hydration with IV or PO as ordered and tolerated  - Evaluate effectiveness of GI medications  - Encourage mobilization and activity  - Obtain nutritional consult as needed  - Establish a toileting routine/schedule  - Consider collaborating with pharmacy to review patient's medication profile  Outcome: Adequate for Discharge     Problem: SKIN/TISSUE INTEGRITY - ADULT  Goal: Skin integrity remains intact  Description: INTERVENTIONS  - Assess and document risk factors for pressure ulcer development  - Assess and document skin integrity  - Monitor for areas of redness and/or skin breakdown  - Initiate interventions, skin care algorithm/standards of care as needed  Outcome: Adequate for Discharge     Problem: Patient/Family Goals  Goal: Patient/Family Long Term Goal  Description: Patient's Long Term Goal: Go back home    Interventions:  - Monitor vital signs  - Monitor appropriate labs  - Pain management  - Administer medications per order  - Follow MD orders  - Diagnostics per order  - Update / inform patient and family on plan of care  - Discharge planning  - See additional Care Plan goals for specific interventions  Outcome: Adequate for Discharge  Goal: Patient/Family Short Term Goal  Description: Patient's Short Term Goal: Feel better    Interventions:   - Monitor vital signs  - Monitor appropriate labs  - Pain management  - Administer medications per order  - Follow MD orders  - Diagnostics per order  - Update / inform patient and family on plan of care  - See additional Care Plan goals for specific interventions  Outcome: Adequate for Discharge

## 2022-02-22 ENCOUNTER — TELEPHONE (OUTPATIENT)
Dept: FAMILY MEDICINE CLINIC | Facility: CLINIC | Age: 57
End: 2022-02-22

## 2022-02-22 ENCOUNTER — PATIENT OUTREACH (OUTPATIENT)
Dept: CASE MANAGEMENT | Age: 57
End: 2022-02-22

## 2022-02-22 RX ORDER — AMOXICILLIN AND CLAVULANATE POTASSIUM 875; 125 MG/1; MG/1
1 TABLET, FILM COATED ORAL 2 TIMES DAILY
COMMUNITY
Start: 2022-02-21

## 2022-02-22 RX ORDER — METHOCARBAMOL 500 MG/1
500 TABLET, FILM COATED ORAL 4 TIMES DAILY
Qty: 90 TABLET | Refills: 0 | Status: SHIPPED | OUTPATIENT
Start: 2022-02-22

## 2022-02-22 NOTE — TELEPHONE ENCOUNTER
Please review. Protocol failed / No protocol.     Requested Prescriptions   Pending Prescriptions Disp Refills    METHOCARBAMOL 500 MG Oral Tab [Pharmacy Med Name: METHOCARBAMOL 500MG TABLETS] 90 tablet 0     Sig: TAKE 1 TABLET(500 MG) BY MOUTH FOUR TIMES DAILY        There is no refill protocol information for this order           Future Appointments         Provider Department Appt Notes    In 6 days Glen Urbano MD [facility] F/U    In 1 week [address]             Recent Outpatient Visits              1 week ago Primary osteoarthritis of right knee    [facility] for Francisco Guerra MD    Office Visit    3 weeks ago Cirrhosis of liver with ascites, unspecified hepatic cirrhosis type [facility])    Tania Doherty MD    Office Visit    3 weeks ago Arthritis of knee    Tania Doherty MD    Telemedicine    1 month ago Muscle spasm    Tania Doherty MD    Office Visit    2 months ago Chronic hepatitis C virus infection with cirrhosis [facility])    [facility] Glen Urbano MD    Office Visit

## 2022-02-22 NOTE — TELEPHONE ENCOUNTER
Appointment: Spoke to the pt today for a HFU call. The patient was recently hospitalized for esophagitis/dehydration. The pt does has a HFU appt scheduled for 3/1/2022, however, a (non TCM) HFU appt is recommended by 2/28/2022 as the pt is a high risk for readmission. The patient was unable to be seen at an earlier date. The patient's transportation through [facility] requires a 3 day notice. The patient also has conflicting appointments on 2/28/2022. Please advise. Order request: The patient is requesting orders for the following supplies:     Shower chair  Walker with chair    TRIAGE:  Please f/u with the pt about the requested orders. Thank you!

## 2022-02-22 NOTE — DISCHARGE SUMMARY
[facility]    PATIENT'S NAME: MARIE REAL   ATTENDING PHYSICIAN: Kishan Duque MD   PATIENT ACCOUNT#:   [id number]    LOCATION:  [address] [place] [address] #:   [id number]       YOB: 1965  ADMISSION DATE:       02/17/2022      DISCHARGE DATE:  02/21/2022    DISCHARGE SUMMARY      Please note that I spent 35 minutes on this discharge. This discharge was significantly delayed by the inability to obtain methadone for the patient while her methadone clinic was closed on Sunday, the 20th and Monday, the 21st.  She could have been discharged actually on the 19th had methadone been obtainable for her. HISTORY AND HOSPITAL COURSE:  This is a very pleasant 59-year-old white female well known to me from previous hospitalizations who presents with a history of nausea, vomiting, and dehydration. She had severe abdominal pain, and she had recently tried to use NSAIDs for pain. She was admitted to the hospital and seen by GI who performed an endoscopy and showed esophagitis probably from vomiting. With proper treatment with proton pump inhibitor, the patient much improved and would have been able to be discharged home. She had been on methadone for many, many years and was afraid stopping the methadone would have created another cycle of nausea and vomiting necessitating readmission. We tried to see if [facility] could give her the methadone and if our cancer center would be able to do it. None of those proved fruitful, and she stayed here until she was able to go home and follow up with her clinic the next day. PHYSICAL EXAMINATION:    VITAL SIGNS:  On discharge temperature 98.2, pulse 57, respiratory rate 18, 128/72, 95%. LUNGS:  Clear. HEART:  Normal S1, S2. No S3.  ABDOMEN:  Soft. EXTREMITIES:  Without edema. NEUROLOGIC:  Alert, oriented, friendly, and cooperative. LABORATORY STUDIES:  Please see chart. ASSESSMENT AND PLAN:    1.    Esophagitis causing nausea and vomiting. Probably from NSAIDs. Now allowed to heal.  We will see how she does. 2.   Dehydration and hypokalemia. Corrected. 3.   Liver cirrhosis from hepatitis C.  4.   Chronic pain. On methadone. 5.   Tattoos which were the cause of her hepatitis C. Per the patient, she found out about these after seeing Arnoldo Krishna, the musician, discussing how he got hepatitis C from amateur and unclean tattoos. 6.   Obesity, body mass index 36.58. May need AMANDA workup. CONDITION ON DISCHARGE:  Stable. CODE STATUS:  Full Code. DISCHARGE MEDICATIONS:    1. Aripiprazole 2 mg every evening. Watch for severe muscle stiffness and fever. 2.   Fluticasone 1 spray each nostril daily. 3.   Lasix 20 mg daily. 4.   Alprazolam 1 mg 3 times a day as needed for anxiety. 5.   Ventolin 2 puffs every 4 hours as needed. 6.   Fluoxetine 20 mg daily. 7.   Methadone 110 mg daily to be only dosed by her methadone clinic. 8.   Methocarbamol 500 mg 3 to 4 times a day as needed. Watch for drowsiness. No alcohol. 9.   MiraLAX 17 g daily. 10.   Naloxone Narcan as needed. 11.   Omeprazole 20 mg twice a day. 12.   Potassium chloride 10 mEq 3 times a week. DIET:  As tolerated. ACTIVITY:  As tolerated. FOLLOWUP:  Dr. Yisel Monique in a few weeks. Follow up with Dr. Hina Quintanilla in a few days for labs and BP check. No more Motrin. Follow up with methadone clinic on Tuesday, 02/22/2022. Resume Ecotrin in about 1 week if there is no more nausea or vomiting. Return if there are problems. RISK OF READMISSION:  Very high. TCM followup recommended. Dictated By Vance Willis.  MD Dunia  d: 02/21/2022 15:58:34  t: 02/22/2022 14:35:27  Job [id number]  TRUMAN/

## 2022-02-22 NOTE — TELEPHONE ENCOUNTER
Message noted: Chart reviewed and may refill medication as requested. Prescription sent to listed pharmacy. Pharmacy to notify patient.  Pt notified through [facility]

## 2022-02-23 NOTE — PROGRESS NOTES
1st attempt GI apt request    Dr.Christopher Nieves Moseley   [address]  [address] [facility] [address]  [phone]  Apt made for Wed. Aug. 31st @10:30am- added patient to wait list    Unable to contact patient with apt details. LMTCB with any other questions or concerns.

## 2022-02-23 NOTE — TELEPHONE ENCOUNTER
Spoke to patient. Relayed that there is a note in computer from Dr. Daron Jean-Baptiste from 2/1/22 that patient was advised by liver specialist not to take more than one Furosemide pill. She will double check with Dr. Josseline Mays about her diuretics. Routing medication through protocol. She said she never received any prescriptions for the medications. Dr. Daron Jean-Baptiste, please advise on pended medications.  They were ordered in hospital.

## 2022-02-24 RX ORDER — NICOTINE POLACRILEX 4 MG/1
20 GUM, CHEWING ORAL 2 TIMES DAILY
Qty: 60 TABLET | Refills: 0 | Status: SHIPPED | OUTPATIENT
Start: 2022-02-24 | End: 2022-03-01

## 2022-02-24 RX ORDER — POTASSIUM CHLORIDE 750 MG/1
10 TABLET, EXTENDED RELEASE ORAL
Qty: 15 TABLET | Refills: 0 | Status: SHIPPED | OUTPATIENT
Start: 2022-02-25

## 2022-02-24 RX ORDER — POLYETHYLENE GLYCOL 3350 17 G/17G
17 POWDER, FOR SOLUTION ORAL DAILY
Qty: 30 EACH | Refills: 0 | Status: SHIPPED | OUTPATIENT
Start: 2022-02-24

## 2022-02-24 RX ORDER — FLUTICASONE PROPIONATE 50 MCG
1 SPRAY, SUSPENSION (ML) NASAL DAILY
Qty: 1 EACH | Refills: 0 | Status: SHIPPED | OUTPATIENT
Start: 2022-02-24 | End: 2022-03-07

## 2022-02-24 NOTE — TELEPHONE ENCOUNTER
Message noted: Chart reviewed and may refill medications as requested. Prescription sent to listed pharmacy. Pharmacy to notify patient.    Pt notified through [facility]

## 2022-02-28 ENCOUNTER — LAB ENCOUNTER (OUTPATIENT)
Dept: LAB | Facility: HOSPITAL | Age: 57
End: 2022-02-28
Attending: INTERNAL MEDICINE
Payer: MEDICAID

## 2022-02-28 ENCOUNTER — OFFICE VISIT (OUTPATIENT)
Dept: SURGERY | Facility: CLINIC | Age: 57
End: 2022-02-28
Payer: MEDICAID

## 2022-02-28 VITALS
OXYGEN SATURATION: 94 % | WEIGHT: 181 LBS | SYSTOLIC BLOOD PRESSURE: 110 MMHG | HEART RATE: 56 BPM | RESPIRATION RATE: 16 BRPM | BODY MASS INDEX: 33 KG/M2 | DIASTOLIC BLOOD PRESSURE: 73 MMHG

## 2022-02-28 DIAGNOSIS — R52 PAIN: ICD-10-CM

## 2022-02-28 DIAGNOSIS — K74.60 CHRONIC HEPATITIS C VIRUS INFECTION WITH CIRRHOSIS (HCC): Primary | ICD-10-CM

## 2022-02-28 DIAGNOSIS — K74.60 CHRONIC HEPATITIS C VIRUS INFECTION WITH CIRRHOSIS (HCC): ICD-10-CM

## 2022-02-28 DIAGNOSIS — K21.00 GASTROESOPHAGEAL REFLUX DISEASE WITH ESOPHAGITIS WITHOUT HEMORRHAGE: ICD-10-CM

## 2022-02-28 DIAGNOSIS — B18.2 CHRONIC HEPATITIS C VIRUS INFECTION WITH CIRRHOSIS (HCC): Primary | ICD-10-CM

## 2022-02-28 DIAGNOSIS — B18.2 CHRONIC HEPATITIS C VIRUS INFECTION WITH CIRRHOSIS (HCC): ICD-10-CM

## 2022-02-28 LAB
AFP-TM SERPL-MCNC: 3.5 NG/ML (ref ?–8)
ALBUMIN SERPL-MCNC: 4 G/DL (ref 3.4–5)
ALBUMIN/GLOB SERPL: 1.2 {RATIO} (ref 1–2)
ALP LIVER SERPL-CCNC: 74 U/L
ALT SERPL-CCNC: 19 U/L
ANION GAP SERPL CALC-SCNC: 5 MMOL/L (ref 0–18)
AST SERPL-CCNC: 23 U/L (ref 15–37)
BASOPHILS # BLD AUTO: 0.02 X10(3) UL (ref 0–0.2)
BASOPHILS NFR BLD AUTO: 0.2 %
BILIRUB SERPL-MCNC: 0.6 MG/DL (ref 0.1–2)
BUN BLD-MCNC: 7 MG/DL (ref 7–18)
BUN/CREAT SERPL: 8.8 (ref 10–20)
CALCIUM BLD-MCNC: 9.3 MG/DL (ref 8.5–10.1)
CHLORIDE SERPL-SCNC: 105 MMOL/L (ref 98–112)
CO2 SERPL-SCNC: 28 MMOL/L (ref 21–32)
CREAT BLD-MCNC: 0.8 MG/DL
DEPRECATED RDW RBC AUTO: 40.7 FL (ref 35.1–46.3)
EOSINOPHIL # BLD AUTO: 0.13 X10(3) UL (ref 0–0.7)
EOSINOPHIL NFR BLD AUTO: 1.4 %
ERYTHROCYTE [DISTWIDTH] IN BLOOD BY AUTOMATED COUNT: 12.4 % (ref 11–15)
FASTING STATUS PATIENT QL REPORTED: NO
GLOBULIN PLAS-MCNC: 3.3 G/DL (ref 2.8–4.4)
HCT VFR BLD AUTO: 40.7 %
HGB BLD-MCNC: 14.1 G/DL
IMM GRANULOCYTES # BLD AUTO: 0.03 X10(3) UL (ref 0–1)
IMM GRANULOCYTES NFR BLD: 0.3 %
INR BLD: 1.22 (ref 0.8–1.2)
LYMPHOCYTES # BLD AUTO: 3.47 X10(3) UL (ref 1–4)
LYMPHOCYTES NFR BLD AUTO: 36.1 %
MCH RBC QN AUTO: 32 PG (ref 26–34)
MCHC RBC AUTO-ENTMCNC: 34.6 G/DL (ref 31–37)
MCV RBC AUTO: 92.5 FL
MONOCYTES # BLD AUTO: 0.5 X10(3) UL (ref 0.1–1)
MONOCYTES NFR BLD AUTO: 5.2 %
NEUTROPHILS # BLD AUTO: 5.46 X10 (3) UL (ref 1.5–7.7)
NEUTROPHILS # BLD AUTO: 5.46 X10(3) UL (ref 1.5–7.7)
NEUTROPHILS NFR BLD AUTO: 56.8 %
OSMOLALITY SERPL CALC.SUM OF ELEC: 283 MOSM/KG (ref 275–295)
PLATELET # BLD AUTO: 175 10(3)UL (ref 150–450)
POTASSIUM SERPL-SCNC: 3.6 MMOL/L (ref 3.5–5.1)
PROTHROMBIN TIME: 15.6 SECONDS (ref 11.6–14.8)
RBC # BLD AUTO: 4.4 X10(6)UL
SODIUM SERPL-SCNC: 138 MMOL/L (ref 136–145)
WBC # BLD AUTO: 9.6 X10(3) UL (ref 4–11)

## 2022-02-28 PROCEDURE — 82105 ALPHA-FETOPROTEIN SERUM: CPT

## 2022-02-28 PROCEDURE — 85610 PROTHROMBIN TIME: CPT | Performed by: INTERNAL MEDICINE

## 2022-02-28 PROCEDURE — 87522 HEPATITIS C REVRS TRNSCRPJ: CPT | Performed by: INTERNAL MEDICINE

## 2022-02-28 PROCEDURE — 36415 COLL VENOUS BLD VENIPUNCTURE: CPT | Performed by: INTERNAL MEDICINE

## 2022-02-28 PROCEDURE — 85025 COMPLETE CBC W/AUTO DIFF WBC: CPT | Performed by: INTERNAL MEDICINE

## 2022-02-28 PROCEDURE — 80053 COMPREHEN METABOLIC PANEL: CPT | Performed by: INTERNAL MEDICINE

## 2022-03-01 RX ORDER — NICOTINE POLACRILEX 4 MG/1
20 GUM, CHEWING ORAL 2 TIMES DAILY
Qty: 60 TABLET | Refills: 11 | Status: SHIPPED | OUTPATIENT
Start: 2022-03-01 | End: 2022-03-31

## 2022-03-01 RX ORDER — ACETAMINOPHEN 500 MG
500 TABLET ORAL 2 TIMES DAILY PRN
Qty: 30 TABLET | Refills: 5 | Status: SHIPPED | OUTPATIENT
Start: 2022-03-01

## 2022-03-05 LAB
HCV QNT BY NAAT (IU/ML): NOT DETECTED IU/ML
HCV QNT BY NAAT (LOG IU/ML): NOT DETECTED LOG IU/ML
HCV QNT BY NAAT INTERP: NOT DETECTED

## 2022-03-07 RX ORDER — FLUTICASONE PROPIONATE 50 MCG
1 SPRAY, SUSPENSION (ML) NASAL DAILY
Qty: 16 G | Refills: 1 | Status: SHIPPED | OUTPATIENT
Start: 2022-03-07 | End: 2022-09-26
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
## 2022-03-16 RX ORDER — ALBUTEROL SULFATE 90 UG/1
AEROSOL, METERED RESPIRATORY (INHALATION)
Qty: 18 G | Refills: 5 | Status: SHIPPED | OUTPATIENT
Start: 2022-03-16

## 2022-03-16 RX ORDER — ALPRAZOLAM 1 MG/1
TABLET ORAL
Qty: 90 TABLET | Refills: 0 | Status: SHIPPED | OUTPATIENT
Start: 2022-03-16

## 2022-03-16 NOTE — TELEPHONE ENCOUNTER
Message noted: Chart reviewed and may refill medications as requested. Script sent to listed pharmacy by secure method.     Pt notified through [facility]

## 2022-03-23 RX ORDER — FLUOXETINE HYDROCHLORIDE 20 MG/1
60 CAPSULE ORAL DAILY
Qty: 270 CAPSULE | Refills: 1 | Status: SHIPPED | OUTPATIENT
Start: 2022-03-23 | End: 2022-10-11
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
## 2022-04-16 RX ORDER — METHOCARBAMOL 500 MG/1
500 TABLET, FILM COATED ORAL 4 TIMES DAILY
Qty: 120 TABLET | Refills: 1 | Status: SHIPPED | OUTPATIENT
Start: 2022-04-16

## 2022-04-16 NOTE — TELEPHONE ENCOUNTER
Message noted: Chart reviewed and may refill medication as requested. Prescription sent to listed pharmacy. Pharmacy to notify patient.  Pt notified through [facility]
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
## 2022-04-20 RX ORDER — ALPRAZOLAM 1 MG/1
1 TABLET ORAL 3 TIMES DAILY
Qty: 90 TABLET | Refills: 0 | Status: SHIPPED | OUTPATIENT
Start: 2022-04-20

## 2022-05-08 ENCOUNTER — APPOINTMENT (OUTPATIENT)
Dept: CT IMAGING | Facility: HOSPITAL | Age: 57
End: 2022-05-08
Attending: EMERGENCY MEDICINE
Payer: MEDICAID

## 2022-05-08 ENCOUNTER — APPOINTMENT (OUTPATIENT)
Dept: GENERAL RADIOLOGY | Facility: HOSPITAL | Age: 57
End: 2022-05-08
Attending: EMERGENCY MEDICINE
Payer: MEDICAID

## 2022-05-08 ENCOUNTER — HOSPITAL ENCOUNTER (EMERGENCY)
Facility: HOSPITAL | Age: 57
Discharge: HOME | End: 2022-05-08
Attending: EMERGENCY MEDICINE
Payer: MEDICAID

## 2022-05-08 VITALS
WEIGHT: 170 LBS | DIASTOLIC BLOOD PRESSURE: 93 MMHG | HEART RATE: 77 BPM | TEMPERATURE: 99 F | BODY MASS INDEX: 31.28 KG/M2 | SYSTOLIC BLOOD PRESSURE: 156 MMHG | RESPIRATION RATE: 22 BRPM | HEIGHT: 62 IN | OXYGEN SATURATION: 96 %

## 2022-05-08 DIAGNOSIS — R11.2 NAUSEA AND VOMITING IN ADULT: ICD-10-CM

## 2022-05-08 DIAGNOSIS — R10.9 ABDOMINAL PAIN OF UNKNOWN ETIOLOGY: Primary | ICD-10-CM

## 2022-05-08 LAB
ALBUMIN SERPL-MCNC: 4.1 G/DL (ref 3.4–5)
ALP LIVER SERPL-CCNC: 81 U/L
ALT SERPL-CCNC: 21 U/L
ANION GAP SERPL CALC-SCNC: 7 MMOL/L (ref 0–18)
AST SERPL-CCNC: 47 U/L (ref 15–37)
BASOPHILS # BLD AUTO: 0.02 X10(3) UL (ref 0–0.2)
BASOPHILS NFR BLD AUTO: 0.1 %
BILIRUB DIRECT SERPL-MCNC: 0.2 MG/DL (ref 0–0.2)
BILIRUB SERPL-MCNC: 1.1 MG/DL (ref 0.1–2)
BILIRUB UR QL: NEGATIVE
BUN BLD-MCNC: 25 MG/DL (ref 7–18)
BUN/CREAT SERPL: 23.8 (ref 10–20)
CALCIUM BLD-MCNC: 9.6 MG/DL (ref 8.5–10.1)
CHLORIDE SERPL-SCNC: 98 MMOL/L (ref 98–112)
CO2 SERPL-SCNC: 26 MMOL/L (ref 21–32)
COLOR UR: YELLOW
CREAT BLD-MCNC: 1.05 MG/DL
DEPRECATED RDW RBC AUTO: 39.8 FL (ref 35.1–46.3)
EOSINOPHIL # BLD AUTO: 0.01 X10(3) UL (ref 0–0.7)
EOSINOPHIL NFR BLD AUTO: 0.1 %
ERYTHROCYTE [DISTWIDTH] IN BLOOD BY AUTOMATED COUNT: 12.6 % (ref 11–15)
GLUCOSE BLD-MCNC: 132 MG/DL (ref 70–99)
GLUCOSE UR-MCNC: NEGATIVE MG/DL
HCT VFR BLD AUTO: 52.4 %
HGB BLD-MCNC: 18.8 G/DL
IMM GRANULOCYTES # BLD AUTO: 0.08 X10(3) UL (ref 0–1)
IMM GRANULOCYTES NFR BLD: 0.5 %
KETONES UR-MCNC: NEGATIVE MG/DL
LYMPHOCYTES # BLD AUTO: 3.12 X10(3) UL (ref 1–4)
LYMPHOCYTES NFR BLD AUTO: 18.7 %
MCH RBC QN AUTO: 31.2 PG (ref 26–34)
MCHC RBC AUTO-ENTMCNC: 35.9 G/DL (ref 31–37)
MCV RBC AUTO: 87 FL
MONOCYTES # BLD AUTO: 0.97 X10(3) UL (ref 0.1–1)
MONOCYTES NFR BLD AUTO: 5.8 %
NEUTROPHILS # BLD AUTO: 12.45 X10 (3) UL (ref 1.5–7.7)
NEUTROPHILS # BLD AUTO: 12.45 X10(3) UL (ref 1.5–7.7)
NEUTROPHILS NFR BLD AUTO: 74.8 %
NITRITE UR QL STRIP.AUTO: NEGATIVE
OSMOLALITY SERPL CALC.SUM OF ELEC: 278 MOSM/KG (ref 275–295)
PH UR: 6 [PH] (ref 5–8)
PLATELET # BLD AUTO: 219 10(3)UL (ref 150–450)
POTASSIUM SERPL-SCNC: 3.5 MMOL/L (ref 3.5–5.1)
PROT SERPL-MCNC: 8.4 G/DL (ref 6.4–8.2)
PROT UR-MCNC: 100 MG/DL
RBC # BLD AUTO: 6.02 X10(6)UL
SODIUM SERPL-SCNC: 131 MMOL/L (ref 136–145)
SP GR UR STRIP: 1.02 (ref 1–1.03)
UROBILINOGEN UR STRIP-ACNC: 2
VIT C UR-MCNC: NEGATIVE MG/DL
WBC # BLD AUTO: 16.7 X10(3) UL (ref 4–11)

## 2022-05-08 PROCEDURE — 74177 CT ABD & PELVIS W/CONTRAST: CPT | Performed by: EMERGENCY MEDICINE

## 2022-05-08 PROCEDURE — 81001 URINALYSIS AUTO W/SCOPE: CPT | Performed by: EMERGENCY MEDICINE

## 2022-05-08 PROCEDURE — 71045 X-RAY EXAM CHEST 1 VIEW: CPT | Performed by: EMERGENCY MEDICINE

## 2022-05-08 PROCEDURE — 93010 ELECTROCARDIOGRAM REPORT: CPT | Performed by: EMERGENCY MEDICINE

## 2022-05-08 PROCEDURE — 93005 ELECTROCARDIOGRAM TRACING: CPT

## 2022-05-08 PROCEDURE — 99285 EMERGENCY DEPT VISIT HI MDM: CPT

## 2022-05-08 PROCEDURE — 96374 THER/PROPH/DIAG INJ IV PUSH: CPT

## 2022-05-08 PROCEDURE — 85025 COMPLETE CBC W/AUTO DIFF WBC: CPT | Performed by: EMERGENCY MEDICINE

## 2022-05-08 PROCEDURE — 80076 HEPATIC FUNCTION PANEL: CPT | Performed by: EMERGENCY MEDICINE

## 2022-05-08 PROCEDURE — 80048 BASIC METABOLIC PNL TOTAL CA: CPT | Performed by: EMERGENCY MEDICINE

## 2022-05-08 PROCEDURE — 87086 URINE CULTURE/COLONY COUNT: CPT | Performed by: EMERGENCY MEDICINE

## 2022-05-08 PROCEDURE — 96361 HYDRATE IV INFUSION ADD-ON: CPT

## 2022-05-08 RX ORDER — ONDANSETRON 4 MG/1
4 TABLET, ORALLY DISINTEGRATING ORAL EVERY 4 HOURS PRN
Qty: 15 TABLET | Refills: 0 | Status: SHIPPED | OUTPATIENT
Start: 2022-05-08

## 2022-05-08 RX ORDER — ONDANSETRON 4 MG/1
4 TABLET, ORALLY DISINTEGRATING ORAL ONCE
Status: COMPLETED | OUTPATIENT
Start: 2022-05-08 | End: 2022-05-08

## 2022-05-08 RX ORDER — KETOROLAC TROMETHAMINE 15 MG/ML
15 INJECTION, SOLUTION INTRAMUSCULAR; INTRAVENOUS ONCE
Status: COMPLETED | OUTPATIENT
Start: 2022-05-08 | End: 2022-05-08

## 2022-05-08 RX ORDER — ONDANSETRON 4 MG/1
TABLET, ORALLY DISINTEGRATING ORAL
Status: COMPLETED
Start: 2022-05-08 | End: 2022-05-08

## 2022-05-12 RX ORDER — ALPRAZOLAM 1 MG/1
TABLET ORAL
Qty: 90 TABLET | Refills: 0 | Status: SHIPPED | OUTPATIENT
Start: 2022-05-12

## 2022-05-14 ENCOUNTER — LAB ENCOUNTER (OUTPATIENT)
Dept: LAB | Facility: HOSPITAL | Age: 57
End: 2022-05-14
Attending: INTERNAL MEDICINE
Payer: MEDICAID

## 2022-05-14 DIAGNOSIS — B18.2 CHRONIC HEPATITIS C VIRUS INFECTION WITH CIRRHOSIS (HCC): ICD-10-CM

## 2022-05-14 DIAGNOSIS — K74.60 CHRONIC HEPATITIS C VIRUS INFECTION WITH CIRRHOSIS (HCC): ICD-10-CM

## 2022-05-14 LAB
ALBUMIN SERPL-MCNC: 3.9 G/DL (ref 3.4–5)
ALBUMIN/GLOB SERPL: 1.1 {RATIO} (ref 1–2)
ALP LIVER SERPL-CCNC: 69 U/L
ALT SERPL-CCNC: 21 U/L
ANION GAP SERPL CALC-SCNC: 7 MMOL/L (ref 0–18)
AST SERPL-CCNC: 27 U/L (ref 15–37)
BASOPHILS # BLD AUTO: 0.01 X10(3) UL (ref 0–0.2)
BASOPHILS NFR BLD AUTO: 0.2 %
BILIRUB SERPL-MCNC: 0.6 MG/DL (ref 0.1–2)
BUN BLD-MCNC: 8 MG/DL (ref 7–18)
BUN/CREAT SERPL: 11.8 (ref 10–20)
CALCIUM BLD-MCNC: 9.5 MG/DL (ref 8.5–10.1)
CHLORIDE SERPL-SCNC: 100 MMOL/L (ref 98–112)
CO2 SERPL-SCNC: 35 MMOL/L (ref 21–32)
CREAT BLD-MCNC: 0.68 MG/DL
DEPRECATED RDW RBC AUTO: 41.1 FL (ref 35.1–46.3)
EOSINOPHIL # BLD AUTO: 0.08 X10(3) UL (ref 0–0.7)
EOSINOPHIL NFR BLD AUTO: 1.4 %
ERYTHROCYTE [DISTWIDTH] IN BLOOD BY AUTOMATED COUNT: 12.6 % (ref 11–15)
FASTING STATUS PATIENT QL REPORTED: YES
GLOBULIN PLAS-MCNC: 3.5 G/DL (ref 2.8–4.4)
GLUCOSE BLD-MCNC: 87 MG/DL (ref 70–99)
HCT VFR BLD AUTO: 41.9 %
HGB BLD-MCNC: 14.6 G/DL
IMM GRANULOCYTES # BLD AUTO: 0.01 X10(3) UL (ref 0–1)
IMM GRANULOCYTES NFR BLD: 0.2 %
INR BLD: 1.33 (ref 0.8–1.2)
LYMPHOCYTES # BLD AUTO: 2.29 X10(3) UL (ref 1–4)
LYMPHOCYTES NFR BLD AUTO: 39.3 %
MCH RBC QN AUTO: 31.3 PG (ref 26–34)
MCHC RBC AUTO-ENTMCNC: 34.8 G/DL (ref 31–37)
MCV RBC AUTO: 89.9 FL
MONOCYTES # BLD AUTO: 0.35 X10(3) UL (ref 0.1–1)
MONOCYTES NFR BLD AUTO: 6 %
NEUTROPHILS # BLD AUTO: 3.08 X10 (3) UL (ref 1.5–7.7)
NEUTROPHILS # BLD AUTO: 3.08 X10(3) UL (ref 1.5–7.7)
NEUTROPHILS NFR BLD AUTO: 52.9 %
OSMOLALITY SERPL CALC.SUM OF ELEC: 292 MOSM/KG (ref 275–295)
PLATELET # BLD AUTO: 144 10(3)UL (ref 150–450)
POTASSIUM SERPL-SCNC: 3.2 MMOL/L (ref 3.5–5.1)
PROT SERPL-MCNC: 7.4 G/DL (ref 6.4–8.2)
PROTHROMBIN TIME: 16.6 SECONDS (ref 11.6–14.8)
RBC # BLD AUTO: 4.66 X10(6)UL
SODIUM SERPL-SCNC: 142 MMOL/L (ref 136–145)
WBC # BLD AUTO: 5.8 X10(3) UL (ref 4–11)

## 2022-05-14 PROCEDURE — 87522 HEPATITIS C REVRS TRNSCRPJ: CPT

## 2022-05-14 PROCEDURE — 85025 COMPLETE CBC W/AUTO DIFF WBC: CPT | Performed by: INTERNAL MEDICINE

## 2022-05-14 PROCEDURE — 80053 COMPREHEN METABOLIC PANEL: CPT | Performed by: INTERNAL MEDICINE

## 2022-05-14 PROCEDURE — 85610 PROTHROMBIN TIME: CPT | Performed by: INTERNAL MEDICINE

## 2022-05-14 PROCEDURE — 36415 COLL VENOUS BLD VENIPUNCTURE: CPT | Performed by: INTERNAL MEDICINE

## 2022-05-23 ENCOUNTER — OFFICE VISIT (OUTPATIENT)
Dept: SURGERY | Facility: CLINIC | Age: 57
End: 2022-05-23
Payer: MEDICAID

## 2022-05-23 ENCOUNTER — TELEPHONE (OUTPATIENT)
Dept: FAMILY MEDICINE CLINIC | Facility: CLINIC | Age: 57
End: 2022-05-23

## 2022-05-23 ENCOUNTER — MED REC SCAN ONLY (OUTPATIENT)
Dept: FAMILY MEDICINE CLINIC | Facility: CLINIC | Age: 57
End: 2022-05-23

## 2022-05-23 VITALS
HEART RATE: 55 BPM | TEMPERATURE: 98 F | WEIGHT: 177 LBS | OXYGEN SATURATION: 97 % | BODY MASS INDEX: 32 KG/M2 | DIASTOLIC BLOOD PRESSURE: 82 MMHG | SYSTOLIC BLOOD PRESSURE: 136 MMHG

## 2022-05-23 DIAGNOSIS — K74.60 CHRONIC HEPATITIS C VIRUS INFECTION WITH CIRRHOSIS (HCC): Primary | ICD-10-CM

## 2022-05-23 DIAGNOSIS — B18.2 CHRONIC HEPATITIS C VIRUS INFECTION WITH CIRRHOSIS (HCC): Primary | ICD-10-CM

## 2022-05-23 NOTE — TELEPHONE ENCOUNTER
Signed Medical Record Release Form successfully faxed to Medical Records at [phone]. Confirmation # and forms placed in scanning.

## 2022-06-15 RX ORDER — ALPRAZOLAM 1 MG/1
TABLET ORAL
Qty: 90 TABLET | Refills: 0 | Status: SHIPPED | OUTPATIENT
Start: 2022-06-15

## 2022-06-16 RX ORDER — ALPRAZOLAM 1 MG/1
TABLET ORAL
Qty: 90 TABLET | Refills: 0 | OUTPATIENT
Start: 2022-06-16

## 2022-06-16 RX ORDER — MELOXICAM 15 MG/1
TABLET ORAL
Qty: 90 TABLET | Refills: 1 | Status: SHIPPED | OUTPATIENT
Start: 2022-06-16

## 2022-07-13 RX ORDER — ALPRAZOLAM 1 MG/1
TABLET ORAL
Qty: 90 TABLET | Refills: 0 | Status: SHIPPED | OUTPATIENT
Start: 2022-07-13

## 2022-08-13 RX ORDER — METHOCARBAMOL 500 MG/1
TABLET, FILM COATED ORAL
Qty: 120 TABLET | Refills: 1 | Status: SHIPPED | OUTPATIENT
Start: 2022-08-13

## 2022-08-13 NOTE — TELEPHONE ENCOUNTER
Message noted: Chart reviewed and may refill medication as requested. Prescription sent to listed pharmacy. Pharmacy to notify patient.  Pt notified through [facility]

## 2022-08-15 RX ORDER — ALPRAZOLAM 1 MG/1
TABLET ORAL
Qty: 90 TABLET | Refills: 0 | Status: SHIPPED | OUTPATIENT
Start: 2022-08-15 | End: 2022-09-09

## 2022-08-15 RX ORDER — ALPRAZOLAM 1 MG/1
TABLET ORAL
Qty: 90 TABLET | Refills: 0 | OUTPATIENT
Start: 2022-08-15

## 2022-08-15 NOTE — TELEPHONE ENCOUNTER
Message noted: Chart reviewed and may refill medication as requested. Script sent to listed pharmacy by secure method.     Pt notified through [facility]

## 2022-09-07 ENCOUNTER — TELEPHONE (OUTPATIENT)
Dept: INTERNAL MEDICINE CLINIC | Facility: CLINIC | Age: 57
End: 2022-09-07

## 2022-09-07 ENCOUNTER — NURSE TRIAGE (OUTPATIENT)
Dept: FAMILY MEDICINE CLINIC | Facility: CLINIC | Age: 57
End: 2022-09-07

## 2022-09-08 ENCOUNTER — TELEMEDICINE (OUTPATIENT)
Dept: FAMILY MEDICINE CLINIC | Facility: CLINIC | Age: 57
End: 2022-09-08
Payer: MEDICAID

## 2022-09-08 DIAGNOSIS — Z53.21 PATIENT LEFT WITHOUT BEING SEEN: Primary | ICD-10-CM

## 2022-09-09 ENCOUNTER — TELEMEDICINE (OUTPATIENT)
Dept: FAMILY MEDICINE CLINIC | Facility: CLINIC | Age: 57
End: 2022-09-09
Payer: MEDICAID

## 2022-09-09 DIAGNOSIS — J44.1 COPD EXACERBATION (HCC): Primary | ICD-10-CM

## 2022-09-09 DIAGNOSIS — F41.1 GENERALIZED ANXIETY DISORDER: ICD-10-CM

## 2022-09-09 RX ORDER — ALBUTEROL SULFATE 90 UG/1
2 AEROSOL, METERED RESPIRATORY (INHALATION) EVERY 4 HOURS PRN
Qty: 18 G | Refills: 0 | Status: SHIPPED | OUTPATIENT
Start: 2022-09-09

## 2022-09-09 RX ORDER — IPRATROPIUM BROMIDE AND ALBUTEROL SULFATE 2.5; .5 MG/3ML; MG/3ML
3 SOLUTION RESPIRATORY (INHALATION) EVERY 6 HOURS PRN
Qty: 84 ML | Refills: 0 | Status: SHIPPED | OUTPATIENT
Start: 2022-09-09

## 2022-09-09 RX ORDER — AMOXICILLIN AND CLAVULANATE POTASSIUM 875; 125 MG/1; MG/1
1 TABLET, FILM COATED ORAL 2 TIMES DAILY
Qty: 10 TABLET | Refills: 0 | Status: SHIPPED | OUTPATIENT
Start: 2022-09-09 | End: 2022-09-14

## 2022-09-09 RX ORDER — PREDNISONE 20 MG/1
TABLET ORAL
Qty: 12 TABLET | Refills: 0 | Status: SHIPPED | OUTPATIENT
Start: 2022-09-09

## 2022-09-09 RX ORDER — ALPRAZOLAM 1 MG/1
1 TABLET ORAL 3 TIMES DAILY
Qty: 90 TABLET | Refills: 0 | Status: SHIPPED | OUTPATIENT
Start: 2022-09-15

## 2022-09-09 RX ORDER — NEBULIZER ACCESSORIES
KIT MISCELLANEOUS
Qty: 1 EACH | Refills: 0 | Status: SHIPPED | OUTPATIENT
Start: 2022-09-09

## 2022-09-26 ENCOUNTER — TELEMEDICINE (OUTPATIENT)
Dept: FAMILY MEDICINE CLINIC | Facility: CLINIC | Age: 57
End: 2022-09-26

## 2022-09-26 ENCOUNTER — MED REC SCAN ONLY (OUTPATIENT)
Dept: FAMILY MEDICINE CLINIC | Facility: CLINIC | Age: 57
End: 2022-09-26

## 2022-09-26 DIAGNOSIS — H93.8X3 CONGESTION OF BOTH EARS: Primary | ICD-10-CM

## 2022-09-26 DIAGNOSIS — J44.1 COPD EXACERBATION (HCC): ICD-10-CM

## 2022-09-26 PROBLEM — J44.9 CHRONIC OBSTRUCTIVE PULMONARY DISEASE (HCC): Status: ACTIVE | Noted: 2022-09-26

## 2022-09-26 RX ORDER — AMOXICILLIN AND CLAVULANATE POTASSIUM 875; 125 MG/1; MG/1
1 TABLET, FILM COATED ORAL 2 TIMES DAILY
Qty: 10 TABLET | Refills: 0 | Status: SHIPPED | OUTPATIENT
Start: 2022-09-26 | End: 2022-10-01

## 2022-09-26 RX ORDER — FLUTICASONE PROPIONATE 50 MCG
1 SPRAY, SUSPENSION (ML) NASAL DAILY
Qty: 16 G | Refills: 1 | Status: SHIPPED | OUTPATIENT
Start: 2022-09-26

## 2022-10-11 ENCOUNTER — TELEPHONE (OUTPATIENT)
Dept: FAMILY MEDICINE CLINIC | Facility: CLINIC | Age: 57
End: 2022-10-11

## 2022-10-11 DIAGNOSIS — B18.2 CHRONIC HEPATITIS C WITHOUT HEPATIC COMA (HCC): ICD-10-CM

## 2022-10-11 RX ORDER — FUROSEMIDE 20 MG/1
20 TABLET ORAL DAILY
Qty: 30 TABLET | Refills: 11 | OUTPATIENT
Start: 2022-10-11

## 2022-10-11 RX ORDER — FLUOXETINE HYDROCHLORIDE 20 MG/1
60 CAPSULE ORAL DAILY
Qty: 270 CAPSULE | Refills: 1 | Status: SHIPPED | OUTPATIENT
Start: 2022-10-11

## 2022-10-11 RX ORDER — METHOCARBAMOL 500 MG/1
500 TABLET, FILM COATED ORAL 4 TIMES DAILY
Qty: 120 TABLET | Refills: 1 | Status: SHIPPED | OUTPATIENT
Start: 2022-10-11

## 2022-10-11 RX ORDER — ALPRAZOLAM 1 MG/1
1 TABLET ORAL 3 TIMES DAILY
Qty: 90 TABLET | Refills: 0 | Status: SHIPPED | OUTPATIENT
Start: 2022-10-11 | End: 2022-10-17

## 2022-10-12 NOTE — TELEPHONE ENCOUNTER
Please advise patient to call Dr. Tonia Polo office for refill of furosemide.    Other refills sent to pharmacy

## 2022-10-13 NOTE — TELEPHONE ENCOUNTER
Attempted to call patient x 2, phone rings then sent to busy tone. Noted [employer] already sent still not read at this time.

## 2022-10-14 RX ORDER — ALPRAZOLAM 1 MG/1
TABLET ORAL
Qty: 90 TABLET | Refills: 0 | OUTPATIENT
Start: 2022-10-14

## 2022-10-17 ENCOUNTER — TELEPHONE (OUTPATIENT)
Dept: FAMILY MEDICINE CLINIC | Facility: CLINIC | Age: 57
End: 2022-10-17

## 2022-10-17 RX ORDER — ALPRAZOLAM 1 MG/1
1 TABLET ORAL 3 TIMES DAILY
Qty: 90 TABLET | Refills: 0 | Status: SHIPPED | OUTPATIENT
Start: 2022-10-17

## 2022-10-17 NOTE — TELEPHONE ENCOUNTER
Message noted: Chart reviewed and may refill medication as requested. Script sent to listed pharmacy by secure method.     Pt notified through [facility]

## 2022-10-17 NOTE — TELEPHONE ENCOUNTER
Dr Marivel Sarkar, please advise    Medication needs to be reordered to the corrected pharmacy. Patient needs her Rx to be sent to the [facility] in [place]. It was sent to the incorrect pharmacy. Pharmacy and allergies verified.

## 2022-10-20 ENCOUNTER — TELEPHONE (OUTPATIENT)
Dept: FAMILY MEDICINE CLINIC | Facility: CLINIC | Age: 57
End: 2022-10-20

## 2022-10-21 ENCOUNTER — TELEPHONE (OUTPATIENT)
Dept: FAMILY MEDICINE CLINIC | Facility: CLINIC | Age: 57
End: 2022-10-21

## 2022-10-21 ENCOUNTER — TELEMEDICINE (OUTPATIENT)
Dept: FAMILY MEDICINE CLINIC | Facility: CLINIC | Age: 57
End: 2022-10-21
Payer: MEDICAID

## 2022-10-21 DIAGNOSIS — J44.9 CHRONIC OBSTRUCTIVE PULMONARY DISEASE, UNSPECIFIED COPD TYPE (HCC): Primary | ICD-10-CM

## 2022-10-21 PROCEDURE — 99213 OFFICE O/P EST LOW 20 MIN: CPT

## 2022-10-21 RX ORDER — ALBUTEROL SULFATE 90 UG/1
2 AEROSOL, METERED RESPIRATORY (INHALATION) EVERY 4 HOURS PRN
Qty: 18 G | Refills: 0 | Status: SHIPPED | OUTPATIENT
Start: 2022-10-21

## 2022-10-21 NOTE — TELEPHONE ENCOUNTER
Patient has been seen twice within the last month for COPD exacerbation. Has been given two rounds of steroids and azithromycin with mild improvement in symptoms. Was also given script for albuterol-ipratropium solution and nebulizer but insurance not covering medication. Was seen today and given Atrovent inhaler. Advised to make appointment with Dr. Elenita Bird.      Will route to pulmonology clinical staff for expedited appointment for uncontrolled COPD

## 2022-10-27 ENCOUNTER — OFFICE VISIT (OUTPATIENT)
Dept: FAMILY MEDICINE CLINIC | Facility: CLINIC | Age: 57
End: 2022-10-27
Payer: MEDICAID

## 2022-10-27 VITALS
DIASTOLIC BLOOD PRESSURE: 64 MMHG | BODY MASS INDEX: 31.18 KG/M2 | HEART RATE: 55 BPM | WEIGHT: 163 LBS | SYSTOLIC BLOOD PRESSURE: 106 MMHG | RESPIRATION RATE: 16 BRPM | HEIGHT: 60.5 IN | TEMPERATURE: 98 F

## 2022-10-27 DIAGNOSIS — M17.10 ARTHRITIS OF KNEE: ICD-10-CM

## 2022-10-27 DIAGNOSIS — Z12.31 VISIT FOR SCREENING MAMMOGRAM: ICD-10-CM

## 2022-10-27 DIAGNOSIS — Z00.00 ROUTINE PHYSICAL EXAMINATION: ICD-10-CM

## 2022-10-27 DIAGNOSIS — Z12.11 COLON CANCER SCREENING: ICD-10-CM

## 2022-10-27 DIAGNOSIS — K74.60 CIRRHOSIS OF LIVER WITH ASCITES, UNSPECIFIED HEPATIC CIRRHOSIS TYPE (HCC): ICD-10-CM

## 2022-10-27 DIAGNOSIS — J44.9 CHRONIC OBSTRUCTIVE PULMONARY DISEASE, UNSPECIFIED COPD TYPE (HCC): ICD-10-CM

## 2022-10-27 DIAGNOSIS — R18.8 CIRRHOSIS OF LIVER WITH ASCITES, UNSPECIFIED HEPATIC CIRRHOSIS TYPE (HCC): ICD-10-CM

## 2022-10-27 DIAGNOSIS — R21 RASH AND NONSPECIFIC SKIN ERUPTION: ICD-10-CM

## 2022-10-27 PROCEDURE — 3074F SYST BP LT 130 MM HG: CPT | Performed by: FAMILY MEDICINE

## 2022-10-27 PROCEDURE — 99396 PREV VISIT EST AGE 40-64: CPT | Performed by: FAMILY MEDICINE

## 2022-10-27 PROCEDURE — 3008F BODY MASS INDEX DOCD: CPT | Performed by: FAMILY MEDICINE

## 2022-10-27 PROCEDURE — 3078F DIAST BP <80 MM HG: CPT | Performed by: FAMILY MEDICINE

## 2022-10-27 RX ORDER — CETIRIZINE HYDROCHLORIDE 10 MG/1
10 TABLET ORAL DAILY
Qty: 30 TABLET | Refills: 1 | Status: SHIPPED | OUTPATIENT
Start: 2022-10-27

## 2022-10-30 ENCOUNTER — HOSPITAL ENCOUNTER (EMERGENCY)
Facility: HOSPITAL | Age: 57
Discharge: HOME | End: 2022-10-31
Payer: MEDICAID

## 2022-10-30 ENCOUNTER — APPOINTMENT (OUTPATIENT)
Dept: GENERAL RADIOLOGY | Facility: HOSPITAL | Age: 57
End: 2022-10-30
Attending: NURSE PRACTITIONER
Payer: MEDICAID

## 2022-10-30 DIAGNOSIS — J44.1 COPD EXACERBATION (HCC): Primary | ICD-10-CM

## 2022-10-30 LAB
BASOPHILS # BLD AUTO: 0.02 X10(3) UL (ref 0–0.2)
BASOPHILS NFR BLD AUTO: 0.3 %
DEPRECATED RDW RBC AUTO: 44.4 FL (ref 35.1–46.3)
EOSINOPHIL # BLD AUTO: 0.12 X10(3) UL (ref 0–0.7)
EOSINOPHIL NFR BLD AUTO: 1.9 %
ERYTHROCYTE [DISTWIDTH] IN BLOOD BY AUTOMATED COUNT: 12.9 % (ref 11–15)
HCT VFR BLD AUTO: 39.7 %
HGB BLD-MCNC: 13.3 G/DL
IMM GRANULOCYTES # BLD AUTO: 0.01 X10(3) UL (ref 0–1)
IMM GRANULOCYTES NFR BLD: 0.2 %
LYMPHOCYTES # BLD AUTO: 2.12 X10(3) UL (ref 1–4)
LYMPHOCYTES NFR BLD AUTO: 33.4 %
MCH RBC QN AUTO: 31.4 PG (ref 26–34)
MCHC RBC AUTO-ENTMCNC: 33.5 G/DL (ref 31–37)
MCV RBC AUTO: 93.9 FL
MONOCYTES # BLD AUTO: 0.39 X10(3) UL (ref 0.1–1)
MONOCYTES NFR BLD AUTO: 6.2 %
NEUTROPHILS # BLD AUTO: 3.68 X10 (3) UL (ref 1.5–7.7)
NEUTROPHILS # BLD AUTO: 3.68 X10(3) UL (ref 1.5–7.7)
NEUTROPHILS NFR BLD AUTO: 58 %
PLATELET # BLD AUTO: 107 10(3)UL (ref 150–450)
RBC # BLD AUTO: 4.23 X10(6)UL
WBC # BLD AUTO: 6.3 X10(3) UL (ref 4–11)

## 2022-10-30 PROCEDURE — 85025 COMPLETE CBC W/AUTO DIFF WBC: CPT | Performed by: NURSE PRACTITIONER

## 2022-10-30 PROCEDURE — 93005 ELECTROCARDIOGRAM TRACING: CPT

## 2022-10-30 PROCEDURE — 84484 ASSAY OF TROPONIN QUANT: CPT | Performed by: NURSE PRACTITIONER

## 2022-10-30 PROCEDURE — 83880 ASSAY OF NATRIURETIC PEPTIDE: CPT | Performed by: NURSE PRACTITIONER

## 2022-10-30 PROCEDURE — 93010 ELECTROCARDIOGRAM REPORT: CPT | Performed by: NURSE PRACTITIONER

## 2022-10-30 PROCEDURE — 85379 FIBRIN DEGRADATION QUANT: CPT | Performed by: NURSE PRACTITIONER

## 2022-10-30 PROCEDURE — 94644 CONT INHLJ TX 1ST HOUR: CPT

## 2022-10-30 PROCEDURE — 36415 COLL VENOUS BLD VENIPUNCTURE: CPT

## 2022-10-30 PROCEDURE — 99285 EMERGENCY DEPT VISIT HI MDM: CPT

## 2022-10-30 PROCEDURE — 80048 BASIC METABOLIC PNL TOTAL CA: CPT | Performed by: NURSE PRACTITIONER

## 2022-10-30 PROCEDURE — 71045 X-RAY EXAM CHEST 1 VIEW: CPT | Performed by: NURSE PRACTITIONER

## 2022-10-31 ENCOUNTER — APPOINTMENT (OUTPATIENT)
Dept: CT IMAGING | Facility: HOSPITAL | Age: 57
End: 2022-10-31
Attending: NURSE PRACTITIONER
Payer: MEDICAID

## 2022-10-31 VITALS
BODY MASS INDEX: 31.41 KG/M2 | RESPIRATION RATE: 15 BRPM | OXYGEN SATURATION: 94 % | WEIGHT: 160 LBS | SYSTOLIC BLOOD PRESSURE: 108 MMHG | HEART RATE: 49 BPM | HEIGHT: 60 IN | DIASTOLIC BLOOD PRESSURE: 62 MMHG | TEMPERATURE: 98 F

## 2022-10-31 LAB
ANION GAP SERPL CALC-SCNC: 6 MMOL/L (ref 0–18)
BUN BLD-MCNC: 14 MG/DL (ref 7–18)
BUN/CREAT SERPL: 14.9 (ref 10–20)
CALCIUM BLD-MCNC: 9 MG/DL (ref 8.5–10.1)
CHLORIDE SERPL-SCNC: 105 MMOL/L (ref 98–112)
CO2 SERPL-SCNC: 28 MMOL/L (ref 21–32)
CREAT BLD-MCNC: 0.94 MG/DL
D DIMER PPP FEU-MCNC: 0.61 UG/ML FEU (ref ?–0.57)
GFR SERPLBLD BASED ON 1.73 SQ M-ARVRAT: 71 ML/MIN/1.73M2 (ref 60–?)
GLUCOSE BLD-MCNC: 99 MG/DL (ref 70–99)
NT-PROBNP SERPL-MCNC: 612 PG/ML (ref ?–125)
OSMOLALITY SERPL CALC.SUM OF ELEC: 289 MOSM/KG (ref 275–295)
POTASSIUM SERPL-SCNC: 3.7 MMOL/L (ref 3.5–5.1)
SODIUM SERPL-SCNC: 139 MMOL/L (ref 136–145)
TROPONIN I HIGH SENSITIVITY: 5 NG/L

## 2022-10-31 PROCEDURE — 71260 CT THORAX DX C+: CPT | Performed by: NURSE PRACTITIONER

## 2022-10-31 RX ORDER — PREDNISONE 20 MG/1
40 TABLET ORAL DAILY
Qty: 10 TABLET | Refills: 0 | Status: SHIPPED | OUTPATIENT
Start: 2022-10-31 | End: 2022-11-05

## 2022-10-31 NOTE — ED INITIAL ASSESSMENT (HPI)
Patient arrives ambulatory to triage with c/o chest pain shortness of breath a dental problem. Patient with history of COPD.

## 2022-11-04 ENCOUNTER — OFFICE VISIT (OUTPATIENT)
Dept: PULMONOLOGY | Facility: CLINIC | Age: 57
End: 2022-11-04
Payer: MEDICAID

## 2022-11-04 VITALS
RESPIRATION RATE: 16 BRPM | HEART RATE: 76 BPM | SYSTOLIC BLOOD PRESSURE: 122 MMHG | DIASTOLIC BLOOD PRESSURE: 82 MMHG | OXYGEN SATURATION: 94 % | WEIGHT: 158 LBS | HEIGHT: 60 IN | BODY MASS INDEX: 31.02 KG/M2

## 2022-11-04 DIAGNOSIS — J44.1 CHRONIC OBSTRUCTIVE PULMONARY DISEASE WITH ACUTE EXACERBATION (HCC): Primary | ICD-10-CM

## 2022-11-04 DIAGNOSIS — Z87.891 PERSONAL HISTORY OF TOBACCO USE, PRESENTING HAZARDS TO HEALTH: ICD-10-CM

## 2022-11-04 RX ORDER — BUDESONIDE AND FORMOTEROL FUMARATE DIHYDRATE 160; 4.5 UG/1; UG/1
2 AEROSOL RESPIRATORY (INHALATION) 2 TIMES DAILY
Qty: 1 EACH | Refills: 3 | Status: SHIPPED | OUTPATIENT
Start: 2022-11-04 | End: 2023-11-04

## 2022-11-04 RX ORDER — PREDNISONE 10 MG/1
TABLET ORAL
Qty: 18 TABLET | Refills: 0 | Status: SHIPPED | OUTPATIENT
Start: 2022-11-04

## 2022-11-04 RX ORDER — LEVOFLOXACIN 500 MG/1
500 TABLET, FILM COATED ORAL DAILY
Qty: 7 TABLET | Refills: 0 | Status: SHIPPED | OUTPATIENT
Start: 2022-11-04

## 2022-11-04 RX ORDER — IPRATROPIUM BROMIDE AND ALBUTEROL SULFATE 2.5; .5 MG/3ML; MG/3ML
3 SOLUTION RESPIRATORY (INHALATION)
Qty: 120 EACH | Refills: 1 | Status: SHIPPED | OUTPATIENT
Start: 2022-11-04

## 2022-11-08 ENCOUNTER — TELEPHONE (OUTPATIENT)
Dept: PULMONOLOGY | Facility: CLINIC | Age: 57
End: 2022-11-08

## 2022-11-08 NOTE — TELEPHONE ENCOUNTER
Received fax from [facility] noting Symbicort PA approval.  Case number: [id number]  8/7/22-11/5/23    Sent to scanning.

## 2022-11-11 RX ORDER — ALPRAZOLAM 1 MG/1
TABLET ORAL
Qty: 90 TABLET | Refills: 0 | Status: SHIPPED | OUTPATIENT
Start: 2022-11-11

## 2022-11-15 ENCOUNTER — TELEPHONE (OUTPATIENT)
Dept: PULMONOLOGY | Facility: CLINIC | Age: 57
End: 2022-11-15

## 2022-11-18 ENCOUNTER — TELEPHONE (OUTPATIENT)
Dept: PULMONOLOGY | Facility: CLINIC | Age: 57
End: 2022-11-18

## 2022-11-18 RX ORDER — LEVOFLOXACIN 500 MG/1
500 TABLET, FILM COATED ORAL DAILY
Qty: 7 TABLET | Refills: 0 | Status: SHIPPED | OUTPATIENT
Start: 2022-11-18

## 2022-11-18 NOTE — TELEPHONE ENCOUNTER
Dr. Felecia Jacobsen had office visit 11/4/22 and levofloxacin 500 mg x 7 days was ordered. Patient requesting another round of ABT because she \"had MRSA in 1990's and is very resistant to ABT\".

## 2022-11-21 ENCOUNTER — TELEPHONE (OUTPATIENT)
Dept: PULMONOLOGY | Facility: CLINIC | Age: 57
End: 2022-11-21

## 2022-11-21 DIAGNOSIS — J44.9 CHRONIC OBSTRUCTIVE PULMONARY DISEASE, UNSPECIFIED COPD TYPE (HCC): Primary | ICD-10-CM

## 2022-11-21 NOTE — TELEPHONE ENCOUNTER
Lesly/ Representative of [employer] is requesting documentation stating patient's nebulizer is broken beyond repair for insurance to cover nebulizer order.     Fax# [phone]

## 2022-11-29 NOTE — TELEPHONE ENCOUNTER
Revised progress note from Dr. Chucky Mcwilliams stating nebulizer is broken beyond repair faxed to [facility] [phone]. Confirmation received.

## 2022-11-29 NOTE — TELEPHONE ENCOUNTER
Received another [facility] request confirming that the patients current nebulizer is broken beyond repair since they just supplied one 6/6/2019. [facility] stated this will be the last time they attempt to retrieve this information. Dr. Adriel Castillo- Can you please addend your last office notes to include that pt's nebulizer is broken beyond repair.

## 2022-12-02 NOTE — TELEPHONE ENCOUNTER
Received new order that was revised and signed by Dr. Leslie Parish faxed to [facility] [phone]. Confirmation received.

## 2022-12-12 RX ORDER — ALPRAZOLAM 1 MG/1
TABLET ORAL
Qty: 90 TABLET | Refills: 0 | Status: SHIPPED | OUTPATIENT
Start: 2022-12-12

## 2022-12-12 NOTE — TELEPHONE ENCOUNTER
Verified name and . Patient calling to follow up on refill request for Alprazolam. She states she is out of medication now- requesting high priority message be sent. Please see pended medication.

## 2023-01-10 RX ORDER — ALPRAZOLAM 1 MG/1
TABLET ORAL
Qty: 90 TABLET | Refills: 0 | Status: SHIPPED | OUTPATIENT
Start: 2023-01-10

## 2023-01-11 NOTE — PROGRESS NOTES
[facility]    Progress Note    Fatuma Martinez Patient Status:  Inpatient    1965 MRN [id number]   Location [facility] 5SW/SE Attending Luis E Duque Day # 3 PCP Neftali Rose MD        Subjective:   Lord Screws 08/15/2021    BUN 24 (H) 08/15/2021     (L) 08/15/2021    K 4.5 08/15/2021    CL 99 08/15/2021    CO2 24.0 08/15/2021     (H) 08/15/2021    CA 10.1 08/15/2021    ALB 3.8 08/14/2021    ALKPHO 98 08/14/2021    BILT 0.6 08/14/2021    TP 9.2 (H) 0 13:00:37 No change Electronically signed on 08/15/2021 at 10:37 by Megha Ziegler MD  8/15/2021 Drysol Counseling:  I discussed with the patient the risks of drysol/aluminum chloride including but not limited to skin rash, itching, irritation, burning.

## 2023-02-08 RX ORDER — ALPRAZOLAM 1 MG/1
1 TABLET ORAL 3 TIMES DAILY
Qty: 90 TABLET | Refills: 0 | Status: SHIPPED | OUTPATIENT
Start: 2023-02-08

## 2023-02-08 NOTE — TELEPHONE ENCOUNTER
Please review refill protocol failed/ no protocol  Requested Prescriptions   Pending Prescriptions Disp Refills    ALPRAZOLAM 1 MG Oral Tab [Pharmacy Med Name: ALPRAZOLAM 1MG TABLETS] 90 tablet 0     Sig: TAKE 1 TABLET(1 MG) BY MOUTH THREE TIMES DAILY       There is no refill protocol information for this order

## 2023-03-09 DIAGNOSIS — B18.2 CHRONIC HEPATITIS C WITHOUT HEPATIC COMA (HCC): ICD-10-CM

## 2023-03-09 RX ORDER — ALPRAZOLAM 1 MG/1
TABLET ORAL
Qty: 90 TABLET | Refills: 0 | Status: SHIPPED | OUTPATIENT
Start: 2023-03-09

## 2023-03-09 NOTE — TELEPHONE ENCOUNTER
Message noted: Chart reviewed and may refill medication as requested. Script sent to listed pharmacy by secure method.     Pt notified through [facility]

## 2023-03-09 NOTE — TELEPHONE ENCOUNTER
Please review. Protocol failed / No Protocol.     Requested Prescriptions   Pending Prescriptions Disp Refills    ALPRAZOLAM 1 MG Oral Tab [Pharmacy Med Name: ALPRAZOLAM 1MG TABLETS] 90 tablet 0     Sig: TAKE 1 TABLET(1 MG) BY MOUTH THREE TIMES DAILY       There is no refill protocol information for this order

## 2023-03-10 RX ORDER — FUROSEMIDE 20 MG/1
20 TABLET ORAL DAILY
Qty: 30 TABLET | Refills: 1 | Status: SHIPPED | OUTPATIENT
Start: 2023-03-10

## 2023-04-12 RX ORDER — ALPRAZOLAM 1 MG/1
1 TABLET ORAL 3 TIMES DAILY
Qty: 90 TABLET | Refills: 5 | Status: SHIPPED | OUTPATIENT
Start: 2023-04-12

## 2023-04-12 NOTE — TELEPHONE ENCOUNTER
Please review refill failed/no protocol     Requested Prescriptions     Pending Prescriptions Disp Refills    ALPRAZOLAM 1 MG Oral Tab [Pharmacy Med Name: ALPRAZOLAM 1MG TABLETS] 90 tablet 0     Sig: TAKE 1 TABLET(1 MG) BY MOUTH THREE TIMES DAILY         Recent Visits  Date Type Provider Dept   10/27/22 Office Visit Faisal Maya MD [facility]   10/19/22 Appointment ONE[facility] Sanchez [facility]   03/10/22 Appointment Faisal Maya MD [facility]   02/01/22 Office Visit Faisal Maya MD [facility]   01/13/22 Office Visit Faisal Maya MD [facility]   10/27/21 Office Visit Faisal Maya MD [facility]   Showing recent visits within past 540 days with a meds authorizing provider and meeting all other requirements  Future Appointments  No visits were found meeting these conditions.   Showing future appointments within next 150 days with a meds authorizing provider and meeting all other requirements    Requested Prescriptions   Pending Prescriptions Disp Refills    ALPRAZOLAM 1 MG Oral Tab [Pharmacy Med Name: ALPRAZOLAM 1MG TABLETS] 90 tablet 0     Sig: TAKE 1 TABLET(1 MG) BY MOUTH THREE TIMES DAILY       There is no refill protocol information for this order

## 2023-04-12 NOTE — TELEPHONE ENCOUNTER
Message noted: Chart reviewed and may refill medication as requested. Script sent to listed pharmacy by secure method.     Pt notified through [facility]

## 2023-04-27 DIAGNOSIS — K74.60 CHRONIC HEPATITIS C VIRUS INFECTION WITH CIRRHOSIS (HCC): Primary | ICD-10-CM

## 2023-04-27 DIAGNOSIS — B18.2 CHRONIC HEPATITIS C VIRUS INFECTION WITH CIRRHOSIS (HCC): Primary | ICD-10-CM

## 2023-05-05 NOTE — TELEPHONE ENCOUNTER
Please review. Protocol failed / No protocol.     Requested Prescriptions   Pending Prescriptions Disp Refills    FLUOXETINE 20 MG Oral Cap [Pharmacy Med Name: FLUOXETINE 20MG CAPSULES] 270 capsule 1     Sig: TAKE 3 CAPSULES(60 MG) BY MOUTH DAILY       Psychiatric Non-Scheduled (Anti-Anxiety) Failed - 5/4/2023  5:45 AM        Failed - In person appointment or virtual visit in the past 6 mos or appointment in next 3 mos     Recent Outpatient Visits              6 months ago Chronic obstructive pulmonary disease with acute exacerbation [facility])    [facility], [address] [facility], Charanjit Hester MD    Office Visit    6 months ago Routine physical examination    Ralph Davila MD    Office Visit    6 months ago Chronic obstructive pulmonary disease, unspecified COPD type [facility])    [facility], [facility] ONEIDA Lacye    Telemedicine    7 months ago Congestion of both ears    Igor Negron APRN    Telemedicine    7 months ago COPD exacerbation [facility])    [address], [facility], [facility]    Telemedicine          Future Appointments         Provider Department Appt Notes    In 1 week Home Sidhu MD [address], [facility] [facility] F/U                    Recent Outpatient Visits              6 months ago Chronic obstructive pulmonary disease with acute exacerbation [facility])    [address], [address] [facility] Jamel Pal MD    Office Visit    6 months ago Routine physical examination    Ralph Davila MD    Office Visit    6 months ago Chronic obstructive pulmonary disease, unspecified COPD type [facility])    Luh Negron Virgin Hansen, APRN Telemedicine    7 months ago Congestion of both ears    [facility], [facility] ONEIDA Castano    Telemedicine    7 months ago COPD exacerbation [facility])    Luh Beasley Brinda Gallus, ONEIDA    Telemedicine            Future Appointments         Provider Department Appt Notes    In 1 week Roney Sanches MD [address], [address] F/U

## 2023-05-06 RX ORDER — FLUOXETINE HYDROCHLORIDE 20 MG/1
60 CAPSULE ORAL DAILY
Qty: 270 CAPSULE | Refills: 3 | Status: SHIPPED | OUTPATIENT
Start: 2023-05-06

## 2023-05-06 NOTE — TELEPHONE ENCOUNTER
Message noted: Chart reviewed and may refill medication as requested. Prescription sent to listed pharmacy. Pharmacy to notify patient.  Pt notified through [facility]

## 2023-05-15 ENCOUNTER — LAB ENCOUNTER (OUTPATIENT)
Dept: LAB | Facility: HOSPITAL | Age: 58
End: 2023-05-15
Attending: INTERNAL MEDICINE
Payer: MEDICAID

## 2023-05-15 ENCOUNTER — OFFICE VISIT (OUTPATIENT)
Dept: SURGERY | Facility: CLINIC | Age: 58
End: 2023-05-15
Payer: MEDICAID

## 2023-05-15 VITALS
RESPIRATION RATE: 18 BRPM | OXYGEN SATURATION: 94 % | DIASTOLIC BLOOD PRESSURE: 85 MMHG | BODY MASS INDEX: 28.51 KG/M2 | HEIGHT: 60 IN | WEIGHT: 145.19 LBS | SYSTOLIC BLOOD PRESSURE: 129 MMHG | HEART RATE: 73 BPM

## 2023-05-15 DIAGNOSIS — B18.2 CHRONIC HEPATITIS C WITHOUT HEPATIC COMA (HCC): ICD-10-CM

## 2023-05-15 DIAGNOSIS — R21 RASH: ICD-10-CM

## 2023-05-15 DIAGNOSIS — K74.60 CIRRHOSIS OF LIVER WITHOUT ASCITES, UNSPECIFIED HEPATIC CIRRHOSIS TYPE (HCC): Primary | ICD-10-CM

## 2023-05-15 DIAGNOSIS — B18.2 CHRONIC HEPATITIS C WITH HEPATIC COMA (HCC): ICD-10-CM

## 2023-05-15 DIAGNOSIS — K74.60 CIRRHOSIS (HCC): Primary | ICD-10-CM

## 2023-05-15 DIAGNOSIS — K74.60 CIRRHOSIS OF LIVER WITHOUT ASCITES, UNSPECIFIED HEPATIC CIRRHOSIS TYPE (HCC): ICD-10-CM

## 2023-05-15 LAB
AFP-TM SERPL-MCNC: 1.9 NG/ML (ref ?–8)
ALBUMIN SERPL-MCNC: 4 G/DL (ref 3.4–5)
ALBUMIN/GLOB SERPL: 1 {RATIO} (ref 1–2)
ALP LIVER SERPL-CCNC: 78 U/L
ALT SERPL-CCNC: 14 U/L
ANION GAP SERPL CALC-SCNC: 2 MMOL/L (ref 0–18)
AST SERPL-CCNC: 18 U/L (ref 15–37)
BASOPHILS # BLD AUTO: 0.01 X10(3) UL (ref 0–0.2)
BASOPHILS NFR BLD AUTO: 0.2 %
BILIRUB SERPL-MCNC: 0.4 MG/DL (ref 0.1–2)
BUN BLD-MCNC: 8 MG/DL (ref 7–18)
CALCIUM BLD-MCNC: 9.8 MG/DL (ref 8.5–10.1)
CHLORIDE SERPL-SCNC: 108 MMOL/L (ref 98–112)
CO2 SERPL-SCNC: 28 MMOL/L (ref 21–32)
CREAT BLD-MCNC: 0.69 MG/DL
EOSINOPHIL # BLD AUTO: 0.03 X10(3) UL (ref 0–0.7)
EOSINOPHIL NFR BLD AUTO: 0.5 %
ERYTHROCYTE [DISTWIDTH] IN BLOOD BY AUTOMATED COUNT: 11.9 %
FASTING STATUS PATIENT QL REPORTED: NO
GFR SERPLBLD BASED ON 1.73 SQ M-ARVRAT: 101 ML/MIN/1.73M2 (ref 60–?)
GLOBULIN PLAS-MCNC: 4 G/DL (ref 2.8–4.4)
GLUCOSE BLD-MCNC: 83 MG/DL (ref 70–99)
HCT VFR BLD AUTO: 41.7 %
HGB BLD-MCNC: 14.8 G/DL
IMM GRANULOCYTES # BLD AUTO: 0.01 X10(3) UL (ref 0–1)
IMM GRANULOCYTES NFR BLD: 0.2 %
INR BLD: 1.3 (ref 0.85–1.16)
LYMPHOCYTES # BLD AUTO: 1.76 X10(3) UL (ref 1–4)
LYMPHOCYTES NFR BLD AUTO: 30.3 %
MCH RBC QN AUTO: 31.4 PG (ref 26–34)
MCHC RBC AUTO-ENTMCNC: 35.5 G/DL (ref 31–37)
MCV RBC AUTO: 88.3 FL
MONOCYTES # BLD AUTO: 0.23 X10(3) UL (ref 0.1–1)
MONOCYTES NFR BLD AUTO: 4 %
NEUTROPHILS # BLD AUTO: 3.77 X10 (3) UL (ref 1.5–7.7)
NEUTROPHILS # BLD AUTO: 3.77 X10(3) UL (ref 1.5–7.7)
NEUTROPHILS NFR BLD AUTO: 64.8 %
OSMOLALITY SERPL CALC.SUM OF ELEC: 283 MOSM/KG (ref 275–295)
PLATELET # BLD AUTO: 143 10(3)UL (ref 150–450)
POTASSIUM SERPL-SCNC: 3.4 MMOL/L (ref 3.5–5.1)
PROT SERPL-MCNC: 8 G/DL (ref 6.4–8.2)
PROTHROMBIN TIME: 16.2 SECONDS (ref 11.6–14.8)
RBC # BLD AUTO: 4.72 X10(6)UL
SODIUM SERPL-SCNC: 138 MMOL/L (ref 136–145)
WBC # BLD AUTO: 5.8 X10(3) UL (ref 4–11)

## 2023-05-15 PROCEDURE — 87522 HEPATITIS C REVRS TRNSCRPJ: CPT

## 2023-05-15 PROCEDURE — 82595 ASSAY OF CRYOGLOBULIN: CPT

## 2023-05-15 PROCEDURE — 85610 PROTHROMBIN TIME: CPT | Performed by: INTERNAL MEDICINE

## 2023-05-15 PROCEDURE — 36415 COLL VENOUS BLD VENIPUNCTURE: CPT

## 2023-05-15 PROCEDURE — 82105 ALPHA-FETOPROTEIN SERUM: CPT | Performed by: INTERNAL MEDICINE

## 2023-05-15 PROCEDURE — 80053 COMPREHEN METABOLIC PANEL: CPT | Performed by: INTERNAL MEDICINE

## 2023-05-15 PROCEDURE — 85025 COMPLETE CBC W/AUTO DIFF WBC: CPT | Performed by: INTERNAL MEDICINE

## 2023-06-05 ENCOUNTER — HOSPITAL ENCOUNTER (EMERGENCY)
Facility: HOSPITAL | Age: 58
Discharge: HOME | End: 2023-06-05
Attending: EMERGENCY MEDICINE
Payer: MEDICAID

## 2023-06-05 VITALS
WEIGHT: 155 LBS | OXYGEN SATURATION: 98 % | HEART RATE: 94 BPM | SYSTOLIC BLOOD PRESSURE: 112 MMHG | RESPIRATION RATE: 22 BRPM | BODY MASS INDEX: 30 KG/M2 | TEMPERATURE: 98 F | DIASTOLIC BLOOD PRESSURE: 80 MMHG

## 2023-06-05 DIAGNOSIS — L50.9 URTICARIA: Primary | ICD-10-CM

## 2023-06-05 DIAGNOSIS — L03.114 CELLULITIS OF LEFT UPPER EXTREMITY: ICD-10-CM

## 2023-06-05 PROCEDURE — 99283 EMERGENCY DEPT VISIT LOW MDM: CPT

## 2023-06-05 PROCEDURE — 99284 EMERGENCY DEPT VISIT MOD MDM: CPT

## 2023-06-05 RX ORDER — METHADONE HYDROCHLORIDE 10 MG/1
120 TABLET ORAL ONCE
Status: COMPLETED | OUTPATIENT
Start: 2023-06-05 | End: 2023-06-05

## 2023-06-05 RX ORDER — HYDROXYZINE HYDROCHLORIDE 25 MG/1
TABLET, FILM COATED ORAL EVERY 6 HOURS PRN
Qty: 20 TABLET | Refills: 0 | Status: SHIPPED | OUTPATIENT
Start: 2023-06-05 | End: 2023-07-05

## 2023-06-05 RX ORDER — CEPHALEXIN 500 MG/1
500 CAPSULE ORAL 3 TIMES DAILY
Qty: 21 CAPSULE | Refills: 0 | Status: SHIPPED | OUTPATIENT
Start: 2023-06-05 | End: 2023-06-12

## 2023-06-05 NOTE — DISCHARGE INSTRUCTIONS
Use the medication prescribed to you today as needed for itching. Take the antibiotic prescribed to you today as directed. Make sure to finish the entire course even if you start to feel better. Try to avoid itching the skin, use a cool compress to help with itching. Use a gentle moisturizer such as Cetaphil cream 1-2 times a day.     See the dermatology referral placed for you by your liver specialist.

## 2023-06-05 NOTE — ED INITIAL ASSESSMENT (HPI)
Patient complains of urticaria on body for six weeks, states it began after there was a change in her methadone regimen

## 2023-07-25 ENCOUNTER — OFFICE VISIT (OUTPATIENT)
Dept: DERMATOLOGY CLINIC | Facility: CLINIC | Age: 58
End: 2023-07-25

## 2023-07-25 DIAGNOSIS — K74.69 OTHER CIRRHOSIS OF LIVER (HCC): Primary | ICD-10-CM

## 2023-07-25 DIAGNOSIS — L29.9 PRURITUS: ICD-10-CM

## 2023-07-25 PROCEDURE — 99204 OFFICE O/P NEW MOD 45 MIN: CPT | Performed by: STUDENT IN AN ORGANIZED HEALTH CARE EDUCATION/TRAINING PROGRAM

## 2023-07-25 RX ORDER — ACETAMINOPHEN AND CODEINE PHOSPHATE 300; 30 MG/1; MG/1
TABLET ORAL
COMMUNITY
Start: 2023-05-17

## 2023-07-25 RX ORDER — CLOBETASOL PROPIONATE 0.5 MG/G
OINTMENT TOPICAL
Qty: 60 G | Refills: 1 | Status: SHIPPED | OUTPATIENT
Start: 2023-07-25

## 2023-07-25 RX ORDER — AMOXICILLIN 500 MG/1
500 CAPSULE ORAL 3 TIMES DAILY
COMMUNITY
Start: 2023-02-27

## 2023-07-25 RX ORDER — HYDROXYZINE HYDROCHLORIDE 25 MG/1
25 TABLET, FILM COATED ORAL EVERY 8 HOURS PRN
Qty: 90 TABLET | Refills: 1 | Status: SHIPPED | OUTPATIENT
Start: 2023-07-25

## 2023-07-25 NOTE — PROGRESS NOTES
July 25, 2023    New patient     Referred by:   No referring provider defined for this encounter. CHIEF COMPLAINT: Rash     HISTORY OF PRESENT ILLNESS: Jessi Umana is a 62year old female here for evaluation of rash. Location: All over body  Duration: 7-8 months   Signs and symptoms: Itchy, discharge and painful  Current treatment: Acetaminophen    Personal Dermatologic History  History of chronic skin disease: No    Family History  History of chronic skin disease: No  History autoimmune diseases:  No    Past Medical History  Past Medical History:   Diagnosis Date    Anxiety     Colitis     ulcerative colitis    COPD (chronic obstructive pulmonary disease) (HCC)     Coronary atherosclerosis     Depression     Essential hypertension     Hepatitis     Hep C    Hepatitis C     1989    High blood pressure     History of blood transfusion 2010    no reactions    Migraines     Osteoarthritis     PTSD (post-traumatic stress disorder)     Sleep apnea     NO CPAP       REVIEW OF SYSTEMS:  Constitutional: Denies fever, chills, unintentional weight loss. Skin as per HPI    Medications  Current Outpatient Medications   Medication Sig Dispense Refill    FLUoxetine 20 MG Oral Cap Take 3 capsules (60 mg total) by mouth daily. 270 capsule 3    ALPRAZolam 1 MG Oral Tab Take 1 tablet (1 mg total) by mouth 3 (three) times daily. 90 tablet 5    furosemide 20 MG Oral Tab Take 1 tablet (20 mg total) by mouth daily. 30 tablet 1    ipratropium (ATROVENT HFA) 17 MCG/ACT Inhalation Aero Soln Inhale 2 puffs into the lungs every 6 (six) hours. 12.9 g 2    levoFLOXacin 500 MG Oral Tab Take 1 tablet (500 mg total) by mouth daily. 7 tablet 0    ipratropium-albuterol 0.5-2.5 (3) MG/3ML Inhalation Solution Take 3 mL by nebulization 4 (four) times daily. 120 each 1    Budesonide-Formoterol Fumarate (SYMBICORT) 160-4.5 MCG/ACT Inhalation Aerosol Inhale 2 puffs into the lungs in the morning and 2 puffs before bedtime.  1 each 3 predniSONE 10 MG Oral Tab Take 3 tabs (30mg) daily for 3 days, then take 2 tabs (20mg) daily for 3 days, then take 1 tab (10mg) daily for 3 days. 18 tablet 0    levoFLOXacin 500 MG Oral Tab Take 1 tablet (500 mg total) by mouth in the morning. 7 tablet 0    cetirizine (ZYRTEC ALLERGY) 10 MG Oral Tab Take 1 tablet (10 mg total) by mouth in the morning. 30 tablet 1    albuterol 108 (90 Base) MCG/ACT Inhalation Aero Soln Inhale 2 puffs into the lungs every 4 (four) hours as needed. 18 g 0    methocarbamol 500 MG Oral Tab Take 1 tablet (500 mg total) by mouth 4 (four) times daily. 120 tablet 1    fluticasone propionate 50 MCG/ACT Nasal Suspension 1 spray by Nasal route daily. 16 g 1    predniSONE 20 MG Oral Tab To take 2 tablets by oral route for 3 days then 1 tablet by oral route for 3 days then 1/2 tablet by oral route for 3 days 12 tablet 0    Spacer/Aero-Holding Chambers Does not apply Device Use as needed with inhaler 1 each 0    Respiratory Therapy Supplies (NEBULIZER/TUBING/MOUTHPIECE) Does not apply Kit Use with nebulizer solution as needed 1 each 0    MELOXICAM 15 MG Oral Tab TAKE 1 TABLET(15 MG) BY MOUTH DAILY 90 tablet 1    ondansetron 4 MG Oral Tablet Dispersible Take 1 tablet (4 mg total) by mouth every 4 (four) hours as needed for Nausea. 15 tablet 0    ALBUTEROL 108 (90 Base) MCG/ACT Inhalation Aero Soln INHALE 2 PUFFS INTO THE LUNGS EVERY 4 HOURS AS NEEDED FOR WHEEZING 18 g 5    acetaminophen 500 MG Oral Tab Take 1 tablet (500 mg total) by mouth 2 (two) times daily as needed for Pain. 30 tablet 5    Polyethylene Glycol 3350 17 g Oral Powd Pack Take 17 g by mouth daily. 30 each 0    potassium chloride 10 MEQ Oral Tab CR Take 1 tablet (10 mEq total) by mouth 3 (three) times a week. 15 tablet 0    Naloxone HCl 4 MG/0.1ML Nasal Liquid 4 mg by Nasal route as needed. If patient remains unresponsive, repeat dose in other nostril 2-5 minutes after first dose.  1 kit 0    Respiratory Therapy Supplies (NEBULIZER/TUBING/MOUTHPIECE) Does not apply Kit For use with Albuterol solution as directed 1 each 0    ARIPiprazole 2 MG Oral Tab Take 1 tablet (2 mg total) by mouth every evening. 30 tablet 0    Methadone HCl 10 MG/5ML Oral Solution Take 65 mL (130 mg total) by mouth daily. PHYSICAL EXAM:  General: awake, alert, no acute distress  Skin: Skin exam was performed today including the following: extremities. Pertinent findings include:   - extremities with numerous excoriated papules    ASSESSMENT & PLAN:  Pathophysiology of diagnoses discussed with patient. Therapeutic options reviewed. Risks, benefits, and alternatives discussed with patient. Instructions reviewed at length. #Rash and nonspecific skin eruption  #Prurutis  #Cirrhosis   - clobetasol 0.05% twice daily to affected areas Monday-Friday. Take weekends off. Avoid use on face, breasts, groin, or axillae.   - Patient currently on methadone and not candidate for natlrexone  - In reserve: phototherapy     Return to clinic:  6 weeks  or sooner if something concerning arises    Jeronimo Love MD

## 2023-08-27 ENCOUNTER — HOSPITAL ENCOUNTER (INPATIENT)
Facility: HOSPITAL | Age: 58
LOS: 5 days | Discharge: HOME HEALTH | End: 2023-09-01
Attending: EMERGENCY MEDICINE | Admitting: HOSPITALIST
Payer: MEDICAID

## 2023-08-27 ENCOUNTER — HOSPITAL ENCOUNTER (INPATIENT)
Facility: HOSPITAL | Age: 58
LOS: 5 days | Discharge: HOME HEALTH | DRG: 603 | End: 2023-09-01
Attending: EMERGENCY MEDICINE | Admitting: HOSPITALIST
Payer: MEDICAID

## 2023-08-27 DIAGNOSIS — L03.90 CELLULITIS, UNSPECIFIED CELLULITIS SITE: Primary | ICD-10-CM

## 2023-08-27 LAB
ALBUMIN SERPL-MCNC: 2.9 G/DL (ref 3.4–5)
ALBUMIN/GLOB SERPL: 0.7 {RATIO} (ref 1–2)
ALP LIVER SERPL-CCNC: 83 U/L
ALT SERPL-CCNC: 13 U/L
ANION GAP SERPL CALC-SCNC: 2 MMOL/L (ref 0–18)
AST SERPL-CCNC: 12 U/L (ref 15–37)
BASOPHILS # BLD AUTO: 0.01 X10(3) UL (ref 0–0.2)
BASOPHILS NFR BLD AUTO: 0.2 %
BILIRUB SERPL-MCNC: 0.2 MG/DL (ref 0.1–2)
BUN BLD-MCNC: 12 MG/DL (ref 7–18)
BUN/CREAT SERPL: 15.6 (ref 10–20)
CALCIUM BLD-MCNC: 8.7 MG/DL (ref 8.5–10.1)
CHLORIDE SERPL-SCNC: 106 MMOL/L (ref 98–112)
CO2 SERPL-SCNC: 30 MMOL/L (ref 21–32)
CREAT BLD-MCNC: 0.77 MG/DL
DEPRECATED RDW RBC AUTO: 42.1 FL (ref 35.1–46.3)
EGFRCR SERPLBLD CKD-EPI 2021: 89 ML/MIN/1.73M2 (ref 60–?)
EOSINOPHIL # BLD AUTO: 0.07 X10(3) UL (ref 0–0.7)
EOSINOPHIL NFR BLD AUTO: 1.1 %
ERYTHROCYTE [DISTWIDTH] IN BLOOD BY AUTOMATED COUNT: 12.7 % (ref 11–15)
GLOBULIN PLAS-MCNC: 4.3 G/DL (ref 2.8–4.4)
GLUCOSE BLD-MCNC: 115 MG/DL (ref 70–99)
HCT VFR BLD AUTO: 37.9 %
HGB BLD-MCNC: 12.7 G/DL
IMM GRANULOCYTES # BLD AUTO: 0.02 X10(3) UL (ref 0–1)
IMM GRANULOCYTES NFR BLD: 0.3 %
LYMPHOCYTES # BLD AUTO: 1.5 X10(3) UL (ref 1–4)
LYMPHOCYTES NFR BLD AUTO: 24.5 %
MCH RBC QN AUTO: 30.8 PG (ref 26–34)
MCHC RBC AUTO-ENTMCNC: 33.5 G/DL (ref 31–37)
MCV RBC AUTO: 92 FL
MONOCYTES # BLD AUTO: 0.23 X10(3) UL (ref 0.1–1)
MONOCYTES NFR BLD AUTO: 3.8 %
NEUTROPHILS # BLD AUTO: 4.28 X10 (3) UL (ref 1.5–7.7)
NEUTROPHILS # BLD AUTO: 4.28 X10(3) UL (ref 1.5–7.7)
NEUTROPHILS NFR BLD AUTO: 70.1 %
OSMOLALITY SERPL CALC.SUM OF ELEC: 287 MOSM/KG (ref 275–295)
PLATELET # BLD AUTO: 173 10(3)UL (ref 150–450)
POTASSIUM SERPL-SCNC: 3.9 MMOL/L (ref 3.5–5.1)
PROT SERPL-MCNC: 7.2 G/DL (ref 6.4–8.2)
RBC # BLD AUTO: 4.12 X10(6)UL
SODIUM SERPL-SCNC: 138 MMOL/L (ref 136–145)
WBC # BLD AUTO: 6.1 X10(3) UL (ref 4–11)

## 2023-08-27 PROCEDURE — 99223 1ST HOSP IP/OBS HIGH 75: CPT | Performed by: HOSPITALIST

## 2023-08-27 RX ORDER — ARIPIPRAZOLE 2 MG/1
2 TABLET ORAL EVERY EVENING
Status: DISCONTINUED | OUTPATIENT
Start: 2023-08-27 | End: 2023-09-01

## 2023-08-27 RX ORDER — ONDANSETRON 2 MG/ML
4 INJECTION INTRAMUSCULAR; INTRAVENOUS EVERY 6 HOURS PRN
Status: DISCONTINUED | OUTPATIENT
Start: 2023-08-27 | End: 2023-09-02

## 2023-08-27 RX ORDER — HEPARIN SODIUM 5000 [USP'U]/ML
5000 INJECTION, SOLUTION INTRAVENOUS; SUBCUTANEOUS EVERY 12 HOURS SCHEDULED
Status: DISCONTINUED | OUTPATIENT
Start: 2023-08-28 | End: 2023-09-02

## 2023-08-27 RX ORDER — CEFAZOLIN SODIUM/WATER 2 G/20 ML
2 SYRINGE (ML) INTRAVENOUS EVERY 8 HOURS
Status: DISCONTINUED | OUTPATIENT
Start: 2023-08-28 | End: 2023-09-02

## 2023-08-27 RX ORDER — KETOROLAC TROMETHAMINE 15 MG/ML
30 INJECTION, SOLUTION INTRAMUSCULAR; INTRAVENOUS EVERY 6 HOURS PRN
Status: DISPENSED | OUTPATIENT
Start: 2023-08-27 | End: 2023-08-29

## 2023-08-27 RX ORDER — MAGNESIUM HYDROXIDE/ALUMINUM HYDROXICE/SIMETHICONE 120; 1200; 1200 MG/30ML; MG/30ML; MG/30ML
30 SUSPENSION ORAL 4 TIMES DAILY PRN
Status: DISCONTINUED | OUTPATIENT
Start: 2023-08-27 | End: 2023-09-02

## 2023-08-27 RX ORDER — PANTOPRAZOLE SODIUM 40 MG/1
40 TABLET, DELAYED RELEASE ORAL
Status: DISCONTINUED | OUTPATIENT
Start: 2023-08-28 | End: 2023-09-02

## 2023-08-27 RX ORDER — CEFAZOLIN SODIUM/WATER 2 G/20 ML
2 SYRINGE (ML) INTRAVENOUS ONCE
Status: COMPLETED | OUTPATIENT
Start: 2023-08-27 | End: 2023-08-27

## 2023-08-27 RX ORDER — CLOBETASOL PROPIONATE 0.5 MG/G
OINTMENT TOPICAL 2 TIMES DAILY
Status: DISCONTINUED | OUTPATIENT
Start: 2023-08-27 | End: 2023-09-02

## 2023-08-27 RX ORDER — FLUOXETINE HYDROCHLORIDE 20 MG/1
60 CAPSULE ORAL DAILY
Status: DISCONTINUED | OUTPATIENT
Start: 2023-08-28 | End: 2023-09-02

## 2023-08-27 RX ORDER — ACETAMINOPHEN 500 MG
500 TABLET ORAL EVERY 6 HOURS PRN
Status: DISCONTINUED | OUTPATIENT
Start: 2023-08-27 | End: 2023-09-02

## 2023-08-27 RX ORDER — METHADONE HYDROCHLORIDE 10 MG/5ML
130 SOLUTION ORAL DAILY
Status: DISCONTINUED | OUTPATIENT
Start: 2023-08-28 | End: 2023-08-28

## 2023-08-27 RX ORDER — FLUTICASONE FUROATE AND VILANTEROL 200; 25 UG/1; UG/1
1 POWDER RESPIRATORY (INHALATION) DAILY
Status: DISCONTINUED | OUTPATIENT
Start: 2023-08-28 | End: 2023-09-02

## 2023-08-27 RX ORDER — POLYETHYLENE GLYCOL 3350 17 G/17G
17 POWDER, FOR SOLUTION ORAL DAILY
Status: DISCONTINUED | OUTPATIENT
Start: 2023-08-28 | End: 2023-09-02

## 2023-08-27 RX ORDER — IPRATROPIUM BROMIDE AND ALBUTEROL SULFATE 2.5; .5 MG/3ML; MG/3ML
3 SOLUTION RESPIRATORY (INHALATION) EVERY 6 HOURS PRN
Status: DISCONTINUED | OUTPATIENT
Start: 2023-08-27 | End: 2023-09-02

## 2023-08-27 RX ORDER — HYDROXYZINE HYDROCHLORIDE 25 MG/1
25 TABLET, FILM COATED ORAL EVERY 6 HOURS PRN
Status: DISCONTINUED | OUTPATIENT
Start: 2023-08-27 | End: 2023-08-28

## 2023-08-27 RX ORDER — ALPRAZOLAM 0.5 MG/1
1 TABLET ORAL 3 TIMES DAILY
Status: DISCONTINUED | OUTPATIENT
Start: 2023-08-27 | End: 2023-08-28

## 2023-08-27 RX ORDER — DIPHENHYDRAMINE HYDROCHLORIDE 50 MG/ML
25 INJECTION INTRAMUSCULAR; INTRAVENOUS EVERY 4 HOURS PRN
Status: DISCONTINUED | OUTPATIENT
Start: 2023-08-27 | End: 2023-08-28

## 2023-08-27 NOTE — ED INITIAL ASSESSMENT (HPI)
S: pt presents to ed with c/o infection to sores bilateral legs and left arm. Pt states the sores are caused by liver failure. Endorses subjective fevers at home. B: see chart    A: redness to the medial portion of right leg with an ulceration in the center, redness to the left lateral lower leg with ulceration as well. And left arm has a large bandage, but noticeable redness and swelling to posterior forearm.     R: protocol

## 2023-08-28 PROCEDURE — 99233 SBSQ HOSP IP/OBS HIGH 50: CPT | Performed by: HOSPITALIST

## 2023-08-28 RX ORDER — DIPHENHYDRAMINE HCL 25 MG
25 CAPSULE ORAL EVERY 6 HOURS PRN
Status: DISCONTINUED | OUTPATIENT
Start: 2023-08-28 | End: 2023-08-30

## 2023-08-28 RX ORDER — METHADONE HYDROCHLORIDE 10 MG/5ML
120 SOLUTION ORAL DAILY
Status: DISCONTINUED | OUTPATIENT
Start: 2023-08-29 | End: 2023-08-28

## 2023-08-28 RX ORDER — SODIUM CHLORIDE 9 MG/ML
INJECTION, SOLUTION INTRAVENOUS CONTINUOUS
Status: DISCONTINUED | OUTPATIENT
Start: 2023-08-28 | End: 2023-08-29

## 2023-08-28 RX ORDER — ALPRAZOLAM 0.5 MG/1
0.5 TABLET ORAL 3 TIMES DAILY
Status: DISCONTINUED | OUTPATIENT
Start: 2023-08-28 | End: 2023-08-29

## 2023-08-28 RX ORDER — METHADONE HYDROCHLORIDE 10 MG/5ML
80 SOLUTION ORAL DAILY
Status: DISCONTINUED | OUTPATIENT
Start: 2023-08-29 | End: 2023-08-29

## 2023-08-28 RX ORDER — METHADONE HYDROCHLORIDE 10 MG/5ML
120 SOLUTION ORAL DAILY
Status: DISCONTINUED | OUTPATIENT
Start: 2023-08-28 | End: 2023-08-28

## 2023-08-28 NOTE — PROGRESS NOTES
WOUND CARE NOTE    History:  Past Medical History:   Diagnosis Date    Anxiety     Bipolar affective disorder ([facility]     Cirrhosis of liver ([facility] [address]     Colitis     ulcerative colitis    COPD (chronic obstructive pulmonary disease) ([facility] [address]     Coronary atherosclerosis     Crack cocaine use     Depression     Essential hypertension     JOHN (generalized anxiety disorder)     Hepatitis     Hep C    Hepatitis C     1989    High blood pressure     History of blood transfusion 2010    no reactions    Methadone use     Migraines     Osteoarthritis     Pneumonia due to organism     Polysubstance dependence including opioid drug with daily use (HCC)     PTSD (post-traumatic stress disorder)     Sleep apnea     NO CPAP     Past Surgical History:   Procedure Laterality Date    ANGIOPLASTY (CORONARY)  2019    3 total    APPENDECTOMY      2012    CATH PERCUTANEOUS  TRANSLUMINAL CORONARY ANGIOPLASTY      HYSTERECTOMY  2010    partial hysterectomy    TONSILLECTOMY      TOTAL ABDOM HYSTERECTOMY        Social History     Socioeconomic History    Marital status:    Tobacco Use    Smoking status: Every Day     Packs/day: 0.50     Years: 42.00     Pack years: 21.00     Types: Cigarettes    Smokeless tobacco: Never   Vaping Use    Vaping Use: Every day    Substances: THC, CBD   Substance and Sexual Activity    Alcohol use: Not Currently    Drug use: Yes     Frequency: 7.0 times per week     Types: Cannabis     Comment: vapes marijuana oil about 5 x /month   Other Topics Concern    Reaction to local anesthetic No    Pt has a pacemaker No    Pt has a defibrillator No       PLAN   Recommendations:  Lotion to dry skin to prevent the pt. from scratching her dry skin and opening up more sores  Heels elevated using pillows to offload heels  To prevent sliding: decrease head of bed and elevate foot of bed as medical condition tolerates  Glucose control to help promote wound healing    Wound(s)  Location: Left arm and RLE  Cleansing  Saline Dressings xeroform gauze  Secure with Border foams  Frequency daily and prn       Discharge Recommendations: The pt. may need assist with supplies after discharge    SUBJECTIVE     Hello, I am hungry  OBJECTIVE   RN Consult new left arm and BLE      ASSESSMENT   Ellis Score:  Ellis Scale Score: 19    Chart Reviewed: yes    Wound(s):  The pt. was resting in bed, she is asking to eat, assisted her with the menu and ordering. See the wound assessments, per the pt. she has not had open wounds in awhile, she does scratch her dry skin, lotion applied and dressings were changed. No questions, call light in reach. Spoke with the nurse. Pt. was asking about dressings for after discharge. Allergies: Patient has no known allergies. Labs:   Lab Results   Component Value Date    WBC 6.1 08/27/2023    HGB 12.7 08/27/2023    HCT 37.9 08/27/2023    .0 08/27/2023    CREATSERUM 0.77 08/27/2023    BUN 12 08/27/2023     08/27/2023    K 3.9 08/27/2023     08/27/2023    CO2 30.0 08/27/2023     (H) 08/27/2023    CA 8.7 08/27/2023    ALB 2.9 (L) 08/27/2023    ALKPHO 83 08/27/2023    BILT 0.2 08/27/2023    TP 7.2 08/27/2023    AST 12 (L) 08/27/2023    ALT 13 08/27/2023     02/17/2022    MG 1.9 02/21/2022    PHOS 3.8 02/21/2022     No results found for: PREALBUMIN      Time Spent 20 Minutes. Rosmery Boucher RN  [facility] AND [address]  [phone]     08/28/23 1132   Wound 08/27/23 Other (comment) Leg Right; Lower   Date First Assessed/Time First Assessed: 08/27/23 2206   Present on Original Admission: Yes  Primary Wound Type: Other (comment)  Location: Leg  Wound Location Orientation: Right; Lower   Wound Image    Site Assessment Fragile; Moist;Painful;Pink;Red   Drainage Amount Small   Drainage Description Serosanguineous   Treatments Cleansed;Site Care   Dressing Aquacel Foam;Xeroform   Dressing Changed Changed   Dressing Status Clean;Dry; Intact;Dressing Changed   Non-staged Wound Description Partial thickness   Racquel-wound Assessment Dry; Intact;Fragile;Pink;Red   Wound Odor None   State of Healing Early/partial granulation   Wound 08/27/23 Other (comment) Arm Anterior;Left;Lower   Date First Assessed/Time First Assessed: 08/27/23 2206   Present on Original Admission: Yes  Primary Wound Type: Other (comment)  Location: Arm  Wound Location Orientation: Anterior;Left;Lower  Wound Description (Comments): weeping   Wound Image    Site Assessment Painful;Fragile;Red;Pink;Black   Drainage Amount Small   Drainage Description Yellow;Serosanguineous   Treatments Cleansed;Site Care   Dressing Aquacel Foam;Xeroform   Dressing Changed Changed   Dressing Status Clean;Dry; Intact;Dressing Changed   Wound Depth (cm) 0 cm   Non-staged Wound Description Partial thickness   Racquel-wound Assessment Edema;Fragile;Pink;Red   Wound Odor None   State of Healing Non-healing   Wound 08/27/23 Other (comment) Foot Right;Dorsal   Date First Assessed/Time First Assessed: 08/27/23 2206   Present on Original Admission: Yes  Primary Wound Type: Other (comment)  Location: Foot  Wound Location Orientation: Right;Dorsal   Wound Image    Site Assessment Dry;Fragile;Pink   Drainage Amount None   Treatments Cleansed;Site Care  (lotion to dry skin)   Dressing Open to air   Non-staged Wound Description Partial thickness   Racquel-wound Assessment Dry; Intact;Fragile;Pink   Wound Odor None   State of Healing   (dry healing)   Wound 08/28/23 Other (comment) Leg Left; Lower; Posterior   Date First Assessed/Time First Assessed: 08/28/23 1143   Present on Original Admission: Yes  Primary Wound Type: Other (comment)  Location: Leg  Wound Location Orientation: Left; Lower; Posterior  Wound Description (Comments): dry wounds   Wound Image    Site Assessment Dry;Fragile;Pink  (scab)   Drainage Amount None   Treatments Cleansed;Site Care  (lotion applied to dry skin)   Dressing Open to air   Non-staged Wound Description Partial thickness   Racquel-wound Assessment Dry; Intact;Fragile;Pink  (resolving erythema, itchy per pt)   Wound Odor None   State of Healing   (dry scab)   Wound Follow Up   Follow up needed Yes

## 2023-08-28 NOTE — ED QUICK NOTES
Orders for admission, patient is aware of plan and ready to go upstairs. Any questions, please call ED RN Bryan Calderon at extension [phone].      Patient Covid vaccination status: Fully vaccinated     COVID Test Ordered in ED: None    COVID Suspicion at Admission: N/A    Running Infusions:  None    Mental Status/LOC at time of transport: A&O x 3/3    Other pertinent information:   CIWA score: N/A   NIH score:  N/A

## 2023-08-28 NOTE — PLAN OF CARE
Problem: Patient Centered Care  Goal: Patient preferences are identified and integrated in the patient's plan of care  Description: Interventions:  - What would you like us to know as we care for you?  From home alone, takes methadone.  - Provide timely, complete, and accurate information to patient/family  - Incorporate patient and family knowledge, values, beliefs, and cultural backgrounds into the planning and delivery of care  - Encourage patient/family to participate in care and decision-making at the level they choose  - Honor patient and family perspectives and choices  Outcome: Progressing     Problem: Patient/Family Goals  Goal: Patient/Family Long Term Goal  Description: Patient's Long Term Goal: Discharge from the hospital    Interventions:  - Monitor vital signs  - Monitor appropriate labs  - Wound care  - Pain management  - Administer medications per order  - Follow MD orders  - Diagnostics per order  - Update / inform patient and family on plan of care  - Discharge planning  - See additional Care Plan goals for specific interventions  Outcome: Progressing  Goal: Patient/Family Short Term Goal  Description: Patient's Short Term Goal: Improve pain and redness to extremities    Interventions:   - Monitor vital signs  - Monitor appropriate labs  - Wound care  - Pain management  - Administer medications per order  - Follow MD orders  - Diagnostics per order  - Update / inform patient and family on plan of care  - See additional Care Plan goals for specific interventions  Outcome: Progressing     Problem: PAIN - ADULT  Goal: Verbalizes/displays adequate comfort level or patient's stated pain goal  Description: INTERVENTIONS:  - Encourage pt to monitor pain and request assistance  - Assess pain using appropriate pain scale  - Administer analgesics based on type and severity of pain and evaluate response  - Implement non-pharmacological measures as appropriate and evaluate response  - Consider cultural and social influences on pain and pain management  - Manage/alleviate anxiety  - Utilize distraction and/or relaxation techniques  - Monitor for opioid side effects  - Notify MD/LIP if interventions unsuccessful or patient reports new pain  - Anticipate increased pain with activity and pre-medicate as appropriate  Outcome: Progressing     Problem: SAFETY ADULT - FALL  Goal: Free from fall injury  Description: INTERVENTIONS:  - Assess pt frequently for physical needs  - Identify cognitive and physical deficits and behaviors that affect risk of falls.   - [place] fall precautions as indicated by assessment.  - Educate pt/family on patient safety including physical limitations  - Instruct pt to call for assistance with activity based on assessment  - Modify environment to reduce risk of injury  - Provide assistive devices as appropriate  - Consider OT/PT consult to assist with strengthening/mobility  - Encourage toileting schedule  Outcome: Progressing     Problem: DISCHARGE PLANNING  Goal: Discharge to home or other facility with appropriate resources  Description: INTERVENTIONS:  - Identify barriers to discharge w/pt and caregiver  - Include patient/family/discharge partner in discharge planning  - Arrange for needed discharge resources and transportation as appropriate  - Identify discharge learning needs (meds, wound care, etc)  - Arrange for interpreters to assist at discharge as needed  - Consider post-discharge preferences of patient/family/discharge partner  - Complete POLST form as appropriate  - Assess patient's ability to be responsible for managing their own health  - Refer to Case Management Department for coordinating discharge planning if the patient needs post-hospital services based on physician/LIP order or complex needs related to functional status, cognitive ability or social support system  Outcome: Progressing     Problem: SKIN/TISSUE INTEGRITY - ADULT  Goal: Skin integrity remains intact  Description: INTERVENTIONS  - Assess and document risk factors for pressure ulcer development  - Assess and document skin integrity  - Monitor for areas of redness and/or skin breakdown  - Initiate interventions, skin care algorithm/standards of care as needed  Outcome: Progressing  Goal: Incision(s), wounds(s) or drain site(s) healing without S/S of infection  Description: INTERVENTIONS:  - Assess and document risk factors for pressure ulcer development  - Assess and document skin integrity  - Assess and document dressing/incision, wound bed, drain sites and surrounding tissue  - Implement wound care per orders  - Initiate isolation precautions as appropriate  - Initiate Pressure Ulcer prevention bundle as indicated  Outcome: Progressing      Monitoring vital signs- stable at this time. Pain medication provided as needed. No acute changes noted at this moment. Safety and fall precautions maintained- bed alarm on, bed locked in lowest position, call light within reach. Frequent rounding by nursing staff.

## 2023-08-28 NOTE — PLAN OF CARE
Patient got up to bathroom this evening, setting bed alarm off. Bed alarm was turned on earlier due to drowsiness. Patient irritated about bed alarm being on. Education provided on safety and keeping bed alarm on to prevent falls due to drowsiness and lower BP & heart rate. Patient stated, \"I will fight you on this one- call the doctor, do whatever you need to do, I do not want alarms on. \" Oriented x 3. Patient wanted to sit up in the chair for dinner, adamantly refusing alarms. Nonskid socks on, call light within reach, and belongings with reach. Educated patient to call for assistance when getting up. She verbalized understanding.

## 2023-08-28 NOTE — PLAN OF CARE
Drowsy throughout most of the day. Opens eyes to speech, oriented to person, place, and situation but disoriented to time. Heart rate in low 40s and blood pressure 39Q systolic- Dr. Carmencita Brody aware. Hold afternoon xanax dose and evening dose of abilify per Dr. Carmencita Brody. Safety precautions in place. Problem: Patient Centered Care  Goal: Patient preferences are identified and integrated in the patient's plan of care  Description: Interventions:  - What would you like us to know as we care for you?  From home alone, takes methadone.  - Provide timely, complete, and accurate information to patient/family  - Incorporate patient and family knowledge, values, beliefs, and cultural backgrounds into the planning and delivery of care  - Encourage patient/family to participate in care and decision-making at the level they choose  - Honor patient and family perspectives and choices  Outcome: Progressing     Problem: Patient/Family Goals  Goal: Patient/Family Long Term Goal  Description: Patient's Long Term Goal: Discharge from the hospital    Interventions:  - Monitor vital signs  - Monitor appropriate labs  - Wound care  - Pain management  - Administer medications per order  - Follow MD orders  - Diagnostics per order  - Update / inform patient and family on plan of care  - Discharge planning  - See additional Care Plan goals for specific interventions  Outcome: Progressing  Goal: Patient/Family Short Term Goal  Description: Patient's Short Term Goal: Improve pain and redness to extremities    Interventions:   - Monitor vital signs  - Monitor appropriate labs  - Wound care  - Pain management  - Administer medications per order  - Follow MD orders  - Diagnostics per order  - Update / inform patient and family on plan of care  - See additional Care Plan goals for specific interventions  Outcome: Progressing     Problem: PAIN - ADULT  Goal: Verbalizes/displays adequate comfort level or patient's stated pain goal  Description: INTERVENTIONS:  - Encourage pt to monitor pain and request assistance  - Assess pain using appropriate pain scale  - Administer analgesics based on type and severity of pain and evaluate response  - Implement non-pharmacological measures as appropriate and evaluate response  - Consider cultural and social influences on pain and pain management  - Manage/alleviate anxiety  - Utilize distraction and/or relaxation techniques  - Monitor for opioid side effects  - Notify MD/LIP if interventions unsuccessful or patient reports new pain  - Anticipate increased pain with activity and pre-medicate as appropriate  Outcome: Progressing     Problem: SAFETY ADULT - FALL  Goal: Free from fall injury  Description: INTERVENTIONS:  - Assess pt frequently for physical needs  - Identify cognitive and physical deficits and behaviors that affect risk of falls.   - [place] fall precautions as indicated by assessment.  - Educate pt/family on patient safety including physical limitations  - Instruct pt to call for assistance with activity based on assessment  - Modify environment to reduce risk of injury  - Provide assistive devices as appropriate  - Consider OT/PT consult to assist with strengthening/mobility  - Encourage toileting schedule  Outcome: Progressing     Problem: DISCHARGE PLANNING  Goal: Discharge to home or other facility with appropriate resources  Description: INTERVENTIONS:  - Identify barriers to discharge w/pt and caregiver  - Include patient/family/discharge partner in discharge planning  - Arrange for needed discharge resources and transportation as appropriate  - Identify discharge learning needs (meds, wound care, etc)  - Arrange for interpreters to assist at discharge as needed  - Consider post-discharge preferences of patient/family/discharge partner  - Complete POLST form as appropriate  - Assess patient's ability to be responsible for managing their own health  - Refer to Case Management Department for coordinating discharge planning if the patient needs post-hospital services based on physician/LIP order or complex needs related to functional status, cognitive ability or social support system  Outcome: Progressing     Problem: SKIN/TISSUE INTEGRITY - ADULT  Goal: Skin integrity remains intact  Description: INTERVENTIONS  - Assess and document risk factors for pressure ulcer development  - Assess and document skin integrity  - Monitor for areas of redness and/or skin breakdown  - Initiate interventions, skin care algorithm/standards of care as needed  Outcome: Progressing  Goal: Incision(s), wounds(s) or drain site(s) healing without S/S of infection  Description: INTERVENTIONS:  - Assess and document risk factors for pressure ulcer development  - Assess and document skin integrity  - Assess and document dressing/incision, wound bed, drain sites and surrounding tissue  - Implement wound care per orders  - Initiate isolation precautions as appropriate  - Initiate Pressure Ulcer prevention bundle as indicated  Outcome: Progressing

## 2023-08-29 LAB
ANION GAP SERPL CALC-SCNC: 6 MMOL/L (ref 0–18)
BASOPHILS # BLD AUTO: 0.01 X10(3) UL (ref 0–0.2)
BASOPHILS NFR BLD AUTO: 0.3 %
BUN BLD-MCNC: 24 MG/DL (ref 7–18)
BUN/CREAT SERPL: 28.2 (ref 10–20)
CALCIUM BLD-MCNC: 8.8 MG/DL (ref 8.5–10.1)
CHLORIDE SERPL-SCNC: 108 MMOL/L (ref 98–112)
CO2 SERPL-SCNC: 27 MMOL/L (ref 21–32)
CREAT BLD-MCNC: 0.85 MG/DL
DEPRECATED RDW RBC AUTO: 44.3 FL (ref 35.1–46.3)
EGFRCR SERPLBLD CKD-EPI 2021: 79 ML/MIN/1.73M2 (ref 60–?)
EOSINOPHIL # BLD AUTO: 0.04 X10(3) UL (ref 0–0.7)
EOSINOPHIL NFR BLD AUTO: 1.1 %
ERYTHROCYTE [DISTWIDTH] IN BLOOD BY AUTOMATED COUNT: 12.9 % (ref 11–15)
GLUCOSE BLD-MCNC: 91 MG/DL (ref 70–99)
HCT VFR BLD AUTO: 36.8 %
HGB BLD-MCNC: 11.8 G/DL
IMM GRANULOCYTES # BLD AUTO: 0.01 X10(3) UL (ref 0–1)
IMM GRANULOCYTES NFR BLD: 0.3 %
LYMPHOCYTES # BLD AUTO: 1.4 X10(3) UL (ref 1–4)
LYMPHOCYTES NFR BLD AUTO: 37.5 %
MCH RBC QN AUTO: 30.6 PG (ref 26–34)
MCHC RBC AUTO-ENTMCNC: 32.1 G/DL (ref 31–37)
MCV RBC AUTO: 95.3 FL
MONOCYTES # BLD AUTO: 0.15 X10(3) UL (ref 0.1–1)
MONOCYTES NFR BLD AUTO: 4 %
NEUTROPHILS # BLD AUTO: 2.12 X10 (3) UL (ref 1.5–7.7)
NEUTROPHILS # BLD AUTO: 2.12 X10(3) UL (ref 1.5–7.7)
NEUTROPHILS NFR BLD AUTO: 56.8 %
OSMOLALITY SERPL CALC.SUM OF ELEC: 296 MOSM/KG (ref 275–295)
PLATELET # BLD AUTO: 127 10(3)UL (ref 150–450)
POTASSIUM SERPL-SCNC: 4.6 MMOL/L (ref 3.5–5.1)
RBC # BLD AUTO: 3.86 X10(6)UL
SODIUM SERPL-SCNC: 141 MMOL/L (ref 136–145)
WBC # BLD AUTO: 3.7 X10(3) UL (ref 4–11)

## 2023-08-29 PROCEDURE — 99233 SBSQ HOSP IP/OBS HIGH 50: CPT | Performed by: HOSPITALIST

## 2023-08-29 RX ORDER — METHOCARBAMOL 500 MG/1
500 TABLET, FILM COATED ORAL 3 TIMES DAILY PRN
Status: DISCONTINUED | OUTPATIENT
Start: 2023-08-29 | End: 2023-09-02

## 2023-08-29 RX ORDER — METHADONE HYDROCHLORIDE 10 MG/5ML
120 SOLUTION ORAL DAILY
Status: DISCONTINUED | OUTPATIENT
Start: 2023-08-29 | End: 2023-08-31

## 2023-08-29 RX ORDER — METHADONE HYDROCHLORIDE 10 MG/5ML
120 SOLUTION ORAL DAILY
Status: DISCONTINUED | OUTPATIENT
Start: 2023-08-30 | End: 2023-08-29

## 2023-08-29 RX ORDER — FUROSEMIDE 20 MG/1
20 TABLET ORAL DAILY
Status: DISCONTINUED | OUTPATIENT
Start: 2023-08-29 | End: 2023-09-02

## 2023-08-29 RX ORDER — ALPRAZOLAM 0.5 MG/1
1 TABLET ORAL 3 TIMES DAILY
Status: DISCONTINUED | OUTPATIENT
Start: 2023-08-29 | End: 2023-08-31

## 2023-08-29 NOTE — PROGRESS NOTES
PT am note: PT attempted eval and pt presented as somnolent and too drowsy to participate falling asleep in the middle of the PT interview. PT will re attempt 8/30 as medical progress and pt's ability to participate allows.

## 2023-08-29 NOTE — PLAN OF CARE
Problem: Patient Centered Care  Goal: Patient preferences are identified and integrated in the patient's plan of care  Description: Interventions:  - What would you like us to know as we care for you?  From home alone, takes methadone.  - Provide timely, complete, and accurate information to patient/family  - Incorporate patient and family knowledge, values, beliefs, and cultural backgrounds into the planning and delivery of care  - Encourage patient/family to participate in care and decision-making at the level they choose  - Honor patient and family perspectives and choices  Outcome: Progressing     Problem: Patient/Family Goals  Goal: Patient/Family Long Term Goal  Description: Patient's Long Term Goal: Discharge from the hospital    Interventions:  - Monitor vital signs  - Monitor appropriate labs  - Wound care  - Pain management  - Administer medications per order  - Follow MD orders  - Diagnostics per order  - Update / inform patient and family on plan of care  - Discharge planning  - See additional Care Plan goals for specific interventions  Outcome: Progressing  Goal: Patient/Family Short Term Goal  Description: Patient's Short Term Goal: Improve pain and redness to extremities    Interventions:   - Monitor vital signs  - Monitor appropriate labs  - Wound care  - Pain management  - Administer medications per order  - Follow MD orders  - Diagnostics per order  - Update / inform patient and family on plan of care  - See additional Care Plan goals for specific interventions  Outcome: Progressing     Problem: PAIN - ADULT  Goal: Verbalizes/displays adequate comfort level or patient's stated pain goal  Description: INTERVENTIONS:  - Encourage pt to monitor pain and request assistance  - Assess pain using appropriate pain scale  - Administer analgesics based on type and severity of pain and evaluate response  - Implement non-pharmacological measures as appropriate and evaluate response  - Consider cultural and social influences on pain and pain management  - Manage/alleviate anxiety  - Utilize distraction and/or relaxation techniques  - Monitor for opioid side effects  - Notify MD/LIP if interventions unsuccessful or patient reports new pain  - Anticipate increased pain with activity and pre-medicate as appropriate  Outcome: Progressing     Problem: SAFETY ADULT - FALL  Goal: Free from fall injury  Description: INTERVENTIONS:  - Assess pt frequently for physical needs  - Identify cognitive and physical deficits and behaviors that affect risk of falls.   - [place] fall precautions as indicated by assessment.  - Educate pt/family on patient safety including physical limitations  - Instruct pt to call for assistance with activity based on assessment  - Modify environment to reduce risk of injury  - Provide assistive devices as appropriate  - Consider OT/PT consult to assist with strengthening/mobility  - Encourage toileting schedule  Outcome: Progressing     Problem: DISCHARGE PLANNING  Goal: Discharge to home or other facility with appropriate resources  Description: INTERVENTIONS:  - Identify barriers to discharge w/pt and caregiver  - Include patient/family/discharge partner in discharge planning  - Arrange for needed discharge resources and transportation as appropriate  - Identify discharge learning needs (meds, wound care, etc)  - Arrange for interpreters to assist at discharge as needed  - Consider post-discharge preferences of patient/family/discharge partner  - Complete POLST form as appropriate  - Assess patient's ability to be responsible for managing their own health  - Refer to Case Management Department for coordinating discharge planning if the patient needs post-hospital services based on physician/LIP order or complex needs related to functional status, cognitive ability or social support system  Outcome: Progressing     Problem: SKIN/TISSUE INTEGRITY - ADULT  Goal: Skin integrity remains intact  Description: INTERVENTIONS  - Assess and document risk factors for pressure ulcer development  - Assess and document skin integrity  - Monitor for areas of redness and/or skin breakdown  - Initiate interventions, skin care algorithm/standards of care as needed  Outcome: Progressing  Goal: Incision(s), wounds(s) or drain site(s) healing without S/S of infection  Description: INTERVENTIONS:  - Assess and document risk factors for pressure ulcer development  - Assess and document skin integrity  - Assess and document dressing/incision, wound bed, drain sites and surrounding tissue  - Implement wound care per orders  - Initiate isolation precautions as appropriate  - Initiate Pressure Ulcer prevention bundle as indicated  Outcome: Progressing   Patient alert and oriented x 4, room air, and self care. IVF and IV abx. Call light within reach and fall precautions in place.

## 2023-08-29 NOTE — PLAN OF CARE
Problem: Patient Centered Care  Goal: Patient preferences are identified and integrated in the patient's plan of care  Description: Interventions:  - What would you like us to know as we care for you?  From home alone, takes methadone.  - Provide timely, complete, and accurate information to patient/family  - Incorporate patient and family knowledge, values, beliefs, and cultural backgrounds into the planning and delivery of care  - Encourage patient/family to participate in care and decision-making at the level they choose  - Honor patient and family perspectives and choices  Outcome: Progressing     Problem: Patient/Family Goals  Goal: Patient/Family Long Term Goal  Description: Patient's Long Term Goal: Discharge from the hospital    Interventions:  - Monitor vital signs  - Monitor appropriate labs  - Wound care  - Pain management  - Administer medications per order  - Follow MD orders  - Diagnostics per order  - Update / inform patient and family on plan of care  - Discharge planning  - See additional Care Plan goals for specific interventions  Outcome: Progressing  Goal: Patient/Family Short Term Goal  Description: Patient's Short Term Goal: Improve pain and redness to extremities    Interventions:   - Monitor vital signs  - Monitor appropriate labs  - Wound care  - Pain management  - Administer medications per order  - Follow MD orders  - Diagnostics per order  - Update / inform patient and family on plan of care  - See additional Care Plan goals for specific interventions  Outcome: Progressing     Problem: PAIN - ADULT  Goal: Verbalizes/displays adequate comfort level or patient's stated pain goal  Description: INTERVENTIONS:  - Encourage pt to monitor pain and request assistance  - Assess pain using appropriate pain scale  - Administer analgesics based on type and severity of pain and evaluate response  - Implement non-pharmacological measures as appropriate and evaluate response  - Consider cultural and social influences on pain and pain management  - Manage/alleviate anxiety  - Utilize distraction and/or relaxation techniques  - Monitor for opioid side effects  - Notify MD/LIP if interventions unsuccessful or patient reports new pain  - Anticipate increased pain with activity and pre-medicate as appropriate  Outcome: Progressing     Problem: SAFETY ADULT - FALL  Goal: Free from fall injury  Description: INTERVENTIONS:  - Assess pt frequently for physical needs  - Identify cognitive and physical deficits and behaviors that affect risk of falls.   - [place] fall precautions as indicated by assessment.  - Educate pt/family on patient safety including physical limitations  - Instruct pt to call for assistance with activity based on assessment  - Modify environment to reduce risk of injury  - Provide assistive devices as appropriate  - Consider OT/PT consult to assist with strengthening/mobility  - Encourage toileting schedule  Outcome: Progressing     Problem: DISCHARGE PLANNING  Goal: Discharge to home or other facility with appropriate resources  Description: INTERVENTIONS:  - Identify barriers to discharge w/pt and caregiver  - Include patient/family/discharge partner in discharge planning  - Arrange for needed discharge resources and transportation as appropriate  - Identify discharge learning needs (meds, wound care, etc)  - Arrange for interpreters to assist at discharge as needed  - Consider post-discharge preferences of patient/family/discharge partner  - Complete POLST form as appropriate  - Assess patient's ability to be responsible for managing their own health  - Refer to Case Management Department for coordinating discharge planning if the patient needs post-hospital services based on physician/LIP order or complex needs related to functional status, cognitive ability or social support system  Outcome: Progressing     Problem: SKIN/TISSUE INTEGRITY - ADULT  Goal: Skin integrity remains intact  Description: INTERVENTIONS  - Assess and document risk factors for pressure ulcer development  - Assess and document skin integrity  - Monitor for areas of redness and/or skin breakdown  - Initiate interventions, skin care algorithm/standards of care as needed  Outcome: Progressing  Goal: Incision(s), wounds(s) or drain site(s) healing without S/S of infection  Description: INTERVENTIONS:  - Assess and document risk factors for pressure ulcer development  - Assess and document skin integrity  - Assess and document dressing/incision, wound bed, drain sites and surrounding tissue  - Implement wound care per orders  - Initiate isolation precautions as appropriate  - Initiate Pressure Ulcer prevention bundle as indicated  Outcome: Progressing     Patient is alert and oriented x 4. Medications reordered per MD. IV fluids and antibiotics on. Appetite good. Call light within reach, safety precautions in place. Patient calls appropriately.

## 2023-08-30 PROCEDURE — 99233 SBSQ HOSP IP/OBS HIGH 50: CPT | Performed by: HOSPITALIST

## 2023-08-30 RX ORDER — HYDROXYZINE HYDROCHLORIDE 25 MG/1
25 TABLET, FILM COATED ORAL 3 TIMES DAILY PRN
Status: DISCONTINUED | OUTPATIENT
Start: 2023-08-30 | End: 2023-09-02

## 2023-08-30 NOTE — CM/SW NOTE
Damien Badillo started via portal.  Mike case ID [id number]. Final insurance authorization is pending. CM/LEENA will continue to follow for authorization.     Yola Pimentel   August 30, 2023   14:15

## 2023-08-30 NOTE — CM/SW NOTE
CM went to see pt at bedside for dc planning for [facility]. Per patient Wants to discuss w/dtr on [facility] vs [facility] w/CM, all together. Pt has this writer's ph# to call to meet on dc planning. Pt lives alone. Has hx with [facility] and HH. Pt up in room ambulating to bathroom. Pt with LUE wound care/drsg and RLE wound drsg care. Pt able to do own drsg care if needed. CM requested department  [facility]) to initiate [facility] referral for [facility]. Also, tentative HH aidin referral/face to face order in [facility]. Pt will need PASRR depending on final choice of dc plan. SW/CM will continue to follow. CM/SW to remain available for support and/or discharge planning.     Geovanna Bridges RN, [facility]    Ext.  [phone]

## 2023-08-30 NOTE — PLAN OF CARE
Patient alert and oriented x4, up ad clau. Vital signs stable, no acute changes. Wound care performed per order. Call light within reach, frequent rounding performed, all needs attended. Safety precautions in place. Problem: Patient Centered Care  Goal: Patient preferences are identified and integrated in the patient's plan of care  Description: Interventions:  - What would you like us to know as we care for you?  From home alone, takes methadone.  - Provide timely, complete, and accurate information to patient/family  - Incorporate patient and family knowledge, values, beliefs, and cultural backgrounds into the planning and delivery of care  - Encourage patient/family to participate in care and decision-making at the level they choose  - Honor patient and family perspectives and choices  Outcome: Progressing     Problem: Patient/Family Goals  Goal: Patient/Family Long Term Goal  Description: Patient's Long Term Goal: Discharge from the hospital    Interventions:  - Monitor vital signs  - Monitor appropriate labs  - Wound care  - Pain management  - Administer medications per order  - Follow MD orders  - Diagnostics per order  - Update / inform patient and family on plan of care  - Discharge planning  - See additional Care Plan goals for specific interventions  Outcome: Progressing  Goal: Patient/Family Short Term Goal  Description: Patient's Short Term Goal: Improve pain and redness to extremities    Interventions:   - Monitor vital signs  - Monitor appropriate labs  - Wound care  - Pain management  - Administer medications per order  - Follow MD orders  - Diagnostics per order  - Update / inform patient and family on plan of care  - See additional Care Plan goals for specific interventions  Outcome: Progressing     Problem: PAIN - ADULT  Goal: Verbalizes/displays adequate comfort level or patient's stated pain goal  Description: INTERVENTIONS:  - Encourage pt to monitor pain and request assistance  - Assess pain using appropriate pain scale  - Administer analgesics based on type and severity of pain and evaluate response  - Implement non-pharmacological measures as appropriate and evaluate response  - Consider cultural and social influences on pain and pain management  - Manage/alleviate anxiety  - Utilize distraction and/or relaxation techniques  - Monitor for opioid side effects  - Notify MD/LIP if interventions unsuccessful or patient reports new pain  - Anticipate increased pain with activity and pre-medicate as appropriate  Outcome: Progressing     Problem: SAFETY ADULT - FALL  Goal: Free from fall injury  Description: INTERVENTIONS:  - Assess pt frequently for physical needs  - Identify cognitive and physical deficits and behaviors that affect risk of falls.   - [place] fall precautions as indicated by assessment.  - Educate pt/family on patient safety including physical limitations  - Instruct pt to call for assistance with activity based on assessment  - Modify environment to reduce risk of injury  - Provide assistive devices as appropriate  - Consider OT/PT consult to assist with strengthening/mobility  - Encourage toileting schedule  Outcome: Progressing     Problem: DISCHARGE PLANNING  Goal: Discharge to home or other facility with appropriate resources  Description: INTERVENTIONS:  - Identify barriers to discharge w/pt and caregiver  - Include patient/family/discharge partner in discharge planning  - Arrange for needed discharge resources and transportation as appropriate  - Identify discharge learning needs (meds, wound care, etc)  - Arrange for interpreters to assist at discharge as needed  - Consider post-discharge preferences of patient/family/discharge partner  - Complete POLST form as appropriate  - Assess patient's ability to be responsible for managing their own health  - Refer to Case Management Department for coordinating discharge planning if the patient needs post-hospital services based on physician/LIP order or complex needs related to functional status, cognitive ability or social support system  Outcome: Progressing     Problem: SKIN/TISSUE INTEGRITY - ADULT  Goal: Skin integrity remains intact  Description: INTERVENTIONS  - Assess and document risk factors for pressure ulcer development  - Assess and document skin integrity  - Monitor for areas of redness and/or skin breakdown  - Initiate interventions, skin care algorithm/standards of care as needed  Outcome: Progressing  Goal: Incision(s), wounds(s) or drain site(s) healing without S/S of infection  Description: INTERVENTIONS:  - Assess and document risk factors for pressure ulcer development  - Assess and document skin integrity  - Assess and document dressing/incision, wound bed, drain sites and surrounding tissue  - Implement wound care per orders  - Initiate isolation precautions as appropriate  - Initiate Pressure Ulcer prevention bundle as indicated  Outcome: Progressing

## 2023-08-30 NOTE — PLAN OF CARE
Problem: Patient Centered Care  Goal: Patient preferences are identified and integrated in the patient's plan of care  Description: Interventions:  - What would you like us to know as we care for you?  From home alone, takes methadone.  - Provide timely, complete, and accurate information to patient/family  - Incorporate patient and family knowledge, values, beliefs, and cultural backgrounds into the planning and delivery of care  - Encourage patient/family to participate in care and decision-making at the level they choose  - Honor patient and family perspectives and choices  Outcome: Progressing     Problem: Patient/Family Goals  Goal: Patient/Family Long Term Goal  Description: Patient's Long Term Goal: Discharge from the hospital    Interventions:  - Monitor vital signs  - Monitor appropriate labs  - Wound care  - Pain management  - Administer medications per order  - Follow MD orders  - Diagnostics per order  - Update / inform patient and family on plan of care  - Discharge planning  - See additional Care Plan goals for specific interventions  Outcome: Progressing  Goal: Patient/Family Short Term Goal  Description: Patient's Short Term Goal: Improve pain and redness to extremities    Interventions:   - Monitor vital signs  - Monitor appropriate labs  - Wound care  - Pain management  - Administer medications per order  - Follow MD orders  - Diagnostics per order  - Update / inform patient and family on plan of care  - See additional Care Plan goals for specific interventions  Outcome: Progressing     Problem: PAIN - ADULT  Goal: Verbalizes/displays adequate comfort level or patient's stated pain goal  Description: INTERVENTIONS:  - Encourage pt to monitor pain and request assistance  - Assess pain using appropriate pain scale  - Administer analgesics based on type and severity of pain and evaluate response  - Implement non-pharmacological measures as appropriate and evaluate response  - Consider cultural and social influences on pain and pain management  - Manage/alleviate anxiety  - Utilize distraction and/or relaxation techniques  - Monitor for opioid side effects  - Notify MD/LIP if interventions unsuccessful or patient reports new pain  - Anticipate increased pain with activity and pre-medicate as appropriate  Outcome: Progressing     Problem: SAFETY ADULT - FALL  Goal: Free from fall injury  Description: INTERVENTIONS:  - Assess pt frequently for physical needs  - Identify cognitive and physical deficits and behaviors that affect risk of falls.   - [place] fall precautions as indicated by assessment.  - Educate pt/family on patient safety including physical limitations  - Instruct pt to call for assistance with activity based on assessment  - Modify environment to reduce risk of injury  - Provide assistive devices as appropriate  - Consider OT/PT consult to assist with strengthening/mobility  - Encourage toileting schedule  Outcome: Progressing     Problem: DISCHARGE PLANNING  Goal: Discharge to home or other facility with appropriate resources  Description: INTERVENTIONS:  - Identify barriers to discharge w/pt and caregiver  - Include patient/family/discharge partner in discharge planning  - Arrange for needed discharge resources and transportation as appropriate  - Identify discharge learning needs (meds, wound care, etc)  - Arrange for interpreters to assist at discharge as needed  - Consider post-discharge preferences of patient/family/discharge partner  - Complete POLST form as appropriate  - Assess patient's ability to be responsible for managing their own health  - Refer to Case Management Department for coordinating discharge planning if the patient needs post-hospital services based on physician/LIP order or complex needs related to functional status, cognitive ability or social support system  Outcome: Progressing     Problem: SKIN/TISSUE INTEGRITY - ADULT  Goal: Skin integrity remains intact  Description: INTERVENTIONS  - Assess and document risk factors for pressure ulcer development  - Assess and document skin integrity  - Monitor for areas of redness and/or skin breakdown  - Initiate interventions, skin care algorithm/standards of care as needed  Outcome: Progressing  Goal: Incision(s), wounds(s) or drain site(s) healing without S/S of infection  Description: INTERVENTIONS:  - Assess and document risk factors for pressure ulcer development  - Assess and document skin integrity  - Assess and document dressing/incision, wound bed, drain sites and surrounding tissue  - Implement wound care per orders  - Initiate isolation precautions as appropriate  - Initiate Pressure Ulcer prevention bundle as indicated  Outcome: Progressing       Patient is alert and oriented x 4. She complained of 7/10 left arm pain, pain meds given as needed. No acute changes at this time. Dressings still clean, dry and intact. Safety and fall precautions maintained. Call light within reach. Frequent rounding by nursing staff.

## 2023-08-30 NOTE — CM/SW NOTE
Department  notified of request for [facility], aidin referrals started. Assigned CM/SW to follow up with pt/family on further discharge planning. Department  notified of request for [address], aidin referrals started. Assigned CM/SW to follow up with pt/family on further discharge planning.

## 2023-08-31 PROCEDURE — 99233 SBSQ HOSP IP/OBS HIGH 50: CPT | Performed by: HOSPITALIST

## 2023-08-31 RX ORDER — HYDROXYZINE HYDROCHLORIDE 25 MG/1
25 TABLET, FILM COATED ORAL 3 TIMES DAILY PRN
Refills: 0 | Status: SHIPPED | COMMUNITY
Start: 2023-08-31

## 2023-08-31 RX ORDER — METHADONE HYDROCHLORIDE 10 MG/5ML
100 SOLUTION ORAL DAILY
Qty: 350 ML | Refills: 0 | Status: SHIPPED | OUTPATIENT
Start: 2023-09-01 | End: 2023-09-01

## 2023-08-31 RX ORDER — ALPRAZOLAM 0.5 MG/1
0.5 TABLET ORAL 3 TIMES DAILY
Status: DISCONTINUED | OUTPATIENT
Start: 2023-08-31 | End: 2023-09-01

## 2023-08-31 RX ORDER — PANTOPRAZOLE SODIUM 40 MG/1
40 TABLET, DELAYED RELEASE ORAL
Refills: 0 | Status: SHIPPED | COMMUNITY
Start: 2023-09-01

## 2023-08-31 RX ORDER — MAGNESIUM HYDROXIDE/ALUMINUM HYDROXICE/SIMETHICONE 120; 1200; 1200 MG/30ML; MG/30ML; MG/30ML
30 SUSPENSION ORAL 4 TIMES DAILY PRN
Refills: 0 | Status: SHIPPED | COMMUNITY
Start: 2023-08-31

## 2023-08-31 RX ORDER — METHOCARBAMOL 500 MG/1
500 TABLET, FILM COATED ORAL 3 TIMES DAILY PRN
Refills: 0 | Status: SHIPPED | COMMUNITY
Start: 2023-08-31

## 2023-08-31 RX ORDER — MELOXICAM 15 MG/1
15 TABLET ORAL
Qty: 90 TABLET | Refills: 1 | Status: SHIPPED | COMMUNITY
Start: 2023-08-31

## 2023-08-31 RX ORDER — ALPRAZOLAM 0.5 MG/1
0.5 TABLET ORAL 3 TIMES DAILY
Qty: 21 TABLET | Refills: 0 | Status: SHIPPED | OUTPATIENT
Start: 2023-08-31 | End: 2023-09-01

## 2023-08-31 RX ORDER — FLUTICASONE PROPIONATE 50 MCG
1 SPRAY, SUSPENSION (ML) NASAL
Qty: 16 G | Refills: 1 | Status: SHIPPED | COMMUNITY
Start: 2023-08-31

## 2023-08-31 RX ORDER — IPRATROPIUM BROMIDE AND ALBUTEROL SULFATE 2.5; .5 MG/3ML; MG/3ML
3 SOLUTION RESPIRATORY (INHALATION)
Qty: 120 EACH | Refills: 1 | Status: SHIPPED | COMMUNITY
Start: 2023-08-31

## 2023-08-31 RX ORDER — CEPHALEXIN 500 MG/1
500 CAPSULE ORAL 3 TIMES DAILY
Qty: 21 CAPSULE | Refills: 0 | Status: SHIPPED | OUTPATIENT
Start: 2023-08-31 | End: 2023-09-07

## 2023-08-31 RX ORDER — METHADONE HYDROCHLORIDE 10 MG/5ML
100 SOLUTION ORAL DAILY
Status: DISCONTINUED | OUTPATIENT
Start: 2023-09-01 | End: 2023-09-01

## 2023-08-31 RX ORDER — CETIRIZINE HYDROCHLORIDE 10 MG/1
10 TABLET ORAL
Qty: 30 TABLET | Refills: 1 | Status: SHIPPED | COMMUNITY
Start: 2023-08-31

## 2023-08-31 NOTE — PLAN OF CARE
Patient alert and oriented x4, vital signs stable, no acute changes. Patient resting in bed at this time, safety and fall precautions in place, call light within reach, all needs attended. Problem: Patient Centered Care  Goal: Patient preferences are identified and integrated in the patient's plan of care  Description: Interventions:  - What would you like us to know as we care for you?  From home alone, takes methadone.  - Provide timely, complete, and accurate information to patient/family  - Incorporate patient and family knowledge, values, beliefs, and cultural backgrounds into the planning and delivery of care  - Encourage patient/family to participate in care and decision-making at the level they choose  - Honor patient and family perspectives and choices  Outcome: Progressing     Problem: Patient/Family Goals  Goal: Patient/Family Long Term Goal  Description: Patient's Long Term Goal: Discharge from the hospital    Interventions:  - Monitor vital signs  - Monitor appropriate labs  - Wound care  - Pain management  - Administer medications per order  - Follow MD orders  - Diagnostics per order  - Update / inform patient and family on plan of care  - Discharge planning  - See additional Care Plan goals for specific interventions  Outcome: Progressing  Goal: Patient/Family Short Term Goal  Description: Patient's Short Term Goal: Improve pain and redness to extremities    Interventions:   - Monitor vital signs  - Monitor appropriate labs  - Wound care  - Pain management  - Administer medications per order  - Follow MD orders  - Diagnostics per order  - Update / inform patient and family on plan of care  - See additional Care Plan goals for specific interventions  Outcome: Progressing     Problem: PAIN - ADULT  Goal: Verbalizes/displays adequate comfort level or patient's stated pain goal  Description: INTERVENTIONS:  - Encourage pt to monitor pain and request assistance  - Assess pain using appropriate pain scale  - Administer analgesics based on type and severity of pain and evaluate response  - Implement non-pharmacological measures as appropriate and evaluate response  - Consider cultural and social influences on pain and pain management  - Manage/alleviate anxiety  - Utilize distraction and/or relaxation techniques  - Monitor for opioid side effects  - Notify MD/LIP if interventions unsuccessful or patient reports new pain  - Anticipate increased pain with activity and pre-medicate as appropriate  Outcome: Progressing     Problem: SAFETY ADULT - FALL  Goal: Free from fall injury  Description: INTERVENTIONS:  - Assess pt frequently for physical needs  - Identify cognitive and physical deficits and behaviors that affect risk of falls.   - [place] fall precautions as indicated by assessment.  - Educate pt/family on patient safety including physical limitations  - Instruct pt to call for assistance with activity based on assessment  - Modify environment to reduce risk of injury  - Provide assistive devices as appropriate  - Consider OT/PT consult to assist with strengthening/mobility  - Encourage toileting schedule  Outcome: Progressing     Problem: DISCHARGE PLANNING  Goal: Discharge to home or other facility with appropriate resources  Description: INTERVENTIONS:  - Identify barriers to discharge w/pt and caregiver  - Include patient/family/discharge partner in discharge planning  - Arrange for needed discharge resources and transportation as appropriate  - Identify discharge learning needs (meds, wound care, etc)  - Arrange for interpreters to assist at discharge as needed  - Consider post-discharge preferences of patient/family/discharge partner  - Complete POLST form as appropriate  - Assess patient's ability to be responsible for managing their own health  - Refer to Case Management Department for coordinating discharge planning if the patient needs post-hospital services based on physician/LIP order or complex needs related to functional status, cognitive ability or social support system  Outcome: Progressing     Problem: SKIN/TISSUE INTEGRITY - ADULT  Goal: Skin integrity remains intact  Description: INTERVENTIONS  - Assess and document risk factors for pressure ulcer development  - Assess and document skin integrity  - Monitor for areas of redness and/or skin breakdown  - Initiate interventions, skin care algorithm/standards of care as needed  Outcome: Progressing  Goal: Incision(s), wounds(s) or drain site(s) healing without S/S of infection  Description: INTERVENTIONS:  - Assess and document risk factors for pressure ulcer development  - Assess and document skin integrity  - Assess and document dressing/incision, wound bed, drain sites and surrounding tissue  - Implement wound care per orders  - Initiate isolation precautions as appropriate  - Initiate Pressure Ulcer prevention bundle as indicated  Outcome: Progressing

## 2023-08-31 NOTE — CM/SW NOTE
Care Progression Note:  Length of stay: 4  GMLOS: 4  Avoidable Delays: HHC or Infusion services not available  Code Status: FULL    Acute Medical Issue/Factors:   Cellulitis, unspecified cellulitis site-IV abx dc'd, continue wound care. Somnolence-resolved    Chronic methadone use-current order is 100mg daily per RN. Pt is ready for dc, MD dc order entered. Nelson Brenda July at [id number] [facility] notified  that facility is no longer able unable to accept patient for [facility] d/t Methadone order, MD made aware. MD agreeable to dc patient [place] but there are no accepting [place] agencies as of the time of this note though referral search only included 10 agencies in [place]. MD requesting that SW/CM obtain accepting [place] agency prior to patient's dc home. CM extended and expanded [place] referral search in [place] to additional 65 agencies. Friday SW/CM will need to follow to confirm accepting [place] agency prior to dc. Discharge Barriers: Self-care knowledge and ability  Expected discharge date: 9/1/2023   Expected next site of care: Home with [address] [place] [employer] [facility]. Plan: Home w/HH pending agency acceptance. / available for discharge planning.      CLAUDETTE Cowart    [phone]

## 2023-08-31 NOTE — CM/SW NOTE
Received message from Mike/Renita, pt has authorization for [facility].  8/30-9/5/23  Requested call back w/[facility] choice [phone]  pt who wants dtr's input, CM called dtr, left vm to call back. Almas Ramirez called back and chose [facility]. CM sent liaison message via [employer]  w/ auth information and reserved. Barrier: pt final dispo choice    Dc plan; [facility], pending pt dispo choice and bed    CM met with pt to finalizied dc plan. Pt has now said \"I never wanted to go to rehab. Pt on phone w/dtElana. Janice Keen RN & this writer at bedside with pt. Pt leaning now towards [facility], but wants to see pictures of rehab. Michael Gutiérrez on premises and will meet with pt now. CM/SW to remain available for support and/or discharge planning.     Eligah Skiff RN, [facility]    Ext.  [phone]

## 2023-08-31 NOTE — PLAN OF CARE
Problem: Patient Centered Care  Goal: Patient preferences are identified and integrated in the patient's plan of care  Description: Interventions:  - What would you like us to know as we care for you?  From home alone, takes methadone.  - Provide timely, complete, and accurate information to patient/family  - Incorporate patient and family knowledge, values, beliefs, and cultural backgrounds into the planning and delivery of care  - Encourage patient/family to participate in care and decision-making at the level they choose  - Honor patient and family perspectives and choices  Outcome: Progressing     Problem: Patient/Family Goals  Goal: Patient/Family Long Term Goal  Description: Patient's Long Term Goal: Discharge from the hospital    Interventions:  - Monitor vital signs  - Monitor appropriate labs  - Wound care  - Pain management  - Administer medications per order  - Follow MD orders  - Diagnostics per order  - Update / inform patient and family on plan of care  - Discharge planning  - See additional Care Plan goals for specific interventions  Outcome: Progressing  Goal: Patient/Family Short Term Goal  Description: Patient's Short Term Goal: Improve pain and redness to extremities    Interventions:   - Monitor vital signs  - Monitor appropriate labs  - Wound care  - Pain management  - Administer medications per order  - Follow MD orders  - Diagnostics per order  - Update / inform patient and family on plan of care  - See additional Care Plan goals for specific interventions  Outcome: Progressing     Problem: PAIN - ADULT  Goal: Verbalizes/displays adequate comfort level or patient's stated pain goal  Description: INTERVENTIONS:  - Encourage pt to monitor pain and request assistance  - Assess pain using appropriate pain scale  - Administer analgesics based on type and severity of pain and evaluate response  - Implement non-pharmacological measures as appropriate and evaluate response  - Consider cultural and social influences on pain and pain management  - Manage/alleviate anxiety  - Utilize distraction and/or relaxation techniques  - Monitor for opioid side effects  - Notify MD/LIP if interventions unsuccessful or patient reports new pain  - Anticipate increased pain with activity and pre-medicate as appropriate  Outcome: Progressing     Problem: SAFETY ADULT - FALL  Goal: Free from fall injury  Description: INTERVENTIONS:  - Assess pt frequently for physical needs  - Identify cognitive and physical deficits and behaviors that affect risk of falls.   - [place] fall precautions as indicated by assessment.  - Educate pt/family on patient safety including physical limitations  - Instruct pt to call for assistance with activity based on assessment  - Modify environment to reduce risk of injury  - Provide assistive devices as appropriate  - Consider OT/PT consult to assist with strengthening/mobility  - Encourage toileting schedule  Outcome: Progressing     Problem: DISCHARGE PLANNING  Goal: Discharge to home or other facility with appropriate resources  Description: INTERVENTIONS:  - Identify barriers to discharge w/pt and caregiver  - Include patient/family/discharge partner in discharge planning  - Arrange for needed discharge resources and transportation as appropriate  - Identify discharge learning needs (meds, wound care, etc)  - Arrange for interpreters to assist at discharge as needed  - Consider post-discharge preferences of patient/family/discharge partner  - Complete POLST form as appropriate  - Assess patient's ability to be responsible for managing their own health  - Refer to Case Management Department for coordinating discharge planning if the patient needs post-hospital services based on physician/LIP order or complex needs related to functional status, cognitive ability or social support system  Outcome: Progressing     Problem: SKIN/TISSUE INTEGRITY - ADULT  Goal: Skin integrity remains intact  Description: INTERVENTIONS  - Assess and document risk factors for pressure ulcer development  - Assess and document skin integrity  - Monitor for areas of redness and/or skin breakdown  - Initiate interventions, skin care algorithm/standards of care as needed  Outcome: Progressing  Goal: Incision(s), wounds(s) or drain site(s) healing without S/S of infection  Description: INTERVENTIONS:  - Assess and document risk factors for pressure ulcer development  - Assess and document skin integrity  - Assess and document dressing/incision, wound bed, drain sites and surrounding tissue  - Implement wound care per orders  - Initiate isolation precautions as appropriate  - Initiate Pressure Ulcer prevention bundle as indicated  Outcome: Progressing     Monitoring vital signs, stable at this moment. Pain medication provided as needed. Wound care provided. Call light within reach, bed locked in lowest position, all fall precautions in place. All needs addressed. Frequent rounding maintained by nursing staff. Patient completing IV antibiotics per MD order, no acute changes at this time.

## 2023-09-01 VITALS
TEMPERATURE: 98 F | DIASTOLIC BLOOD PRESSURE: 57 MMHG | HEIGHT: 62 IN | RESPIRATION RATE: 16 BRPM | BODY MASS INDEX: 31.5 KG/M2 | HEART RATE: 66 BPM | OXYGEN SATURATION: 96 % | WEIGHT: 171.19 LBS | SYSTOLIC BLOOD PRESSURE: 93 MMHG

## 2023-09-01 LAB
ALBUMIN SERPL-MCNC: 2.9 G/DL (ref 3.4–5)
ANION GAP SERPL CALC-SCNC: 5 MMOL/L (ref 0–18)
BASOPHILS # BLD AUTO: 0.01 X10(3) UL (ref 0–0.2)
BASOPHILS NFR BLD AUTO: 0.2 %
BUN BLD-MCNC: 24 MG/DL (ref 7–18)
BUN/CREAT SERPL: 31.6 (ref 10–20)
CALCIUM BLD-MCNC: 9 MG/DL (ref 8.5–10.1)
CHLORIDE SERPL-SCNC: 105 MMOL/L (ref 98–112)
CO2 SERPL-SCNC: 30 MMOL/L (ref 21–32)
CREAT BLD-MCNC: 0.76 MG/DL
DEPRECATED RDW RBC AUTO: 44.8 FL (ref 35.1–46.3)
EGFRCR SERPLBLD CKD-EPI 2021: 91 ML/MIN/1.73M2 (ref 60–?)
EOSINOPHIL # BLD AUTO: 0.08 X10(3) UL (ref 0–0.7)
EOSINOPHIL NFR BLD AUTO: 1.9 %
ERYTHROCYTE [DISTWIDTH] IN BLOOD BY AUTOMATED COUNT: 13 % (ref 11–15)
GLUCOSE BLD-MCNC: 105 MG/DL (ref 70–99)
HCT VFR BLD AUTO: 33.9 %
HGB BLD-MCNC: 11.2 G/DL
IMM GRANULOCYTES # BLD AUTO: 0.02 X10(3) UL (ref 0–1)
IMM GRANULOCYTES NFR BLD: 0.5 %
LYMPHOCYTES # BLD AUTO: 1.39 X10(3) UL (ref 1–4)
LYMPHOCYTES NFR BLD AUTO: 32.3 %
MAGNESIUM SERPL-MCNC: 2 MG/DL (ref 1.6–2.6)
MCH RBC QN AUTO: 30.9 PG (ref 26–34)
MCHC RBC AUTO-ENTMCNC: 33 G/DL (ref 31–37)
MCV RBC AUTO: 93.6 FL
MONOCYTES # BLD AUTO: 0.23 X10(3) UL (ref 0.1–1)
MONOCYTES NFR BLD AUTO: 5.3 %
NEUTROPHILS # BLD AUTO: 2.57 X10 (3) UL (ref 1.5–7.7)
NEUTROPHILS # BLD AUTO: 2.57 X10(3) UL (ref 1.5–7.7)
NEUTROPHILS NFR BLD AUTO: 59.8 %
OSMOLALITY SERPL CALC.SUM OF ELEC: 294 MOSM/KG (ref 275–295)
PHOSPHATE SERPL-MCNC: 3.6 MG/DL (ref 2.5–4.9)
PLATELET # BLD AUTO: 140 10(3)UL (ref 150–450)
POTASSIUM SERPL-SCNC: 4 MMOL/L (ref 3.5–5.1)
RBC # BLD AUTO: 3.62 X10(6)UL
SODIUM SERPL-SCNC: 140 MMOL/L (ref 136–145)
WBC # BLD AUTO: 4.3 X10(3) UL (ref 4–11)

## 2023-09-01 PROCEDURE — 99239 HOSP IP/OBS DSCHRG MGMT >30: CPT | Performed by: HOSPITALIST

## 2023-09-01 RX ORDER — MELATONIN
2000 DAILY
Status: DISCONTINUED | OUTPATIENT
Start: 2023-09-01 | End: 2023-09-02

## 2023-09-01 RX ORDER — METHADONE HYDROCHLORIDE 10 MG/5ML
80 SOLUTION ORAL DAILY
Status: DISCONTINUED | OUTPATIENT
Start: 2023-09-02 | End: 2023-09-02

## 2023-09-01 RX ORDER — METHADONE HYDROCHLORIDE 10 MG/5ML
80 SOLUTION ORAL DAILY
Refills: 0 | Status: SHIPPED | COMMUNITY
Start: 2023-09-02

## 2023-09-01 RX ORDER — ALPRAZOLAM 0.5 MG/1
0.5 TABLET ORAL 3 TIMES DAILY PRN
Status: DISCONTINUED | OUTPATIENT
Start: 2023-09-01 | End: 2023-09-02

## 2023-09-01 RX ORDER — FUROSEMIDE 10 MG/ML
20 INJECTION INTRAMUSCULAR; INTRAVENOUS ONCE
Status: COMPLETED | OUTPATIENT
Start: 2023-09-01 | End: 2023-09-01

## 2023-09-01 RX ORDER — CHOLECALCIFEROL (VITAMIN D3) 25 MCG
2000 TABLET ORAL DAILY
Qty: 60 TABLET | Refills: 0 | Status: SHIPPED | COMMUNITY
Start: 2023-09-01

## 2023-09-01 RX ORDER — ALPRAZOLAM 0.5 MG/1
0.5 TABLET ORAL 3 TIMES DAILY PRN
Qty: 21 TABLET | Refills: 0 | Status: SHIPPED | COMMUNITY
Start: 2023-09-01

## 2023-09-01 NOTE — CM/SW NOTE
09/01/23 1000   Discharge disposition   Expected discharge disposition Home-Health   Post Acute Care Provider   ([facility])   Discharge transportation Private car     Pt discussed during nursing rounds. Pt is stable for NM today. MD NM order entered. [facility] is only accepting [facility] agency at NM. [address] and notified of [facility] and Home home today. Pt's family will provide transport at NM. Plan: Home w/[facility] [address] today. / to remain available for support and/or discharge planning.      CLAUDETTE Wright    [phone]

## 2023-09-01 NOTE — DISCHARGE INSTRUCTIONS
Sometimes managing your health at home requires assistance. The [facility] team has recognized your preference to use [facility], Phone: [phone] or Fax: [phone]. A representative from the home health agency will contact you or your family to schedule your first visit.

## 2023-09-01 NOTE — PLAN OF CARE
Problem: Patient Centered Care  Goal: Patient preferences are identified and integrated in the patient's plan of care  Description: Interventions:  - What would you like us to know as we care for you?  From home alone, takes methadone.  - Provide timely, complete, and accurate information to patient/family  - Incorporate patient and family knowledge, values, beliefs, and cultural backgrounds into the planning and delivery of care  - Encourage patient/family to participate in care and decision-making at the level they choose  - Honor patient and family perspectives and choices  Outcome: Progressing     Problem: Patient/Family Goals  Goal: Patient/Family Long Term Goal  Description: Patient's Long Term Goal: Discharge from the hospital    Interventions:  - Monitor vital signs  - Monitor appropriate labs  - Wound care  - Pain management  - Administer medications per order  - Follow MD orders  - Diagnostics per order  - Update / inform patient and family on plan of care  - Discharge planning  - See additional Care Plan goals for specific interventions  Outcome: Progressing  Goal: Patient/Family Short Term Goal  Description: Patient's Short Term Goal: Improve pain and redness to extremities    Interventions:   - Monitor vital signs  - Monitor appropriate labs  - Wound care  - Pain management  - Administer medications per order  - Follow MD orders  - Diagnostics per order  - Update / inform patient and family on plan of care  - See additional Care Plan goals for specific interventions  Outcome: Progressing     Problem: PAIN - ADULT  Goal: Verbalizes/displays adequate comfort level or patient's stated pain goal  Description: INTERVENTIONS:  - Encourage pt to monitor pain and request assistance  - Assess pain using appropriate pain scale  - Administer analgesics based on type and severity of pain and evaluate response  - Implement non-pharmacological measures as appropriate and evaluate response  - Consider cultural and social influences on pain and pain management  - Manage/alleviate anxiety  - Utilize distraction and/or relaxation techniques  - Monitor for opioid side effects  - Notify MD/LIP if interventions unsuccessful or patient reports new pain  - Anticipate increased pain with activity and pre-medicate as appropriate  Outcome: Progressing     Problem: SAFETY ADULT - FALL  Goal: Free from fall injury  Description: INTERVENTIONS:  - Assess pt frequently for physical needs  - Identify cognitive and physical deficits and behaviors that affect risk of falls.   - [place] fall precautions as indicated by assessment.  - Educate pt/family on patient safety including physical limitations  - Instruct pt to call for assistance with activity based on assessment  - Modify environment to reduce risk of injury  - Provide assistive devices as appropriate  - Consider OT/PT consult to assist with strengthening/mobility  - Encourage toileting schedule  Outcome: Progressing     Problem: DISCHARGE PLANNING  Goal: Discharge to home or other facility with appropriate resources  Description: INTERVENTIONS:  - Identify barriers to discharge w/pt and caregiver  - Include patient/family/discharge partner in discharge planning  - Arrange for needed discharge resources and transportation as appropriate  - Identify discharge learning needs (meds, wound care, etc)  - Arrange for interpreters to assist at discharge as needed  - Consider post-discharge preferences of patient/family/discharge partner  - Complete POLST form as appropriate  - Assess patient's ability to be responsible for managing their own health  - Refer to Case Management Department for coordinating discharge planning if the patient needs post-hospital services based on physician/LIP order or complex needs related to functional status, cognitive ability or social support system  Outcome: Progressing     Problem: SKIN/TISSUE INTEGRITY - ADULT  Goal: Skin integrity remains intact  Description: INTERVENTIONS  - Assess and document risk factors for pressure ulcer development  - Assess and document skin integrity  - Monitor for areas of redness and/or skin breakdown  - Initiate interventions, skin care algorithm/standards of care as needed  Outcome: Progressing  Goal: Incision(s), wounds(s) or drain site(s) healing without S/S of infection  Description: INTERVENTIONS:  - Assess and document risk factors for pressure ulcer development  - Assess and document skin integrity  - Assess and document dressing/incision, wound bed, drain sites and surrounding tissue  - Implement wound care per orders  - Initiate isolation precautions as appropriate  - Initiate Pressure Ulcer prevention bundle as indicated  Outcome: Progressing    Patient slightly drowsy, falls asleep quickly but able to remain alert in conversation. Held AM Xanax per MD. Daughter and son-in-law at bedside, updated on plan of care. No complaints of pain at this time. Call light within reach, safety precautions in place. Patient calls appropriately.

## 2023-09-01 NOTE — PHYSICAL THERAPY NOTE
PHYSICAL THERAPY TREATMENT NOTE - INPATIENT     Room Number: 866/806-E       Presenting Problem: Cellulitis    Problem List  Principal Problem:    Cellulitis, unspecified cellulitis site      PHYSICAL THERAPY ASSESSMENT   Chart reviewed. RN Shelley Royal approved participation in physical therapy. PPE worn by therapist: mask and gloves. Patient was not wearing a mask during session. Patient presented in bed with 6/10 pain. Patient with good  progress towards goals during this session. Education provided on Physical therapy plan of care and physiological benefits of out of bed mobility. Patient with good carryover. Pt agreeable to therapy, able to ambulate in hallway and in room with supervision, pacing around room while on phone with supervision, about 15 mins, no LOB noted. Pt requesting to use restroom, assisted with toilet transfer and hygiene with Supervision. Pt on track to discharge home with HHPT and 24 hr supervision once medically cleared. Bed mobility: Supervision sit to EOB  Transfers: Supervision for STS transfers  Gait Assistance: Supervision  Distance (ft): 125 ft  Assistive Device: None  Pattern: Shuffle (decreased bev and step length, forward flexed posture)          Patient was left in bed at end of session with all needs in reach. The patient's Approx Degree of Impairment: 28.97% has been calculated based on documentation in the [facility] '6 clicks' Inpatient Basic Mobility Short Form. Research supports that patients with this level of impairment may benefit from Home with home health PT. 24 hr supervision. RN aware of patient status post session. DISCHARGE RECOMMENDATIONS  PT Discharge Recommendations: Home with home health PT;24 hour care/supervision     PLAN  PT Treatment Plan: Bed mobility; Body mechanics; Endurance; Energy conservation;Patient education;Gait training;Range of motion;Strengthening;Transfer training;Balance training    SUBJECTIVE  My kids don't think I'm strong enough to go home     OBJECTIVE  Precautions: Limb alert - right (pt is refusing use of alarms see RN notes for details)    WEIGHT BEARING RESTRICTION  Weight Bearing Restriction: None                PAIN ASSESSMENT   Ratin  Location: BLE  Management Techniques: Activity promotion; Body mechanics    BALANCE                                                                                                                       Static Sitting: Fair +  Dynamic Sitting: Fair           Static Standing: Fair  Dynamic Standing: Fair        AM-PAC '6-Clicks' INPATIENT SHORT FORM - BASIC MOBILITY  How much difficulty does the patient currently have. .. Patient Difficulty: Turning over in bed (including adjusting bedclothes, sheets and blankets)?: None   Patient Difficulty: Sitting down on and standing up from a chair with arms (e.g., wheelchair, bedside commode, etc.): None   Patient Difficulty: Moving from lying on back to sitting on the side of the bed?: None   How much help from another person does the patient currently need. ..    Help from Another: Moving to and from a bed to a chair (including a wheelchair)?: A Little   Help from Another: Need to walk in hospital room?: A Little   Help from Another: Climbing 3-5 steps with a railing?: A Little     AM-PAC Score:  Raw Score: 21   Approx Degree of Impairment: 28.97%   Standardized Score (AM-PAC Scale): 50.25   CMS Modifier (G-Code): CJ        Patient End of Session: In bed;Needs met;Call light within reach;RN aware of session/findings    CURRENT GOALS     Goals to be met by: 9/15/2023  Patient Goal Patient's self-stated goal is: return home    Goal #1 Patient is able to demonstrate supine - sit EOB @ level: independent      Goal #1   Current Status  Supervision   Goal #2 Patient is able to demonstrate transfers Sit to/from Stand at assistance level: independent with none      Goal #2  Current Status  Supervision    Goal #3 Patient is able to ambulate 300 feet with assist device: none at assistance level: independent   Goal #3   Current Status  125 ft with supervision    Goal #4 Patient will negotiate 2 stairs/one curb w/ assistive device and supervision   Goal #4   Current Status  NT   Goal #5 Patient to demonstrate independence with home activity/exercise instructions provided to patient in preparation for discharge.    Goal #5   Current Status  ongoing    Goal #6     Goal #6  Current Status         Gait Trainin minutes  Therapeutic Activity: 15 minutes    [address] [place] [address]  [phone]

## 2023-09-01 NOTE — PHYSICAL THERAPY NOTE
Attempted to see pt this AM, chart reviewed, RN approved participation. Pt in room standing bedside, daughter and son in law present. Pt declining ambulation with PT at this time, stating she feels weak and her legs are in pain. Will follow up later today as schedule allows.      Loida Mayorga  [facility]  [phone]'

## 2023-09-01 NOTE — PLAN OF CARE
Problem: Patient Centered Care  Goal: Patient preferences are identified and integrated in the patient's plan of care  Description: Interventions:  - What would you like us to know as we care for you?  From home alone, takes methadone.  - Provide timely, complete, and accurate information to patient/family  - Incorporate patient and family knowledge, values, beliefs, and cultural backgrounds into the planning and delivery of care  - Encourage patient/family to participate in care and decision-making at the level they choose  - Honor patient and family perspectives and choices  Outcome: Progressing     Problem: Patient/Family Goals  Goal: Patient/Family Long Term Goal  Description: Patient's Long Term Goal: Discharge from the hospital    Interventions:  - Monitor vital signs  - Monitor appropriate labs  - Wound care  - Pain management  - Administer medications per order  - Follow MD orders  - Diagnostics per order  - Update / inform patient and family on plan of care  - Discharge planning  - See additional Care Plan goals for specific interventions  Outcome: Progressing  Goal: Patient/Family Short Term Goal  Description: Patient's Short Term Goal: Improve pain and redness to extremities    Interventions:   - Monitor vital signs  - Monitor appropriate labs  - Wound care  - Pain management  - Administer medications per order  - Follow MD orders  - Diagnostics per order  - Update / inform patient and family on plan of care  - See additional Care Plan goals for specific interventions  Outcome: Progressing     Problem: PAIN - ADULT  Goal: Verbalizes/displays adequate comfort level or patient's stated pain goal  Description: INTERVENTIONS:  - Encourage pt to monitor pain and request assistance  - Assess pain using appropriate pain scale  - Administer analgesics based on type and severity of pain and evaluate response  - Implement non-pharmacological measures as appropriate and evaluate response  - Consider cultural and social influences on pain and pain management  - Manage/alleviate anxiety  - Utilize distraction and/or relaxation techniques  - Monitor for opioid side effects  - Notify MD/LIP if interventions unsuccessful or patient reports new pain  - Anticipate increased pain with activity and pre-medicate as appropriate  Outcome: Progressing     Problem: SAFETY ADULT - FALL  Goal: Free from fall injury  Description: INTERVENTIONS:  - Assess pt frequently for physical needs  - Identify cognitive and physical deficits and behaviors that affect risk of falls.   - [place] fall precautions as indicated by assessment.  - Educate pt/family on patient safety including physical limitations  - Instruct pt to call for assistance with activity based on assessment  - Modify environment to reduce risk of injury  - Provide assistive devices as appropriate  - Consider OT/PT consult to assist with strengthening/mobility  - Encourage toileting schedule  Outcome: Progressing     Problem: DISCHARGE PLANNING  Goal: Discharge to home or other facility with appropriate resources  Description: INTERVENTIONS:  - Identify barriers to discharge w/pt and caregiver  - Include patient/family/discharge partner in discharge planning  - Arrange for needed discharge resources and transportation as appropriate  - Identify discharge learning needs (meds, wound care, etc)  - Arrange for interpreters to assist at discharge as needed  - Consider post-discharge preferences of patient/family/discharge partner  - Complete POLST form as appropriate  - Assess patient's ability to be responsible for managing their own health  - Refer to Case Management Department for coordinating discharge planning if the patient needs post-hospital services based on physician/LIP order or complex needs related to functional status, cognitive ability or social support system  Outcome: Progressing     Problem: SKIN/TISSUE INTEGRITY - ADULT  Goal: Skin integrity remains intact  Description: INTERVENTIONS  - Assess and document risk factors for pressure ulcer development  - Assess and document skin integrity  - Monitor for areas of redness and/or skin breakdown  - Initiate interventions, skin care algorithm/standards of care as needed  Outcome: Progressing  Goal: Incision(s), wounds(s) or drain site(s) healing without S/S of infection  Description: INTERVENTIONS:  - Assess and document risk factors for pressure ulcer development  - Assess and document skin integrity  - Assess and document dressing/incision, wound bed, drain sites and surrounding tissue  - Implement wound care per orders  - Initiate isolation precautions as appropriate  - Initiate Pressure Ulcer prevention bundle as indicated  Outcome: Progressing     Monitoring vital signs, stable at this moment. Pain medication provided as needed. Wound care provided. Call light within reach, bed locked in lowest position, all fall precautions in place. All needs addressed. Frequent rounding maintained by nursing staff. Patient completing IV antibiotics per MD order, no acute changes at this time.

## 2023-09-01 NOTE — PLAN OF CARE
Problem: Patient Centered Care  Goal: Patient preferences are identified and integrated in the patient's plan of care  Description: Interventions:  - What would you like us to know as we care for you?  From home alone, takes methadone.  - Provide timely, complete, and accurate information to patient/family  - Incorporate patient and family knowledge, values, beliefs, and cultural backgrounds into the planning and delivery of care  - Encourage patient/family to participate in care and decision-making at the level they choose  - Honor patient and family perspectives and choices  9/1/2023 1617 by Talita Matias RN  Outcome: Adequate for Discharge       Problem: Patient/Family Goals  Goal: Patient/Family Long Term Goal  Description: Patient's Long Term Goal: Discharge from the hospital    Interventions:  - Monitor vital signs  - Monitor appropriate labs  - Wound care  - Pain management  - Administer medications per order  - Follow MD orders  - Diagnostics per order  - Update / inform patient and family on plan of care  - Discharge planning  - See additional Care Plan goals for specific interventions  9/1/2023 1617 by Talita Matias RN  Outcome: Adequate for Discharge    Goal: Patient/Family Short Term Goal  Description: Patient's Short Term Goal: Improve pain and redness to extremities    Interventions:   - Monitor vital signs  - Monitor appropriate labs  - Wound care  - Pain management  - Administer medications per order  - Follow MD orders  - Diagnostics per order  - Update / inform patient and family on plan of care  - See additional Care Plan goals for specific interventions  9/1/2023 1617 by Talita Matias RN  Outcome: Adequate for Discharge    Problem: PAIN - ADULT  Goal: Verbalizes/displays adequate comfort level or patient's stated pain goal  Description: INTERVENTIONS:  - Encourage pt to monitor pain and request assistance  - Assess pain using appropriate pain scale  - Administer analgesics based on type and severity of pain and evaluate response  - Implement non-pharmacological measures as appropriate and evaluate response  - Consider cultural and social influences on pain and pain management  - Manage/alleviate anxiety  - Utilize distraction and/or relaxation techniques  - Monitor for opioid side effects  - Notify MD/LIP if interventions unsuccessful or patient reports new pain  - Anticipate increased pain with activity and pre-medicate as appropriate  9/1/2023 1617 by Romy Georges RN  Outcome: Adequate for Discharge  9/1/2023 1115 by Romy Georges RN  Outcome: Progressing     Problem: SAFETY ADULT - FALL  Goal: Free from fall injury  Description: INTERVENTIONS:  - Assess pt frequently for physical needs  - Identify cognitive and physical deficits and behaviors that affect risk of falls.   - Cecil fall precautions as indicated by assessment.  - Educate pt/family on patient safety including physical limitations  - Instruct pt to call for assistance with activity based on assessment  - Modify environment to reduce risk of injury  - Provide assistive devices as appropriate  - Consider OT/PT consult to assist with strengthening/mobility  - Encourage toileting schedule  9/1/2023 1617 by Romy Georges RN  Outcome: Adequate for Discharge       Problem: DISCHARGE PLANNING  Goal: Discharge to home or other facility with appropriate resources  Description: INTERVENTIONS:  - Identify barriers to discharge w/pt and caregiver  - Include patient/family/discharge partner in discharge planning  - Arrange for needed discharge resources and transportation as appropriate  - Identify discharge learning needs (meds, wound care, etc)  - Arrange for interpreters to assist at discharge as needed  - Consider post-discharge preferences of patient/family/discharge partner  - Complete POLST form as appropriate  - Assess patient's ability to be responsible for managing their own health  - Refer to Case Management Department for coordinating discharge planning if the patient needs post-hospital services based on physician/LIP order or complex needs related to functional status, cognitive ability or social support system  9/1/2023 1617 by Chaka Thomas RN  Outcome: Adequate for Discharge       Problem: SKIN/TISSUE INTEGRITY - ADULT  Goal: Skin integrity remains intact  Description: INTERVENTIONS  - Assess and document risk factors for pressure ulcer development  - Assess and document skin integrity  - Monitor for areas of redness and/or skin breakdown  - Initiate interventions, skin care algorithm/standards of care as needed  9/1/2023 1617 by Chaka Thomas RN  Outcome: Adequate for Discharge    Goal: Incision(s), wounds(s) or drain site(s) healing without S/S of infection  Description: INTERVENTIONS:  - Assess and document risk factors for pressure ulcer development  - Assess and document skin integrity  - Assess and document dressing/incision, wound bed, drain sites and surrounding tissue  - Implement wound care per orders  - Initiate isolation precautions as appropriate  - Initiate Pressure Ulcer prevention bundle as indicated  9/1/2023 1617 by Chaka Thomas RN  Outcome: Adequate for Discharge    Discharge arrangements made- shawanda Carey will return at 6pm to  patient. No complaints of pain at this time. PIV removed and discharge paperwork reviewed. Patient verbalized understanding.

## 2023-09-04 NOTE — CM/SW NOTE
Notified [facility] of pt dispo to home w/HH. Case close by intake at [facility]. DC planner/CM to remain available for support and/or discharge planning.     Liz Delgado RN, [facility]    Ext.  [phone]

## 2023-09-05 ENCOUNTER — PATIENT OUTREACH (OUTPATIENT)
Dept: CASE MANAGEMENT | Age: 58
End: 2023-09-05

## 2023-09-05 NOTE — PROGRESS NOTES
Spoke with pt briefly--requests call back tomorrow--\"I'm in a hurry--I have a ride waiting, I have to go. \"    Discharge Dx:     Cellulitis

## 2023-09-05 NOTE — PAYOR COMM NOTE
--------------  DISCHARGE REVIEW    Payor: Demarco Mirza #:  [id number]  Authorization Number: [id number]    Admit date: 8/27/23  Admit time:   9:17 PM  Discharge Date: 9/1/2023  8:00 PM     Admitting Physician: Ayden Kenney MD  Attending Physician:  No att. providers found  Primary Care Physician: Era Norton MD              09/01/23 1000   Discharge disposition   Expected discharge disposition Home-Health   Post Acute Care Provider    ([address] [place] [address])   Discharge transportation Private car      Pt discussed during nursing rounds. Pt is stable for [place] today. MD dc order entered. [facility] is only accepting [facility] agency at [place]. [address] and notified of [facility] home today. Pt's family will provide transport at [place]. Plan: Home w/[facility] [address] today. / to remain available for support and/or discharge planning.       CLAUDETTE Curtis    [phone]             Electronically signed by Colleen Reza RN at 9/1/2023 10:19 AM

## 2023-09-08 NOTE — PROGRESS NOTES
Spoke with pt, briefly--she is in a cab right now--will call this NCM ([phone]) back, when able.

## 2023-09-09 ENCOUNTER — TELEPHONE (OUTPATIENT)
Dept: FAMILY MEDICINE CLINIC | Facility: CLINIC | Age: 58
End: 2023-09-09

## 2023-09-09 NOTE — TELEPHONE ENCOUNTER
On call  Patient states she was discharged, when she called pharmacy pt was informed her alprazolam refill was cancelled, per patient she has not been able to sleep and she need medication refill. Patient states she had a standing prescription for alprazolam that is not available anymore, she is frustrated as she had been in her medications for long time and per pt hospitalist had no right to change her meds, pt is concerned about possible withdrawal. Explained to pt I see in system a prescription sent for 0.5 mg tid at discharge, per pt she did not get medication. Called pharmacy, verified pt has not received refill, authorized last refill pending from last prescription.

## 2023-09-10 RX ORDER — ALPRAZOLAM 1 MG/1
1 TABLET ORAL 3 TIMES DAILY
Qty: 90 TABLET | Refills: 0 | OUTPATIENT
Start: 2023-09-10

## 2023-10-09 ENCOUNTER — TELEPHONE (OUTPATIENT)
Dept: DERMATOLOGY CLINIC | Facility: CLINIC | Age: 58
End: 2023-10-09

## 2023-10-09 NOTE — TELEPHONE ENCOUNTER
Patient called    Asking to be seen sooner than 10/30 but declined appt on 10/12 for 11 am. States needs to be seen asap. Advised DM will be out office until the 30th. Asking who is on call for him?

## 2023-10-09 NOTE — TELEPHONE ENCOUNTER
Please review. Protocol failed / No protocol.   Requested Prescriptions   Pending Prescriptions Disp Refills    ALBUTEROL 108 (90 Base) MCG/ACT Inhalation Aero Soln [Pharmacy Med Name: ALBUTEROL HFA INH(200 PUFFS) 18GM] 18 g 0     Sig: INHALE 2 PUFFS INTO THE LUNGS EVERY 4 HOURS AS NEEDED       Asthma & COPD Medication Protocol Failed - 10/7/2023 12:32 PM        Failed - In person appointment or virtual visit in the past 6 mos or appointment in next 3 mos     Recent Outpatient Visits              2 months ago Other cirrhosis of liver ([facility] [address]    [employer], [address] [facility], Claudetta Nicks, MD    Office Visit    4 months ago Cirrhosis of liver without ascites, unspecified hepatic cirrhosis type [facility])    [employer], [facility]    Office Visit    11 months ago Chronic obstructive pulmonary disease with acute exacerbation [facility])    [employer], [address] [facility] [place] Hilary Avalos MD    Office Visit    11 months ago Routine physical examination    Yamliet Botello MD    Office Visit    11 months ago Chronic obstructive pulmonary disease, unspecified COPD type [facility])    [employer], [address] [facility], [place] ONEIDA Ivy    Telemedicine          Future Appointments         Provider Department Appt Notes    In 3 weeks Naima Jefferson MD [place], [address], [place] follow up    In 1 month Elle Cortez MD [facility], [address] [place] [address]

## 2023-10-10 RX ORDER — ALBUTEROL SULFATE 90 UG/1
2 AEROSOL, METERED RESPIRATORY (INHALATION) EVERY 4 HOURS PRN
Qty: 18 G | Refills: 0 | Status: SHIPPED | OUTPATIENT
Start: 2023-10-10

## 2023-10-10 NOTE — TELEPHONE ENCOUNTER
Please inform patient I can see her tomorrow morning. Double book at 9 or around that time. I do not have a stroger topical and the clobetasol should not be used more than twice daily. I will recommend starting light therapy.      Livier Jones MD

## 2023-10-10 NOTE — TELEPHONE ENCOUNTER
Relayed below info to pt, she voiced understanding. She is willing to start UV[facility] however unable to come in until after 1pm. She's aware you are fully booked. How do we proceed here?

## 2023-10-10 NOTE — TELEPHONE ENCOUNTER
Message noted: Chart reviewed and may refill medication as requested. Prescription sent to listed pharmacy. Pharmacy to notify patient.  Pt notified through [facility]

## 2023-10-10 NOTE — TELEPHONE ENCOUNTER
Pt needs to call transportation to see if she is able to get a ride. Pt will update office once she has confirmed a time that works. Is agreeable to coming if she can get a ride.   Please schedule patient with Dr Julieta Harris 10/11/23 between 130-3pm--okay to double book per MD.  Brigitte Blanca

## 2023-10-10 NOTE — TELEPHONE ENCOUNTER
LOV 7/25/23, pt with hepatic urticaria, currently on hydroxyzine 25mg TID, using clobetasol \"at least 5-6 times per day\" with no relief, requests stronger topical and to be seen this week for uncontrolled itching head to toes. Please advise.

## 2023-11-08 RX ORDER — ALBUTEROL SULFATE 90 UG/1
2 AEROSOL, METERED RESPIRATORY (INHALATION) EVERY 4 HOURS PRN
Qty: 8.5 G | Refills: 5 | Status: SHIPPED | OUTPATIENT
Start: 2023-11-08

## 2023-11-08 RX ORDER — HYDROXYZINE HYDROCHLORIDE 25 MG/1
25 TABLET, FILM COATED ORAL 3 TIMES DAILY PRN
Qty: 30 TABLET | Refills: 0 | Status: SHIPPED | OUTPATIENT
Start: 2023-11-08

## 2023-11-08 RX ORDER — METHOCARBAMOL 500 MG/1
500 TABLET, FILM COATED ORAL 4 TIMES DAILY
Qty: 120 TABLET | Refills: 0 | Status: SHIPPED | OUTPATIENT
Start: 2023-11-08

## 2023-11-08 RX ORDER — CLOBETASOL PROPIONATE 0.5 MG/G
OINTMENT TOPICAL
Qty: 60 G | Refills: 1 | Status: SHIPPED | OUTPATIENT
Start: 2023-11-08

## 2023-11-08 NOTE — TELEPHONE ENCOUNTER
Refill Request for medication(s): clobetasol 0.05 % External Ointment     Last Office Visit: 7/2023    Last Refill: n/a    Pharmacy, Dosage verified:     Condition Update (if applicable):     Rx pended and sent to provider for approval, please advise. Thank You!

## 2023-11-08 NOTE — TELEPHONE ENCOUNTER
Refill passed per [place] protocol.     Requested Prescriptions   Pending Prescriptions Disp Refills    ALBUTEROL 108 (90 Base) MCG/ACT Inhalation Aero Soln [Pharmacy Med Name: ALBUTEROL HFA INH (200 PUFFS) 8.5GM] 8.5 g 0     Sig: INHALE 2 PUFFS EVERY 4 HOURS AS NEEDED       Asthma & COPD Medication Protocol Passed - 11/8/2023  4:36 PM        Passed - In person appointment or virtual visit in the past 6 mos or appointment in next 3 mos     Recent Outpatient Visits              3 months ago Other cirrhosis of liver [facility])    [address], [address] [facility] Jerome Price Daniel, MD    Office Visit    5 months ago Cirrhosis of liver without ascites, unspecified hepatic cirrhosis type [facility])    [address], [address] [facility] Kate Edmundo Charter, MD    Office Visit    1 year ago Chronic obstructive pulmonary disease with acute exacerbation [facility])    [facility], [address] [place] [address] Brittni Kulkarni MD    Office Visit    1 year ago Routine physical examination    Kelsy Mon MD    Office Visit    1 year ago Chronic obstructive pulmonary disease, unspecified COPD type [facility])    [address], [address] [facility] Luh Price Dustin [place], [place]    Telemedicine          Future Appointments         Provider Department Appt Notes    In 2 weeks Sumaya Taylor MD [address], [place] px last px was 10/27/2022    In 2 weeks Lorraine Harmon MD [address], [address] [place]                   Future Appointments         Provider Department Appt Notes    In 2 weeks Sumaya Taylor MD [address], [place] px last px was 10/27/2022    In 2 weeks Lorraine Harmon MD [address] [facility] [address]           Recent Outpatient Visits              3 months ago Other cirrhosis of liver [facility])    Rochelle Eller MD    Office Visit    5 months ago Cirrhosis of liver without ascites, unspecified hepatic cirrhosis type [facility])    [facility], [address] [place] [address], Jimbo Macedo MD    Office Visit    1 year ago Chronic obstructive pulmonary disease with acute exacerbation [facility])    [address], [address] [facility] Narendra Sanz MD    Office Visit    1 year ago Routine physical examination    Xenia Chau MD    Office Visit    1 year ago Chronic obstructive pulmonary disease, unspecified COPD type [facility])    [address], [address] [facility] Luh Price Ruston, APRN    Telemedicine

## 2023-11-08 NOTE — TELEPHONE ENCOUNTER
Please review. Protocol failed / Has no protocol.      Requested Prescriptions   Pending Prescriptions Disp Refills    HYDROXYZINE 25 MG Oral Tab [Pharmacy Med Name: HYDROXYZINE HCL 25MG TABS (WHITE)] 30 tablet 0     Sig: TAKE 1 TABLET(25 MG) BY MOUTH THREE TIMES DAILY AS NEEDED FOR ITCHING OR ANXIETY       There is no refill protocol information for this order       METHOCARBAMOL 500 MG Oral Tab [Pharmacy Med Name: METHOCARBAMOL 500MG TABLETS] 120 tablet 0     Sig: TAKE 1 TABLET(500 MG) BY MOUTH FOUR TIMES DAILY       There is no refill protocol information for this order        Future Appointments         Provider Department Appt Notes    In 2 weeks Katharine Nazario MD [address], [facility] px last px was 10/27/2022    In 2 weeks King Gaby Fung MD [address], [address] [place]            Recent Outpatient Visits              3 months ago Other cirrhosis of liver [facility])    Rudy Suárez MD    Office Visit    5 months ago Cirrhosis of liver without ascites, unspecified hepatic cirrhosis type [facility])    [address], [address] [facility], Abram Oppenheim, MD    Office Visit    1 year ago Chronic obstructive pulmonary disease with acute exacerbation [facility])    [address], [address] [facility] Theo Bryan MD    Office Visit    1 year ago Routine physical examination    Teresita Yuen MD    Office Visit    1 year ago Chronic obstructive pulmonary disease, unspecified COPD type [facility])    [address], [address] [facility] Arv[facility], [facility]    Telemedicine

## 2023-11-08 NOTE — TELEPHONE ENCOUNTER
Message noted: Chart reviewed and may refill medication as requested. Prescription sent to listed pharmacy. Pharmacy to notify patient.  Pt notified through [facility]

## 2023-11-09 RX ORDER — ALPRAZOLAM 1 MG/1
TABLET ORAL
Qty: 90 TABLET | Refills: 0 | Status: SHIPPED | OUTPATIENT
Start: 2023-11-09

## 2023-11-09 NOTE — TELEPHONE ENCOUNTER
Please review; protocol failed.     Requested Prescriptions   Pending Prescriptions Disp Refills    ALPRAZOLAM 1 MG Oral Tab [Pharmacy Med Name: ALPRAZOLAM 1MG TABLETS] 90 tablet 0     Sig: TAKE 1 TABLET BY MOUTH THREE TIMES DAILY       There is no refill protocol information for this order        Future Appointments         Provider Department Appt Notes    In 2 weeks Amos Meyer MD [address], [facility] px last px was 10/27/2022    In 2 weeks Coty Tyson MD [address], [address] [place]           Recent Outpatient Visits              3 months ago Other cirrhosis of liver [facility])    Hong Scott MD    Office Visit    5 months ago Cirrhosis of liver without ascites, unspecified hepatic cirrhosis type [facility])    [address], [address] [facility]    Office Visit    1 year ago Chronic obstructive pulmonary disease with acute exacerbation [facility])    [address], [address] [facility] Dawson Boykin MD    Office Visit    1 year ago Routine physical examination    Bowen Lee MD    Office Visit    1 year ago Chronic obstructive pulmonary disease, unspecified COPD type [facility])    [address], [address] [facility] Luh Price Ruston, APR    Telemedicine

## 2023-12-02 ENCOUNTER — OFFICE VISIT (OUTPATIENT)
Dept: FAMILY MEDICINE CLINIC | Facility: CLINIC | Age: 58
End: 2023-12-02

## 2023-12-02 VITALS
BODY MASS INDEX: 31.83 KG/M2 | DIASTOLIC BLOOD PRESSURE: 55 MMHG | SYSTOLIC BLOOD PRESSURE: 91 MMHG | HEART RATE: 52 BPM | HEIGHT: 62 IN | WEIGHT: 173 LBS | OXYGEN SATURATION: 96 %

## 2023-12-02 DIAGNOSIS — M54.50 ACUTE BILATERAL LOW BACK PAIN WITHOUT SCIATICA: Primary | ICD-10-CM

## 2023-12-02 DIAGNOSIS — J44.9 CHRONIC OBSTRUCTIVE PULMONARY DISEASE, UNSPECIFIED COPD TYPE (HCC): ICD-10-CM

## 2023-12-02 LAB
APPEARANCE: CLEAR
BILIRUBIN: NEGATIVE
GLUCOSE (URINE DIPSTICK): NEGATIVE MG/DL
KETONES (URINE DIPSTICK): NEGATIVE MG/DL
MULTISTIX LOT#: ABNORMAL NUMERIC
NITRITE, URINE: NEGATIVE
OCCULT BLOOD: NEGATIVE
PH, URINE: 6.5 (ref 4.5–8)
PROTEIN (URINE DIPSTICK): NEGATIVE MG/DL
SPECIFIC GRAVITY: 1.02 (ref 1–1.03)
URINE-COLOR: YELLOW
UROBILINOGEN,SEMI-QN: 0.2 MG/DL (ref 0–1.9)

## 2023-12-02 PROCEDURE — 81002 URINALYSIS NONAUTO W/O SCOPE: CPT | Performed by: FAMILY MEDICINE

## 2023-12-02 PROCEDURE — 3074F SYST BP LT 130 MM HG: CPT | Performed by: FAMILY MEDICINE

## 2023-12-02 PROCEDURE — 3078F DIAST BP <80 MM HG: CPT | Performed by: FAMILY MEDICINE

## 2023-12-02 PROCEDURE — 99214 OFFICE O/P EST MOD 30 MIN: CPT | Performed by: FAMILY MEDICINE

## 2023-12-02 PROCEDURE — 3008F BODY MASS INDEX DOCD: CPT | Performed by: FAMILY MEDICINE

## 2023-12-11 DIAGNOSIS — B18.2 CHRONIC HEPATITIS C WITHOUT HEPATIC COMA (HCC): ICD-10-CM

## 2023-12-11 NOTE — TELEPHONE ENCOUNTER
Patient called and rescheduled her appointment to 12/18/2023 at 3pm and she is requesting refills on the following medication:      hydrOXYzine 25 MG Oral Tab     ALPRAZolam 1 MG Oral Tab     furosemide 20 MG Oral Tab       ibuprofen 800 MG Oral Tab     ipratropium (ATROVENT HFA) 17 MCG/ACT Inhalation Aero Soln

## 2023-12-12 RX ORDER — IPRATROPIUM BROMIDE 17 UG/1
2 AEROSOL, METERED RESPIRATORY (INHALATION) 4 TIMES DAILY
Qty: 3 EACH | Refills: 1 | Status: SHIPPED | OUTPATIENT
Start: 2023-12-12

## 2023-12-12 RX ORDER — HYDROXYZINE HYDROCHLORIDE 25 MG/1
25 TABLET, FILM COATED ORAL 3 TIMES DAILY PRN
Qty: 30 TABLET | Refills: 0 | Status: SHIPPED | OUTPATIENT
Start: 2023-12-12

## 2023-12-12 RX ORDER — FUROSEMIDE 20 MG/1
20 TABLET ORAL DAILY
Qty: 90 TABLET | Refills: 1 | Status: SHIPPED | OUTPATIENT
Start: 2023-12-12

## 2023-12-12 RX ORDER — IBUPROFEN 800 MG/1
800 TABLET ORAL DAILY PRN
Qty: 30 TABLET | Refills: 0 | Status: SHIPPED | OUTPATIENT
Start: 2023-12-12

## 2023-12-12 RX ORDER — ALPRAZOLAM 0.5 MG/1
0.5 TABLET ORAL 3 TIMES DAILY PRN
Qty: 21 TABLET | Refills: 0 | Status: SHIPPED | OUTPATIENT
Start: 2023-12-12

## 2023-12-12 NOTE — TELEPHONE ENCOUNTER
Patient is calling for status of her medication refill request. Per the patient she is out of medication. Pt can be reached at [phone].

## 2023-12-12 NOTE — TELEPHONE ENCOUNTER
Please review. Protocol failed / Has no protocol. Recent fills Alprazolam 1mg each qty 90 : 8/10, 9/9, 10/6, 11/9    Ibuprofen is patient reported    Requested Prescriptions   Pending Prescriptions Disp Refills    hydrOXYzine 25 MG Oral Tab 30 tablet 0     Sig: Take 1 tablet (25 mg total) by mouth 3 (three) times daily as needed for Anxiety or Itching. There is no refill protocol information for this order       ALPRAZolam 0.5 MG Oral Tab 21 tablet 0     Sig: Take 1 tablet (0.5 mg total) by mouth 3 (three) times daily as needed for Anxiety. There is no refill protocol information for this order       furosemide 20 MG Oral Tab 30 tablet 0     Sig: Take 1 tablet (20 mg total) by mouth daily.        Hypertensive Medications Protocol Passed - 12/12/2023  1:15 PM        Passed - In person appointment in the past 12 or next 3 months     Recent Outpatient Visits              1 week ago Acute bilateral low back pain without sciatica    [place], [place]    Office Visit    4 months ago Other cirrhosis of liver [facility])    Daksha Pettit MD    Office Visit    7 months ago Cirrhosis of liver without ascites, unspecified hepatic cirrhosis type [facility])    [facility], [address] [facility], Jeferson Ramírez MD    Office Visit    1 year ago Chronic obstructive pulmonary disease with acute exacerbation [facility])    Shruthi Martínez, [address] [facility], [place] Jeanmarie Castillo MD    Office Visit    1 year ago Routine physical examination    George De MD    Office Visit          Future Appointments         Provider Department Appt Notes    In 6 days ONEIDA [facility], [facility] px last px was 10/27/2022               Passed - Last BP reading less than 140/90 BP Readings from Last 1 Encounters:   23 91/55               Passed - CMP or BMP in past 6 months     Recent Results (from the past 4392 hour(s))   Basic Metabolic Panel (8)    Collection Time: 23  4:55 AM   Result Value Ref Range    Glucose 91 70 - 99 mg/dL    Sodium 141 136 - 145 mmol/L    Potassium 4.6 3.5 - 5.1 mmol/L    Chloride 108 98 - 112 mmol/L    CO2 27.0 21.0 - 32.0 mmol/L    Anion Gap 6 0 - 18 mmol/L    BUN 24 (H) 7 - 18 mg/dL    Creatinine 0.85 0.55 - 1.02 mg/dL    BUN/CREA Ratio 28.2 (H) 10.0 - 20.0    Calcium, Total 8.8 8.5 - 10.1 mg/dL    Calculated Osmolality 296 (H) 275 - 295 mOsm/kg    eGFR-Cr 79 >=60 mL/min/1.73m2     *Note: Due to a large number of results and/or encounters for the requested time period, some results have not been displayed. A complete set of results can be found in Results Review.                Passed - In person appointment or virtual visit in the past 6 months     Recent Outpatient Visits              1 week ago Acute bilateral low back pain without sciatica    Sandi Echeverria [place], [place]    Office Visit    4 months ago Other cirrhosis of liver [facility])    Barbie Sarkar MD    Office Visit    7 months ago Cirrhosis of liver without ascites, unspecified hepatic cirrhosis type [facility])    [facility], [address] [facility], Kiara Mcnair MD    Office Visit    1 year ago Chronic obstructive pulmonary disease with acute exacerbation [facility])    [address] [facility],[address], [address] [facility] Zoraida Apgar, MD    Office Visit    1 year ago Routine physical examination    Monika Junior MD    Office Visit          Future Appointments         Provider Department Appt Notes    In 6 days ONEIDA [facility], [address] [facility] px last px was 10/27/2022               Passed - EGFRCR or GFRNAA > 50     GFR Evaluation  EGFRCR: 91 , resulted on 9/1/2023            ibuprofen 800 MG Oral Tab  0     Sig: Take 1 tablet (800 mg total) by mouth daily as needed. Non-Narcotic Pain Medication Protocol Passed - 12/12/2023  1:15 PM        Passed - In person appointment or virtual visit in the past 6 mos or appointment in next 3 mos     Recent Outpatient Visits              1 week ago Acute bilateral low back pain without sciatica    Igor Chairez [place]    Office Visit    4 months ago Other cirrhosis of liver [facility])    Storm Cline MD    Office Visit    7 months ago Cirrhosis of liver without ascites, unspecified hepatic cirrhosis type [facility])    [facility], [address] [place] [address], Ely Sanchez MD    Office Visit    1 year ago Chronic obstructive pulmonary disease with acute exacerbation [facility])    [address] [place] [address],[address], [address] [facility] [place] Gwen Lyon MD    Office Visit    1 year ago Routine physical examination    Brandee Saab MD    Office Visit          Future Appointments         Provider Department Appt Notes    In 6 days [facility] px last px was 10/27/2022                 ipratropium (ATROVENT HFA) 17 MCG/ACT Inhalation Aero Soln 12.9 g 2     Sig: Inhale 2 puffs into the lungs every 6 (six) hours.        Asthma & COPD Medication Protocol Passed - 12/12/2023  1:15 PM        Passed - In person appointment or virtual visit in the past 6 mos or appointment in next 3 mos     Recent Outpatient Visits              1 week ago Acute bilateral low back pain without sciatica    [facility] [address] [place] [address], Igor Lang, [place]    Office Visit    4 months ago Other cirrhosis of liver [facility])    Derrick Maguire, [address] Pineda Daniel, MD    Office Visit    7 months ago Cirrhosis of liver without ascites, unspecified hepatic cirrhosis type [facility])    Derrick Maguire, [address] Kate Chen [place], MD    Office Visit    1 year ago Chronic obstructive pulmonary disease with acute exacerbation [facility])    [facility], [address] [place] [address], Lena Gisela Sifuentes MD    Office Visit    1 year ago Routine physical examination    Cortney Donaldson MD    Office Visit          Future Appointments         Provider Department Appt Notes    In 6 days ONEIDA Boles [facility], [place] px last px was 10/27/2022                  Future Appointments         Provider Department Appt Notes    In 6 days ONEIDA Boles [facility], [place] px last px was 10/27/2022           Recent Outpatient Visits              1 week ago Acute bilateral low back pain without sciatica    Derrick Maguire, [address] [facility] [place], [place]    Office Visit    4 months ago Other cirrhosis of liver [facility])    Marcie Downey MD    Office Visit    7 months ago Cirrhosis of liver without ascites, unspecified hepatic cirrhosis type [facility])    Derrick Maguire, [address] Laura Chen MD    Office Visit    1 year ago Chronic obstructive pulmonary disease with acute exacerbation [facility])    Perfecto Contreras MD    Office Visit    1 year ago Routine physical examination    Cortney Donaldson MD    Office Visit

## 2023-12-13 RX ORDER — ALPRAZOLAM 1 MG/1
TABLET ORAL
Qty: 90 TABLET | Refills: 0 | Status: SHIPPED | OUTPATIENT
Start: 2023-12-13

## 2023-12-13 NOTE — TELEPHONE ENCOUNTER
Scott Bean, pharmacist from [facility], calling to state that patient's insurance will not cover the early refill as they will only cover this type of medication prescription once a month even with MD authorization. She states she can use discount care for patient or our office can call the patient's insurance to try to see if this can be overrided.     This RN asked pharmacist to use discount card and if patient has issue with amount to be paid with discount card to call our office again to see if we can assist.

## 2023-12-13 NOTE — TELEPHONE ENCOUNTER
Spoke to patient. She is unsure what happened but said that Alprazolam 0.5 mg tablets were sent and she takes 1 mg tablets. She spoke to pharmacy and they said she could bring back the 0.5 mg tablets. Dr. Haider Lu, please advise on Alprazolam 1 mg Rx. Pended.

## 2023-12-13 NOTE — TELEPHONE ENCOUNTER
Rx for ALPRAZolam 1 MG Oral Tab was sent today. She was given 0.5 mg of Xanax, she has qty of #21 now of the 0.5 mg; she was told by pharmacy to take 2 tablets to equal dosage. She states the pharmacy told her to have PCP's office to clarify for insurance purposes. I made her aware I will call pharmacy to clarify. Patient verbalized understanding. No further questions or concerns at this time. I called [facility], spoke with Shruti/pharmacist and made aware of Rx for Alprazolam 1 mg sent today to be filled. She was made aware of the 1 mg can be filled per Dr. Cristofer Gray note. She will call insurance to see if they will make an exception to cover as well as call the patient to make her aware of what transpires [related to insurance].

## 2023-12-13 NOTE — TELEPHONE ENCOUNTER
Message noted: May refill alprazolam 1mg as requested. Script sent to listed pharmacy by secure method. Can notify pt.

## 2023-12-19 ENCOUNTER — NURSE TRIAGE (OUTPATIENT)
Dept: FAMILY MEDICINE CLINIC | Facility: CLINIC | Age: 58
End: 2023-12-19

## 2023-12-19 DIAGNOSIS — N63.0 LUMP IN FEMALE BREAST: Primary | ICD-10-CM

## 2023-12-19 NOTE — TELEPHONE ENCOUNTER
Action Requested: Summary for Provider     []  Critical Lab, Recommendations Needed  [x] Need Additional Advice  []   FYI    [x]   Need Orders  [] Need Medications Sent to Pharmacy  []  Other     SUMMARY: Declined appointment, would like to get an order for the  mammogram ONLY and to schedule it . Reason for call: Breast Lump  Onset: 4 days     Reported left breast lump, hard,tender ,marble size, fevers but currently no fever , no discharges, soft and it moves.       Reason for Disposition   Breast lump    Protocols used: Breast Symptoms-A-[place]        Care Gaps  Close care gaps     Overdue          Never done Mammogram (Yearly)

## 2024-01-09 ENCOUNTER — HOSPITAL ENCOUNTER (OUTPATIENT)
Dept: MAMMOGRAPHY | Facility: HOSPITAL | Age: 59
Discharge: HOME | End: 2024-01-09
Attending: FAMILY MEDICINE
Payer: MEDICAID

## 2024-01-09 ENCOUNTER — HOSPITAL ENCOUNTER (OUTPATIENT)
Dept: ULTRASOUND IMAGING | Facility: HOSPITAL | Age: 59
Discharge: HOME | End: 2024-01-09
Attending: FAMILY MEDICINE
Payer: MEDICAID

## 2024-01-09 DIAGNOSIS — N63.0 LUMP IN FEMALE BREAST: ICD-10-CM

## 2024-01-09 PROCEDURE — 76642 ULTRASOUND BREAST LIMITED: CPT | Performed by: FAMILY MEDICINE

## 2024-01-09 PROCEDURE — 77066 DX MAMMO INCL CAD BI: CPT | Performed by: FAMILY MEDICINE

## 2024-01-09 PROCEDURE — 77062 BREAST TOMOSYNTHESIS BI: CPT | Performed by: FAMILY MEDICINE

## 2024-02-05 RX ORDER — IBUPROFEN 800 MG/1
800 TABLET ORAL DAILY PRN
Qty: 30 TABLET | Refills: 0 | Status: SHIPPED | OUTPATIENT
Start: 2024-02-05

## 2024-02-05 NOTE — TELEPHONE ENCOUNTER
Message noted: Chart reviewed and may refill medication as requested times one. Prescription sent to listed pharmacy. Pharmacy to notify patient.

## 2024-02-08 ENCOUNTER — OFFICE VISIT (OUTPATIENT)
Dept: INTERNAL MEDICINE CLINIC | Facility: CLINIC | Age: 59
End: 2024-02-08

## 2024-02-08 ENCOUNTER — NURSE TRIAGE (OUTPATIENT)
Dept: FAMILY MEDICINE CLINIC | Facility: CLINIC | Age: 59
End: 2024-02-08

## 2024-02-08 VITALS
HEART RATE: 74 BPM | RESPIRATION RATE: 20 BRPM | HEIGHT: 62 IN | BODY MASS INDEX: 31.95 KG/M2 | DIASTOLIC BLOOD PRESSURE: 61 MMHG | WEIGHT: 173.63 LBS | OXYGEN SATURATION: 95 % | TEMPERATURE: 98 F | SYSTOLIC BLOOD PRESSURE: 96 MMHG

## 2024-02-08 DIAGNOSIS — R30.9 PAIN PASSING URINE: Primary | ICD-10-CM

## 2024-02-08 DIAGNOSIS — L29.9 ITCHING: ICD-10-CM

## 2024-02-08 DIAGNOSIS — Z28.21 IMMUNIZATION NOT CARRIED OUT BECAUSE OF PATIENT REFUSAL: ICD-10-CM

## 2024-02-08 DIAGNOSIS — F41.1 GENERALIZED ANXIETY DISORDER: ICD-10-CM

## 2024-02-08 LAB
BILIRUBIN: NEGATIVE
GLUCOSE (URINE DIPSTICK): NEGATIVE MG/DL
KETONES (URINE DIPSTICK): NEGATIVE MG/DL
LEUKOCYTES: NEGATIVE
MULTISTIX LOT#: 9812 NUMERIC
NITRITE, URINE: POSITIVE
OCCULT BLOOD: NEGATIVE
PH, URINE: 7.5 (ref 4.5–8)
PROTEIN (URINE DIPSTICK): NEGATIVE MG/DL
SPECIFIC GRAVITY: 1.02 (ref 1–1.03)
URINE-COLOR: YELLOW
UROBILINOGEN,SEMI-QN: 0.2 MG/DL (ref 0–1.9)

## 2024-02-08 PROCEDURE — 81002 URINALYSIS NONAUTO W/O SCOPE: CPT

## 2024-02-08 PROCEDURE — 99214 OFFICE O/P EST MOD 30 MIN: CPT

## 2024-02-08 RX ORDER — CIPROFLOXACIN 250 MG/1
250 TABLET, FILM COATED ORAL 2 TIMES DAILY
Qty: 6 TABLET | Refills: 0 | Status: SHIPPED | OUTPATIENT
Start: 2024-02-08 | End: 2024-02-11

## 2024-02-08 RX ORDER — ALPRAZOLAM 1 MG/1
TABLET ORAL
Qty: 90 TABLET | Refills: 0 | Status: SHIPPED | OUTPATIENT
Start: 2024-02-08

## 2024-02-08 RX ORDER — ALPRAZOLAM 1 MG/1
TABLET ORAL
Qty: 90 TABLET | Refills: 0 | Status: CANCELLED | OUTPATIENT
Start: 2024-02-08

## 2024-02-08 RX ORDER — HYDROXYZINE HYDROCHLORIDE 25 MG/1
25 TABLET, FILM COATED ORAL 3 TIMES DAILY PRN
Qty: 30 TABLET | Refills: 0 | Status: SHIPPED | OUTPATIENT
Start: 2024-02-08

## 2024-02-08 RX ORDER — HYDROXYZINE HYDROCHLORIDE 25 MG/1
25 TABLET, FILM COATED ORAL 3 TIMES DAILY PRN
Qty: 30 TABLET | Refills: 0 | Status: CANCELLED | OUTPATIENT
Start: 2024-02-08

## 2024-02-08 NOTE — TELEPHONE ENCOUNTER
Pt s/sx started 1 week ago. Pt is having lower back pain. Pt feeling like she has to urinate. Pt has urinated but feeling like she has to urinate all the time. No blood in urine. Pt has a fever of 101.0. Pt and daughter inform she will need to be seen today. Daughter agreed and pt was given a appt to see Joanne today at 1 pm.    Future Appointments   Date Time Provider Department Center   2/8/2024  1:00 PM Rica Kraft APRN [facility]       Reason for Disposition   Fever > 100.4 F (38.0 C)    Protocols used: Urination Pain - Female-A-[place]

## 2024-02-08 NOTE — PROGRESS NOTES
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    Also requesting refills of alprazolam for anxiety, usually filled by PCP Dr. Braswell, and hydroxyzine which she uses for itching up to three times a day    History/Other:    Chief Complaint Reviewed and Verified  No Further Nursing Notes to   Review  Tobacco Reviewed  Allergies Reviewed  Medications Reviewed    Problem List Reviewed  Medical History Reviewed  Surgical History   Reviewed  OB Status Reviewed  Family History Reviewed  Social History   Reviewed         Tobacco:  She currently smokes tobacco.  Social History    Tobacco Use      Smoking status: Every Day        Packs/day: 0.50        Years: 42.00        Additional pack years: 0.00        Total pack years: 21.00        Types: Cigarettes      Smokeless tobacco: Never     Current Outpatient Medications   Medication Sig Dispense Refill    ALPRAZolam 1 MG Oral Tab TAKE 1 TABLET BY MOUTH THREE TIMES DAILY 90 tablet 0    hydrOXYzine 25 MG Oral Tab Take 1 tablet (25 mg total) by mouth 3 (three) times daily as needed for Anxiety or Itching. 30 tablet 0    ciprofloxacin 250 MG Oral Tab Take 1 tablet (250 mg total) by mouth 2 (two) times daily for 3 days. 6 tablet 0    IBUPROFEN 800 MG Oral Tab TAKE 1 TABLET(800 MG) BY MOUTH DAILY AS NEEDED 30 tablet 0    furosemide 20 MG Oral Tab Take 1 tablet (20 mg total) by mouth daily. 90 tablet 1    ipratropium (ATROVENT  HFA) 17 MCG/ACT Inhalation Aero Soln Inhale 2 puffs into the lungs 4 (four) times daily. 3 each 1    promethazine 6.25 MG/5ML Oral Syrup 1 TEASPOONFUL 3 TIMES A DAY      albuterol 108 (90 Base) MCG/ACT Inhalation Aero Soln Inhale 2 puffs into the lungs every 4 (four) hours as needed. 8.5 g 5    clobetasol 0.05 % External Ointment APPLY TO THE AFFECTED AREA TWICE DAILY MONDAY-FRIDAY 60 g 1    methocarbamol 500 MG Oral Tab Take 1 tablet (500 mg total) by mouth 4 (four) times daily. 120 tablet 0    Methadone HCl 10 MG/5ML Oral Solution Take 40 mL (80 mg total) by mouth daily.  0    cholecalciferol 25 MCG Oral Tab Take 2 tablets (2,000 Units total) by mouth daily. 60 tablet 0    ipratropium-albuterol 0.5-2.5 (3) MG/3ML Inhalation Solution Take 3 mL by nebulization in the morning and 3 mL at noon and 3 mL in the evening. 120 each 1    Meloxicam 15 MG Oral Tab Take 1 tablet (15 mg total) by mouth daily as needed for Pain. 90 tablet 1    fluticasone propionate 50 MCG/ACT Nasal Suspension 1 spray by Nasal route daily as needed for Rhinitis or Allergies. 16 g 1    cetirizine (ZYRTEC ALLERGY) 10 MG Oral Tab Take 1 tablet (10 mg total) by mouth daily as needed for Allergies or Rhinitis. 30 tablet 1    alum-mag hydroxide-simethicone 200-200-20 MG/5ML Oral Suspension Take 30 mL by mouth 4 (four) times daily as needed for Indigestion.  0    pantoprazole 40 MG Oral Tab EC Take 1 tablet (40 mg total) by mouth every morning before breakfast.  0    FLUoxetine 20 MG Oral Cap Take 3 capsules (60 mg total) by mouth daily. 270 capsule 3    Spacer/Aero-Holding Chambers Does not apply Device Use as needed with inhaler 1 each 0    Respiratory Therapy Supplies (NEBULIZER/TUBING/MOUTHPIECE) Does not apply Kit Use with nebulizer solution as needed 1 each 0    ondansetron 4 MG Oral Tablet Dispersible Take 1 tablet (4 mg total) by mouth every 4 (four) hours as needed for Nausea. 15 tablet 0    acetaminophen 500 MG Oral Tab Take 1 tablet (500 mg  total) by mouth 2 (two) times daily as needed for Pain. 30 tablet 5    potassium chloride 10 MEQ Oral Tab CR Take 1 tablet (10 mEq total) by mouth 3 (three) times a week. 15 tablet 0    Naloxone HCl 4 MG/0.1ML Nasal Liquid 4 mg by Nasal route as needed. If patient remains unresponsive, repeat dose in other nostril 2-5 minutes after first dose. 1 kit 0    Respiratory Therapy Supplies (NEBULIZER/TUBING/MOUTHPIECE) Does not apply Kit For use with Albuterol solution as directed 1 each 0       Review of Systems:  Review of Systems   Constitutional:  Positive for fever.   Respiratory: Negative.     Cardiovascular: Negative.    Gastrointestinal:  Positive for nausea and vomiting.   Genitourinary:  Positive for dysuria, flank pain, hematuria and pelvic pain.   Skin: Negative.    Neurological: Negative.      Objective:   BP 96/61 (BP Location: Right arm, Patient Position: Sitting, Cuff Size: large)   Pulse 74   Temp 98.3 °F (36.8 °C) (Temporal)   Resp 20   Ht 5' 2\" (1.575 m)   Wt 173 lb 9.6 oz (78.7 kg)   SpO2 95%   BMI 31.75 kg/m²  Estimated body mass index is 31.75 kg/m² as calculated from the following:    Height as of this encounter: 5' 2\" (1.575 m).    Weight as of this encounter: 173 lb 9.6 oz (78.7 kg).  Physical Exam  Vitals reviewed.   Constitutional:       General: She is not in acute distress.     Appearance: Normal appearance. She is well-developed. She is ill-appearing.   Cardiovascular:      Rate and Rhythm: Normal rate and regular rhythm.      Heart sounds: Normal heart sounds.   Pulmonary:      Effort: Pulmonary effort is normal.      Breath sounds: Normal breath sounds.   Abdominal:      Tenderness: There is abdominal tenderness in the right upper quadrant and left upper quadrant. There is right CVA tenderness, left CVA tenderness and guarding.   Skin:     General: Skin is warm and dry.   Neurological:      Mental Status: She is alert and oriented to person, place, and time.       Assessment & Plan:    1. Pain passing urine (Primary)  -     POC Urinalysis, Manual Dip without microscopy [[id number]]  -     Urine Culture, Routine  -     Ciprofloxacin HCl; Take 1 tablet (250 mg total) by mouth 2 (two) times daily for 3 days.  Dispense: 6 tablet; Refill: 0  2. Generalized anxiety disorder  -     ALPRAZolam; TAKE 1 TABLET BY MOUTH THREE TIMES DAILY  Dispense: 90 tablet; Refill: 0  3. Immunization not carried out because of patient refusal  -     Influenza Vaccine Refused (Order that documents the process)  4. Itching  -     hydrOXYzine HCl; Take 1 tablet (25 mg total) by mouth 3 (three) times daily as needed for Anxiety or Itching.  Dispense: 30 tablet; Refill: 0    Discussed could start with a more common UTI medication and wait for culture results, patient strongly desires the ciprofloxacin \"or even something stronger\", discussed potential side effects of ciprofloxacin to watch out for and discontinue use if they occur. She verbalizes understanding and still wants to take this medication.     ONEIDA Gaytan, 2/8/2024, 2:44 PM

## 2024-02-10 DIAGNOSIS — R30.9 PAIN PASSING URINE: Primary | ICD-10-CM

## 2024-02-10 DIAGNOSIS — R10.9 FLANK PAIN: ICD-10-CM

## 2024-03-05 ENCOUNTER — OFFICE VISIT (OUTPATIENT)
Dept: OBGYN CLINIC | Facility: CLINIC | Age: 59
End: 2024-03-05
Payer: MEDICAID

## 2024-03-05 VITALS — SYSTOLIC BLOOD PRESSURE: 110 MMHG | BODY MASS INDEX: 31 KG/M2 | WEIGHT: 171 LBS | DIASTOLIC BLOOD PRESSURE: 64 MMHG

## 2024-03-05 DIAGNOSIS — Z12.4 SCREENING FOR CERVICAL CANCER: ICD-10-CM

## 2024-03-05 DIAGNOSIS — N89.8 VAGINAL DISCHARGE: ICD-10-CM

## 2024-03-05 DIAGNOSIS — R10.2 PELVIC PAIN: Primary | ICD-10-CM

## 2024-03-05 DIAGNOSIS — Z87.42 HISTORY OF ABNORMAL CERVICAL PAP SMEAR: ICD-10-CM

## 2024-03-05 LAB
APPEARANCE: CLEAR
BILIRUBIN: NEGATIVE
GLUCOSE (URINE DIPSTICK): NEGATIVE MG/DL
KETONES (URINE DIPSTICK): NEGATIVE MG/DL
MULTISTIX LOT#: ABNORMAL NUMERIC
NITRITE, URINE: NEGATIVE
OCCULT BLOOD: NEGATIVE
PH, URINE: 6 (ref 4.5–8)
PROTEIN (URINE DIPSTICK): NEGATIVE MG/DL
SPECIFIC GRAVITY: 1.01 (ref 1–1.03)
URINE-COLOR: YELLOW
UROBILINOGEN,SEMI-QN: 0.2 MG/DL (ref 0–1.9)

## 2024-03-05 PROCEDURE — 87624 HPV HI-RISK TYP POOLED RSLT: CPT | Performed by: OBSTETRICS & GYNECOLOGY

## 2024-03-05 PROCEDURE — 81002 URINALYSIS NONAUTO W/O SCOPE: CPT | Performed by: OBSTETRICS & GYNECOLOGY

## 2024-03-05 PROCEDURE — 99204 OFFICE O/P NEW MOD 45 MIN: CPT | Performed by: OBSTETRICS & GYNECOLOGY

## 2024-03-05 PROCEDURE — 87086 URINE CULTURE/COLONY COUNT: CPT | Performed by: OBSTETRICS & GYNECOLOGY

## 2024-03-05 NOTE — PROGRESS NOTES
New Patient GYN History and Physical  [facility]    CHIEF COMPLAINT:    Chief Complaint   Patient presents with    Vaginal Problem     Pt states vaginal discharge, brownish clear, \"strange\" odor, states its been happening for a couple of weeks     Pain     Pt states right ovary pain    HISTORY OF PRESENT ILLNESS:   Anastasiia Hobbs is a 58 year old female   who presents for    Discharge - like tea - brown / tan color.  + odor - smells like when she vomits bile - a strange smell like that.    Has been like this for a few weeks. Thought it was pee.  All of a sudden felt wet and thought she was peeing herself.    hasn't had sex for a long time.    Has a bladder tumor - so not sure if this has anything to do with it.    Also has pain on right side of pelvis.  A couple of times, is sharp pains - feels like when she gets burst ovarian cysts.    Dull ache right now.  Does occasionally take ibuprofen to help the pain.    No changes in BM.  Hasn't had much of an appetitie.    Last pap  abnormal.      PAST MEDICAL HISTORY:   Past Medical History:   Diagnosis Date    Anxiety     Bipolar affective disorder (HCC)     Cirrhosis of liver (HCC)     Colitis     ulcerative colitis    COPD (chronic obstructive pulmonary disease) (HCC)     Coronary atherosclerosis     Crack cocaine use     Depression     Essential hypertension     JOHN (generalized anxiety disorder)     Hepatitis     Hep C    Hepatitis C         High blood pressure     History of blood transfusion     no reactions    Methadone use     Migraines     Nausea & vomiting 2022    Osteoarthritis     Pneumonia due to organism     Polysubstance dependence including opioid drug with daily use (HCC)     PTSD (post-traumatic stress disorder)     Sleep apnea     NO CPAP        PAST SURGICAL HISTORY:   Past Surgical History:   Procedure Laterality Date    Angioplasty (coronary)  2019    3 total    Appendectomy      2012    Cath percutaneous  transluminal  coronary angioplasty      Lap supracerv hysterectomy  2010    supracervical hysterectomy    Tonsillectomy          PAST OB HISTORY:  OB History    Para Term  AB Living   3 1 1   2 1   SAB IAB Ectopic Multiple Live Births   2       1      # Outcome Date GA Lbr Conrado/2nd Weight Sex Delivery Anes PTL Lv   3 Term     F Vag-Spont   KORY   2 SAB            1 SAB                CURRENT MEDICATIONS:      Current Outpatient Medications:     ALPRAZolam 1 MG Oral Tab, TAKE 1 TABLET BY MOUTH THREE TIMES DAILY, Disp: 90 tablet, Rfl: 0    hydrOXYzine 25 MG Oral Tab, Take 1 tablet (25 mg total) by mouth 3 (three) times daily as needed for Anxiety or Itching., Disp: 30 tablet, Rfl: 0    IBUPROFEN 800 MG Oral Tab, TAKE 1 TABLET(800 MG) BY MOUTH DAILY AS NEEDED, Disp: 30 tablet, Rfl: 0    furosemide 20 MG Oral Tab, Take 1 tablet (20 mg total) by mouth daily., Disp: 90 tablet, Rfl: 1    ipratropium (ATROVENT HFA) 17 MCG/ACT Inhalation Aero Soln, Inhale 2 puffs into the lungs 4 (four) times daily., Disp: 3 each, Rfl: 1    albuterol 108 (90 Base) MCG/ACT Inhalation Aero Soln, Inhale 2 puffs into the lungs every 4 (four) hours as needed., Disp: 8.5 g, Rfl: 5    clobetasol 0.05 % External Ointment, APPLY TO THE AFFECTED AREA TWICE DAILY MONDAY-FRIDAY, Disp: 60 g, Rfl: 1    methocarbamol 500 MG Oral Tab, Take 1 tablet (500 mg total) by mouth 4 (four) times daily., Disp: 120 tablet, Rfl: 0    Methadone HCl 10 MG/5ML Oral Solution, Take 40 mL (80 mg total) by mouth daily., Disp: , Rfl: 0    ipratropium-albuterol 0.5-2.5 (3) MG/3ML Inhalation Solution, Take 3 mL by nebulization in the morning and 3 mL at noon and 3 mL in the evening., Disp: 120 each, Rfl: 1    FLUoxetine 20 MG Oral Cap, Take 3 capsules (60 mg total) by mouth daily., Disp: 270 capsule, Rfl: 3    Spacer/Aero-Holding Chambers Does not apply Device, Use as needed with inhaler, Disp: 1 each, Rfl: 0    Respiratory Therapy Supplies (NEBULIZER/TUBING/MOUTHPIECE) Does not  apply Kit, Use with nebulizer solution as needed, Disp: 1 each, Rfl: 0    Respiratory Therapy Supplies (NEBULIZER/TUBING/MOUTHPIECE) Does not apply Kit, For use with Albuterol solution as directed, Disp: 1 each, Rfl: 0    promethazine 6.25 MG/5ML Oral Syrup, 1 TEASPOONFUL 3 TIMES A DAY (Patient not taking: Reported on 3/5/2024), Disp: , Rfl:     cholecalciferol 25 MCG Oral Tab, Take 2 tablets (2,000 Units total) by mouth daily. (Patient not taking: Reported on 3/5/2024), Disp: 60 tablet, Rfl: 0    Meloxicam 15 MG Oral Tab, Take 1 tablet (15 mg total) by mouth daily as needed for Pain. (Patient not taking: Reported on 3/5/2024), Disp: 90 tablet, Rfl: 1    fluticasone propionate 50 MCG/ACT Nasal Suspension, 1 spray by Nasal route daily as needed for Rhinitis or Allergies. (Patient not taking: Reported on 3/5/2024), Disp: 16 g, Rfl: 1    cetirizine (ZYRTEC ALLERGY) 10 MG Oral Tab, Take 1 tablet (10 mg total) by mouth daily as needed for Allergies or Rhinitis. (Patient not taking: Reported on 3/5/2024), Disp: 30 tablet, Rfl: 1    alum-mag hydroxide-simethicone 200-200-20 MG/5ML Oral Suspension, Take 30 mL by mouth 4 (four) times daily as needed for Indigestion. (Patient not taking: Reported on 3/5/2024), Disp: , Rfl: 0    pantoprazole 40 MG Oral Tab EC, Take 1 tablet (40 mg total) by mouth every morning before breakfast. (Patient not taking: Reported on 3/5/2024), Disp: , Rfl: 0    ondansetron 4 MG Oral Tablet Dispersible, Take 1 tablet (4 mg total) by mouth every 4 (four) hours as needed for Nausea. (Patient not taking: Reported on 3/5/2024), Disp: 15 tablet, Rfl: 0    acetaminophen 500 MG Oral Tab, Take 1 tablet (500 mg total) by mouth 2 (two) times daily as needed for Pain. (Patient not taking: Reported on 3/5/2024), Disp: 30 tablet, Rfl: 5    potassium chloride 10 MEQ Oral Tab CR, Take 1 tablet (10 mEq total) by mouth 3 (three) times a week. (Patient not taking: Reported on 3/5/2024), Disp: 15 tablet, Rfl: 0     Naloxone HCl 4 MG/0.1ML Nasal Liquid, 4 mg by Nasal route as needed. If patient remains unresponsive, repeat dose in other nostril 2-5 minutes after first dose. (Patient not taking: Reported on 3/5/2024), Disp: 1 kit, Rfl: 0    ALLERGIES:  No Known Allergies    SOCIAL HISTORY:  Social History     Socioeconomic History    Marital status:    Tobacco Use    Smoking status: Every Day     Packs/day: 0.50     Years: 42.00     Additional pack years: 0.00     Total pack years: 21.00     Types: Cigarettes    Smokeless tobacco: Never   Vaping Use    Vaping Use: Every day    Substances: THC, CBD   Substance and Sexual Activity    Alcohol use: Not Currently    Drug use: Yes     Frequency: 7.0 times per week     Types: Cannabis     Comment: vapes daily    Sexual activity: Not Currently     Partners: Male   Other Topics Concern    Reaction to local anesthetic No    Pt has a pacemaker No    Pt has a defibrillator No    Blood Transfusions Yes       FAMILY HISTORY:  Family History   Problem Relation Age of Onset    Cancer Father         brain    Hypertension Father     Cancer Mother         lung, metastatic    COPD Mother      ASSESSMENTS:  PHYSICAL EXAM:   No LMP recorded. (Menstrual status: Partial Hysterectomy).   Vitals:    03/05/24 1219   BP: 110/64   Weight: 171 lb (77.6 kg)     CONSTITUTIONAL: Awake, alert, cooperative, no apparent distress, and appears stated age   NECK: Supple, symmetrical, trachea midline, no adenopathy, thyroid symmetric, not enlarged and no tenderness  LUNGS: No excess work of breathing  ABDOMEN: Soft, non-distended, non-tender, no masses palpated    GENITAL/URINARY:    External Genitalia:  General appearance; normal, Hair distribution; normal, Lesions absent   Urethral Meatus:  Lesions absent, Prolapse absent  Bladder:  Tenderness absent, Cystocele absent  Vagina:  Discharge absent, Lesions absent, Pelvic support normal  Strength 5/5 with good relaxation.  Pain right OI worse with  abduction.  Cervix:  Friable, Lesions absent, Discharge absent, Tenderness absent  Uterus:  surgically absent  Adnexa:  Masses absent but + fullness in right adnexa, Tenderness absent  Anus/Perineum:  Lesions absent    MUSCULOSKELETAL: There is no redness, warmth, or swelling of the joints. Tone is normal.  NEUROLOGIC: Patient is awake, alert and oriented to name, place and time. Casual gait is normal.  SKIN: no bruising or bleeding and no rashes  PSYCHIATRIC: Behavior:  Appropriate  Mood:  appropriate  ASSESSMENT AND PLAN:  1. Pelvic pain  - unclear etiology - ovarian cyst vs high tone pelvic floor dysfunction. Start with US to eval ovaries.  - if no finding on US - recommend pelvic floor PT.  - Pelvic US Complete GYNE Only [61984/75600]; Future  - US PELVIS (TRANSABDOMINAL AND TRANSVAGINAL) (CPT=76856/64635); Future  - Urine Dip in office [[id number]]  - Urine Culture, Routine  - VIKOR VAGINAL ID; Future    2. Screening for cervical cancer  - ThinPrep PAP Smear; Future  - Hpv Dna  High Risk , Thin Prep Collect; Future     3. Vaginal discharge  - nothing noted on exam, but vikor swab collected.    follow up as needed  Vicky Dunham, DO

## 2024-03-06 LAB — HPV I/H RISK 1 DNA SPEC QL NAA+PROBE: NEGATIVE

## 2024-03-07 DIAGNOSIS — F41.1 GENERALIZED ANXIETY DISORDER: ICD-10-CM

## 2024-03-07 NOTE — TELEPHONE ENCOUNTER
Patient is requesting a prescription refills for    ALPRAZOLAM  1 mg       IBUPROFEN  800 MG    [facility] in [facility] confirmed

## 2024-03-08 ENCOUNTER — TELEPHONE (OUTPATIENT)
Dept: OBGYN CLINIC | Facility: CLINIC | Age: 59
End: 2024-03-08

## 2024-03-08 DIAGNOSIS — B96.89 BACTERIAL VAGINOSIS: ICD-10-CM

## 2024-03-08 DIAGNOSIS — N76.0 BACTERIAL VAGINOSIS: ICD-10-CM

## 2024-03-08 DIAGNOSIS — A59.9 TRICHOMONAS INFECTION: Primary | ICD-10-CM

## 2024-03-08 RX ORDER — DOXYCYCLINE HYCLATE 100 MG
TABLET ORAL
Qty: 14 TABLET | Refills: 0 | Status: SHIPPED | OUTPATIENT
Start: 2024-03-08

## 2024-03-08 RX ORDER — METRONIDAZOLE 500 MG/1
500 TABLET ORAL 2 TIMES DAILY
Qty: 14 TABLET | Refills: 0 | Status: SHIPPED | OUTPATIENT
Start: 2024-03-08

## 2024-03-08 NOTE — TELEPHONE ENCOUNTER
Refill passed per [facility] protocol.    Please review pended refill request as unable to refill due to high/very high interaction warning copied here:      High  Drug-Drug: ibuprofen and FLUoxetineToxic effects may be increased with concurrent administration of ibuprofen and Selective Serotonin Reuptake Inhibitors. The risk of upper gastrointestinal bleeding may be increased. Patients taking both drugs concurrently should be educated about the signs and symptoms of GI bleeding.  Details  Requested Prescriptions   Pending Prescriptions Disp Refills    ALPRAZolam 1 MG Oral Tab 90 tablet 0     Sig: TAKE 1 TABLET BY MOUTH THREE TIMES DAILY       Controlled Substance Medication Failed - 3/7/2024 12:42 PM        Failed - This medication is a controlled substance - forward to provider to refill          ibuprofen 800 MG Oral Tab 30 tablet 0     Sig: Take 1 tablet (800 mg total) by mouth daily as needed.       Non-Narcotic Pain Medication Protocol Passed - 3/7/2024 12:42 PM        Passed - In person appointment or virtual visit in the past 6 mos or appointment in next 3 mos     Recent Outpatient Visits              3 days ago Pelvic pain    [facility] - OB/GYN Vicky Dunham DO    Office Visit    4 weeks ago Pain passing urine    [facility] Rica Kraft APRN    Office Visit    3 months ago Acute bilateral low back pain without sciatica    [facility] Zacarias Godinez DO    Office Visit    7 months ago Other cirrhosis of liver (HCC)    [facility] Cale Lira MD    Office Visit    9 months ago Cirrhosis of liver without ascites, unspecified hepatic cirrhosis type (HCC)    [facility] Daren Cortez MD    Office Visit          Future Appointments         Provider Department Appt Notes     In 4 weeks [facility] ULTRASOUND [facility] [place] - OB/GYN ultrasound    In 1 month Rowena Khan APRN [facility] [place] - OB/GYN Pain in side of back, discharge                  Recent Outpatient Visits              3 days ago Pelvic pain    [facility] [place] - OB/GYN Vicky Dunham, DO    Office Visit    4 weeks ago Pain passing urine    [facility] [place] [facility]    Office Visit    3 months ago Acute bilateral low back pain without sciatica    [facility] [place]    Office Visit    7 months ago Other cirrhosis of liver (HCC)    [facility] [place]    Office Visit    9 months ago Cirrhosis of liver without ascites, unspecified hepatic cirrhosis type (HCC)    [facility] Daren Cortez MD    Office Visit          Future Appointments         Provider Department Appt Notes    In 4 weeks [facility] 10 [facility] ULTRASOUND [facility] [place] - OB/GYN ultrasound    In 1 month Rowena Khan APRN [facility] [place] - OB/GYN Pain in side of back, discharge

## 2024-03-08 NOTE — TELEPHONE ENCOUNTER
Please review; protocol failed/ has no protocol    Requested Prescriptions   Pending Prescriptions Disp Refills    ALPRAZolam 1 MG Oral Tab 90 tablet 0     Sig: TAKE 1 TABLET BY MOUTH THREE TIMES DAILY       Controlled Substance Medication Failed - 3/8/2024  3:06 PM        Failed - This medication is a controlled substance - forward to provider to refill          ibuprofen 800 MG Oral Tab 30 tablet 0     Sig: Take 1 tablet (800 mg total) by mouth daily as needed.       Non-Narcotic Pain Medication Protocol Passed - 3/8/2024  3:06 PM        Passed - In person appointment or virtual visit in the past 6 mos or appointment in next 3 mos     Recent Outpatient Visits              3 days ago Pelvic pain    [facility]icky [facility]ham,     Office Visit    4 weeks ago Pain passing urine    [facility] [place] [facility]    Office Visit    3 months ago Acute bilateral low back pain without sciatica    [facility] [place] [facility]    Office Visit    7 months ago Other cirrhosis [facility] liver [facility]HCC)    [facility]    [facility]    9 months ago Cirrhosis of liver without ascites, unspecified hepatic cirrhosis type (HCC)    [facility] Daren Cortez MD    Office Visit          Future Appointments         Provider Department Appt Notes    In 4 weeks [facility] [place] - OB/GYN ultrasound    In 1 month Rowena Khan APRN [facility] - OB/GYN Pain in side of back, discharge                  Recent Outpatient Visits              3 days ago Pelvic pain    [facility] [place] - OB/GYN Vicky Dunham,     Office Visit    4 weeks ago Pain passing urine     [facility] [place]    Office Visit    3 months ago Acute bilateral low back pain without sciatica    [facility], [address], Zacarias Lawrence DO    Office Visit    7 months ago Other cirrhosis of liver (HCC)    [facility] [place] [facility] [facility]    Office Visit    9 months ago Cirrhosis of liver without ascites, unspecified hepatic cirrhosis type (HCC)    [facility], [facility] Daren Cortez MD    Office Visit          Future Appointments         Provider Department Appt Notes    In 4 weeks [facility] [place] [facility]    In 1 month Rowena Khan APRN [facility] - OB/GYN Pain in side of back, discharge

## 2024-03-08 NOTE — TELEPHONE ENCOUNTER
RN spoke with pt, verified name and . RN provided pt with +VIKOR results. Pt disagrees with the +trich result because she has been abstinent \"for a long time\". RN told pt that RN would send both prescriptions to her preferred pharmacy (verified) and provided admin instructions. Pt is adamant that trich is not sexually transmitted and would like to speak with RD. RN told pt that RN would forward msg to RD, but that RD is out of the office for the weekend. Pt verbalized understanding and agreed with POC.      VIKOR+:   Mycoplasma hominis: 76.923%  Trichomonas vaginalis: 7.692%  Atopobium vaginae: 7.692%  Gardnerella vaginalis: 7.692%    Treatment:  Metronidazole 500mg BID x7d  Doxycycline 100mg BID x7d

## 2024-03-09 DIAGNOSIS — F41.1 GENERALIZED ANXIETY DISORDER: ICD-10-CM

## 2024-03-09 RX ORDER — IBUPROFEN 800 MG/1
800 TABLET ORAL DAILY PRN
Qty: 30 TABLET | Refills: 0 | Status: SHIPPED | OUTPATIENT
Start: 2024-03-09

## 2024-03-09 RX ORDER — ALPRAZOLAM 1 MG/1
TABLET ORAL
Qty: 90 TABLET | Refills: 0 | Status: SHIPPED | OUTPATIENT
Start: 2024-03-09

## 2024-03-09 NOTE — TELEPHONE ENCOUNTER
Message noted: Chart reviewed and may refill medication as requested. Script sent to listed pharmacy by secure method.    Pt notified through [employer]

## 2024-03-10 RX ORDER — ALPRAZOLAM 1 MG/1
TABLET ORAL
Qty: 90 TABLET | Refills: 0 | OUTPATIENT
Start: 2024-03-10

## 2024-03-18 DIAGNOSIS — M62.89 HIGH-TONE PELVIC FLOOR DYSFUNCTION IN FEMALE: Primary | ICD-10-CM

## 2024-03-26 ENCOUNTER — TELEPHONE (OUTPATIENT)
Dept: FAMILY MEDICINE CLINIC | Facility: CLINIC | Age: 59
End: 2024-03-26

## 2024-03-26 ENCOUNTER — TELEMEDICINE (OUTPATIENT)
Dept: FAMILY MEDICINE CLINIC | Facility: CLINIC | Age: 59
End: 2024-03-26
Payer: MEDICAID

## 2024-03-26 DIAGNOSIS — R21 RASH AND NONSPECIFIC SKIN ERUPTION: Primary | ICD-10-CM

## 2024-03-26 PROCEDURE — 99441 PHONE E/M BY PHYS 5-10 MIN: CPT | Performed by: FAMILY MEDICINE

## 2024-03-26 NOTE — PROGRESS NOTES
Subjective:   Patient ID: Anastasiia Hobbs is a 58 year old female.    Virtual Telephone Check-In    Anastasiia Hobbs verbally consents to a Virtual/Telephone Check-In visit on 03/26/24.  Patient has been referred to the [facility] website at [email or web address] to review the yearly Consent to Treat document.    Patient understands and accepts financial responsibility for any deductible, co-insurance and/or co-pays associated with this service.    Duration of the service: 5 minutes      Summary of topics discussed: medical issue/ needs letter.    Pt presents due to illness / rash. Pt states has not been feeling well and needs letter to cancel flight to [place]. No fevers or sig abdominal pains. No vomiting/ diarrhea.    Past hx of liver cirrhosis.   Jose Braswell MD                  History/Other:   Review of Systems  Current Outpatient Medications   Medication Sig Dispense Refill    ALPRAZolam 1 MG Oral Tab TAKE 1 TABLET BY MOUTH THREE TIMES DAILY 90 tablet 0    ibuprofen 800 MG Oral Tab Take 1 tablet (800 mg total) by mouth daily as needed. 30 tablet 0    metRONIDAZOLE 500 MG Oral Tab Take 1 tablet (500 mg total) by mouth in the morning and 1 tablet (500 mg total) before bedtime. 14 tablet 0    Doxycycline Hyclate 100 MG Oral Tab Take 1 tablet (100 mg total) by mouth 2 (two) times daily. 14 tablet 0    hydrOXYzine 25 MG Oral Tab Take 1 tablet (25 mg total) by mouth 3 (three) times daily as needed for Anxiety or Itching. 30 tablet 0    furosemide 20 MG Oral Tab Take 1 tablet (20 mg total) by mouth daily. 90 tablet 1    ipratropium (ATROVENT HFA) 17 MCG/ACT Inhalation Aero Soln Inhale 2 puffs into the lungs 4 (four) times daily. 3 each 1    promethazine 6.25 MG/5ML Oral Syrup 1 TEASPOONFUL 3 TIMES A DAY (Patient not taking: Reported on 3/5/2024)      albuterol 108 (90 Base) MCG/ACT Inhalation Aero Soln Inhale 2 puffs into the lungs every 4 (four) hours as needed. 8.5 g 5    clobetasol 0.05 % External Ointment APPLY TO THE  AFFECTED AREA TWICE DAILY MONDAY-FRIDAY 60 g 1    methocarbamol 500 MG Oral Tab Take 1 tablet (500 mg total) by mouth 4 (four) times daily. 120 tablet 0    Methadone HCl 10 MG/5ML Oral Solution Take 40 mL (80 mg total) by mouth daily.  0    cholecalciferol 25 MCG Oral Tab Take 2 tablets (2,000 Units total) by mouth daily. (Patient not taking: Reported on 3/5/2024) 60 tablet 0    ipratropium-albuterol 0.5-2.5 (3) MG/3ML Inhalation Solution Take 3 mL by nebulization in the morning and 3 mL at noon and 3 mL in the evening. 120 each 1    Meloxicam 15 MG Oral Tab Take 1 tablet (15 mg total) by mouth daily as needed for Pain. (Patient not taking: Reported on 3/5/2024) 90 tablet 1    fluticasone propionate 50 MCG/ACT Nasal Suspension 1 spray by Nasal route daily as needed for Rhinitis or Allergies. (Patient not taking: Reported on 3/5/2024) 16 g 1    cetirizine (ZYRTEC ALLERGY) 10 MG Oral Tab Take 1 tablet (10 mg total) by mouth daily as needed for Allergies or Rhinitis. (Patient not taking: Reported on 3/5/2024) 30 tablet 1    alum-mag hydroxide-simethicone 200-200-20 MG/5ML Oral Suspension Take 30 mL by mouth 4 (four) times daily as needed for Indigestion. (Patient not taking: Reported on 3/5/2024)  0    pantoprazole 40 MG Oral Tab EC Take 1 tablet (40 mg total) by mouth every morning before breakfast. (Patient not taking: Reported on 3/5/2024)  0    FLUoxetine 20 MG Oral Cap Take 3 capsules (60 mg total) by mouth daily. 270 capsule 3    Spacer/Aero-Holding Chambers Does not apply Device Use as needed with inhaler 1 each 0    Respiratory Therapy Supplies (NEBULIZER/TUBING/MOUTHPIECE) Does not apply Kit Use with nebulizer solution as needed 1 each 0    ondansetron 4 MG Oral Tablet Dispersible Take 1 tablet (4 mg total) by mouth every 4 (four) hours as needed for Nausea. (Patient not taking: Reported on 3/5/2024) 15 tablet 0    acetaminophen 500 MG Oral Tab Take 1 tablet (500 mg total) by mouth 2 (two) times daily as  needed for Pain. (Patient not taking: Reported on 3/5/2024) 30 tablet 5    potassium chloride 10 MEQ Oral Tab CR Take 1 tablet (10 mEq total) by mouth 3 (three) times a week. (Patient not taking: Reported on 3/5/2024) 15 tablet 0    Naloxone HCl 4 MG/0.1ML Nasal Liquid 4 mg by Nasal route as needed. If patient remains unresponsive, repeat dose in other nostril 2-5 minutes after first dose. (Patient not taking: Reported on 3/5/2024) 1 kit 0    Respiratory Therapy Supplies (NEBULIZER/TUBING/MOUTHPIECE) Does not apply Kit For use with Albuterol solution as directed 1 each 0     Allergies:No Known Allergies    Objective:   Physical Exam  Neurological:      Mental Status: She is alert.         Assessment & Plan:   1. Rash and nonspecific skin eruption : hx of liver cirrhosis  - After discussion with patient, to monitor for symptoms and call if any significant symptoms; Note for travel/ airline provided. Follow up as needed.          No orders of the defined types were placed in this encounter.      Meds This Visit:  Requested Prescriptions      No prescriptions requested or ordered in this encounter       Imaging & Referrals:  None

## 2024-03-26 NOTE — TELEPHONE ENCOUNTER
Patient notified that her letter is ready for  at the [facility]  for    Updated in the chart.

## 2024-03-27 DIAGNOSIS — L29.9 ITCHING: ICD-10-CM

## 2024-03-28 RX ORDER — HYDROXYZINE HYDROCHLORIDE 25 MG/1
25 TABLET, FILM COATED ORAL 3 TIMES DAILY PRN
Qty: 30 TABLET | Refills: 0 | Status: SHIPPED | OUTPATIENT
Start: 2024-03-28

## 2024-03-28 NOTE — TELEPHONE ENCOUNTER
Message noted: Chart reviewed and may refill medication as requested. Prescription sent to listed pharmacy. Pharmacy to notify patient. Pt notified through [employer]

## 2024-03-28 NOTE — TELEPHONE ENCOUNTER
Protocol Failed/ No Protocol    Requested Prescriptions   Pending Prescriptions Disp Refills    hydrOXYzine 25 MG Oral Tab [Pharmacy Med Name: HYDROXYZINE HCL 25MG TABS (WHITE)] 30 tablet 0     Sig: Take 1 tablet (25 mg total) by mouth 3 (three) times daily as needed for Anxiety or Itching.       There is no refill protocol information for this order          Future Appointments         Provider Department Appt Notes    In 1 week [facility]    In 3 weeks Rowena Khan APRN [facility] Pain in side of back, discharge          Recent Outpatient Visits              [facility]    [facility]    3 weeks ago Pelvic pain    [facility] Vicky Dunham,     Office Visit    1 month ago Pain passing urine    [facility]    Office Visit    3 months ago Acute bilateral low back pain without sciatica    [facility] [place] [facility]    Office Visit    8 months ago Other cirrhosis of liver (HCC)    [facility]    Office Visit

## 2024-04-08 ENCOUNTER — TELEPHONE (OUTPATIENT)
Dept: OBGYN CLINIC | Facility: CLINIC | Age: 59
End: 2024-04-08

## 2024-04-09 DIAGNOSIS — F41.1 GENERALIZED ANXIETY DISORDER: ICD-10-CM

## 2024-04-09 NOTE — TELEPHONE ENCOUNTER
Patient is requesting a refill for alprazolam. Patient is out of medication. Please advise     [id number] - [place] - [address] AT [facility] [address]     Medication Quantity Refills Start End   ALPRAZolam 1 MG Oral Tab 90 tablet 0 3/9/2024 --   Sig:   TAKE 1 TABLET BY MOUTH THREE TIMES DAILY     Route:   (none)

## 2024-04-10 RX ORDER — ALPRAZOLAM 1 MG/1
TABLET ORAL
Qty: 90 TABLET | Refills: 0 | Status: SHIPPED | OUTPATIENT
Start: 2024-04-10

## 2024-04-10 NOTE — TELEPHONE ENCOUNTER
Message noted: Chart reviewed and may refill medication as requested. Script sent to listed pharmacy by secure method.    Pt notified through [employer]

## 2024-04-18 ENCOUNTER — OFFICE VISIT (OUTPATIENT)
Dept: OBGYN CLINIC | Facility: CLINIC | Age: 59
End: 2024-04-18

## 2024-04-18 VITALS
DIASTOLIC BLOOD PRESSURE: 68 MMHG | BODY MASS INDEX: 34 KG/M2 | WEIGHT: 184.38 LBS | SYSTOLIC BLOOD PRESSURE: 112 MMHG | HEART RATE: 63 BPM

## 2024-04-18 DIAGNOSIS — Z90.711 S/P ABDOMINAL SUPRACERVICAL SUBTOTAL HYSTERECTOMY: ICD-10-CM

## 2024-04-18 DIAGNOSIS — R10.2 PELVIC PAIN: Primary | ICD-10-CM

## 2024-04-18 DIAGNOSIS — N89.8 VAGINAL DISCHARGE: ICD-10-CM

## 2024-04-18 PROCEDURE — 99213 OFFICE O/P EST LOW 20 MIN: CPT | Performed by: NURSE PRACTITIONER

## 2024-04-18 NOTE — PROGRESS NOTES
[facility]   Obstetrics and Gynecology    Anastasiia Hobbs is a 58 year old female  No LMP recorded. (Menstrual status: Partial Hysterectomy).   Chief Complaint   Patient presents with    Gyn Problem     LOWER RIGHT ABDOMINAL PAIN, WATERY DISCHARGE   .   Patient last seen in  with dr. Osborne. post laparoscopic supra-cervical hyst for fibroids and bleeding in . Ovaries were preserved as well.     Patient reports history of ovarian cysts since . Describes sharp pains on and off mostly on right side of pelvis. Nothing makes it worse, ibuprofen helps the pain somewhat.    No bowel concerns.  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 Negative urine culture on 3/5. When she stands she feels she has to urinate but when she sits to urinate hard to come out.    She reports saw dr. Arauz on 3/5 and had cultures +for trichomonas. She took antibiotics but sthe is still having discharge and \"funky smell\". Took doxy and flagyl. She has had intercourse with a new partner since finishing antibiotics.    Pap:3/5/2024 normal, neg HPV      OBSTETRICS HISTORY:  OB History    Para Term  AB Living   3 1 1 0 2 1   SAB IAB Ectopic Multiple Live Births   2 0 0 0 1       GYNE HISTORY:  Use of Birth Control (if yes, specify type): Hysterectomy (2024  2:06 PM)  Date When Birth Control Last Used: Had a supracervical Hysterectomy (2024  2:06 PM)  Hx Prior Abnormal Pap: Yes (2024  2:06 PM)  Pap Date: 24 (2024  2:06 PM)  Pap Result Notes: NEG HPV NEG (2024  2:06 PM)  Follow Up Recommendation: mammo done 2024 (3/5/2024 12:23 PM)      History   Sexual Activity    Sexual activity: Not Currently    Partners: Male       MEDICAL HISTORY:  Past Medical History:    Anxiety    Bipolar affective disorder (HCC)    Cirrhosis of liver (HCC)    Colitis    ulcerative colitis    COPD (chronic obstructive pulmonary disease)  (HCC)    Coronary atherosclerosis    Crack cocaine use    Depression    Essential hypertension    JOHN (generalized anxiety disorder)    Hepatitis    Hep C    Hepatitis C    1989    High blood pressure    History of blood transfusion    no reactions    Methadone use    Migraines    Nausea & vomiting    Osteoarthritis    Pneumonia due to organism    Polysubstance dependence including opioid drug with daily use (HCC)    PTSD (post-traumatic stress disorder)    Sleep apnea    NO CPAP       SOCIAL HISTORY:  Social History     Socioeconomic History    Marital status:      Spouse name: Not on file    Number of children: Not on file    Years of education: Not on file    Highest education level: Not on file   Occupational History    Not on file   Tobacco Use    Smoking status: Every Day     Current packs/day: 0.50     Average packs/day: 0.5 packs/day for 42.0 years (21.0 ttl pk-yrs)     Types: Cigarettes    Smokeless tobacco: Never   Vaping Use    Vaping status: Every Day    Substances: THC, CBD   Substance and Sexual Activity    Alcohol use: Not Currently    Drug use: Yes     Frequency: 7.0 times per week     Types: Cannabis     Comment: vapes daily    Sexual activity: Not Currently     Partners: Male   Other Topics Concern    Grew up on a farm Not Asked    History of tanning Not Asked    Outdoor occupation Not Asked    Breast feeding Not Asked    Reaction to local anesthetic No    Pt has a pacemaker No    Pt has a defibrillator No     Service Not Asked    Blood Transfusions Yes    Caffeine Concern Not Asked    Occupational Exposure Not Asked    Hobby Hazards Not Asked    Sleep Concern Not Asked    Stress Concern Not Asked    Weight Concern Not Asked    Special Diet Not Asked    Back Care Not Asked    Exercise Not Asked    Bike Helmet Not Asked    Seat Belt Not Asked    Self-Exams Not Asked   Social History Narrative    Not on file     Social Determinants of Health     Financial Resource Strain: Low Risk   (2/22/2022)    Received from [facility]    Overall Financial Resource Strain (CARDIA)     Difficulty of Paying Living Expenses: Not very hard   Food Insecurity: Not on file   Transportation Needs: No Transportation Needs (2/22/2022)    Received from [facility]    PRAPARE - Transportation     Lack of Transportation (Medical): No     Lack of Transportation (Non-Medical): No   Physical Activity: Not on file   Stress: Not on file   Social Connections: Unknown (3/13/2021)    [facility] from [facility], [facility]    Social Connections     Conversations with friends/family/neighbors per week: Not on file   Housing Stability: Low Risk  (7/17/2021)    Received from [facility], [facility]    Housing Stability     Mortgage Payment Concerns?: Not on file     Number of Places Lived in the Last Year: Not on file     Unstable Housing?: Not on file       MEDICATIONS:    Current Outpatient Medications:     ALPRAZolam 1 MG Oral Tab, TAKE 1 TABLET BY MOUTH THREE TIMES DAILY, Disp: 90 tablet, Rfl: 0    hydrOXYzine 25 MG Oral Tab, Take 1 tablet (25 mg total) by mouth 3 (three) times daily as needed for Anxiety or Itching., Disp: 30 tablet, Rfl: 0    ibuprofen 800 MG Oral Tab, Take 1 tablet (800 mg total) by mouth daily as needed., Disp: 30 tablet, Rfl: 0    metRONIDAZOLE 500 MG Oral Tab, Take 1 tablet (500 mg total) by mouth in the morning and 1 tablet (500 mg total) before bedtime., Disp: 14 tablet, Rfl: 0    Doxycycline Hyclate 100 MG Oral Tab, Take 1 tablet (100 mg total) by mouth 2 (two) times daily., Disp: 14 tablet, Rfl: 0    furosemide 20 MG Oral Tab, Take 1 tablet (20 mg total) by mouth daily., Disp: 90 tablet, Rfl: 1    ipratropium (ATROVENT HFA) 17 MCG/ACT Inhalation Aero Soln, Inhale 2 puffs into the lungs 4 (four) times daily., Disp: 3 each, Rfl: 1    promethazine 6.25 MG/5ML Oral Syrup, , Disp: , Rfl:     albuterol 108 (90  Base) MCG/ACT Inhalation Aero Soln, Inhale 2 puffs into the lungs every 4 (four) hours as needed., Disp: 8.5 g, Rfl: 5    clobetasol 0.05 % External Ointment, APPLY TO THE AFFECTED AREA TWICE DAILY MONDAY-FRIDAY, Disp: 60 g, Rfl: 1    methocarbamol 500 MG Oral Tab, Take 1 tablet (500 mg total) by mouth 4 (four) times daily., Disp: 120 tablet, Rfl: 0    Methadone HCl 10 MG/5ML Oral Solution, Take 40 mL (80 mg total) by mouth daily., Disp: , Rfl: 0    cholecalciferol 25 MCG Oral Tab, Take 2 tablets (2,000 Units total) by mouth daily., Disp: 60 tablet, Rfl: 0    ipratropium-albuterol 0.5-2.5 (3) MG/3ML Inhalation Solution, Take 3 mL by nebulization in the morning and 3 mL at noon and 3 mL in the evening., Disp: 120 each, Rfl: 1    Meloxicam 15 MG Oral Tab, Take 1 tablet (15 mg total) by mouth daily as needed for Pain., Disp: 90 tablet, Rfl: 1    fluticasone propionate 50 MCG/ACT Nasal Suspension, 1 spray by Nasal route daily as needed for Rhinitis or Allergies., Disp: 16 g, Rfl: 1    cetirizine (ZYRTEC ALLERGY) 10 MG Oral Tab, Take 1 tablet (10 mg total) by mouth daily as needed for Allergies or Rhinitis., Disp: 30 tablet, Rfl: 1    alum-mag hydroxide-simethicone 200-200-20 MG/5ML Oral Suspension, Take 30 mL by mouth 4 (four) times daily as needed for Indigestion., Disp: , Rfl: 0    pantoprazole 40 MG Oral Tab EC, Take 1 tablet (40 mg total) by mouth every morning before breakfast., Disp: , Rfl: 0    FLUoxetine 20 MG Oral Cap, Take 3 capsules (60 mg total) by mouth daily., Disp: 270 capsule, Rfl: 3    Spacer/Aero-Holding Chambers Does not apply Device, Use as needed with inhaler, Disp: 1 each, Rfl: 0    Respiratory Therapy Supplies (NEBULIZER/TUBING/MOUTHPIECE) Does not apply Kit, Use with nebulizer solution as needed, Disp: 1 each, Rfl: 0    ondansetron 4 MG Oral Tablet Dispersible, Take 1 tablet (4 mg total) by mouth every 4 (four) hours as needed for Nausea., Disp: 15 tablet, Rfl: 0    potassium chloride 10 MEQ Oral  Tab CR, Take 1 tablet (10 mEq total) by mouth 3 (three) times a week., Disp: 15 tablet, Rfl: 0    Naloxone HCl 4 MG/0.1ML Nasal Liquid, 4 mg by Nasal route as needed. If patient remains unresponsive, repeat dose in other nostril 2-5 minutes after first dose., Disp: 1 kit, Rfl: 0    Respiratory Therapy Supplies (NEBULIZER/TUBING/MOUTHPIECE) Does not apply Kit, For use with Albuterol solution as directed, Disp: 1 each, Rfl: 0    acetaminophen 500 MG Oral Tab, Take 1 tablet (500 mg total) by mouth 2 (two) times daily as needed for Pain. (Patient not taking: Reported on 3/5/2024), Disp: 30 tablet, Rfl: 5    ALLERGIES:  No Known Allergies      Review of Systems:  Constitutional:  Denies fatigue, night sweats, hot flashes  Cardiovascular:  denies chest pain or palpitations  Respiratory:  denies shortness of breath  Gastrointestinal:  denies heartburn, abdominal pain, diarrhea or constipation  Genitourinary:  denies dysuria, incontinence, abnormal vaginal discharge, vaginal itching see HPI  Musculoskeletal:  denies back pain.  Skin/Breast:  Denies any breast pain, lumps, or discharge.   Neurological:  denies headaches, extremity weakness or numbness.  Psychiatric: denies depression or anxiety.  Endocrine:   denies excessive thirst or urination.  Heme/Lymph:  denies history of anemia, easy bruising or bleeding.      PHYSICAL EXAM:     Vitals:    04/18/24 1407   BP: 112/68   Pulse: 63   Weight: 184 lb 6.4 oz (83.6 kg)     Body mass index is 33.73 kg/m².     Patient offered chaperone, patient declined    Constitutional: well developed, well nourished    Psychiatric:  Oriented to time, place, person and situation. Appropriate mood and affect    Pelvic Exam:  External Genitalia: normal appearance, hair distribution, and no lesions  Urethral Meatus:  normal in size, location, without lesions and prolapse  Bladder:  No fullness, masses or tenderness  Vagina:  Normal appearance without lesions, no abnormal discharge  Cervix:   Normal without tenderness on motion  Uterus: absent  Adnexa: normal without masses or tenderness  Perineum: normal  Anus: no hemorroids   Lymph node: no inguinal lymph nodes    Assessment & Plan:  Anastasiia was seen today for gyn problem.    Diagnoses and all orders for this visit:    Pelvic pain  -     US PELVIS W EV (CPT=76856/66451); Future    S/P abdominal supracervical subtotal hysterectomy  -     US PELVIS W EV (CPT=76856/81615); Future    Vaginal discharge  -     Vaginitis Vaginosis PCR Panel; Future  -     Chlamydia/Gc Amplification  -     Vaginitis Vaginosis PCR Panel      Cultures done  Pelvic US ordered  Will follow up with results.    ONEIDA Gandhi    This note was prepared using Dragon Medical voice recognition dictation software. As a result errors may occur. When identified these errors have been corrected. While every attempt is made to correct errors during dictation discrepancies may still exist.

## 2024-04-19 ENCOUNTER — TELEPHONE (OUTPATIENT)
Dept: OBGYN CLINIC | Facility: CLINIC | Age: 59
End: 2024-04-19

## 2024-04-19 LAB
BV BACTERIA DNA VAG QL NAA+PROBE: POSITIVE
C GLABRATA DNA VAG QL NAA+PROBE: NEGATIVE
C KRUSEI DNA VAG QL NAA+PROBE: NEGATIVE
C TRACH DNA SPEC QL NAA+PROBE: NEGATIVE
CANDIDA DNA VAG QL NAA+PROBE: NEGATIVE
N GONORRHOEA DNA SPEC QL NAA+PROBE: NEGATIVE
T VAGINALIS DNA VAG QL NAA+PROBE: NEGATIVE

## 2024-04-19 RX ORDER — NYSTATIN 100000 U/G
1 CREAM TOPICAL 2 TIMES DAILY
Qty: 30 G | Refills: 0 | Status: SHIPPED | OUTPATIENT
Start: 2024-04-19

## 2024-04-19 RX ORDER — METRONIDAZOLE 500 MG/1
500 TABLET ORAL 2 TIMES DAILY
Qty: 14 TABLET | Refills: 0 | Status: SHIPPED | OUTPATIENT
Start: 2024-04-19 | End: 2024-04-26

## 2024-04-19 NOTE — TELEPHONE ENCOUNTER
----- Message from ONEIDA Gandhi sent at 4/19/2024 10:48 AM CDT -----  +BV - please rx flagyl 500 mg PO BID x 7 days    ONEIDA Gandhi

## 2024-04-19 NOTE — TELEPHONE ENCOUNTER
Pt informed of results and recs and verbalized understanding.  Pt also asked if EMB will be prescribing \"cream\" for rash in groin area? States this was discussed at visit. Message to EMB.

## 2024-04-19 NOTE — TELEPHONE ENCOUNTER
This is the recommended treatment for bacterial vaginosis. This is vaginal infection and unrelated to MRSA.

## 2024-04-19 NOTE — TELEPHONE ENCOUNTER
Pt informed of EMB recs and verbalized understanding. Per pt, she has a hx of MRSA and is resistant to some abx. (Pt is not aware which class.) She wishes to confirm that Flagyl will be effective. To EMB to please advise, thank you.

## 2024-05-04 ENCOUNTER — APPOINTMENT (OUTPATIENT)
Dept: GENERAL RADIOLOGY | Age: 59
End: 2024-05-04
Payer: MEDICAID

## 2024-05-04 ENCOUNTER — HOSPITAL ENCOUNTER (OUTPATIENT)
Age: 59
Discharge: HOME | End: 2024-05-04
Payer: MEDICAID

## 2024-05-04 VITALS
RESPIRATION RATE: 20 BRPM | TEMPERATURE: 98 F | OXYGEN SATURATION: 94 % | DIASTOLIC BLOOD PRESSURE: 65 MMHG | SYSTOLIC BLOOD PRESSURE: 104 MMHG | HEART RATE: 66 BPM

## 2024-05-04 DIAGNOSIS — M79.641 HAND PAIN, RIGHT: ICD-10-CM

## 2024-05-04 DIAGNOSIS — B96.89 BACTERIAL VAGINOSIS: ICD-10-CM

## 2024-05-04 DIAGNOSIS — M25.511 ACUTE PAIN OF BOTH SHOULDERS: Primary | ICD-10-CM

## 2024-05-04 DIAGNOSIS — M25.512 ACUTE PAIN OF BOTH SHOULDERS: Primary | ICD-10-CM

## 2024-05-04 DIAGNOSIS — N76.0 BACTERIAL VAGINOSIS: ICD-10-CM

## 2024-05-04 PROCEDURE — 99213 OFFICE O/P EST LOW 20 MIN: CPT

## 2024-05-04 PROCEDURE — 73030 X-RAY EXAM OF SHOULDER: CPT

## 2024-05-04 PROCEDURE — L3924 HFO WITHOUT JOINTS PRE OTS: HCPCS

## 2024-05-04 PROCEDURE — A4565 SLINGS: HCPCS

## 2024-05-04 PROCEDURE — 73130 X-RAY EXAM OF HAND: CPT

## 2024-05-04 PROCEDURE — 73110 X-RAY EXAM OF WRIST: CPT

## 2024-05-04 RX ORDER — CLINDAMYCIN HYDROCHLORIDE 300 MG/1
300 CAPSULE ORAL 3 TIMES DAILY
Qty: 42 CAPSULE | Refills: 0 | Status: SHIPPED | OUTPATIENT
Start: 2024-05-04 | End: 2024-05-18

## 2024-05-04 NOTE — ED INITIAL ASSESSMENT (HPI)
Pt fell 2 days ago, hurting right hand, wrist and shoulder. Pt was already having issues with left shoulder, concerned for torn rotator cuff    Pt was recently diagnosed with \"ovarian infection\", antibiotics were making her sick so she stopped taking them, requesting new antibiotics. Patient taking metronidazole. Hx mrsa

## 2024-05-04 NOTE — DISCHARGE INSTRUCTIONS
There is a possible wrist fracture of the wrist on x-ray.     Please wear the splint and sling that we placed in the immediate care.    You can take Tylenol and Motrin for pain.    Rest, ice and elevate.    Make an appointment to follow-up with the orthopedic specialist.    If the patient develops any numbness, tingling, acutely worsening pain, swelling or any other concerns or complaints they go to the emergency department.    Otherwise follow-up with primary care doctor.     You have an old fracture of the right middle finger.  X-rays of your shoulders were normal.  You likely have a sprain of your shoulders.  Rest, ice and elevate.  Take Tylenol and Motrin for pain as needed.  If you develop any numbness, tingling, acutely worsening pain, swelling or any other concerning complaints you should go to the emergency department.  If you have persistent pain for more than the next week make an appointment to see the physical medicine or orthopedic specialist.  Otherwise follow-up with your primary care doctor.    I did send a new prescription for clindamycin to treat your bacterial vaginosis as discussed.  Please stop taking the metronidazole.  Please make an appointment to follow-up with your gynecologist or primary care doctor.

## 2024-05-04 NOTE — ED PROVIDER NOTES
Patient Seen in: [facility]      History     Chief Complaint   Patient presents with    Pain     Stated Complaint: Shoulder    Subjective:   Anastasiia is a 58-year-old female presenting to the immediate care following a mechanical fall 2 days ago.  Patient states that she has had pain to the right hand and both of her shoulders for the past couple of days.  Patient states that she slipped on something on her stairs causing her to fall and landed on her outstretched arms.  Denies any pain to the elbows.  Patient states that she did not hit her head with the fall.  She denies any loss of consciousness.  She denies any head, neck or back pain.  Patient denies any pain to her chest or abdomen.  Separately, patient was seen by PCP a few days ago and diagnosed with bacterial vaginosis.  States that she has been taking metronidazole as prescribed but it is causing her some stomach upset.  She is requesting a new prescription for different antibiotic to treat the bacterial vaginosis.  No new/changed BV symptoms today.  No abdominal pain.  Has no other complaints or concerns.          Objective:   Past Medical History:    Anxiety    Bipolar affective disorder (HCC)    Cirrhosis of liver (HCC)    Colitis    ulcerative colitis    COPD (chronic obstructive pulmonary disease) (HCC)    Coronary atherosclerosis    Crack cocaine use    Depression    Essential hypertension    JOHN (generalized anxiety disorder)    Hepatitis    Hep C    Hepatitis C    1989    High blood pressure    History of blood transfusion    no reactions    Methadone use    Migraines    Nausea & vomiting    Osteoarthritis    Pneumonia due to organism    Polysubstance dependence including opioid drug with daily use (HCC)    PTSD (post-traumatic stress disorder)    Sleep apnea    NO CPAP              Past Surgical History:   Procedure Laterality Date    Angioplasty (coronary)  2019    3 total    Appendectomy      2012    Cath percutaneous  transluminal  coronary angioplasty      Lap supracerv hysterectomy  2010    supracervical hysterectomy    Tonsillectomy                  Social History     Socioeconomic History    Marital status:    Tobacco Use    Smoking status: Every Day     Current packs/day: 0.50     Average packs/day: 0.5 packs/day for 42.0 years (21.0 ttl pk-yrs)     Types: Cigarettes    Smokeless tobacco: Never   Vaping Use    Vaping status: Every Day    Substances: THC, CBD   Substance and Sexual Activity    Alcohol use: Not Currently    Drug use: Yes     Frequency: 7.0 times per week     Types: Cannabis     Comment: vapes daily    Sexual activity: Not Currently     Partners: Male   Other Topics Concern    Reaction to local anesthetic No    Pt has a pacemaker No    Pt has a defibrillator No    Blood Transfusions Yes     Social Determinants of Health     Financial Resource Strain: Low Risk  (2/22/2022)    Received from [facility]    Overall Financial Resource Strain (CARDIA)     Difficulty of Paying Living Expenses: Not very hard   Transportation Needs: No Transportation Needs (2/22/2022)    Received from [facility], [facility]    PRAPARE - Transportation     Lack of Transportation (Medical): No     Lack of Transportation (Non-Medical): No    Received from [facility], [facility]    Social Connections    Received from [facility], [facility]    Housing Stability              Review of Systems    Positive for stated complaint: Shoulder  Other systems are as noted in HPI.  Constitutional and vital signs reviewed.      All other systems reviewed and negative except as noted above.    Physical Exam     ED Triage Vitals [05/04/24 1320]   /65   Pulse 66   Resp 20   Temp 97.5 °F (36.4 °C)   Temp src Temporal   SpO2 94 %   O2 Device None (Room air)       Current:/65   Pulse 66   Temp 97.5 °F (36.4 °C) (Temporal)    Resp 20   SpO2 94%         Physical Exam  Vitals and nursing note reviewed.   Constitutional:       General: She is not in acute distress.     Appearance: Normal appearance. She is not ill-appearing, toxic-appearing or diaphoretic.   HENT:      Head: Normocephalic.   Cardiovascular:      Rate and Rhythm: Normal rate.   Pulmonary:      Effort: Pulmonary effort is normal.   Musculoskeletal:      Right shoulder: Tenderness present. No swelling, deformity, effusion, laceration, bony tenderness or crepitus. Decreased range of motion. Normal strength. Normal pulse.      Left shoulder: Tenderness present. No swelling, deformity, effusion, laceration, bony tenderness or crepitus. Decreased range of motion. Normal strength. Normal pulse.      Right upper arm: Normal.      Left upper arm: Normal.      Right elbow: Normal.      Left elbow: Normal.      Right forearm: Normal.      Left forearm: Normal.      Right wrist: Tenderness and snuff box tenderness present. No swelling, deformity, effusion, lacerations, bony tenderness or crepitus. Normal range of motion. Normal pulse.      Left wrist: Normal.      Right hand: Swelling, tenderness and bony tenderness present. No deformity or lacerations. Normal range of motion. Normal strength. Normal sensation. There is no disruption of two-point discrimination. Normal capillary refill. Normal pulse.      Left hand: Normal.      Cervical back: Normal range of motion.      Comments: Positive CMS.  2+ radial and ulnar pulses. Capillary refill less than 2 seconds.  Patient is mildly decreased range of motion to both shoulders secondary to pain.  Patient has tenderness to bilateral anterior shoulders with palpation.  There is no swelling noted.  No ecchymosis or warmth noted.  No erythema noted.  No abrasions or lacerations noted.  No bleeding noted.  No obvious deformity is noted.      Patient initially denied any wrist pain however on reevaluation states that she does have pain to the  right wrist.  Patient does have full range of motion to the right wrist.  Patient has tenderness to the right wrist with snuffbox tenderness with palpation.  There is slight swelling noted.  No ecchymosis, warmth, erythema, abrasions, lacerations or bleeding noted. No obvious deformity or crepitus.  Patient has full range of motion to the right hand and all 5 fingers.      Patient does have full range of motion to the entire right hand all 5 fingers.   There are old tiny abrasions to the entire hand, no lacerations noted.  No bleeding.  Mild scattered ecchymosis noted to entire right hand.  Patient does have tenderness to index and ring fingers.  There is mild erythema, no warmth noted.  No obvious deformity noted. Patient has mild swelling to the index and ring fingers.       Skin:     General: Skin is warm and dry.      Capillary Refill: Capillary refill takes less than 2 seconds.   Neurological:      General: No focal deficit present.      Mental Status: She is alert and oriented to person, place, and time.   Psychiatric:         Mood and Affect: Mood normal.         Behavior: Behavior normal.         Thought Content: Thought content normal.         Judgment: Judgment normal.             ED Course   Labs Reviewed - No data to display  XR WRIST NAVICULAR COMPLETE (4 VIEWS), RIGHT (CPT=73110)    Result Date: 5/4/2024  CONCLUSION:   Possible subtle cortical discontinuity and associated transverse lucency mid scaphoid.  A nondisplaced fracture is not excluded.  Please correlate for point tenderness in this region.    Dictated by (CST): Luis Manuel Deras MD on 5/04/2024 at 3:13 PM     Finalized by (CST): Luis Manuel Deras MD on 5/04/2024 at 3:16 PM          XR SHOULDER, COMPLETE (MIN 2 VIEWS), RIGHT (CPT=73030)    Result Date: 5/4/2024  CONCLUSION: No acute fracture or dislocation.    Dictated by (CST): Luis Manuel Deras MD on 5/04/2024 at 2:43 PM     Finalized by (CST): Luis Manuel Deras MD on 5/04/2024 at 2:44 PM           XR HAND (MIN 3 VIEWS), RIGHT (CPT=73130)    Result Date: 5/4/2024  CONCLUSION:  1. Old ununited mallet fracture dorsal lip of long finger distal phalanx at DIP joint. 2. No unequivocal acute fracture. 3. If patient has snuffbox tenderness, directed scaphoid wrist few is recommended.    Dictated by (CST): Tacho Frausto MD on 5/04/2024 at 2:38 PM     Finalized by (CST): Tacho Frausto MD on 5/04/2024 at 2:41 PM          XR SHOULDER, COMPLETE (MIN 2 VIEWS), LEFT (CPT=73030)    Result Date: 5/4/2024  CONCLUSION:  1. No acute fracture or subluxation.    Dictated by (CST): Tacho Frausto MD on 5/04/2024 at 2:37 PM     Finalized by (CST): Tacho Frausto MD on 5/04/2024 at 2:38 PM                 MDM                      Medical Decision Making  Multiple medical diagnoses were considered including but not limited to bony versus soft tissue injury to the right arm and left shoulder.  Patient is well appearing, non-toxic and in no acute distress.  Vital signs are stable.   There is a possible subtle cortical discontinuity and associated transverse lucency of the mid scaphoid on x-ray.  Patient does have snuffbox tenderness.  Will treat as a fracture until follow-up with Ortho.  Thumb spica splint and sling in the immediate care.   After application of the splint I returned and re-examined the patient. The splint was adequately immobilizing the joint and distal to the splint the patient's circulation and sensation was intact.  Recommended using Tylenol and Motrin for pain.  Recommended that if the patient develop any numbness, tingling, acutely worsening pain, swelling or any other concerns or complaints they go to the emergency department.  Recommended that the patient make an appointment to follow-up with the orthopedic.    There is no acute fracture on x-ray of the hand or shoulders per the radiology read.  I independently reviewed the films, there are no obvious signs of fracture.  Patient's history and physical  exam are consistent with a sprain.   Recommended RICE.  Recommended using Tylenol and Motrin for pain.  Recommended that if the patient develop any numbness, tingling, acutely worsening pain, swelling or any other concerns or complaints they go to the emergency department.  Recommended that if patient has persistent pain they make an appointment to follow-up with the orthopedic specialist.  Otherwise recommended follow-up with primary care doctor.  Patient is currently being treated for bacterial vaginosis through PCP with metronidazole.  Patient states that metronidazole is giving her abdominal cramping and diarrhea.  She is requesting clindamycin.  Verified positive bacterial vaginosis result in epic.  I sent a prescription for clindamycin.  ED precautions discussed.  Patient (guardian) advised to follow up with PCP in 2-3 days.  Patient (guardian) agrees with this plan of care.  Patient (guardian) verbalizes understanding of discharge instructions and plan of care.      Amount and/or Complexity of Data Reviewed  Radiology: ordered and independent interpretation performed. Decision-making details documented in ED Course.    Risk  OTC drugs.  Prescription drug management.        Disposition and Plan     Clinical Impression:  1. Acute pain of both shoulders    2. Hand pain, right    3. Bacterial vaginosis         Disposition:  Discharge  5/4/2024  3:28 pm    Follow-up:  Jose Braswell MD  [address] [place] [id number] [phone]          Po Cobian MD  [address] [place] [id number] [phone]          Antonio Melendez MD  [address] [place] [address]  [facility] [id number] [phone]                Medications Prescribed:  Discharge Medication List as of 5/4/2024  3:31 PM        START taking these medications    Details   clindamycin 300 MG Oral Cap Take 1 capsule (300 mg total) by mouth 3 (three) times daily for 14 days., Normal, Disp-42 capsule, R-0

## 2024-05-06 DIAGNOSIS — L29.9 ITCHING: ICD-10-CM

## 2024-05-06 DIAGNOSIS — F41.1 GENERALIZED ANXIETY DISORDER: ICD-10-CM

## 2024-05-06 RX ORDER — ALPRAZOLAM 1 MG/1
TABLET ORAL
Qty: 90 TABLET | Refills: 0 | Status: SHIPPED | OUTPATIENT
Start: 2024-05-06

## 2024-05-06 NOTE — TELEPHONE ENCOUNTER
Message noted: Chart reviewed and may refill medication as requested. Script sent to listed pharmacy by secure method.    Pt notified through [employer]

## 2024-05-06 NOTE — TELEPHONE ENCOUNTER
Verified name and .    Patient requesting refill of Alprazolam.    Medication pended for your review and approval.

## 2024-05-08 RX ORDER — HYDROXYZINE HYDROCHLORIDE 25 MG/1
25 TABLET, FILM COATED ORAL 3 TIMES DAILY PRN
Qty: 30 TABLET | Refills: 0 | Status: SHIPPED | OUTPATIENT
Start: 2024-05-08

## 2024-05-08 RX ORDER — ALPRAZOLAM 1 MG/1
TABLET ORAL
Qty: 90 TABLET | Refills: 0 | OUTPATIENT
Start: 2024-05-08

## 2024-05-08 NOTE — TELEPHONE ENCOUNTER
Please review. Protocol Failed; No Protocol    Requested Prescriptions   Pending Prescriptions Disp Refills    HYDROXYZINE 25 MG Oral Tab [Pharmacy Med Name: HYDROXYZINE HCL 25MG TABS (WHITE)] 30 tablet 0     Sig: TAKE 1 TABLET(25 MG) BY MOUTH THREE TIMES DAILY AS NEEDED FOR ANXIETY OR ITCHING       There is no refill protocol information for this order      Refused Prescriptions Disp Refills    ALPRAZOLAM 1 MG Oral Tab [Pharmacy Med Name: ALPRAZOLAM 1MG TABLETS] 90 tablet 0     Sig: TAKE 1 TABLET BY MOUTH THREE TIMES DAILY       Controlled Substance Medication Failed - 5/6/2024  3:58 PM        Failed - This medication is a controlled substance - forward to provider to refill                 Recent Outpatient Visits              2 weeks ago Pelvic pain    [facility] Rowena Khan, [place]    Office Visit    1 month ago Rash and nonspecific skin eruption    [facility] [place]    [place]    2 months ago Pelvic pain    [facility] Vicky Dunham, DO    Office Visit    3 months ago Pain passing urine    [facility]    Office Visit    5 months ago Acute bilateral low back pain without sciatica    [facility] [place] [place] [place],     Office Visit

## 2024-05-08 NOTE — TELEPHONE ENCOUNTER
Message noted: Chart reviewed and may refill medication as requested. Prescription sent to listed pharmacy. Pharmacy to notify patient. Pt notified through [email or web address]

## 2024-06-05 DIAGNOSIS — F41.1 GENERALIZED ANXIETY DISORDER: ICD-10-CM

## 2024-06-06 DIAGNOSIS — F41.1 GENERALIZED ANXIETY DISORDER: ICD-10-CM

## 2024-06-06 RX ORDER — ALPRAZOLAM 1 MG/1
TABLET ORAL
Qty: 90 TABLET | Refills: 0 | OUTPATIENT
Start: 2024-06-06

## 2024-06-06 RX ORDER — ALPRAZOLAM 1 MG/1
TABLET ORAL
Qty: 90 TABLET | Refills: 0 | Status: SHIPPED | OUTPATIENT
Start: 2024-06-06

## 2024-06-06 NOTE — TELEPHONE ENCOUNTER
Patient requesting refill on medication, patient completely out     [id number] - [place] - [address] AT [facility] [address]     Medication Detail    Medication Quantity Refills Start End   ALPRAZolam 1 MG Oral Tab 90 tablet 0 5/6/2024 --   Sig:   TAKE 1 TABLET BY MOUTH THREE TIMES DAILY     Route:   (none)     Order #:   [id number]

## 2024-06-06 NOTE — TELEPHONE ENCOUNTER
Message noted: Chart reviewed and may refill medication as requested. Script sent to listed pharmacy by secure method.    Pt notified through [employer]

## 2024-06-06 NOTE — TELEPHONE ENCOUNTER
Please review; protocol failed.    Patient's dispense history (qty #90 ea for a 30 day supply): 05/08/24, 04/10/24, and 03/09/24. (Due date: 06/13/24, okay to fill 06/12/24) - please adjust if not appropriate.    **Patient is 6 days early on refill:    May 2024 refill: due on 05/13/24, filled on 05/08/24 (5 days early)  April 2024 refill: due on 04/13/24, filled on 04/10/24 (3 days early)  March 2024 refill due on 03/14/24, filled on 03/09/24 (5 days early)    Last prescription written: 05/06/24  Last office visit with primary care provider: 03/26/2024 (telemedicine)    Last physical exam: 10/27/22  Requested Prescriptions     Pending Prescriptions Disp Refills    ALPRAZOLAM 1 MG Oral Tab [Pharmacy Med Name: ALPRAZOLAM 1MG TABLETS] 90 tablet 0     Sig: TAKE 1 TABLET BY MOUTH THREE TIMES DAILY

## 2024-06-06 NOTE — TELEPHONE ENCOUNTER
Patient calling regarding refill. She received a message that her request was denied. I advised that we received multiple requests for the refill and one was denied because it was a duplicate. She is completely out of medication. I advised that medication was sent as high priority. She verbalized understanding.     Zollinger, Kirsten, CPhT routed this conversation to Jose Braswell MD 6/6/24 12:54.

## 2024-06-17 DIAGNOSIS — L29.9 ITCHING: ICD-10-CM

## 2024-06-19 RX ORDER — HYDROXYZINE HYDROCHLORIDE 25 MG/1
25 TABLET, FILM COATED ORAL 3 TIMES DAILY PRN
Qty: 30 TABLET | Refills: 0 | Status: SHIPPED | OUTPATIENT
Start: 2024-06-19

## 2024-06-19 NOTE — TELEPHONE ENCOUNTER
Please review. Protocol Failed; No Protocol    Requested Prescriptions   Pending Prescriptions Disp Refills    HYDROXYZINE 25 MG Oral Tab [Pharmacy Med Name: HYDROXYZINE HCL 25MG TABS (WHITE)] 30 tablet 0     Sig: TAKE 1 TABLET(25 MG) BY MOUTH THREE TIMES DAILY AS NEEDED FOR ANXIETY OR ITCHING       There is no refill protocol information for this order              Recent Outpatient Visits              2 months ago Pelvic pain    [facility] - OB/GYN Rowena Khan APRN    Office Visit    2 months ago Rash and nonspecific skin eruption    [facility] Jose Braswell MD    Telemedicine    3 months ago Pelvic pain    [facility] - OB/GYN Vicky Dunham,     Office Visit    4 months ago Pain passing urine    [facility]    Office Visit    6 months ago Acute bilateral low back pain without sciatica    [facility] Zacarias Godinez DO    Office Visit

## 2024-06-19 NOTE — TELEPHONE ENCOUNTER
Message noted: Chart reviewed and may refill medication as requested. Prescription sent to listed pharmacy. Pharmacy to notify patient. Pt notified through [employer]

## 2024-07-08 DIAGNOSIS — F41.1 GENERALIZED ANXIETY DISORDER: ICD-10-CM

## 2024-07-09 RX ORDER — ALPRAZOLAM 1 MG/1
TABLET ORAL
Qty: 90 TABLET | Refills: 1 | Status: ON HOLD | OUTPATIENT
Start: 2024-07-09

## 2024-07-09 NOTE — TELEPHONE ENCOUNTER
Message noted: Chart reviewed and may refill medication as requested. Script sent to listed pharmacy by secure method.    Pt notified through [employer]

## 2024-07-09 NOTE — TELEPHONE ENCOUNTER
Patient calling for RX, stated she have been out for 2 days , also asking for refill so she do not have to go through this every month, please advise   Please review.Protocol failed/ No protocol      Requested Prescriptions   Pending Prescriptions Disp Refills    ALPRAZOLAM 1 MG Oral Tab [Pharmacy Med Name: ALPRAZOLAM 1MG TABLETS] 90 tablet 0     Sig: TAKE 1 TABLET BY MOUTH THREE TIMES DAILY       Controlled Substance Medication Failed - 7/9/2024 10:53 AM        Failed - This medication is a controlled substance - forward to provider to refill                  Recent Outpatient Visits              2 months ago Pelvic pain    [facility] - OB/GYN Rowena Khan, [place]    Office Visit    3 months ago Rash and nonspecific skin eruption    [facility]    Telemedicine    4 months ago Pelvic pain    [facility] - [facility]/GYN Vicky Dunham, DO    Office Visit    5 months ago Pain passing urine    [facility] [place] [place]    Office Visit    7 months ago Acute bilateral low back pain without sciatica    [facility] [place]    Office Visit

## 2024-07-13 ENCOUNTER — APPOINTMENT (OUTPATIENT)
Dept: GENERAL RADIOLOGY | Facility: HOSPITAL | Age: 59
End: 2024-07-13
Attending: EMERGENCY MEDICINE
Payer: MEDICAID

## 2024-07-13 ENCOUNTER — ANESTHESIA EVENT (OUTPATIENT)
Dept: SURGERY | Facility: HOSPITAL | Age: 59
End: 2024-07-13
Payer: MEDICAID

## 2024-07-13 ENCOUNTER — ANESTHESIA (OUTPATIENT)
Dept: SURGERY | Facility: HOSPITAL | Age: 59
End: 2024-07-13
Payer: MEDICAID

## 2024-07-13 ENCOUNTER — APPOINTMENT (OUTPATIENT)
Dept: CT IMAGING | Facility: HOSPITAL | Age: 59
End: 2024-07-13
Attending: EMERGENCY MEDICINE
Payer: MEDICAID

## 2024-07-13 ENCOUNTER — HOSPITAL ENCOUNTER (INPATIENT)
Facility: HOSPITAL | Age: 59
LOS: 7 days | Discharge: HOME HEALTH | End: 2024-07-20
Attending: EMERGENCY MEDICINE | Admitting: HOSPITALIST
Payer: MEDICAID

## 2024-07-13 DIAGNOSIS — F41.1 GENERALIZED ANXIETY DISORDER: ICD-10-CM

## 2024-07-13 DIAGNOSIS — T79.A11A TRAUMATIC COMPARTMENT SYNDROME OF RIGHT UPPER EXTREMITY, INITIAL ENCOUNTER (HCC): Primary | ICD-10-CM

## 2024-07-13 DIAGNOSIS — F11.90 METHADONE USE: ICD-10-CM

## 2024-07-13 DIAGNOSIS — L03.90 CELLULITIS, UNSPECIFIED CELLULITIS SITE: ICD-10-CM

## 2024-07-13 DIAGNOSIS — S92.421A DISPLACED FRACTURE OF DISTAL PHALANX OF RIGHT GREAT TOE, INITIAL ENCOUNTER FOR CLOSED FRACTURE: ICD-10-CM

## 2024-07-13 DIAGNOSIS — S62.652A CLOSED NONDISPLACED FRACTURE OF MIDDLE PHALANX OF RIGHT MIDDLE FINGER, INITIAL ENCOUNTER: ICD-10-CM

## 2024-07-13 PROBLEM — T79.A0XA COMPARTMENT SYNDROME (HCC): Status: ACTIVE | Noted: 2024-07-13

## 2024-07-13 PROBLEM — T79.A0XA COMPARTMENT SYNDROME: Status: ACTIVE | Noted: 2024-07-13

## 2024-07-13 LAB
ANION GAP SERPL CALC-SCNC: 5 MMOL/L (ref 0–18)
ANTIBODY SCREEN: NEGATIVE
APTT PPP: 33.7 SECONDS (ref 23–36)
BASOPHILS # BLD AUTO: 0.01 X10(3) UL (ref 0–0.2)
BASOPHILS NFR BLD AUTO: 0.2 %
BUN BLD-MCNC: 13 MG/DL (ref 9–23)
BUN/CREAT SERPL: 19.4 (ref 10–20)
CALCIUM BLD-MCNC: 9.2 MG/DL (ref 8.7–10.4)
CHLORIDE SERPL-SCNC: 108 MMOL/L (ref 98–112)
CO2 SERPL-SCNC: 28 MMOL/L (ref 21–32)
CREAT BLD-MCNC: 0.67 MG/DL
DEPRECATED RDW RBC AUTO: 39.4 FL (ref 35.1–46.3)
EGFRCR SERPLBLD CKD-EPI 2021: 101 ML/MIN/1.73M2 (ref 60–?)
EOSINOPHIL # BLD AUTO: 0.06 X10(3) UL (ref 0–0.7)
EOSINOPHIL NFR BLD AUTO: 1 %
ERYTHROCYTE [DISTWIDTH] IN BLOOD BY AUTOMATED COUNT: 12.3 % (ref 11–15)
GLUCOSE BLD-MCNC: 100 MG/DL (ref 70–99)
HCT VFR BLD AUTO: 37.1 %
HGB BLD-MCNC: 13.3 G/DL
IMM GRANULOCYTES # BLD AUTO: 0.02 X10(3) UL (ref 0–1)
IMM GRANULOCYTES NFR BLD: 0.3 %
INR BLD: 1.18 (ref 0.8–1.2)
LYMPHOCYTES # BLD AUTO: 1.14 X10(3) UL (ref 1–4)
LYMPHOCYTES NFR BLD AUTO: 18.4 %
MCH RBC QN AUTO: 31.3 PG (ref 26–34)
MCHC RBC AUTO-ENTMCNC: 35.8 G/DL (ref 31–37)
MCV RBC AUTO: 87.3 FL
MONOCYTES # BLD AUTO: 0.33 X10(3) UL (ref 0.1–1)
MONOCYTES NFR BLD AUTO: 5.3 %
NEUTROPHILS # BLD AUTO: 4.62 X10 (3) UL (ref 1.5–7.7)
NEUTROPHILS # BLD AUTO: 4.62 X10(3) UL (ref 1.5–7.7)
NEUTROPHILS NFR BLD AUTO: 74.8 %
OSMOLALITY SERPL CALC.SUM OF ELEC: 292 MOSM/KG (ref 275–295)
PLATELET # BLD AUTO: 144 10(3)UL (ref 150–450)
PLATELETS.RETICULATED NFR BLD AUTO: 3.7 % (ref 0–7)
POTASSIUM SERPL-SCNC: 4 MMOL/L (ref 3.5–5.1)
PROTHROMBIN TIME: 15.8 SECONDS (ref 11.6–14.8)
RBC # BLD AUTO: 4.25 X10(6)UL
RH BLOOD TYPE: POSITIVE
RH BLOOD TYPE: POSITIVE
SODIUM SERPL-SCNC: 141 MMOL/L (ref 136–145)
WBC # BLD AUTO: 6.2 X10(3) UL (ref 4–11)

## 2024-07-13 PROCEDURE — 0J9G0ZZ DRAINAGE OF RIGHT LOWER ARM SUBCUTANEOUS TISSUE AND FASCIA, OPEN APPROACH: ICD-10-PCS | Performed by: ORTHOPAEDIC SURGERY

## 2024-07-13 PROCEDURE — 73130 X-RAY EXAM OF HAND: CPT | Performed by: EMERGENCY MEDICINE

## 2024-07-13 PROCEDURE — 0JNG0ZZ RELEASE RIGHT LOWER ARM SUBCUTANEOUS TISSUE AND FASCIA, OPEN APPROACH: ICD-10-PCS | Performed by: ORTHOPAEDIC SURGERY

## 2024-07-13 PROCEDURE — 70450 CT HEAD/BRAIN W/O DYE: CPT | Performed by: EMERGENCY MEDICINE

## 2024-07-13 PROCEDURE — 73660 X-RAY EXAM OF TOE(S): CPT | Performed by: EMERGENCY MEDICINE

## 2024-07-13 PROCEDURE — 73090 X-RAY EXAM OF FOREARM: CPT | Performed by: EMERGENCY MEDICINE

## 2024-07-13 PROCEDURE — 2W3JX1Z IMMOBILIZATION OF RIGHT FINGER USING SPLINT: ICD-10-PCS | Performed by: ORTHOPAEDIC SURGERY

## 2024-07-13 PROCEDURE — 73030 X-RAY EXAM OF SHOULDER: CPT | Performed by: EMERGENCY MEDICINE

## 2024-07-13 PROCEDURE — 99223 1ST HOSP IP/OBS HIGH 75: CPT | Performed by: HOSPITALIST

## 2024-07-13 RX ORDER — ACETAMINOPHEN 500 MG
500 TABLET ORAL EVERY 4 HOURS PRN
Status: DISCONTINUED | OUTPATIENT
Start: 2024-07-13 | End: 2024-07-20

## 2024-07-13 RX ORDER — LIDOCAINE HYDROCHLORIDE 40 MG/ML
SOLUTION TOPICAL AS NEEDED
Status: DISCONTINUED | OUTPATIENT
Start: 2024-07-13 | End: 2024-07-13 | Stop reason: SURG

## 2024-07-13 RX ORDER — MORPHINE SULFATE 2 MG/ML
1 INJECTION, SOLUTION INTRAMUSCULAR; INTRAVENOUS EVERY 2 HOUR PRN
Status: DISCONTINUED | OUTPATIENT
Start: 2024-07-13 | End: 2024-07-13 | Stop reason: ALTCHOICE

## 2024-07-13 RX ORDER — HYDROMORPHONE HYDROCHLORIDE 1 MG/ML
0.5 INJECTION, SOLUTION INTRAMUSCULAR; INTRAVENOUS; SUBCUTANEOUS EVERY 2 HOUR PRN
Status: DISCONTINUED | OUTPATIENT
Start: 2024-07-13 | End: 2024-07-13 | Stop reason: HOSPADM

## 2024-07-13 RX ORDER — HYDROMORPHONE HYDROCHLORIDE 1 MG/ML
0.5 INJECTION, SOLUTION INTRAMUSCULAR; INTRAVENOUS; SUBCUTANEOUS EVERY 2 HOUR PRN
Status: DISCONTINUED | OUTPATIENT
Start: 2024-07-13 | End: 2024-07-20

## 2024-07-13 RX ORDER — POLYETHYLENE GLYCOL 3350 17 G/17G
17 POWDER, FOR SOLUTION ORAL DAILY PRN
Status: DISCONTINUED | OUTPATIENT
Start: 2024-07-13 | End: 2024-07-20

## 2024-07-13 RX ORDER — HYDROMORPHONE HYDROCHLORIDE 1 MG/ML
1 INJECTION, SOLUTION INTRAMUSCULAR; INTRAVENOUS; SUBCUTANEOUS EVERY 2 HOUR PRN
Status: DISCONTINUED | OUTPATIENT
Start: 2024-07-13 | End: 2024-07-13 | Stop reason: HOSPADM

## 2024-07-13 RX ORDER — KETOROLAC TROMETHAMINE 15 MG/ML
15 INJECTION, SOLUTION INTRAMUSCULAR; INTRAVENOUS ONCE
Status: COMPLETED | OUTPATIENT
Start: 2024-07-13 | End: 2024-07-13

## 2024-07-13 RX ORDER — ONDANSETRON 2 MG/ML
4 INJECTION INTRAMUSCULAR; INTRAVENOUS EVERY 6 HOURS PRN
Status: DISCONTINUED | OUTPATIENT
Start: 2024-07-13 | End: 2024-07-20

## 2024-07-13 RX ORDER — OXYCODONE HYDROCHLORIDE 5 MG/1
15 TABLET ORAL EVERY 4 HOURS PRN
Status: DISCONTINUED | OUTPATIENT
Start: 2024-07-13 | End: 2024-07-20

## 2024-07-13 RX ORDER — METHADONE HYDROCHLORIDE 10 MG/5ML
130 SOLUTION ORAL DAILY
Status: DISCONTINUED | OUTPATIENT
Start: 2024-07-14 | End: 2024-07-20

## 2024-07-13 RX ORDER — NALOXONE HYDROCHLORIDE 0.4 MG/ML
0.08 INJECTION, SOLUTION INTRAMUSCULAR; INTRAVENOUS; SUBCUTANEOUS AS NEEDED
Status: DISCONTINUED | OUTPATIENT
Start: 2024-07-13 | End: 2024-07-13 | Stop reason: HOSPADM

## 2024-07-13 RX ORDER — HYDROMORPHONE HYDROCHLORIDE 1 MG/ML
1 INJECTION, SOLUTION INTRAMUSCULAR; INTRAVENOUS; SUBCUTANEOUS ONCE
Status: COMPLETED | OUTPATIENT
Start: 2024-07-13 | End: 2024-07-13

## 2024-07-13 RX ORDER — PROCHLORPERAZINE EDISYLATE 5 MG/ML
5 INJECTION INTRAMUSCULAR; INTRAVENOUS EVERY 8 HOURS PRN
Status: DISCONTINUED | OUTPATIENT
Start: 2024-07-13 | End: 2024-07-13 | Stop reason: HOSPADM

## 2024-07-13 RX ORDER — SODIUM CHLORIDE, SODIUM LACTATE, POTASSIUM CHLORIDE, CALCIUM CHLORIDE 600; 310; 30; 20 MG/100ML; MG/100ML; MG/100ML; MG/100ML
INJECTION, SOLUTION INTRAVENOUS CONTINUOUS PRN
Status: DISCONTINUED | OUTPATIENT
Start: 2024-07-13 | End: 2024-07-13 | Stop reason: SURG

## 2024-07-13 RX ORDER — MORPHINE SULFATE 4 MG/ML
8 INJECTION, SOLUTION INTRAMUSCULAR; INTRAVENOUS EVERY 10 MIN PRN
Status: DISCONTINUED | OUTPATIENT
Start: 2024-07-13 | End: 2024-07-13 | Stop reason: HOSPADM

## 2024-07-13 RX ORDER — OXYCODONE HYDROCHLORIDE 5 MG/1
15 TABLET ORAL EVERY 4 HOURS PRN
Status: DISCONTINUED | OUTPATIENT
Start: 2024-07-13 | End: 2024-07-13 | Stop reason: HOSPADM

## 2024-07-13 RX ORDER — MORPHINE SULFATE 4 MG/ML
4 INJECTION, SOLUTION INTRAMUSCULAR; INTRAVENOUS ONCE
Status: COMPLETED | OUTPATIENT
Start: 2024-07-13 | End: 2024-07-13

## 2024-07-13 RX ORDER — ONDANSETRON 2 MG/ML
4 INJECTION INTRAMUSCULAR; INTRAVENOUS EVERY 6 HOURS PRN
Status: DISCONTINUED | OUTPATIENT
Start: 2024-07-13 | End: 2024-07-13 | Stop reason: HOSPADM

## 2024-07-13 RX ORDER — OXYCODONE HYDROCHLORIDE 5 MG/1
10 TABLET ORAL EVERY 4 HOURS PRN
Status: DISCONTINUED | OUTPATIENT
Start: 2024-07-13 | End: 2024-07-20

## 2024-07-13 RX ORDER — FLUOXETINE HYDROCHLORIDE 20 MG/1
60 CAPSULE ORAL DAILY
Status: DISCONTINUED | OUTPATIENT
Start: 2024-07-14 | End: 2024-07-20

## 2024-07-13 RX ORDER — HYDROMORPHONE HYDROCHLORIDE 1 MG/ML
1 INJECTION, SOLUTION INTRAMUSCULAR; INTRAVENOUS; SUBCUTANEOUS EVERY 2 HOUR PRN
Status: DISCONTINUED | OUTPATIENT
Start: 2024-07-13 | End: 2024-07-20

## 2024-07-13 RX ORDER — EPHEDRINE SULFATE 50 MG/ML
INJECTION, SOLUTION INTRAVENOUS AS NEEDED
Status: DISCONTINUED | OUTPATIENT
Start: 2024-07-13 | End: 2024-07-13 | Stop reason: SURG

## 2024-07-13 RX ORDER — MORPHINE SULFATE 10 MG/ML
15 INJECTION, SOLUTION INTRAMUSCULAR; INTRAVENOUS EVERY 10 MIN PRN
Status: DISCONTINUED | OUTPATIENT
Start: 2024-07-13 | End: 2024-07-13 | Stop reason: HOSPADM

## 2024-07-13 RX ORDER — HYDROCODONE BITARTRATE AND ACETAMINOPHEN 5; 325 MG/1; MG/1
1 TABLET ORAL EVERY 4 HOURS PRN
Status: DISCONTINUED | OUTPATIENT
Start: 2024-07-13 | End: 2024-07-20

## 2024-07-13 RX ORDER — HYDROMORPHONE HYDROCHLORIDE 1 MG/ML
0.5 INJECTION, SOLUTION INTRAMUSCULAR; INTRAVENOUS; SUBCUTANEOUS EVERY 5 MIN PRN
Status: DISCONTINUED | OUTPATIENT
Start: 2024-07-13 | End: 2024-07-13 | Stop reason: HOSPADM

## 2024-07-13 RX ORDER — MORPHINE SULFATE 4 MG/ML
2 INJECTION, SOLUTION INTRAMUSCULAR; INTRAVENOUS EVERY 10 MIN PRN
Status: DISCONTINUED | OUTPATIENT
Start: 2024-07-13 | End: 2024-07-13 | Stop reason: HOSPADM

## 2024-07-13 RX ORDER — ALPRAZOLAM 0.5 MG/1
1 TABLET ORAL 3 TIMES DAILY PRN
Status: DISCONTINUED | OUTPATIENT
Start: 2024-07-13 | End: 2024-07-20

## 2024-07-13 RX ORDER — HYDROXYZINE HYDROCHLORIDE 25 MG/1
25 TABLET, FILM COATED ORAL 3 TIMES DAILY PRN
Status: DISCONTINUED | OUTPATIENT
Start: 2024-07-13 | End: 2024-07-20

## 2024-07-13 RX ORDER — ONDANSETRON 2 MG/ML
INJECTION INTRAMUSCULAR; INTRAVENOUS AS NEEDED
Status: DISCONTINUED | OUTPATIENT
Start: 2024-07-13 | End: 2024-07-13 | Stop reason: SURG

## 2024-07-13 RX ORDER — METHOCARBAMOL 500 MG/1
500 TABLET, FILM COATED ORAL 3 TIMES DAILY PRN
Status: DISCONTINUED | OUTPATIENT
Start: 2024-07-13 | End: 2024-07-20

## 2024-07-13 RX ORDER — MORPHINE SULFATE 2 MG/ML
2 INJECTION, SOLUTION INTRAMUSCULAR; INTRAVENOUS EVERY 2 HOUR PRN
Status: DISCONTINUED | OUTPATIENT
Start: 2024-07-13 | End: 2024-07-13 | Stop reason: ALTCHOICE

## 2024-07-13 RX ORDER — LIDOCAINE HCL/EPINEPHRINE/PF 2%-1:200K
20 VIAL (ML) INJECTION ONCE
Status: COMPLETED | OUTPATIENT
Start: 2024-07-13 | End: 2024-07-13

## 2024-07-13 RX ORDER — FUROSEMIDE 20 MG/1
20 TABLET ORAL DAILY
Status: DISCONTINUED | OUTPATIENT
Start: 2024-07-14 | End: 2024-07-14

## 2024-07-13 RX ORDER — LIDOCAINE HYDROCHLORIDE 10 MG/ML
INJECTION, SOLUTION EPIDURAL; INFILTRATION; INTRACAUDAL; PERINEURAL AS NEEDED
Status: DISCONTINUED | OUTPATIENT
Start: 2024-07-13 | End: 2024-07-13 | Stop reason: SURG

## 2024-07-13 RX ORDER — MORPHINE SULFATE 4 MG/ML
4 INJECTION, SOLUTION INTRAMUSCULAR; INTRAVENOUS EVERY 2 HOUR PRN
Status: DISCONTINUED | OUTPATIENT
Start: 2024-07-13 | End: 2024-07-13 | Stop reason: ALTCHOICE

## 2024-07-13 RX ORDER — IPRATROPIUM BROMIDE AND ALBUTEROL SULFATE 2.5; .5 MG/3ML; MG/3ML
3 SOLUTION RESPIRATORY (INHALATION) EVERY 4 HOURS PRN
Status: DISCONTINUED | OUTPATIENT
Start: 2024-07-13 | End: 2024-07-20

## 2024-07-13 RX ORDER — SODIUM CHLORIDE, SODIUM LACTATE, POTASSIUM CHLORIDE, CALCIUM CHLORIDE 600; 310; 30; 20 MG/100ML; MG/100ML; MG/100ML; MG/100ML
INJECTION, SOLUTION INTRAVENOUS CONTINUOUS
Status: DISCONTINUED | OUTPATIENT
Start: 2024-07-13 | End: 2024-07-13 | Stop reason: HOSPADM

## 2024-07-13 RX ORDER — PANTOPRAZOLE SODIUM 40 MG/1
40 TABLET, DELAYED RELEASE ORAL
Status: DISCONTINUED | OUTPATIENT
Start: 2024-07-14 | End: 2024-07-20

## 2024-07-13 RX ORDER — OXYCODONE HYDROCHLORIDE 5 MG/1
10 TABLET ORAL EVERY 4 HOURS PRN
Status: DISCONTINUED | OUTPATIENT
Start: 2024-07-13 | End: 2024-07-13 | Stop reason: HOSPADM

## 2024-07-13 RX ORDER — HYDROMORPHONE HYDROCHLORIDE 1 MG/ML
1 INJECTION, SOLUTION INTRAMUSCULAR; INTRAVENOUS; SUBCUTANEOUS EVERY 5 MIN PRN
Status: DISCONTINUED | OUTPATIENT
Start: 2024-07-13 | End: 2024-07-13 | Stop reason: HOSPADM

## 2024-07-13 RX ORDER — ROCURONIUM BROMIDE 10 MG/ML
INJECTION, SOLUTION INTRAVENOUS AS NEEDED
Status: DISCONTINUED | OUTPATIENT
Start: 2024-07-13 | End: 2024-07-13 | Stop reason: SURG

## 2024-07-13 RX ORDER — MAGNESIUM HYDROXIDE/ALUMINUM HYDROXICE/SIMETHICONE 120; 1200; 1200 MG/30ML; MG/30ML; MG/30ML
30 SUSPENSION ORAL 4 TIMES DAILY PRN
Status: DISCONTINUED | OUTPATIENT
Start: 2024-07-13 | End: 2024-07-20

## 2024-07-13 RX ADMIN — ROCURONIUM BROMIDE 30 MG: 10 INJECTION, SOLUTION INTRAVENOUS at 17:54:00

## 2024-07-13 RX ADMIN — LIDOCAINE HYDROCHLORIDE 40 MG: 10 INJECTION, SOLUTION EPIDURAL; INFILTRATION; INTRACAUDAL; PERINEURAL at 17:50:00

## 2024-07-13 RX ADMIN — LIDOCAINE HYDROCHLORIDE 3 ML: 40 SOLUTION TOPICAL at 17:53:00

## 2024-07-13 RX ADMIN — EPHEDRINE SULFATE 10 MG: 50 INJECTION, SOLUTION INTRAVENOUS at 18:17:00

## 2024-07-13 RX ADMIN — SODIUM CHLORIDE, SODIUM LACTATE, POTASSIUM CHLORIDE, CALCIUM CHLORIDE: 600; 310; 30; 20 INJECTION, SOLUTION INTRAVENOUS at 17:44:00

## 2024-07-13 RX ADMIN — ONDANSETRON 4 MG: 2 INJECTION INTRAMUSCULAR; INTRAVENOUS at 18:23:00

## 2024-07-13 NOTE — ED QUICK NOTES
While patient in CT, started complaining of pain to her right arm and that she did not want to continue with CT until medication was administered. Toradol administered in CT, now back in room and would like to speak with MD prior to wound care and ace wrap to arm.
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
## 2024-07-13 NOTE — SPIRITUAL CARE NOTE
Spiritual Care Visit Note    Patient Name: Anastasiia Hobbs Date of Spiritual Care Visit: 24   : 1965 Primary Dx: Traumatic compartment syndrome of right upper extremity, initial encounter (HCC)       Referred By:      Spiritual Care Taxonomy:    Intended Effects: Demonstrate caring and concern    Methods: Encourage sharing of feelings;Encouraging spiritual/Presybeterian practices;Explore presence of God;Offer emotional support;Offer spiritual/Presybeterian support    Interventions: Acknowledge current situation;Acknowledge response to difficult experience;Active listening;Ask guided questions;Ask guided questions about darrin;Discuss concerns;Discuss plan of care;Connect someone with their darrin community/clergy;[place];Share words of hope and inspiration    Visit Type/Summary:     - Spiritual Care: Responded to a request via the on call phone Offered empathic listening and emotional support. Patient and family expressed appreciation for  visit. Provided support for Patient's spiritual/Presybeterian requests. Coordinated Gnosticist Communion and verified NPO status. Offered prayer.  met with pt and daughter in ED responding to Trauma 2. Provided emotional support to pt daughter at bedside. Stated being taken to surgery. Placed pt on Gnosticist communion list at her her request. Pt will be served on  by visiting [employer] . Gave daughter contact card. Ended visit with prayer.   remains available for follow up.    Spiritual Care support can be requested via an Epic consult. For urgent/immediate needs, please contact the On Call  at: [place]: ext [phone]    Rev. Jana Palencia

## 2024-07-13 NOTE — ED QUICK NOTES
Patient ready for transfer to OR. Daughter escorting patient upstairs along with the RN and OR transport. Patient monitored closing during transport with no complications. Respirations regular and unlabored.

## 2024-07-13 NOTE — ANESTHESIA PROCEDURE NOTES
Airway  Date/Time: 7/13/2024 5:54 PM  Urgency: Elective    Airway not difficult    General Information and Staff    Patient location during procedure: OR  Anesthesiologist: Anoop Carbone MD  Performed: anesthesiologist   Performed by: Anoop Carbone MD  Authorized by: Anoop Carbone MD      Indications and Patient Condition  Indications for airway management: anesthesia  Sedation level: deep  Preoxygenated: yes  Patient position: sniffing  Mask difficulty assessment: 1 - vent by mask    Final Airway Details  Final airway type: endotracheal airway      Successful airway: ETT  Cuffed: yes   Successful intubation technique: direct laryngoscopy  Endotracheal tube insertion site: oral  Blade: Horace  Blade size: #3  ETT size (mm): 7.0    Cormack-Lehane Classification: grade I - full view of glottis  Placement verified by: capnometry   Measured from: teeth  ETT to teeth (cm): 19  Number of attempts at approach: 1
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
## 2024-07-13 NOTE — ANESTHESIA POSTPROCEDURE EVALUATION
Patient: Anastasiia Hobbs    Procedure Summary       Date: 07/13/24 Room / Location: [facility] MAIN OR  / [facility] MAIN OR    Anesthesia Start: 1741 Anesthesia Stop: 1855    Procedure: RIGHT ARM FASCIIOTOMY (Right: Lower Arm) Diagnosis:       Compartment syndrome (HCC)      (Compartment syndrome (HCC) [T79.A0XA])    Surgeons: Anthony Harrison MD Anesthesiologist: Anoop Carbone MD    Anesthesia Type: general ASA Status: 3 - Emergent            Anesthesia Type: general    Vitals Value Taken Time   /73 07/13/24 1853   Temp 98 07/13/24 1855   Pulse 61 07/13/24 1854   Resp 23 07/13/24 1854   SpO2 97 % 07/13/24 1854   Vitals shown include unfiled device data.    [facility] AN Post Evaluation:   Patient Evaluated in PACU  Patient Participation: complete - patient participated  Level of Consciousness: awake and alert  Pain Score: 4  Pain Management: adequate  Airway Patency:patent  Dental exam unchanged from preop  Yes    Nausea/Vomiting: none  Cardiovascular Status: acceptable  Respiratory Status: acceptable  Postoperative Hydration acceptable      Anoop Carbone MD  7/13/2024 6:55 PM
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
## 2024-07-13 NOTE — ED QUICK NOTES
Orders for admission, patient is aware of plan and ready to go upstairs. Any questions, please call ED RN Tricia at extension [phone].     Patient Covid vaccination status: Fully vaccinated     COVID Test Ordered in ED: None    COVID Suspicion at Admission: N/A    Running Infusions:  None    Mental Status/LOC at time of transport: A&O x 4    Other pertinent information:   CIWA score: N/A   NIH score:  N/A

## 2024-07-13 NOTE — ANESTHESIA PREPROCEDURE EVALUATION
Anesthesia PreOp Note    HPI:     Anastasiia Hobbs is a 58 year old female who presents for preoperative consultation requested by: Anthony Harrison MD    Date of Surgery: 7/13/2024    Procedure(s):  RIGHT ARM FASCIIOTOMY  Indication: Compartment syndrome (HCC) [T79.A0XA]    Relevant Problems   No relevant active problems       NPO: 1030 am food                         History Review:  Patient Active Problem List    Diagnosis Date Noted    Compartment syndrome (HCC) 07/13/2024    Traumatic compartment syndrome of right upper extremity, initial encounter (HCC) 07/13/2024    Displaced fracture of distal phalanx of right great toe, initial encounter for closed fracture 07/13/2024    Closed nondisplaced fracture of middle phalanx of right middle finger, initial encounter 07/13/2024    Cellulitis, unspecified cellulitis site 08/27/2023    Chronic obstructive pulmonary disease (HCC) 09/26/2022    Congestion of both ears 09/26/2022    Leukocytosis 02/17/2022    Acute kidney injury (HCC) 02/17/2022    Abdominal pain, epigastric 02/17/2022    Intractable vomiting 02/17/2022    Nausea & vomiting 02/17/2022    Primary osteoarthritis of right knee     Abnormal magnetic resonance cholangiopancreatography (MRCP)     RUQ pain     Bipolar depression (HCC) 08/12/2021    Bipolar affective disorder, currently depressed, moderate (HCC) 08/12/2021    Polysubstance dependence including opioid drug with daily use (HCC) 08/12/2021    Generalized anxiety disorder 08/12/2021    Exertional chest pain 08/11/2021    Hepatitis C virus infection without hepatic coma     Cirrhosis of liver without ascites (HCC)     Urinary frequency     Vaginal bleeding 04/18/2020    Post-menopausal bleeding 04/18/2020    PCB (post coital bleeding) 04/18/2020    Bradycardia by electrocardiogram 03/06/2020    Crack cocaine use 03/06/2020    Hypokalemia 06/06/2019    COPD exacerbation (HCC) 05/25/2019    Methadone use 04/13/2018    Obesity (BMI 30-39.9) 04/10/2018     Tobacco abuse 04/10/2018       Past Medical History:    Anxiety    Bipolar affective disorder (HCC)    Cirrhosis of liver (HCC)    Colitis    ulcerative colitis    COPD (chronic obstructive pulmonary disease) (HCC)    Coronary atherosclerosis    Crack cocaine use    Depression    Essential hypertension    JOHN (generalized anxiety disorder)    Hepatitis    Hep C    Hepatitis C        High blood pressure    History of blood transfusion    no reactions    Methadone use    Migraines    Nausea & vomiting    Osteoarthritis    Pneumonia due to organism    Polysubstance dependence including opioid drug with daily use (HCC)    PTSD (post-traumatic stress disorder)    Sleep apnea    NO CPAP       Past Surgical History:   Procedure Laterality Date    Angioplasty (coronary)  2019    3 total    Appendectomy          Cath percutaneous  transluminal coronary angioplasty      Lap supracerv hysterectomy      supracervical hysterectomy    Tonsillectomy         Medications Prior to Admission   Medication Sig Dispense Refill Last Dose    ALPRAZOLAM 1 MG Oral Tab TAKE 1 TABLET BY MOUTH THREE TIMES DAILY 90 tablet 1     hydrOXYzine 25 MG Oral Tab Take 1 tablet (25 mg total) by mouth 3 (three) times daily as needed for Anxiety or Itching. 30 tablet 0     [] clindamycin 300 MG Oral Cap Take 1 capsule (300 mg total) by mouth 3 (three) times daily for 14 days. 42 capsule 0     [] metRONIDAZOLE (FLAGYL) 500 MG Oral Tab Take 1 tablet (500 mg total) by mouth 2 (two) times daily for 7 days. 14 tablet 0     nystatin 100,000 Units/g External Cream Apply 1 Application topically 2 (two) times daily. For 7-10 days 30 g 0     ibuprofen 800 MG Oral Tab Take 1 tablet (800 mg total) by mouth daily as needed. 30 tablet 0     Doxycycline Hyclate 100 MG Oral Tab Take 1 tablet (100 mg total) by mouth 2 (two) times daily. 14 tablet 0     furosemide 20 MG Oral Tab Take 1 tablet (20 mg total) by mouth daily. 90 tablet 1     ipratropium  (ATROVENT HFA) 17 MCG/ACT Inhalation Aero Soln Inhale 2 puffs into the lungs 4 (four) times daily. 3 each 1     promethazine 6.25 MG/5ML Oral Syrup        albuterol 108 (90 Base) MCG/ACT Inhalation Aero Soln Inhale 2 puffs into the lungs every 4 (four) hours as needed. 8.5 g 5     clobetasol 0.05 % External Ointment APPLY TO THE AFFECTED AREA TWICE DAILY MONDAY-FRIDAY 60 g 1     methocarbamol 500 MG Oral Tab Take 1 tablet (500 mg total) by mouth 4 (four) times daily. 120 tablet 0     Methadone HCl 10 MG/5ML Oral Solution Take 40 mL (80 mg total) by mouth daily.  0     cholecalciferol 25 MCG Oral Tab Take 2 tablets (2,000 Units total) by mouth daily. 60 tablet 0     ipratropium-albuterol 0.5-2.5 (3) MG/3ML Inhalation Solution Take 3 mL by nebulization in the morning and 3 mL at noon and 3 mL in the evening. 120 each 1     Meloxicam 15 MG Oral Tab Take 1 tablet (15 mg total) by mouth daily as needed for Pain. 90 tablet 1     fluticasone propionate 50 MCG/ACT Nasal Suspension 1 spray by Nasal route daily as needed for Rhinitis or Allergies. 16 g 1     cetirizine (ZYRTEC ALLERGY) 10 MG Oral Tab Take 1 tablet (10 mg total) by mouth daily as needed for Allergies or Rhinitis. 30 tablet 1     alum-mag hydroxide-simethicone 200-200-20 MG/5ML Oral Suspension Take 30 mL by mouth 4 (four) times daily as needed for Indigestion.  0     pantoprazole 40 MG Oral Tab EC Take 1 tablet (40 mg total) by mouth every morning before breakfast.  0     FLUoxetine 20 MG Oral Cap Take 3 capsules (60 mg total) by mouth daily. 270 capsule 3     Spacer/Aero-Holding Chambers Does not apply Device Use as needed with inhaler 1 each 0     Respiratory Therapy Supplies (NEBULIZER/TUBING/MOUTHPIECE) Does not apply Kit Use with nebulizer solution as needed 1 each 0     ondansetron 4 MG Oral Tablet Dispersible Take 1 tablet (4 mg total) by mouth every 4 (four) hours as needed for Nausea. 15 tablet 0     acetaminophen 500 MG Oral Tab Take 1 tablet (500 mg  total) by mouth 2 (two) times daily as needed for Pain. (Patient not taking: Reported on 3/5/2024) 30 tablet 5     potassium chloride 10 MEQ Oral Tab CR Take 1 tablet (10 mEq total) by mouth 3 (three) times a week. 15 tablet 0     Naloxone HCl 4 MG/0.1ML Nasal Liquid 4 mg by Nasal route as needed. If patient remains unresponsive, repeat dose in other nostril 2-5 minutes after first dose. 1 kit 0     Respiratory Therapy Supplies (NEBULIZER/TUBING/MOUTHPIECE) Does not apply Kit For use with Albuterol solution as directed 1 each 0      No current [facility]-ordered facility-administered medications on file.     No current [facility]-ordered outpatient medications on file.       No Known Allergies    Family History   Problem Relation Age of Onset    Cancer Father         brain    Hypertension Father     Cancer Mother         lung, metastatic    COPD Mother      Social History     Socioeconomic History    Marital status:    Tobacco Use    Smoking status: Every Day     Current packs/day: 0.50     Average packs/day: 0.5 packs/day for 42.0 years (21.0 ttl pk-yrs)     Types: Cigarettes    Smokeless tobacco: Never   Vaping Use    Vaping status: Every Day    Substances: THC, CBD   Substance and Sexual Activity    Alcohol use: Not Currently    Drug use: Yes     Frequency: 7.0 times per week     Types: Cannabis     Comment: vapes daily    Sexual activity: Not Currently     Partners: Male   Other Topics Concern    Reaction to local anesthetic No    Pt has a pacemaker No    Pt has a defibrillator No    Blood Transfusions Yes       Available pre-op labs reviewed.     Lab Results   Component Value Date     07/13/2024    K 4.0 07/13/2024     07/13/2024    CO2 28.0 07/13/2024    BUN 13 07/13/2024    CREATSERUM 0.67 07/13/2024     (H) 07/13/2024    CA 9.2 07/13/2024     Lab Results   Component Value Date    INR 1.18 07/13/2024       Vital Signs:  Body mass index is 32.92 kg/m².   height is 1.575 m (5' 2\") and weight is  81.6 kg (180 lb). Her temperature is 99.2 °F (37.3 °C). Her blood pressure is 134/76 and her pulse is 48 (abnormal). Her respiration is 16 and oxygen saturation is 94%.   Vitals:    07/13/24 1627 07/13/24 1651 07/13/24 1657 07/13/24 1713   BP: (!) 127/106  134/76 134/76   Pulse: (!) 49  (!) 48 (!) 48   Resp: 13  (!) 9 16   Temp:       TempSrc:       SpO2:  97% 94% 94%   Weight:       Height:            Anesthesia Evaluation     Patient summary reviewed and Nursing notes reviewed    No history of anesthetic complications   Airway   Mallampati: II  TM distance: <3 FB  Neck ROM: full  Dental    (+) upper dentures and lower dentures    Pulmonary - normal exam   (+) COPD moderate, sleep apnea    ROS comment: Everyday 1/2ppd smoker, cannabis use  Cardiovascular - normal exam  (+) hypertension, CAD (angioplasty)    ROS comment: 2021 echo  1. Left ventricle: The cavity size was normal. Wall thickness was      normal. Systolic function was normal. The estimated ejection      fraction was 60%, by biplane method of disks. Wall motion was      normal; there were no regional wall motion abnormalities. Left      ventricular diastolic function parameters were normal.   2. Mitral valve: Moderate regurgitation.   3. Left atrium: The atrium was mildly dilated.   4. Pulmonary arteries: Systolic pressure was mildly increased.       Neuro/Psych    (+)  anxiety/panic attacks,  bipolar disorder, depression      GI/Hepatic/Renal    (+) hepatitis C, liver disease    Endo/Other      Comments: History of MEY, currently on methadone, tooktoday  Abdominal                  Anesthesia Plan:   ASA:  3  Emergent    Plan:   General  Airway:  ETT  Post-op Pain Management: Oral pain medication and IV analgesics  Informed Consent Plan and Risks Discussed With:  Patient      I have informed Anastasiia Hobbs and/or legal guardian or family member of the nature of the anesthetic plan, benefits, risks including possible dental damage if relevant, major  complications, and any alternative forms of anesthetic management.   All of the patient's questions were answered to the best of my ability. The patient desires the anesthetic management as planned.  Anoop Carbone MD  7/13/2024 5:20 PM  Present on Admission:  **None**

## 2024-07-14 LAB
ANION GAP SERPL CALC-SCNC: 5 MMOL/L (ref 0–18)
BASOPHILS # BLD AUTO: 0 X10(3) UL (ref 0–0.2)
BASOPHILS NFR BLD AUTO: 0 %
BUN BLD-MCNC: 15 MG/DL (ref 9–23)
BUN/CREAT SERPL: 19.5 (ref 10–20)
CALCIUM BLD-MCNC: 8.5 MG/DL (ref 8.7–10.4)
CHLORIDE SERPL-SCNC: 109 MMOL/L (ref 98–112)
CO2 SERPL-SCNC: 29 MMOL/L (ref 21–32)
CREAT BLD-MCNC: 0.77 MG/DL
DEPRECATED RDW RBC AUTO: 41.9 FL (ref 35.1–46.3)
EGFRCR SERPLBLD CKD-EPI 2021: 89 ML/MIN/1.73M2 (ref 60–?)
EOSINOPHIL # BLD AUTO: 0.06 X10(3) UL (ref 0–0.7)
EOSINOPHIL NFR BLD AUTO: 1.3 %
ERYTHROCYTE [DISTWIDTH] IN BLOOD BY AUTOMATED COUNT: 12.6 % (ref 11–15)
GLUCOSE BLD-MCNC: 109 MG/DL (ref 70–99)
HCT VFR BLD AUTO: 31.6 %
HGB BLD-MCNC: 10.6 G/DL
IMM GRANULOCYTES # BLD AUTO: 0.02 X10(3) UL (ref 0–1)
IMM GRANULOCYTES NFR BLD: 0.4 %
LYMPHOCYTES # BLD AUTO: 1.47 X10(3) UL (ref 1–4)
LYMPHOCYTES NFR BLD AUTO: 32.1 %
MCH RBC QN AUTO: 31 PG (ref 26–34)
MCHC RBC AUTO-ENTMCNC: 33.5 G/DL (ref 31–37)
MCV RBC AUTO: 92.4 FL
MONOCYTES # BLD AUTO: 0.3 X10(3) UL (ref 0.1–1)
MONOCYTES NFR BLD AUTO: 6.6 %
NEUTROPHILS # BLD AUTO: 2.73 X10 (3) UL (ref 1.5–7.7)
NEUTROPHILS # BLD AUTO: 2.73 X10(3) UL (ref 1.5–7.7)
NEUTROPHILS NFR BLD AUTO: 59.6 %
OSMOLALITY SERPL CALC.SUM OF ELEC: 297 MOSM/KG (ref 275–295)
PLATELET # BLD AUTO: 113 10(3)UL (ref 150–450)
POTASSIUM SERPL-SCNC: 3.4 MMOL/L (ref 3.5–5.1)
POTASSIUM SERPL-SCNC: 4.3 MMOL/L (ref 3.5–5.1)
RBC # BLD AUTO: 3.42 X10(6)UL
SODIUM SERPL-SCNC: 143 MMOL/L (ref 136–145)
WBC # BLD AUTO: 4.6 X10(3) UL (ref 4–11)

## 2024-07-14 PROCEDURE — 99233 SBSQ HOSP IP/OBS HIGH 50: CPT | Performed by: HOSPITALIST

## 2024-07-14 RX ORDER — METHADONE HYDROCHLORIDE 10 MG/5ML
80 SOLUTION ORAL ONCE
Status: COMPLETED | OUTPATIENT
Start: 2024-07-14 | End: 2024-07-14

## 2024-07-14 RX ORDER — CEPHALEXIN 500 MG/1
500 CAPSULE ORAL EVERY 8 HOURS SCHEDULED
Status: DISCONTINUED | OUTPATIENT
Start: 2024-07-15 | End: 2024-07-20

## 2024-07-14 RX ORDER — POTASSIUM CHLORIDE 20 MEQ/1
40 TABLET, EXTENDED RELEASE ORAL EVERY 4 HOURS
Status: COMPLETED | OUTPATIENT
Start: 2024-07-14 | End: 2024-07-14

## 2024-07-14 NOTE — PLAN OF CARE
Post-op day 1. Alert and oriented x4 on room air. Dressing in place to RUE w/ splint in place to finger and post-op shoe to RLE. Denies numbness/tingling. PRN oxy given for pain. SCDs for DVT prophylaxis. Up standby assist. Voiding freely in bathroom. Call light within reach.     Problem: Patient Centered Care  Goal: Patient preferences are identified and integrated in the patient's plan of care  Description: Interventions:  - What would you like us to know as we care for you?   - Provide timely, complete, and accurate information to patient/family  - Incorporate patient and family knowledge, values, beliefs, and cultural backgrounds into the planning and delivery of care  - Encourage patient/family to participate in care and decision-making at the level they choose  - Honor patient and family perspectives and choices  Outcome: Progressing     Problem: Patient/Family Goals  Goal: Patient/Family Long Term Goal  Description: Patient's Long Term Goal:     Interventions:  -   - See additional Care Plan goals for specific interventions  Outcome: Progressing  Goal: Patient/Family Short Term Goal  Description: Patient's Short Term Goal:     Interventions:   -   - See additional Care Plan goals for specific interventions  Outcome: Progressing     Problem: PAIN - ADULT  Goal: Verbalizes/displays adequate comfort level or patient's stated pain goal  Description: INTERVENTIONS:  - Encourage pt to monitor pain and request assistance  - Assess pain using appropriate pain scale  - Administer analgesics based on type and severity of pain and evaluate response  - Implement non-pharmacological measures as appropriate and evaluate response  - Consider cultural and social influences on pain and pain management  - Manage/alleviate anxiety  - Utilize distraction and/or relaxation techniques  - Monitor for opioid side effects  - Notify MD/LIP if interventions unsuccessful or patient reports new pain  - Anticipate increased pain with  activity and pre-medicate as appropriate  Outcome: Progressing

## 2024-07-14 NOTE — PHYSICAL THERAPY NOTE
PT orders and pt chart reviewed. Per EMR and RN pt medically appropriate for PT evaluation. Upon PT arrival pt asleep, difficult to arouse and very lethargic/trouble keeping eyes open. Pt unable to hold conversation, kept dozing off. Pt declining to mobilize OOB stating she had already gotten up with nursing staff. Encouraged mobilization and provided education on importance of OOB activity however pt continued to decline. Will plan to attempt PT evaluation tomorrow as appropriate.   Per EMR - pt is WBAT to R upper and lower extremities.     Mildred Perdomo, PT  7/14/2024

## 2024-07-14 NOTE — PROGRESS NOTES
ORTHO    Patient complains of pain but is doing well overall.    Vitals:    07/14/24 0719   BP: (!) 84/57   Pulse: 60   Resp: 16   Temp: 98.6 °F (37 °C)     RUE NVID  Dressing intact  Vac holding suction but cannister is beeping  RLE NVID  Great toe hurts but shoe is on and things are stable      Lab Results   Component Value Date    WBC 4.6 07/14/2024    HGB 10.6 07/14/2024    HCT 31.6 07/14/2024    .0 07/14/2024    CREATSERUM 0.77 07/14/2024    BUN 15 07/14/2024     07/14/2024    K 3.4 07/14/2024     07/14/2024    CO2 29.0 07/14/2024     07/14/2024    CA 8.5 07/14/2024    PTT 33.7 07/13/2024    INR 1.18 07/13/2024     S/P: R forearm fasciotomy    1.WBAT RLE  2. Ace wrap removed, ice and elevate RUE  3. Pain meds  4. Oral abx for one week  5. Likely DC tomorrow    Anthony Harrison MD
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
## 2024-07-14 NOTE — PLAN OF CARE
Post op day 1. Low BP, bolus given. Up with standby assistance, weight bearing as tolerated. Tolerating diet and voiding freely. Plan to discharge home tomorrow.     Problem: Patient Centered Care  Goal: Patient preferences are identified and integrated in the patient's plan of care  Description: Interventions:  - What would you like us to know as we care for you?   - Provide timely, complete, and accurate information to patient/family  - Incorporate patient and family knowledge, values, beliefs, and cultural backgrounds into the planning and delivery of care  - Encourage patient/family to participate in care and decision-making at the level they choose  - Honor patient and family perspectives and choices  Outcome: Progressing      Problem: PAIN - ADULT  Goal: Verbalizes/displays adequate comfort level or patient's stated pain goal  Description: INTERVENTIONS:  - Encourage pt to monitor pain and request assistance  - Assess pain using appropriate pain scale  - Administer analgesics based on type and severity of pain and evaluate response  - Implement non-pharmacological measures as appropriate and evaluate response  - Consider cultural and social influences on pain and pain management  - Manage/alleviate anxiety  - Utilize distraction and/or relaxation techniques  - Monitor for opioid side effects  - Notify MD/LIP if interventions unsuccessful or patient reports new pain  - Anticipate increased pain with activity and pre-medicate as appropriate  Outcome: Progressing

## 2024-07-15 LAB
ATRIAL RATE: 61 BPM
P AXIS: 49 DEGREES
P-R INTERVAL: 148 MS
Q-T INTERVAL: 472 MS
QRS DURATION: 92 MS
QTC CALCULATION (BEZET): 475 MS
R AXIS: 43 DEGREES
T AXIS: 38 DEGREES
VENTRICULAR RATE: 61 BPM

## 2024-07-15 PROCEDURE — 99233 SBSQ HOSP IP/OBS HIGH 50: CPT | Performed by: HOSPITALIST

## 2024-07-15 RX ORDER — METHADONE HYDROCHLORIDE 10 MG/5ML
130 SOLUTION ORAL DAILY
Status: SHIPPED | COMMUNITY
Start: 2024-07-16

## 2024-07-15 NOTE — PHYSICAL THERAPY NOTE
PHYSICAL THERAPY EVALUATION - INPATIENT     Room Number: 407/407-A  Evaluation Date: 7/15/2024  Type of Evaluation: Initial   Physician Order: PT Eval and Treat    Presenting Problem: traumatic compartment syndrome of right upper extremity, s/p R open fasciotomy on 7/13  Co-Morbidities : Hx hypotension, COPD, opiate dependency  Reason for Therapy: Mobility Dysfunction and Discharge Planning    PHYSICAL THERAPY ASSESSMENT   Patient is a 58 year old female admitted 7/13/2024 for traumatic compartment syndrome of right upper extremity, s/p R open fasciotomy.  Prior to admission, patient's baseline is independent with ADLs and functional mobility without assistive device.  Patient is currently functioning near baseline with bed mobility, transfers, gait, stair negotiation, standing prolonged periods, and performing household tasks.  Patient is requiring supervision and stand-by assist as a result of the following impairments: pain, impaired dynamic standing balance, and medical status.  Physical Therapy will continue to follow for duration of hospitalization.    Patient will benefit from continued skilled PT Services for duration of hospitalization, however, given the patient is functioning near baseline level do not anticipate skilled therapy needs at discharge .    PLAN  PT Treatment Plan: Bed mobility;Body mechanics;Endurance;Energy conservation;Patient education;Gait training;Strengthening;Stair training;Balance training;Transfer training  Rehab Potential : Good  Frequency (Obs): 5x/week    PHYSICAL THERAPY MEDICAL/SOCIAL HISTORY     Problem List  Principal Problem:    Traumatic compartment syndrome of right upper extremity, initial encounter (HCC)  Active Problems:    Compartment syndrome (HCC)    Displaced fracture of distal phalanx of right great toe, initial encounter for closed fracture    Closed nondisplaced fracture of middle phalanx of right middle finger, initial encounter      HOME SITUATION  Home  Situation  Type of Home: House  Home Layout: One level  Stairs to Enter : 4  Railing: No  Lives With: Alone  Drives: No  Patient Owned Equipment: None  Patient Regularly Uses: None     Prior Level of [place]: independent    SUBJECTIVE  Agreeable to activity.     PHYSICAL THERAPY EXAMINATION   OBJECTIVE  Precautions: Bed/chair alarm  Fall Risk: Standard fall risk    WEIGHT BEARING RESTRICTION  Weight Bearing Restriction: R lower extremity (w/ post-op shoe)  R Lower Extremity: Weight Bearing as Tolerated    PAIN ASSESSMENT  Rating: Unable to rate  Location: R foot  Management Techniques: Activity promotion;Breathing techniques;Body mechanics;Repositioning    COGNITION  Overall Cognitive Status:  WFL - within functional limits    RANGE OF MOTION AND STRENGTH ASSESSMENT  Upper extremity ROM and strength are within functional limits.  Lower extremity ROM is within functional limits.  Lower extremity strength is within functional limits.    BALANCE  Static Sitting: Good  Dynamic Sitting: Fair +  Static Standing: Fair  Dynamic Standing: Fair -    AM-PAC '6-Clicks' INPATIENT SHORT FORM - BASIC MOBILITY  How much difficulty does the patient currently have...  Patient Difficulty: Turning over in bed (including adjusting bedclothes, sheets and blankets)?: A Little   Patient Difficulty: Sitting down on and standing up from a chair with arms (e.g., wheelchair, bedside commode, etc.): A Little   Patient Difficulty: Moving from lying on back to sitting on the side of the bed?: A Little   How much help from another person does the patient currently need...   Help from Another: Moving to and from a bed to a chair (including a wheelchair)?: A Little   Help from Another: Need to walk in hospital room?: A Little   Help from Another: Climbing 3-5 steps with a railing?: A Little     AM-PAC Score:  Raw Score: 18   Approx Degree of Impairment: 46.58%   Standardized Score (AM-PAC Scale): 43.63   CMS Modifier (G-Code): CK    FUNCTIONAL  ABILITY STATUS  Functional Mobility/Gait Assessment  Gait Assistance: Other (Comment) (SBA)  Distance (ft): 15  Assistive Device: Rolling walker  Pattern: Shuffle;R Decreased stance time (slow, step-to pattern, no LOB)  Supine to Sit: supervision  Sit to Stand: stand-by assist    Exercise/Education Provided:  Bed mobility  Body mechanics  Energy conservation  Functional activity tolerated  Gait training  Posture  Transfer training  Use of RW  WBAT  Wearing RLE post op shoe    Skilled Therapy Provided: Patient received resting in bed with R post op shoe donned, and agreeable to activity. Introduced self and role, educated on goals for today. Pt c/o significant pain. Demos bed mobility with supervision. STS transfer to/from EOB and chair with RW and SBA. Ambulated around the bed to the chair with RW and SBA. Slow, antalgic gait on RLE. No LOB. Impulsive and removed hands from RW before fully turning to face chair; able to take steps to chair without assistive device and SBA, no LOB. Pt is self directed this date. Recommended use of RW at al times. Pt was left sitting in chair with alarm on, needs within reach, handoff to RN complete. Anticipate 1 additional session for stair training.     The patient's Approx Degree of Impairment: 46.58% has been calculated based on documentation in the [facility] '6 clicks' Inpatient Basic Mobility Short Form.  Research supports that patients with this level of impairment may benefit from home with  PT.  Final disposition will be made by interdisciplinary medical team.    Patient End of Session: Up in chair;Needs met;Call light within reach;RN aware of session/findings;All patient questions and concerns addressed;Alarm set    CURRENT GOALS  Goals to be met by: 7/22/24  Patient Goal Patient's self-stated goal is: go home   Goal #1 Patient is able to demonstrate supine - sit EOB @ level: modified independent     Goal #1   Current Status    Goal #2 Patient is able to demonstrate transfers  Sit to/from Stand at assistance level: modified independent with walker - rolling     Goal #2  Current Status    Goal #3 Patient is able to ambulate 50 feet with assist device: walker - rolling at assistance level: modified independent   Goal #3   Current Status    Goal #4 Patient will negotiate 4 stairs/one curb w/ assistive device and supervision   Goal #4   Current Status    Goal #5 Patient to demonstrate independence with home activity/exercise instructions provided to patient in preparation for discharge.   Goal #5   Current Status    Goal #6    Goal #6  Current Status      Patient Evaluation Complexity Level:  History Low - no personal factors and/or co-morbidities   Examination of body systems Low -  addressing 1-2 elements   Clinical Presentation Low- Stable   Clinical Decision Making  Low Complexity     Therapeutic Activity:  10 minutes
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
## 2024-07-15 NOTE — PLAN OF CARE
Patient alert and orientated x4. Post-op day #2. Dressing in place to right arm- prevena in place. VSS. RA. Tolerating diet. Voiding freely. Pt had a small BM. SCDs on for DVT prophylaxis. PRN oxycodone given for Pain. PRN xanax given for anxiety. Encouraged frequent ambulation and use of incentive spirometer. Fall precautions maintained, bed locked in lowest position, call light and personal belongings within reach, non-skid socks in place to bilateral feet. Frequent rounding by nursing staff. Plan to discharge home tomorrow, will need walker upon discharge.                Problem: Patient Centered Care  Goal: Patient preferences are identified and integrated in the patient's plan of care  Description: Interventions:  - What would you like us to know as we care for you?   - Provide timely, complete, and accurate information to patient/family  - Incorporate patient and family knowledge, values, beliefs, and cultural backgrounds into the planning and delivery of care  - Encourage patient/family to participate in care and decision-making at the level they choose  - Honor patient and family perspectives and choices  Outcome: Progressing     Problem: Patient/Family Goals  Goal: Patient/Family Long Term Goal  Description: Patient's Long Term Goal:     Interventions:    - See additional Care Plan goals for specific interventions  Outcome: Progressing  Goal: Patient/Family Short Term Goal  Description: Patient's Short Term Goal:     Interventions:   - See additional Care Plan goals for specific interventions  Outcome: Progressing     Problem: PAIN - ADULT  Goal: Verbalizes/displays adequate comfort level or patient's stated pain goal  Description: INTERVENTIONS:  - Encourage pt to monitor pain and request assistance  - Assess pain using appropriate pain scale  - Administer analgesics based on type and severity of pain and evaluate response  - Implement non-pharmacological measures as appropriate and evaluate response  -  Consider cultural and social influences on pain and pain management  - Manage/alleviate anxiety  - Utilize distraction and/or relaxation techniques  - Monitor for opioid side effects  - Notify MD/LIP if interventions unsuccessful or patient reports new pain  - Anticipate increased pain with activity and pre-medicate as appropriate  Outcome: Progressing      Infant vitals stable. eye rolling not noted on assesment. Infant examined by MD in NBN.

## 2024-07-15 NOTE — PROGRESS NOTES
ORTHO    POD#2 s/p R forearm fasciotomy. Patient complains of pain but is doing well overall. Reports finger splint keeps falling off.     Vitals:    07/15/24 0734   BP: 125/65   Pulse:    Resp: 17   Temp:      Resting comfortable  RUE NVID   Dressing intact  Vac holding suction and dressing intact  RLE NVID  Great toe hurts but shoe is on and things are stable. Ecchymosis and swelling noted.      Lab Results   Component Value Date    K 4.3 07/14/2024     S/P: R forearm fasciotomy    1.WBAT RLE  2. Ace wrap removed, ice and elevate RUE  3. Pain meds  4. Oral abx for one week  5. Possible DC today or tomorrow.       CHANTAL PEREZ PA-C  [facility]  ([phone])  Epic Chat or Perfect Serve

## 2024-07-15 NOTE — PHYSICAL THERAPY NOTE
PT orders received, chart reviewed. Patient was seen independently ambulating out of the bathroom to the bed. Introduced self and role. Patient adamantly refused to work with therapy despite encouragement and education provided. Insisted she is not going home until tomorrow and therefore does not need therapy until tomorrow. Requesting pain medication due to c/o severe pain. RN was made aware of the above.
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
## 2024-07-16 LAB
BASOPHILS # BLD AUTO: 0.01 X10(3) UL (ref 0–0.2)
BASOPHILS NFR BLD AUTO: 0.2 %
DEPRECATED RDW RBC AUTO: 41.9 FL (ref 35.1–46.3)
EOSINOPHIL # BLD AUTO: 0.09 X10(3) UL (ref 0–0.7)
EOSINOPHIL NFR BLD AUTO: 2 %
ERYTHROCYTE [DISTWIDTH] IN BLOOD BY AUTOMATED COUNT: 12.6 % (ref 11–15)
HCT VFR BLD AUTO: 34.4 %
HGB BLD-MCNC: 11.5 G/DL
IMM GRANULOCYTES # BLD AUTO: 0.01 X10(3) UL (ref 0–1)
IMM GRANULOCYTES NFR BLD: 0.2 %
LYMPHOCYTES # BLD AUTO: 1.76 X10(3) UL (ref 1–4)
LYMPHOCYTES NFR BLD AUTO: 39.6 %
MCH RBC QN AUTO: 30.7 PG (ref 26–34)
MCHC RBC AUTO-ENTMCNC: 33.4 G/DL (ref 31–37)
MCV RBC AUTO: 91.7 FL
MONOCYTES # BLD AUTO: 0.26 X10(3) UL (ref 0.1–1)
MONOCYTES NFR BLD AUTO: 5.8 %
NEUTROPHILS # BLD AUTO: 2.32 X10 (3) UL (ref 1.5–7.7)
NEUTROPHILS # BLD AUTO: 2.32 X10(3) UL (ref 1.5–7.7)
NEUTROPHILS NFR BLD AUTO: 52.2 %
PLATELET # BLD AUTO: 124 10(3)UL (ref 150–450)
RBC # BLD AUTO: 3.75 X10(6)UL
WBC # BLD AUTO: 4.5 X10(3) UL (ref 4–11)

## 2024-07-16 PROCEDURE — 99233 SBSQ HOSP IP/OBS HIGH 50: CPT | Performed by: HOSPITALIST

## 2024-07-16 RX ORDER — OXYCODONE HYDROCHLORIDE 10 MG/1
10 TABLET ORAL EVERY 4 HOURS PRN
Qty: 30 TABLET | Refills: 0 | Status: SHIPPED | OUTPATIENT
Start: 2024-07-16

## 2024-07-16 RX ORDER — CEPHALEXIN 500 MG/1
500 CAPSULE ORAL 3 TIMES DAILY
Qty: 30 CAPSULE | Refills: 0 | Status: SHIPPED | OUTPATIENT
Start: 2024-07-16

## 2024-07-16 RX ORDER — FUROSEMIDE 20 MG/1
20 TABLET ORAL DAILY
Status: DISCONTINUED | OUTPATIENT
Start: 2024-07-16 | End: 2024-07-20
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
## 2024-07-16 NOTE — PROGRESS NOTES
Orthopedics    Patient is resting comfortably in bed this morning.    Patient's vital signs are stable.    Physical exam is unchanged.  She is intact motor and sensory function to her right upper extremity and her compartments are soft and compressible.  The remainder of her exam to her right lower extremity is unchanged as well with pain in her great toe.    The plan is the patient is to mobilize as tolerated.  The wound VAC will come off 7 days after the surgery.  She will be on oral pain medication and follow-up in our office for a wound check and suture removal in the next 7 to 10 days.  She does understand the plan.  And the importance of follow-up.    Anthony Harrison MD
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
## 2024-07-16 NOTE — PHYSICAL THERAPY NOTE
PHYSICAL THERAPY TREATMENT NOTE - INPATIENT     Room Number: 407/407-A       Presenting Problem: traumatic compartment syndrome of right upper extremity, s/p R open fasciotomy on   Co-Morbidities : Hx hypotension, COPD, opiate dependency    Problem List  Principal Problem:    Traumatic compartment syndrome of right upper extremity, initial encounter ([facility])  Active Problems:    Compartment syndrome (HCC)    Displaced fracture of distal phalanx of right great toe, initial encounter for closed fracture    Closed nondisplaced fracture of middle phalanx of right middle finger, initial encounter      PHYSICAL THERAPY ASSESSMENT   Patient demonstrates good  progress this session, goals  remain in progress.      Patient is requiring moderate assist as a result of the following impairments: decreased functional strength, decreased endurance/aerobic capacity, and pain.     Patient continues to function below baseline with bed mobility, transfers, and gait.  Contributing factors to remaining limitations include decreased functional strength, decreased endurance/aerobic capacity, and pain.  Next session anticipate patient to progress bed mobility, transfers, and gait.  Physical Therapy will continue to follow patient for duration of hospitalization.    Patient continues to benefit from continued skilled PT services: to promote return to prior level of function and safety with continuous assistance and gradual rehabilitative therapy .    PLAN  PT Treatment Plan: Bed mobility;Body mechanics;Endurance;Gait training  Frequency (Obs): 5x/week    SUBJECTIVE  Pt  reports being ready for PT RX    OBJECTIVE  Precautions: Bed/chair alarm    WEIGHT BEARING RESTRICTION        R Lower Extremity: Weight Bearing as Tolerated       PAIN ASSESSMENT   Ratin  Location: R foot  Management Techniques: Activity promotion;Breathing techniques;Body mechanics;Repositioning    BALANCE  Static Sitting: Good  Dynamic Sitting: Fair +  Static  Standing: Fair  Dynamic Standing: Fair -    ACTIVITY TOLERANCE                          O2 WALK       AM-PAC '6-Clicks' INPATIENT SHORT FORM - BASIC MOBILITY  How much difficulty does the patient currently have...  Patient Difficulty: Turning over in bed (including adjusting bedclothes, sheets and blankets)?: A Little   Patient Difficulty: Sitting down on and standing up from a chair with arms (e.g., wheelchair, bedside commode, etc.): A Little   Patient Difficulty: Moving from lying on back to sitting on the side of the bed?: A Little   How much help from another person does the patient currently need...   Help from Another: Moving to and from a bed to a chair (including a wheelchair)?: A Little   Help from Another: Need to walk in hospital room?: A Little   Help from Another: Climbing 3-5 steps with a railing?: A Little     AM-PAC Score:  Raw Score: 18   Approx Degree of Impairment: 46.58%   Standardized Score (AM-PAC Scale): 43.63   CMS Modifier (G-Code): CK    FUNCTIONAL ABILITY STATUS  Functional Mobility/Gait Assessment  Gait Assistance: Moderate assistance  Distance (ft): 2 x 20  Assistive Device: Rolling walker  Pattern: R Decreased stance time;Shuffle  Rolling: moderate assist  Supine to Sit: moderate assist  Sit to Supine: moderate assist  Sit to Stand: moderate assist    Skilled Therapy Provided: Pt seen daily.Mod a for bed mobility and transfer.Extra time provided to complete task.EOB sitting balance activity with emphasis on core stabilization.Pt amb 2 x 20 ft with RW and mod a.Provided cuing for gait pattern as well as for postural awareness.There ex.Assisted pt to the bathroom.    The patient's Approx Degree of Impairment: 46.58% has been calculated based on documentation in the [facility] '6 clicks' Inpatient Daily Activity Short Form.  Research supports that patients with this level of impairment may benefit from Sub-acute Rehabilitation.  Final disposition will be made by interdisciplinary medical  team.    THERAPEUTIC EXERCISES  Lower Extremity Ankle pumps  Glut sets  Quad sets     Position Supine       Patient End of Session: Up in chair;Call light within reach;RN aware of session/findings;All patient questions and concerns addressed    CURRENT GOALS     Patient Goal Patient's self-stated goal is: go home   Goal #1 Patient is able to demonstrate supine - sit EOB @ level: modified independent     Goal #1   Current Status Mod a   Goal #2 Patient is able to demonstrate transfers Sit to/from Stand at assistance level: modified independent with walker - rolling     Goal #2  Current Status Mod a   Goal #3 Patient is able to ambulate 50 feet with assist device: walker - rolling at assistance level: modified independent   Goal #3   Current Status Pt amb  2 x 20 ft with RW and mod a   Goal #4 Patient will negotiate 4 stairs/one curb w/ assistive device and supervision   Goal #4   Current Status NT   Goal #5 Patient to demonstrate independence with home activity/exercise instructions provided to patient in preparation for discharge.   Goal #5   Current Status In progress   Goal #6    Goal #6  Current Status      Gait Training:  minutes  Therapeutic Activity: 15 minutes  Neuromuscular Re-education:  minutes  Therapeutic Exercise: 15 minutes  Canalith Repositioning:  minutes  Manual Therapy:  minutes  Can add/delete any of these

## 2024-07-17 PROCEDURE — 99233 SBSQ HOSP IP/OBS HIGH 50: CPT | Performed by: HOSPITALIST

## 2024-07-17 NOTE — PHYSICAL THERAPY NOTE
PHYSICAL THERAPY TREATMENT NOTE - INPATIENT     Room Number: 407/407-A       Presenting Problem: traumatic compartment syndrome of right upper extremity, s/p R open fasciotomy on 7/13  Co-Morbidities : Hx hypotension, COPD, opiate dependency    Problem List  Principal Problem:    Traumatic compartment syndrome of right upper extremity, initial encounter ([facility])  Active Problems:    Compartment syndrome (HCC)    Displaced fracture of distal phalanx of right great toe, initial encounter for closed fracture    Closed nondisplaced fracture of middle phalanx of right middle finger, initial encounter      PHYSICAL THERAPY ASSESSMENT   Patient demonstrates limited progress this session, goals  remain in progress.      Patient is requiring contact guard assist as a result of the following impairments: decreased functional strength, decreased endurance/aerobic capacity, pain, impaired standing balance, decreased muscular endurance, and medical status.     Patient continues to function below baseline with bed mobility, transfers, gait, stair negotiation, and standing prolonged periods. Next session anticipate patient to progress bed mobility, transfers, gait, stair negotiation, and standing prolonged periods.  Physical Therapy will continue to follow patient for duration of hospitalization.    Patient continues to benefit from continued skilled PT services: to promote return to prior level of function and safety with continuous assistance and gradual rehabilitative therapy .    PLAN  PT Treatment Plan: Bed mobility;Body mechanics;Endurance;Energy conservation;Patient education;Gait training;Strengthening;Balance training  Frequency (Obs): 5x/week    SUBJECTIVE  \"I have 4 raccoons\"    OBJECTIVE  Precautions: Bed/chair alarm;Drain(s);Limb alert - right (hemovac drain RUE)    WEIGHT BEARING RESTRICTION  R Lower Extremity: Weight Bearing as Tolerated     PAIN ASSESSMENT   Rating: Unable to rate  Location: R hand and  foot  Management Techniques: Repositioning;Breathing techniques;Body mechanics    BALANCE  Static Sitting: Good  Dynamic Sitting: Fair +  Static Standing: Fair  Dynamic Standing: Poor +    ACTIVITY TOLERANCE  Pulse: 64  Heart Rate Source: Monitor     BP: (!) 137/120 (after activity)  BP Location: Left arm  BP Method: Automatic  Patient Position: Sitting     O2 WALK  Oxygen Therapy  SPO2% Ambulation on Room Air: 99    AM-PAC '6-Clicks' INPATIENT SHORT FORM - BASIC MOBILITY  How much difficulty does the patient currently have...  Patient Difficulty: Turning over in bed (including adjusting bedclothes, sheets and blankets)?: A Little   Patient Difficulty: Sitting down on and standing up from a chair with arms (e.g., wheelchair, bedside commode, etc.): A Little   Patient Difficulty: Moving from lying on back to sitting on the side of the bed?: A Little   How much help from another person does the patient currently need...   Help from Another: Moving to and from a bed to a chair (including a wheelchair)?: A Little   Help from Another: Need to walk in hospital room?: A Little   Help from Another: Climbing 3-5 steps with a railing?: A Lot     AM-PAC Score:  Raw Score: 17   Approx Degree of Impairment: 50.57%   Standardized Score (AM-PAC Scale): 42.13   CMS Modifier (G-Code): CK    FUNCTIONAL ABILITY STATUS  Functional Mobility/Gait Assessment  Gait Assistance:  (mod A progressing to min A)  Distance (ft): 15ft x2  Assistive Device: Rolling walker  Pattern: Shuffle;R Foot flat (decreased bev/spped, short step length bilaterally, forward posture). R post op shoe donned. Pt cued on upright posture for postural management and better breathing mechanics.   Sit to Stand: contact guard assist. Cued on proper sequencing with RW for pt safety.     Pt received getting out of the bathroom. RN approved activity. Pt agreeable to participation. Pt distracted easily with cues to redirect. Decreased insight into deficits. Pt appeared  anxious throughout session. Discussed importance of using RW when going to the bathroom for pt's safety.     The patient's Approx Degree of Impairment: 50.57% has been calculated based on documentation in the [facility] '6 clicks' Inpatient Daily Activity Short Form.  Research supports that patients with this level of impairment may benefit from rehab.  Final disposition will be made by interdisciplinary medical team.    Patient End of Session: Up in chair;Needs met;Call light within reach;RN aware of session/findings;All patient questions and concerns addressed;Alarm set;Ice applied    CURRENT GOALS   Goals to be met by: 24  Patient Goal Patient's self-stated goal is: go home   Goal #1 Patient is able to demonstrate supine - sit EOB @ level: modified independent      Goal #1   Current Status  In progress   Goal #2 Patient is able to demonstrate transfers Sit to/from Stand at assistance level: modified independent with walker - rolling      Goal #2  Current Status  In progress, CGA   Goal #3 Patient is able to ambulate 50 feet with assist device: walker - rolling at assistance level: modified independent   Goal #3   Current Status  In progress, Mod/Min 15ft x2 with RW   Goal #4 Patient will negotiate 4 stairs/one curb w/ assistive device and supervision   Goal #4   Current Status  In progress   Goal #5 Patient to demonstrate independence with home activity/exercise instructions provided to patient in preparation for discharge.   Goal #5   Current Status  In progress     Gait Trainin minutes  Therapeutic Activity: 16 minutes    Enmanuel [facility]  DPT Student     I provided clinical instruction and supervision for the duration of this session and agree with the above documentation.     Zenia Mirza, PT, DPT
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
## 2024-07-17 NOTE — CONGREGATE LIVING REVIEW
Counts include 234 beds at the [facility] Living Authorization    The [facility] Review Committee has reviewed this case and the patient IS APPROVED for discharge to a facility for Short Term Skilled once the following procedure is followed:     - The physician discharge instructions (contained within the SHADY note for SNF) must inlcude the below appropriate and approved COVID instructions to the facility    For questions regarding CLRC approval process, please contact the CM assigned to the case.  For questions regarding RN discharge workflow, please contact the unit Clinical Leader.
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
## 2024-07-18 LAB
DEPRECATED RDW RBC AUTO: 41.2 FL (ref 35.1–46.3)
ERYTHROCYTE [DISTWIDTH] IN BLOOD BY AUTOMATED COUNT: 12.5 % (ref 11–15)
HCT VFR BLD AUTO: 30.9 %
HGB BLD-MCNC: 10.5 G/DL
MCH RBC QN AUTO: 31 PG (ref 26–34)
MCHC RBC AUTO-ENTMCNC: 34 G/DL (ref 31–37)
MCV RBC AUTO: 91.2 FL
PLATELET # BLD AUTO: 121 10(3)UL (ref 150–450)
RBC # BLD AUTO: 3.39 X10(6)UL
WBC # BLD AUTO: 3.7 X10(3) UL (ref 4–11)

## 2024-07-18 PROCEDURE — 99233 SBSQ HOSP IP/OBS HIGH 50: CPT | Performed by: HOSPITALIST
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
## 2024-07-18 NOTE — PLAN OF CARE
Anastasiia is alert and oriented x 4 . Pod 4 right arm fasciotomy. Prevena wound vac in place-patent, splint to right middle finger, post op shoe rle-ambulates self, bhupinder diet, voiding, bm today, oxy prn pain, po keflex, ra-hx magdiel-declines cpap, remote tele, jayce placement pending selection  Problem: Patient Centered Care  Goal: Patient preferences are identified and integrated in the patient's plan of care  Description: Interventions:  - What would you like us to know as we care for you?   - Provide timely, complete, and accurate information to patient/family  - Incorporate patient and family knowledge, values, beliefs, and cultural backgrounds into the planning and delivery of care  - Encourage patient/family to participate in care and decision-making at the level they choose  - Honor patient and family perspectives and choices  Outcome: Progressing     Problem: Patient/Family Goals  Goal: Patient/Family Long Term Goal  Description: Patient's Long Term Goal:     Interventions:  -   - See additional Care Plan goals for specific interventions  Outcome: Progressing  Goal: Patient/Family Short Term Goal  Description: Patient's Short Term Goal:     Interventions:   -   - See additional Care Plan goals for specific interventions  Outcome: Progressing     Problem: PAIN - ADULT  Goal: Verbalizes/displays adequate comfort level or patient's stated pain goal  Description: INTERVENTIONS:  - Encourage pt to monitor pain and request assistance  - Assess pain using appropriate pain scale  - Administer analgesics based on type and severity of pain and evaluate response  - Implement non-pharmacological measures as appropriate and evaluate response  - Consider cultural and social influences on pain and pain management  - Manage/alleviate anxiety  - Utilize distraction and/or relaxation techniques  - Monitor for opioid side effects  - Notify MD/LIP if interventions unsuccessful or patient reports new pain  - Anticipate increased pain  with activity and pre-medicate as appropriate  Outcome: Progressing     Problem: RISK FOR INFECTION - ADULT  Goal: Absence of fever/infection during anticipated neutropenic period  Description: INTERVENTIONS  - Monitor WBC  - Administer growth factors as ordered  - Implement neutropenic guidelines  Outcome: Progressing     Problem: SAFETY ADULT - FALL  Goal: Free from fall injury  Description: INTERVENTIONS:  - Assess pt frequently for physical needs  - Identify cognitive and physical deficits and behaviors that affect risk of falls.  - [place] fall precautions as indicated by assessment.  - Educate pt/family on patient safety including physical limitations  - Instruct pt to call for assistance with activity based on assessment  - Modify environment to reduce risk of injury  - Provide assistive devices as appropriate  - Consider OT/PT consult to assist with strengthening/mobility  - Encourage toileting schedule  Outcome: Progressing     Problem: DISCHARGE PLANNING  Goal: Discharge to home or other facility with appropriate resources  Description: INTERVENTIONS:  - Identify barriers to discharge w/pt and caregiver  - Include patient/family/discharge partner in discharge planning  - Arrange for needed discharge resources and transportation as appropriate  - Identify discharge learning needs (meds, wound care, etc)  - Arrange for interpreters to assist at discharge as needed  - Consider post-discharge preferences of patient/family/discharge partner  - Complete POLST form as appropriate  - Assess patient's ability to be responsible for managing their own health  - Refer to Case Management Department for coordinating discharge planning if the patient needs post-hospital services based on physician/LIP order or complex needs related to functional status, cognitive ability or social support system  Outcome: Progressing

## 2024-07-18 NOTE — PLAN OF CARE
Up and about. Right finger brace in place, right arm Prevena in place and patent, right post op shoe for ambulation. Plan is to go home vs rehab she stated.       Problem: Patient Centered Care  Goal: Patient preferences are identified and integrated in the patient's plan of care  Description: Interventions:  - What would you like us to know as we care for you? From home with daughter and son in law per record  - Provide timely, complete, and accurate information to patient/family  - Incorporate patient and family knowledge, values, beliefs, and cultural backgrounds into the planning and delivery of care  - Encourage patient/family to participate in care and decision-making at the level they choose  - Honor patient and family perspectives and choices  Outcome: Progressing     Problem: Patient/Family Goals  Goal: Patient/Family Long Term Goal  Description: Patient's Long Term Goal: Recover at daughter's home or rehab    Interventions:  - SW and therapy for dc planning  - See additional Care Plan goals for specific interventions  Outcome: Progressing  Goal: Patient/Family Short Term Goal  Description: Patient's Short Term Goal: pain controlled <5/10    Interventions:   - medicated per MD order for pain  - See additional Care Plan goals for specific interventions  Outcome: Progressing     Problem: PAIN - ADULT  Goal: Verbalizes/displays adequate comfort level or patient's stated pain goal  Description: INTERVENTIONS:  - Encourage pt to monitor pain and request assistance  - Assess pain using appropriate pain scale  - Administer analgesics based on type and severity of pain and evaluate response  - Implement non-pharmacological measures as appropriate and evaluate response  - Consider cultural and social influences on pain and pain management  - Manage/alleviate anxiety  - Utilize distraction and/or relaxation techniques  - Monitor for opioid side effects  - Notify MD/LIP if interventions unsuccessful or patient reports  new pain  - Anticipate increased pain with activity and pre-medicate as appropriate  Outcome: Progressing     Problem: RISK FOR INFECTION - ADULT  Goal: Absence of fever/infection during anticipated neutropenic period  Description: INTERVENTIONS  - Monitor WBC  - Administer growth factors as ordered  - Implement neutropenic guidelines  Outcome: Progressing     Problem: SAFETY ADULT - FALL  Goal: Free from fall injury  Description: INTERVENTIONS:  - Assess pt frequently for physical needs  - Identify cognitive and physical deficits and behaviors that affect risk of falls.  - [place] fall precautions as indicated by assessment.  - Educate pt/family on patient safety including physical limitations  - Instruct pt to call for assistance with activity based on assessment  - Modify environment to reduce risk of injury  - Provide assistive devices as appropriate  - Consider OT/PT consult to assist with strengthening/mobility  - Encourage toileting schedule  Outcome: Progressing     Problem: DISCHARGE PLANNING  Goal: Discharge to home or other facility with appropriate resources  Description: INTERVENTIONS:  - Identify barriers to discharge w/pt and caregiver  - Include patient/family/discharge partner in discharge planning  - Arrange for needed discharge resources and transportation as appropriate  - Identify discharge learning needs (meds, wound care, etc)  - Arrange for interpreters to assist at discharge as needed  - Consider post-discharge preferences of patient/family/discharge partner  - Complete POLST form as appropriate  - Assess patient's ability to be responsible for managing their own health  - Refer to Case Management Department for coordinating discharge planning if the patient needs post-hospital services based on physician/LIP order or complex needs related to functional status, cognitive ability or social support system  Outcome: Progressing

## 2024-07-18 NOTE — PHYSICAL THERAPY NOTE
PHYSICAL THERAPY TREATMENT NOTE - INPATIENT     Room Number: 407/407-A       Presenting Problem: traumatic compartment syndrome of right upper extremity, s/p R open fasciotomy on 7/13  Co-Morbidities : Hx hypotension, COPD, opiate dependency    Problem List  Principal Problem:    Traumatic compartment syndrome of right upper extremity, initial encounter ([facility])  Active Problems:    Compartment syndrome ([facility])    Displaced fracture of distal phalanx of right great toe, initial encounter for closed fracture    Closed nondisplaced fracture of middle phalanx of right middle finger, initial encounter      PHYSICAL THERAPY ASSESSMENT   Patient demonstrates good  progress this session, goals  progressing with bed mobility, transfers and ambulation this session.     Patient is requiring independent as a result of the following impairments: decreased functional strength, pain, and medical status.     Patient continues to function near baseline with bed mobility, transfers, and gait.  Contributing factors to remaining limitations include decreased functional strength, pain, and medical status.  Pt demos adequate safety and stability with functional mobility tasks and is safe to discharge from PT standpoint.  Physical Therapy will continue to follow patient for duration of hospitalization.    Patient continues to benefit from continued skilled PT services: at discharge to promote prior level of function and safety with additional support and return home with home health PT.    PLAN  PT Treatment Plan: Bed mobility;Body mechanics;Endurance;Energy conservation;Patient education;Gait training;Strengthening;Balance training  Frequency (Obs): 5x/week    SUBJECTIVE  I can walk around the house and do the one stair to get in    OBJECTIVE  Precautions: Bed/chair alarm;Drain(s);Limb alert - right (hemovac drain RUE)    WEIGHT BEARING RESTRICTION        R Lower Extremity: Weight Bearing as Tolerated       PAIN ASSESSMENT   Rating: Unable  to rate  Location: R flank, R hip  Management Techniques: Activity promotion;Body mechanics    BALANCE  Static Sitting: Normal  Dynamic Sitting: Good  Static Standing: Good  Dynamic Standing: Fair +      AM-PAC '6-Clicks' INPATIENT SHORT FORM - BASIC MOBILITY  How much difficulty does the patient currently have...  Patient Difficulty: Turning over in bed (including adjusting bedclothes, sheets and blankets)?: None   Patient Difficulty: Sitting down on and standing up from a chair with arms (e.g., wheelchair, bedside commode, etc.): None   Patient Difficulty: Moving from lying on back to sitting on the side of the bed?: None   How much help from another person does the patient currently need...   Help from Another: Moving to and from a bed to a chair (including a wheelchair)?: A Little   Help from Another: Need to walk in hospital room?: A Little   Help from Another: Climbing 3-5 steps with a railing?: A Little     AM-PAC Score:  Raw Score: 21   Approx Degree of Impairment: 28.97%   Standardized Score (AM-PAC Scale): 50.25   CMS Modifier (G-Code): CJ    FUNCTIONAL ABILITY STATUS  Functional Mobility/Gait Assessment  Gait Assistance: Independent  Distance (ft): 15 ft x2  Assistive Device: None  Pattern: Shuffle;R Decreased stance time (no LOB noted, antalgic pattern)  Sit to Stand: independent    Skilled Therapy Provided: Coordinated session with OT to maximize pt outcomes. Pt rec'd in bathroom agreeable to therapy, identified by name and , gait belt donned for mobility. Pt up ad clau in room ambulating and using restroom without assistive device. Pt initially irritable and defensive during interactions, but able to calm and refocus with active listening from therapist. Pt able to perform dynamic standing tasks with supervision and no LOB. Assisted pt with gown change and brief change. Pt ed provided on importance of mobility once home, use of RW for stability as needed, use of ice and stair sequencing (declined  formal stair training). Ice provided for R flank and R UE pain and swelling. Pt in chair at end of session with needs in reach, RN aware.     The patient's Approx Degree of Impairment: 28.97% has been calculated based on documentation in the [facility] '6 clicks' Inpatient Daily Activity Short Form.  Research supports that patients with this level of impairment may benefit from home with no services.  Final disposition will be made by interdisciplinary medical team.      Patient End of Session: Up in chair;Needs met;RN aware of session/findings;Call light within reach;Ice applied    CURRENT GOALS   Goals to be met by: 7/22/24  Patient Goal Patient's self-stated goal is: go home   Goal #1 Patient is able to demonstrate supine - sit EOB @ level: modified independent      Goal #1   Current Status NT   Goal #2 Patient is able to demonstrate transfers Sit to/from Stand at assistance level: modified independent with walker - rolling      Goal #2  Current Status GOAL MET   Goal #3 Patient is able to ambulate 50 feet with assist device: walker - rolling at assistance level: modified independent   Goal #3   Current Status 15 ft x2, Independent GOAL MET   Goal #4 Patient will negotiate 4 stairs/one curb w/ assistive device and supervision   Goal #4   Current Status  In progress- declined stair training   Goal #5 Patient to demonstrate independence with home activity/exercise instructions provided to patient in preparation for discharge.   Goal #5   Current Status  In progress       Therapeutic Activity: 23 minutes

## 2024-07-18 NOTE — PLAN OF CARE
Problem: Patient Centered Care  Goal: Patient preferences are identified and integrated in the patient's plan of care  Description: Interventions:  - What would you like us to know as we care for you?   - Provide timely, complete, and accurate information to patient/family  - Incorporate patient and family knowledge, values, beliefs, and cultural backgrounds into the planning and delivery of care  - Encourage patient/family to participate in care and decision-making at the level they choose  - Honor patient and family perspectives and choices  Outcome: Progressing     Problem: Patient/Family Goals  Goal: Patient/Family Long Term Goal  Description: Patient's Long Term Goal:     Interventions:  -   - See additional Care Plan goals for specific interventions  Outcome: Progressing  Goal: Patient/Family Short Term Goal  Description: Patient's Short Term Goal:     Interventions:   -  - See additional Care Plan goals for specific interventions  Outcome: Progressing     Problem: PAIN - ADULT  Goal: Verbalizes/displays adequate comfort level or patient's stated pain goal  Description: INTERVENTIONS:  - Encourage pt to monitor pain and request assistance  - Assess pain using appropriate pain scale  - Administer analgesics based on type and severity of pain and evaluate response  - Implement non-pharmacological measures as appropriate and evaluate response  - Consider cultural and social influences on pain and pain management  - Manage/alleviate anxiety  - Utilize distraction and/or relaxation techniques  - Monitor for opioid side effects  - Notify MD/LIP if interventions unsuccessful or patient reports new pain  - Anticipate increased pain with activity and pre-medicate as appropriate  Outcome: Progressing     Problem: RISK FOR INFECTION - ADULT  Goal: Absence of fever/infection during anticipated neutropenic period  Description: INTERVENTIONS  - Monitor WBC  - Administer growth factors as ordered  - Implement neutropenic  guidelines  Outcome: Progressing     Problem: SAFETY ADULT - FALL  Goal: Free from fall injury  Description: INTERVENTIONS:  - Assess pt frequently for physical needs  - Identify cognitive and physical deficits and behaviors that affect risk of falls.  - [place] fall precautions as indicated by assessment.  - Educate pt/family on patient safety including physical limitations  - Instruct pt to call for assistance with activity based on assessment  - Modify environment to reduce risk of injury  - Provide assistive devices as appropriate  - Consider OT/PT consult to assist with strengthening/mobility  - Encourage toileting schedule  Outcome: Progressing     Problem: DISCHARGE PLANNING  Goal: Discharge to home or other facility with appropriate resources  Description: INTERVENTIONS:  - Identify barriers to discharge w/pt and caregiver  - Include patient/family/discharge partner in discharge planning  - Arrange for needed discharge resources and transportation as appropriate  - Identify discharge learning needs (meds, wound care, etc)  - Arrange for interpreters to assist at discharge as needed  - Consider post-discharge preferences of patient/family/discharge partner  - Complete POLST form as appropriate  - Assess patient's ability to be responsible for managing their own health  - Refer to Case Management Department for coordinating discharge planning if the patient needs post-hospital services based on physician/LIP order or complex needs related to functional status, cognitive ability or social support system  Outcome: Progressing     Pt alert and oriented. Surgical dressing CDI. Prevena wound vac in place. On remote tele. Ambulates independently. AMANDA but no CPAP at night. Tolerating diet. Oxy for pain PRN. Ice applied. Call light within reach. Bed in lowest and locked position. Plan for rehab placement.

## 2024-07-18 NOTE — OCCUPATIONAL THERAPY NOTE
OCCUPATIONAL THERAPY TREATMENT NOTE - INPATIENT        Room Number: 407/407-A     Presenting Problem: traumatic compartment syndrome or R UE s/p open fasciotomy R forearm 7/13, R 3rd finger fx (w/ splint), R great toe fx (w/ post-op shoe)    Problem List  Principal Problem:    Traumatic compartment syndrome of right upper extremity, initial encounter ([facility])  Active Problems:    Compartment syndrome (HCC)    Displaced fracture of distal phalanx of right great toe, initial encounter for closed fracture    Closed nondisplaced fracture of middle phalanx of right middle finger, initial encounter      OCCUPATIONAL THERAPY ASSESSMENT   Patient demonstrates good  progress this session, goals progressing with 3/4 met this session.    Patient is requiring supervision as a result of the following impairments: decreased endurance.    Patient continues to function near baseline with adls and functional mobility. Pt does not present w/ unmet skilled OT needs. Anticipate pt would be able to discharge to her daughter's home w/ support of family     PLAN  OT Treatment Plan:  (discharge from OT)  OT Device Recommendations: Shower chair    SUBJECTIVE  Pt c/o pain R buttocks and side    OBJECTIVE  Precautions: Bed/chair alarm;Drain(s);Limb alert - right (hemovac drain RUE)    WEIGHT BEARING RESTRICTION  R Lower Extremity: Weight Bearing as Tolerated    PAIN ASSESSMENT  Rating: -- (pt did not rate pain)  Location: -- (R side/buttocks)  Management Techniques: Ice; Relaxation; Repositioning    ACTIVITY TOLERANCE  good    O2 SATURATIONS  Activity on room air    ACTIVITIES OF DAILY LIVING ASSESSMENT  AM-PAC ‘6-Clicks’ Inpatient Daily Activity Short Form  How much help from another person does the patient currently need…  -   Putting on and taking off regular lower body clothing?: A Little  -   Bathing (including washing, rinsing, drying)?: A Little  -   Toileting, which includes using toilet, bedpan or urinal? : None  -   Putting on and  taking off regular upper body clothing?: None  -   Taking care of personal grooming such as brushing teeth?: None  -   Eating meals?: None    AM-PAC Score:  Score: 22  Approx Degree of Impairment: 25.8%  Standardized Score (AM-PAC Scale): 47.1  CMS Modifier (G-Code): CJ    FUNCTIONAL ADL ASSESSMENT  Eating: independent  Grooming: independent  UB Dressing: independent  LB Dressing: supervision  Toileting: independent    Skilled Therapy Provided: RN contacted prior to start of care. Treatment coordinated w/ PT. Pt agreeable to participation in therapy. Pt received up in washroom, alert and oriented. Pt completed toileting task w/ mod I. Pt donned post op shoe and socks w/ supervision/limited assist. Pt ambulating in the room w/o an ad;no lob noted. Pt demonstrating sound dynamic balance w/ functional tasks. Use of rw suggested given pt c/o R buttocks/side pain. Pt currently unable to identify potential concerns in anticipation of discharge. Anticipate pt will be able to return to home w/ support of family     At end of session pt remaining up in chair w/ all needs in reach:ice to Rue and buttocks. RN aware of pt's status and performance in therapy. No further OT contact planned      EDUCATION PROVIDED  Patient: Plan of Care; Fall Prevention; Compensatory ADL Techniques  Patient's Response to Education: Verbalized Understanding    The patient's Approx Degree of Impairment: 25.8% has been calculated based on documentation in the [facility] '6 clicks' Inpatient Daily Activity Short Form.  Research supports that patients with this level of impairment may benefit from return to home.  Final disposition will be made by interdisciplinary medical team.    Patient End of Session: Up in chair;Needs met;Call light within reach;RN aware of session/findings;All patient questions and concerns addressed    OT Goals:   Patient will complete functional transfer with MI  Comment: Goal Met    Patient will complete toileting with MI  Comment:  Goal Met    Patient will complete LB dressing with MI  Comment:pt required supervision    Patient will complete item retrieval with MI  Comment:Goal Met          Goals  on: 24  Frequency: 3-5x/week    Therapeutic Activity: 20 minutes

## 2024-07-18 NOTE — CM/SW NOTE
LEENA followed up on DC planning.     LEENA spoke with pt at bedside who states the plan is for pt to hopefully discharge back to her dtr's house.     Dtr is asking for FMLA at work and is waiting to hear if she will be approved    Dtr's address [address] [place] [address]     If Dtr does not get FMLA pt will then DC to [facility] - choice still pending as she said her dtr is really hoping pt can come back to her home.    If pt discharges to dtr's house would need MD to complete for 3 and 1 commode:     The patient is physically incapable of utilizing regular toilet facilities because they are confined to one level of their home and there is no toilet on that level.     And would need MD to cosign the LEENA order for the commode     [facility] accepted pt [facility] - will need to confirm if they can see pt in [place]    PLAN: Now home to dtr's house pending FMLA confirmation (DME and HHC pending)    Veronica Awan, MERCYW, MSW ext. [phone]

## 2024-07-19 ENCOUNTER — APPOINTMENT (OUTPATIENT)
Dept: GENERAL RADIOLOGY | Facility: HOSPITAL | Age: 59
End: 2024-07-19
Attending: HOSPITALIST
Payer: MEDICAID

## 2024-07-19 LAB
DEPRECATED RDW RBC AUTO: 41.1 FL (ref 35.1–46.3)
ERYTHROCYTE [DISTWIDTH] IN BLOOD BY AUTOMATED COUNT: 12.8 % (ref 11–15)
HCT VFR BLD AUTO: 29.4 %
HGB BLD-MCNC: 10.5 G/DL
MCH RBC QN AUTO: 31.9 PG (ref 26–34)
MCHC RBC AUTO-ENTMCNC: 35.7 G/DL (ref 31–37)
MCV RBC AUTO: 89.4 FL
PLATELET # BLD AUTO: 125 10(3)UL (ref 150–450)
RBC # BLD AUTO: 3.29 X10(6)UL
WBC # BLD AUTO: 3.9 X10(3) UL (ref 4–11)

## 2024-07-19 PROCEDURE — 99233 SBSQ HOSP IP/OBS HIGH 50: CPT | Performed by: HOSPITALIST

## 2024-07-19 PROCEDURE — 71101 X-RAY EXAM UNILAT RIBS/CHEST: CPT | Performed by: HOSPITALIST

## 2024-07-19 RX ORDER — ALPRAZOLAM 1 MG/1
1 TABLET ORAL 3 TIMES DAILY PRN
Qty: 30 TABLET | Refills: 1 | Status: SHIPPED | OUTPATIENT
Start: 2024-07-19

## 2024-07-19 NOTE — DISCHARGE INSTRUCTIONS
Home health  [address] [address]  Phone: [phone]  Fax: [phone]    3 and 1 commode should be delivered by [facility] on Monday 7/22  [facility]  Main Phone: [phone]    Wear pos-op shoe to right foot.  Apply ice to affected areas.  Elevate right forearm  Use right 3rd finger splint  Changed right forearm dressing daily or sooner if saturated.
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
## 2024-07-19 NOTE — PLAN OF CARE
Patient has safety precautions in place bed in the lowest position, bed alarm on, and call light within reach. Plan of care ongoing. No further concerns as of present.    Problem: Patient Centered Care  Goal: Patient preferences are identified and integrated in the patient's plan of care  Description: Interventions:    - Provide timely, complete, and accurate information to patient/family  - Incorporate patient and family knowledge, values, beliefs, and cultural backgrounds into the planning and delivery of care  - Encourage patient/family to participate in care and decision-making at the level they choose  - Honor patient and family perspectives and choices  Outcome: Progressing     Problem: PAIN - ADULT  Goal: Verbalizes/displays adequate comfort level or patient's stated pain goal  Description: INTERVENTIONS:  - Encourage pt to monitor pain and request assistance  - Assess pain using appropriate pain scale  - Administer analgesics based on type and severity of pain and evaluate response  - Implement non-pharmacological measures as appropriate and evaluate response  - Consider cultural and social influences on pain and pain management  - Manage/alleviate anxiety  - Utilize distraction and/or relaxation techniques  - Monitor for opioid side effects  - Notify MD/LIP if interventions unsuccessful or patient reports new pain  - Anticipate increased pain with activity and pre-medicate as appropriate  Outcome: Progressing     Problem: RISK FOR INFECTION - ADULT  Goal: Absence of fever/infection during anticipated neutropenic period  Description: INTERVENTIONS  - Monitor WBC  - Administer growth factors as ordered  - Implement neutropenic guidelines  Outcome: Progressing     Problem: SAFETY ADULT - FALL  Goal: Free from fall injury  Description: INTERVENTIONS:  - Assess pt frequently for physical needs  - Identify cognitive and physical deficits and behaviors that affect risk of falls.  - [place] fall precautions as  indicated by assessment.  - Educate pt/family on patient safety including physical limitations  - Instruct pt to call for assistance with activity based on assessment  - Modify environment to reduce risk of injury  - Provide assistive devices as appropriate  - Consider OT/PT consult to assist with strengthening/mobility  - Encourage toileting schedule  Outcome: Progressing     Problem: DISCHARGE PLANNING  Goal: Discharge to home or other facility with appropriate resources  Description: INTERVENTIONS:  - Identify barriers to discharge w/pt and caregiver  - Include patient/family/discharge partner in discharge planning  - Arrange for needed discharge resources and transportation as appropriate  - Identify discharge learning needs (meds, wound care, etc)  - Arrange for interpreters to assist at discharge as needed  - Consider post-discharge preferences of patient/family/discharge partner  - Complete POLST form as appropriate  - Assess patient's ability to be responsible for managing their own health  - Refer to Case Management Department for coordinating discharge planning if the patient needs post-hospital services based on physician/LIP order or complex needs related to functional status, cognitive ability or social support system  Outcome: Progressing
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
## 2024-07-20 VITALS
DIASTOLIC BLOOD PRESSURE: 65 MMHG | SYSTOLIC BLOOD PRESSURE: 100 MMHG | TEMPERATURE: 98 F | RESPIRATION RATE: 16 BRPM | WEIGHT: 193.38 LBS | HEART RATE: 53 BPM | HEIGHT: 62 IN | OXYGEN SATURATION: 100 % | BODY MASS INDEX: 35.59 KG/M2

## 2024-07-20 PROCEDURE — 99239 HOSP IP/OBS DSCHRG MGMT >30: CPT | Performed by: HOSPITALIST
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
## 2024-07-20 NOTE — PLAN OF CARE
Patient has safety precautions in place bed in the lowest postion, bed alarm set, and call light within reach. Plan of care ongoing. No futher concerns as of present.   Problem: Patient Centered Care  Goal: Patient preferences are identified and integrated in the patient's plan of care  Description: Interventions:  - Provide timely, complete, and accurate information to patient/family  - Incorporate patient and family knowledge, values, beliefs, and cultural backgrounds into the planning and delivery of care  - Encourage patient/family to participate in care and decision-making at the level they choose  - Honor patient and family perspectives and choices  Outcome: Progressing     Problem: PAIN - ADULT  Goal: Verbalizes/displays adequate comfort level or patient's stated pain goal  Description: INTERVENTIONS:  - Encourage pt to monitor pain and request assistance  - Assess pain using appropriate pain scale  - Administer analgesics based on type and severity of pain and evaluate response  - Implement non-pharmacological measures as appropriate and evaluate response  - Consider cultural and social influences on pain and pain management  - Manage/alleviate anxiety  - Utilize distraction and/or relaxation techniques  - Monitor for opioid side effects  - Notify MD/LIP if interventions unsuccessful or patient reports new pain  - Anticipate increased pain with activity and pre-medicate as appropriate  Outcome: Progressing     Problem: RISK FOR INFECTION - ADULT  Goal: Absence of fever/infection during anticipated neutropenic period  Description: INTERVENTIONS  - Monitor WBC  - Administer growth factors as ordered  - Implement neutropenic guidelines  Outcome: Progressing     Problem: SAFETY ADULT - FALL  Goal: Free from fall injury  Description: INTERVENTIONS:  - Assess pt frequently for physical needs  - Identify cognitive and physical deficits and behaviors that affect risk of falls.  - [place] fall precautions as  indicated by assessment.  - Educate pt/family on patient safety including physical limitations  - Instruct pt to call for assistance with activity based on assessment  - Modify environment to reduce risk of injury  - Provide assistive devices as appropriate  - Consider OT/PT consult to assist with strengthening/mobility  - Encourage toileting schedule  Outcome: Progressing     Problem: DISCHARGE PLANNING  Goal: Discharge to home or other facility with appropriate resources  Description: INTERVENTIONS:  - Identify barriers to discharge w/pt and caregiver  - Include patient/family/discharge partner in discharge planning  - Arrange for needed discharge resources and transportation as appropriate  - Identify discharge learning needs (meds, wound care, etc)  - Arrange for interpreters to assist at discharge as needed  - Consider post-discharge preferences of patient/family/discharge partner  - Complete POLST form as appropriate  - Assess patient's ability to be responsible for managing their own health  - Refer to Case Management Department for coordinating discharge planning if the patient needs post-hospital services based on physician/LIP order or complex needs related to functional status, cognitive ability or social support system  Outcome: Progressing

## 2024-07-20 NOTE — PLAN OF CARE
Patient AOX4. Room air. SCDS when in bed. Voiding freely. Passing flatus. Pervena wound vac in place. Plan to dc tomorrow, pending daughters availability/ bed at her home. Call light within reach.     Problem: Patient Centered Care  Goal: Patient preferences are identified and integrated in the patient's plan of care  Description: Interventions:  - What would you like us to know as we care for you?   - Provide timely, complete, and accurate information to patient/family  - Incorporate patient and family knowledge, values, beliefs, and cultural backgrounds into the planning and delivery of care  - Encourage patient/family to participate in care and decision-making at the level they choose  - Honor patient and family perspectives and choices  Outcome: Progressing     Problem: Patient/Family Goals  Goal: Patient/Family Long Term Goal  Description: Patient's Long Term Goal:     Interventions:  -   - See additional Care Plan goals for specific interventions  Outcome: Progressing  Goal: Patient/Family Short Term Goal  Description: Patient's Short Term Goal:     Interventions:   - See additional Care Plan goals for specific interventions  Outcome: Progressing     Problem: PAIN - ADULT  Goal: Verbalizes/displays adequate comfort level or patient's stated pain goal  Description: INTERVENTIONS:  - Encourage pt to monitor pain and request assistance  - Assess pain using appropriate pain scale  - Administer analgesics based on type and severity of pain and evaluate response  - Implement non-pharmacological measures as appropriate and evaluate response  - Consider cultural and social influences on pain and pain management  - Manage/alleviate anxiety  - Utilize distraction and/or relaxation techniques  - Monitor for opioid side effects  - Notify MD/LIP if interventions unsuccessful or patient reports new pain  - Anticipate increased pain with activity and pre-medicate as appropriate  Outcome: Progressing     Problem: RISK FOR  INFECTION - ADULT  Goal: Absence of fever/infection during anticipated neutropenic period  Description: INTERVENTIONS  - Monitor WBC  - Administer growth factors as ordered  - Implement neutropenic guidelines  Outcome: Progressing     Problem: SAFETY ADULT - FALL  Goal: Free from fall injury  Description: INTERVENTIONS:  - Assess pt frequently for physical needs  - Identify cognitive and physical deficits and behaviors that affect risk of falls.  - [place] fall precautions as indicated by assessment.  - Educate pt/family on patient safety including physical limitations  - Instruct pt to call for assistance with activity based on assessment  - Modify environment to reduce risk of injury  - Provide assistive devices as appropriate  - Consider OT/PT consult to assist with strengthening/mobility  - Encourage toileting schedule  Outcome: Progressing     Problem: DISCHARGE PLANNING  Goal: Discharge to home or other facility with appropriate resources  Description: INTERVENTIONS:  - Identify barriers to discharge w/pt and caregiver  - Include patient/family/discharge partner in discharge planning  - Arrange for needed discharge resources and transportation as appropriate  - Identify discharge learning needs (meds, wound care, etc)  - Arrange for interpreters to assist at discharge as needed  - Consider post-discharge preferences of patient/family/discharge partner  - Complete POLST form as appropriate  - Assess patient's ability to be responsible for managing their own health  - Refer to Case Management Department for coordinating discharge planning if the patient needs post-hospital services based on physician/LIP order or complex needs related to functional status, cognitive ability or social support system  Outcome: Progressing

## 2024-07-22 ENCOUNTER — PATIENT OUTREACH (OUTPATIENT)
Dept: CASE MANAGEMENT | Age: 59
End: 2024-07-22

## 2024-07-22 NOTE — PROGRESS NOTES
Attempted to reach the patient to complete a Hospital follow up call. Left a message for the patient to call the nurse care manager back at, [phone].
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
## 2024-07-26 ENCOUNTER — MED REC SCAN ONLY (OUTPATIENT)
Dept: FAMILY MEDICINE CLINIC | Facility: CLINIC | Age: 59
End: 2024-07-26

## 2024-07-29 ENCOUNTER — TELEPHONE (OUTPATIENT)
Dept: FAMILY MEDICINE CLINIC | Facility: CLINIC | Age: 59
End: 2024-07-29

## 2024-07-29 ENCOUNTER — MED REC SCAN ONLY (OUTPATIENT)
Dept: FAMILY MEDICINE CLINIC | Facility: CLINIC | Age: 59
End: 2024-07-29

## 2024-07-29 NOTE — TELEPHONE ENCOUNTER
Was paged by Candelaria about patient and possible yeast infection. Returned call and no answer and left 2 voice mails. Left office number to call back to discuss.

## 2024-08-08 DIAGNOSIS — L29.9 ITCHING: ICD-10-CM

## 2024-08-09 ENCOUNTER — TELEPHONE (OUTPATIENT)
Dept: FAMILY MEDICINE CLINIC | Facility: CLINIC | Age: 59
End: 2024-08-09
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
## 2024-08-10 NOTE — TELEPHONE ENCOUNTER
If having acute symptoms like fever/ sweating. May need to go to immediate care or ER for timely evaluation of symptoms. Wound not order testing for patient until seen / evaluated.

## 2024-08-12 ENCOUNTER — APPOINTMENT (OUTPATIENT)
Dept: GENERAL RADIOLOGY | Facility: HOSPITAL | Age: 59
End: 2024-08-12
Payer: MEDICAID

## 2024-08-12 ENCOUNTER — HOSPITAL ENCOUNTER (EMERGENCY)
Facility: HOSPITAL | Age: 59
Discharge: HOME | End: 2024-08-12
Attending: EMERGENCY MEDICINE
Payer: MEDICAID

## 2024-08-12 VITALS
SYSTOLIC BLOOD PRESSURE: 115 MMHG | TEMPERATURE: 98 F | OXYGEN SATURATION: 94 % | BODY MASS INDEX: 33.13 KG/M2 | RESPIRATION RATE: 19 BRPM | HEIGHT: 62 IN | HEART RATE: 61 BPM | DIASTOLIC BLOOD PRESSURE: 61 MMHG | WEIGHT: 180 LBS

## 2024-08-12 DIAGNOSIS — J06.9 VIRAL URI WITH COUGH: Primary | ICD-10-CM

## 2024-08-12 LAB
ALBUMIN SERPL-MCNC: 4.4 G/DL (ref 3.2–4.8)
ALBUMIN/GLOB SERPL: 1.4 {RATIO} (ref 1–2)
ALP LIVER SERPL-CCNC: 88 U/L
ALT SERPL-CCNC: <7 U/L
ANION GAP SERPL CALC-SCNC: 9 MMOL/L (ref 0–18)
AST SERPL-CCNC: 15 U/L (ref ?–34)
BASOPHILS # BLD AUTO: 0.02 X10(3) UL (ref 0–0.2)
BASOPHILS NFR BLD AUTO: 0.3 %
BILIRUB SERPL-MCNC: 0.5 MG/DL (ref 0.3–1.2)
BNP SERPL-MCNC: 11 PG/ML
BUN BLD-MCNC: 14 MG/DL (ref 9–23)
BUN/CREAT SERPL: 17.5 (ref 10–20)
CALCIUM BLD-MCNC: 9.5 MG/DL (ref 8.7–10.4)
CHLORIDE SERPL-SCNC: 105 MMOL/L (ref 98–112)
CO2 SERPL-SCNC: 27 MMOL/L (ref 21–32)
CREAT BLD-MCNC: 0.8 MG/DL
DEPRECATED RDW RBC AUTO: 40.3 FL (ref 35.1–46.3)
EGFRCR SERPLBLD CKD-EPI 2021: 85 ML/MIN/1.73M2 (ref 60–?)
EOSINOPHIL # BLD AUTO: 0.11 X10(3) UL (ref 0–0.7)
EOSINOPHIL NFR BLD AUTO: 1.5 %
ERYTHROCYTE [DISTWIDTH] IN BLOOD BY AUTOMATED COUNT: 12.7 % (ref 11–15)
GLOBULIN PLAS-MCNC: 3.2 G/DL (ref 2–3.5)
GLUCOSE BLD-MCNC: 130 MG/DL (ref 70–99)
HCT VFR BLD AUTO: 35 %
HGB BLD-MCNC: 12.5 G/DL
IMM GRANULOCYTES # BLD AUTO: 0.02 X10(3) UL (ref 0–1)
IMM GRANULOCYTES NFR BLD: 0.3 %
LYMPHOCYTES # BLD AUTO: 1.17 X10(3) UL (ref 1–4)
LYMPHOCYTES NFR BLD AUTO: 16.4 %
MCH RBC QN AUTO: 31.2 PG (ref 26–34)
MCHC RBC AUTO-ENTMCNC: 35.7 G/DL (ref 31–37)
MCV RBC AUTO: 87.3 FL
MONOCYTES # BLD AUTO: 0.39 X10(3) UL (ref 0.1–1)
MONOCYTES NFR BLD AUTO: 5.5 %
NEUTROPHILS # BLD AUTO: 5.42 X10 (3) UL (ref 1.5–7.7)
NEUTROPHILS # BLD AUTO: 5.42 X10(3) UL (ref 1.5–7.7)
NEUTROPHILS NFR BLD AUTO: 76 %
OSMOLALITY SERPL CALC.SUM OF ELEC: 294 MOSM/KG (ref 275–295)
PLATELET # BLD AUTO: 161 10(3)UL (ref 150–450)
POTASSIUM SERPL-SCNC: 3.8 MMOL/L (ref 3.5–5.1)
PROT SERPL-MCNC: 7.6 G/DL (ref 5.7–8.2)
RBC # BLD AUTO: 4.01 X10(6)UL
SARS-COV-2 RNA RESP QL NAA+PROBE: NOT DETECTED
SODIUM SERPL-SCNC: 141 MMOL/L (ref 136–145)
TROPONIN I SERPL HS-MCNC: <3 NG/L
WBC # BLD AUTO: 7.1 X10(3) UL (ref 4–11)

## 2024-08-12 PROCEDURE — 99284 EMERGENCY DEPT VISIT MOD MDM: CPT

## 2024-08-12 PROCEDURE — 93005 ELECTROCARDIOGRAM TRACING: CPT

## 2024-08-12 PROCEDURE — 83880 ASSAY OF NATRIURETIC PEPTIDE: CPT | Performed by: EMERGENCY MEDICINE

## 2024-08-12 PROCEDURE — 85025 COMPLETE CBC W/AUTO DIFF WBC: CPT | Performed by: EMERGENCY MEDICINE

## 2024-08-12 PROCEDURE — 84484 ASSAY OF TROPONIN QUANT: CPT | Performed by: EMERGENCY MEDICINE

## 2024-08-12 PROCEDURE — 93010 ELECTROCARDIOGRAM REPORT: CPT

## 2024-08-12 PROCEDURE — 80053 COMPREHEN METABOLIC PANEL: CPT | Performed by: EMERGENCY MEDICINE

## 2024-08-12 PROCEDURE — 71045 X-RAY EXAM CHEST 1 VIEW: CPT

## 2024-08-12 PROCEDURE — 36415 COLL VENOUS BLD VENIPUNCTURE: CPT

## 2024-08-12 NOTE — TELEPHONE ENCOUNTER
Mailbox is full not able to leave a message for daughter. Please transfer to triage. 2nd attempt.  Left message for Candelaria KING to call us back.

## 2024-08-12 NOTE — TELEPHONE ENCOUNTER
CHRISTINE Leigh  from [facility] calling back     Lu reports the family ws told on Friday to make an appointment     No future appointments.      Lu will notify family of Dr Murrieta instructions that if having acute symptoms to go to  IC or ER

## 2024-08-13 LAB
ATRIAL RATE: 76 BPM
P AXIS: 30 DEGREES
P-R INTERVAL: 154 MS
Q-T INTERVAL: 428 MS
QRS DURATION: 84 MS
QTC CALCULATION (BEZET): 481 MS
R AXIS: 41 DEGREES
T AXIS: 42 DEGREES
VENTRICULAR RATE: 76 BPM

## 2024-08-13 RX ORDER — FLUOXETINE HYDROCHLORIDE 20 MG/1
60 CAPSULE ORAL DAILY
Qty: 270 CAPSULE | Refills: 0 | Status: SHIPPED | OUTPATIENT
Start: 2024-08-13

## 2024-08-13 RX ORDER — HYDROXYZINE HYDROCHLORIDE 25 MG/1
25 TABLET, FILM COATED ORAL 3 TIMES DAILY PRN
Qty: 30 TABLET | Refills: 0 | Status: SHIPPED | OUTPATIENT
Start: 2024-08-13
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
## 2024-08-13 NOTE — ED QUICK NOTES
Patient safe to be discharged home per provider. Discharge education given via printed AVS. Reviewed: follow up care and when to seek emergency care. Patient verbalizes understanding and is able to teach back. Patient wheeled to exit await daughter exit outside of ER  per patient's request.

## 2024-08-13 NOTE — ED PROVIDER NOTES
Patient Seen in: [facility] Emergency Department    History     Chief Complaint   Patient presents with    Chest Congestion    Shoulder Pain     Left shoulder    Shortness Of Breath       HPI    59-year-old female with past medical history significant for anxiety, bipolar affective disorder, liver cirrhosis, ulcerative colitis, COPD, CAD, depression, high blood pressure, arthritis, sleep apnea, polysubstance abuse, PTSD, presents to the ED for evaluation of chest congestion, tightness, shortness of breath, coughing up mucus.  Patient states that she has been feeling bad since 31 July.  Daughter is at bedside and states that patient has had fevers for for 5 days and has been very weak.  She has been sleeping constantly.  Patient denies any leg swelling, hemoptysis, chest pain.    History from Independent Source: Daughter gave additional history as stated in HPI    External Records Reviewed: Reviewed prior records available in EMR.  Patient was admitted to the hospital on 13 July and discharged on the 21st after having traumatic compartment syndrome of the right forearm as well as fractures in her right hand and right foot.  Patient needed to have a fasciotomy on the 13th.    History reviewed.   Past Medical History:    Anxiety    Bipolar affective disorder (HCC)    Cirrhosis of liver (HCC)    Colitis    ulcerative colitis    COPD (chronic obstructive pulmonary disease) (HCC)    Coronary atherosclerosis    Crack cocaine use    Depression    Essential hypertension    JOHN (generalized anxiety disorder)    Hepatitis    Hep C    Hepatitis C    1989    High blood pressure    History of blood transfusion    no reactions    Methadone use    Migraines    Nausea & vomiting    Osteoarthritis    Pneumonia due to organism    Polysubstance dependence including opioid drug with daily use (HCC)    PTSD (post-traumatic stress disorder)    Sleep apnea    NO CPAP       History reviewed.   Past Surgical History:   Procedure  Laterality Date    Angioplasty (coronary)  2019    3 total    Appendectomy      2012    Cath percutaneous  transluminal coronary angioplasty      Lap supracerv hysterectomy  2010    supracervical hysterectomy    Tonsillectomy           Medications :  (Not in a hospital admission)       Family History   Problem Relation Age of Onset    Cancer Father         brain    Hypertension Father     Cancer Mother         lung, metastatic    COPD Mother        Smoking Status:   Social History     Socioeconomic History    Marital status:    Tobacco Use    Smoking status: Every Day     Current packs/day: 0.50     Average packs/day: 0.5 packs/day for 42.0 years (21.0 ttl pk-yrs)     Types: Cigarettes    Smokeless tobacco: Never   Vaping Use    Vaping status: Every Day    Substances: THC, CBD   Substance and Sexual Activity    Alcohol use: Not Currently    Drug use: Yes     Frequency: 7.0 times per week     Types: Cannabis     Comment: vapes daily    Sexual activity: Not Currently     Partners: Male   Other Topics Concern    Reaction to local anesthetic No    Pt has a pacemaker No    Pt has a defibrillator No    Blood Transfusions Yes       Constitutional and vital signs reviewed.      Social History and Family History elements reviewed from today, pertinent positives to the presenting problem noted.    Physical Exam     ED Triage Vitals [08/12/24 1945]   /68   Pulse 79   Resp 22   Temp 98.4 °F (36.9 °C)   Temp src Oral   SpO2 97 %   O2 Device None (Room air)       Physical Exam   Constitutional: AAOx3, well nourished, NAD  HEENT: Normocephalic, PERRLA, MMM  CV: s1s2+, RRR, no m/r/g, normal distal pulses  Pulmonary/Chest: CTA b/l with no rales, wheezes.  No chest wall tenderness  Abdominal: Nontender.  Nondistended. Soft. Bowel sounds are normal.   Neck/Back:   :   Musculoskeletal: Normal range of motion. No deformity.   Neurological: Awake, alert. Normal reflexes. No cranial nerve deficit.    Skin: Skin is warm and  dry. No rash noted. No erythema.   Psychiatric:      All measures to prevent infection transmission during my interaction with the patient were taken. The patient was already wearing a droplet mask on my arrival to the room. Personal protective equipment was worn throughout the duration of the exam.      ED Course        Labs Reviewed   COMP METABOLIC PANEL (14) - Abnormal; Notable for the following components:       Result Value    Glucose 130 (*)     ALT <7 (*)     All other components within normal limits   TROPONIN I HIGH SENSITIVITY - Normal   BNP (B TYPE NATRIURETIC PEPTIDE) - Normal   RAPID SARS-COV-2 BY PCR - Normal   CBC WITH DIFFERENTIAL WITH PLATELET   RAINBOW DRAW LAVENDER   RAINBOW DRAW LIGHT GREEN   RAINBOW DRAW BLUE   RAINBOW DRAW GOLD     My Independent Interpretation of EKG (if performed): EKG    Rate, intervals and axes as noted on EKG Report.  Rate: 76 bpm  Rhythm: Sinus Rhythm  Reading: Normal sinus rhythm, no acute ST changes, normal axis and intervals, no ectopy             Monitor Interpretation:   normal sinus rhythm as interpreted by me.      Imaging Results Available and Reviewed while in ED: XR CHEST AP PORTABLE  (CPT=71045)    Result Date: 8/12/2024  CONCLUSION:   No focal airspace disease or significant pleural effusion.   Dictated by (CST): Arley Kruse MD on 8/12/2024 at 8:34 PM     Finalized by (CST): Arley Kruse MD on 8/12/2024 at 8:35 PM         ED Medications Administered: Medications - No data to display          MDM     Vitals:    08/12/24 1945   BP: 107/68   Pulse: 79   Resp: 22   Temp: 98.4 °F (36.9 °C)   TempSrc: Oral   SpO2: 97%   Weight: 81.6 kg   Height: 157.5 cm (5' 2\")     *I personally reviewed and interpreted all ED vitals.    Independent Interpretation of Studies: I have independently reviewed patient's chest x-ray and there are no significant acute findings    Social Determinants of Health:     Procedures:      Differential/MDM/Shared Decision Making:  Differential Diagnosis includes viral URI, pneumonia, COVID, CHF, COPD, bronchitis, others.      The patient already has recent hospitalization for traumatic right forearm compartment syndrome requiring fasciotomy, anxiety, bipolar affective disorder, liver cirrhosis, ulcerative colitis, COPD, CAD, depression, high blood pressure, sleep apnea, arthritis, polysubstance abuse, PTSD, to contribute to the complexity of this ED evaluation.           Patient's workup in the ED is unremarkable.  She has had no respiratory distress and her room air oxygen saturations are 97%.  Vitals are otherwise unremarkable.  Chest x-ray, labs, EKG, are all relatively normal.  Discussed case with patient that I believe her symptoms are likely due to a viral URI.  She is otherwise comfortable with discharge plan.      Condition upon leaving the department: Stable    Disposition and Plan     Clinical Impression:  1. Viral URI with cough        Disposition:  Discharge    Follow-up:  Jose Braswell MD  [address] [place] [id number] [phone]    Call in 2 day(s)        Medications Prescribed:  Current Discharge Medication List

## 2024-08-13 NOTE — TELEPHONE ENCOUNTER
Please review pended refill request as unable to refill due to high/very high interaction warning copied here:    High  Drug-Drug: ibuprofen and FLUoxetineToxic effects may be increased with concurrent administration of ibuprofen and Selective Serotonin Reuptake Inhibitors. The risk of upper gastrointestinal bleeding may be increased. Patients taking both drugs concurrently should be educated about the signs and symptoms of GI bleeding.      Requested Prescriptions   Pending Prescriptions Disp Refills    HYDROXYZINE 25 MG Oral Tab [Pharmacy Med Name: HYDROXYZINE HCL 25MG TABS (WHITE)] 30 tablet 0     Sig: TAKE 1 TABLET(25 MG) BY MOUTH THREE TIMES DAILY AS NEEDED FOR ANXIETY OR ITCHING       There is no refill protocol information for this order       FLUOXETINE 20 MG Oral Cap [Pharmacy Med Name: FLUOXETINE 20MG CAPSULES] 270 capsule 3     Sig: TAKE 3 CAPSULES(60 MG) BY MOUTH DAILY       Psychiatric Non-Scheduled (Anti-Anxiety) Passed - 8/8/2024  5:52 PM        Passed - In person appointment or virtual visit in the past 6 mos or appointment in next 3 mos     Recent Outpatient Visits              3 months ago Pelvic pain    [facility] Rowena Khan, APRN    Office Visit    4 months ago Rash and nonspecific skin eruption    [facility]    Telemedicine    5 months ago Pelvic pain    [facility]    Office Visit    6 months ago Pain passing urine    [facility] [place]    Office Visit    8 months ago Acute bilateral low back pain without sciatica    [facility]    [facility] Visit                      Passed - Depression Screening completed within the past 12 months                 Recent Outpatient Visits              3  months ago Pelvic pain    [facility] Rowena Khan, APRN    Office Visit    4 months ago Rash and nonspecific skin eruption    [facility]    Telemedicine    5 months ago Pelvic pain    [facility] Vicky Dunham, DO    Office Visit    6 months ago Pain passing urine    [facility]    Office Visit    8 months ago Acute bilateral low back pain without sciatica    [facility] [place] Zacarias Godinez,     Office Visit           Oriented - self; Oriented - place; Oriented - time

## 2024-08-13 NOTE — TELEPHONE ENCOUNTER
Message noted: Chart reviewed and may refill medication as requested. Prescription sent to listed pharmacy. Pharmacy to notify patient. Pt notified through [employer]

## 2024-08-15 ENCOUNTER — MED REC SCAN ONLY (OUTPATIENT)
Dept: FAMILY MEDICINE CLINIC | Facility: CLINIC | Age: 59
End: 2024-08-15

## 2024-09-03 ENCOUNTER — MED REC SCAN ONLY (OUTPATIENT)
Dept: FAMILY MEDICINE CLINIC | Facility: CLINIC | Age: 59
End: 2024-09-03

## 2024-09-05 ENCOUNTER — TELEPHONE (OUTPATIENT)
Dept: FAMILY MEDICINE CLINIC | Facility: CLINIC | Age: 59
End: 2024-09-05

## 2024-09-05 DIAGNOSIS — F41.1 GENERALIZED ANXIETY DISORDER: ICD-10-CM

## 2024-09-05 RX ORDER — ALPRAZOLAM 1 MG
1 TABLET ORAL 3 TIMES DAILY
Qty: 90 TABLET | Refills: 0 | Status: SHIPPED | OUTPATIENT
Start: 2024-09-05

## 2024-09-05 NOTE — TELEPHONE ENCOUNTER
Patient is requesting a hospital follow up appointment with PCP and states she missed today's appointment on 9/5/24.    Patient mentions she was discharged over 3 weeks ago.    PCPs first available is 9/23/24.  Is PCP able to see patient week of 9/9/24.    Please advise.

## 2024-09-05 NOTE — TELEPHONE ENCOUNTER
Please Review. Protocol Failed; No Protocol   08/08/2024 07/09/2024 06/06/2024  Last written 07/09/2024  Recent Visits  Date Type Provider Dept   12/02/23 Office Visit Zacarias Godinez DO [facility]   12/01/23 Appointment Nicole Hinson APRN [facility]   Showing recent visits within past 540 days with a meds authorizing provider and meeting all other requirements  Today's Visits  Date Type Provider Dept   09/05/24 Appointment Jose Braswell MD [facility]   Showing today's visits with a meds authorizing provider and meeting all other requirements  Future Appointments  No visits were found meeting these conditions.  Showing future appointments within next 150 days with a meds authorizing provider and meeting all other requirements    Requested Prescriptions   Pending Prescriptions Disp Refills    ALPRAZOLAM 1 MG Oral Tab [Pharmacy Med Name: ALPRAZOLAM 1MG TABLETS] 90 tablet 0     Sig: TAKE 1 TABLET BY MOUTH THREE TIMES DAILY       Controlled Substance Medication Failed - 9/5/2024  2:15 PM        Failed - This medication is a controlled substance - forward to provider to refill                 Recent Outpatient Visits              4 months ago Pelvic pain    [facility] - OB[facility]GYN Rowena Khan APRN    Office Visit    5 months ago Rash and nonspecific skin eruption    [facility]    Telemedicine    6 months ago Pelvic pain    [facility] - [facility] Vicky Dunham DO    Office Visit    7 months ago Pain passing urine    [facility]    Office Visit    9 months ago Acute bilateral low back pain without sciatica    [facility] God[facility] DO    Office Visit

## 2024-09-05 NOTE — TELEPHONE ENCOUNTER
Patient is calling to advise she only has 1 dosage remaining    Alprazolam    [facility] in [facility] confirmed preferred

## 2024-09-05 NOTE — TELEPHONE ENCOUNTER
Can use res 24 slots if available. May need 2 slots if hospital follow up. Otherwise can see if appointment with another provider or our mid levels.

## 2024-09-05 NOTE — TELEPHONE ENCOUNTER
Message noted: Chart reviewed and may refill medication as requested. Script sent to listed pharmacy by secure method.    Pt notified through [employer]

## 2024-09-28 ENCOUNTER — HOSPITAL ENCOUNTER (EMERGENCY)
Facility: HOSPITAL | Age: 59
Discharge: HOME | End: 2024-09-28
Attending: STUDENT IN AN ORGANIZED HEALTH CARE EDUCATION/TRAINING PROGRAM
Payer: MEDICAID

## 2024-09-28 VITALS
TEMPERATURE: 98 F | BODY MASS INDEX: 33.38 KG/M2 | SYSTOLIC BLOOD PRESSURE: 120 MMHG | DIASTOLIC BLOOD PRESSURE: 77 MMHG | RESPIRATION RATE: 19 BRPM | OXYGEN SATURATION: 95 % | HEIGHT: 60 IN | WEIGHT: 170 LBS | HEART RATE: 77 BPM

## 2024-09-28 DIAGNOSIS — R21 EXCORIATED RASH: ICD-10-CM

## 2024-09-28 DIAGNOSIS — L03.90 CELLULITIS, UNSPECIFIED CELLULITIS SITE: Primary | ICD-10-CM

## 2024-09-28 PROCEDURE — 99284 EMERGENCY DEPT VISIT MOD MDM: CPT

## 2024-09-28 PROCEDURE — 99283 EMERGENCY DEPT VISIT LOW MDM: CPT

## 2024-09-28 RX ORDER — IBUPROFEN 600 MG/1
600 TABLET, FILM COATED ORAL ONCE
Status: COMPLETED | OUTPATIENT
Start: 2024-09-28 | End: 2024-09-28

## 2024-09-28 RX ORDER — CEPHALEXIN 500 MG/1
500 CAPSULE ORAL ONCE
Status: COMPLETED | OUTPATIENT
Start: 2024-09-28 | End: 2024-09-28

## 2024-09-28 RX ORDER — HYDROXYZINE HYDROCHLORIDE 25 MG/1
25 TABLET, FILM COATED ORAL ONCE
Status: COMPLETED | OUTPATIENT
Start: 2024-09-28 | End: 2024-09-28

## 2024-09-28 RX ORDER — CEPHALEXIN 500 MG/1
500 CAPSULE ORAL 4 TIMES DAILY
Qty: 28 CAPSULE | Refills: 0 | Status: SHIPPED | OUTPATIENT
Start: 2024-09-28 | End: 2024-10-05

## 2024-09-28 RX ORDER — HYDROXYZINE HYDROCHLORIDE 25 MG/1
25 TABLET, FILM COATED ORAL EVERY 6 HOURS PRN
Qty: 20 TABLET | Refills: 0 | Status: SHIPPED | OUTPATIENT
Start: 2024-09-28 | End: 2024-10-28

## 2024-09-28 NOTE — ED INITIAL ASSESSMENT (HPI)
Patient ambulatory to ED for generalized rash and joint pain.  Patient has hx of hepatic urticaria but reports this rash is different. Reports some \"areas of rash are leaking clear fluid\". No difficulty breathing noted. Patient is passing secretions fine. Reports generalized joint pain. Aaox4.

## 2024-09-28 NOTE — ED PROVIDER NOTES
History   No chief complaint on file.      HPI    59 year old female presents with rash.  Patient states she chronically has a pruritic rash and excoriations that she was previously using clobetasol ointment for but over the past few days reports they have all become more pruritic and painful in her lower extremities are more red.  She also has myalgias intermittently and was told that she may have arthritis.  No fevers or vomiting.  Reports a prior history of cirrhosis, chronically on methadone, history of treated hepatitis C.            Past Medical History:    Anxiety    Bipolar affective disorder (HCC)    Cirrhosis of liver (HCC)    Colitis    ulcerative colitis    COPD (chronic obstructive pulmonary disease) (HCC)    Coronary atherosclerosis    Crack cocaine use    Depression    Essential hypertension    JOHN (generalized anxiety disorder)    Hepatitis    Hep C    Hepatitis C    1989    High blood pressure    History of blood transfusion    no reactions    Methadone use    Migraines    Nausea & vomiting    Osteoarthritis    Pneumonia due to organism    Polysubstance dependence including opioid drug with daily use (HCC)    PTSD (post-traumatic stress disorder)    Sleep apnea    NO CPAP       Past Surgical History:   Procedure Laterality Date    Angioplasty (coronary)  2019    3 total    Appendectomy      2012    Cath percutaneous  transluminal coronary angioplasty      Lap supracerv hysterectomy  2010    supracervical hysterectomy    Tonsillectomy         Social History     Socioeconomic History    Marital status:    Tobacco Use    Smoking status: Every Day     Current packs/day: 0.50     Average packs/day: 0.5 packs/day for 42.0 years (21.0 ttl pk-yrs)     Types: Cigarettes    Smokeless tobacco: Never   Vaping Use    Vaping status: Every Day    Substances: THC, CBD   Substance and Sexual Activity    Alcohol use: Not Currently    Drug use: Yes     Frequency: 7.0 times per week     Types: Cannabis      Comment: vetojed daily    Sexual activity: Not Currently     Partners: Male   Other Topics Concern    Reaction to local anesthetic No    Pt has a pacemaker No    Pt has a defibrillator No    Blood Transfusions Yes     Social Determinants of Health     Financial Resource Strain: Low Risk  (2/22/2022)    [facility]    Overall Financial Resource Strain (CARDIA)     Difficulty of Paying Living Expenses: Not very hard   Food Insecurity: No Food Insecurity (7/13/2024)    Food Insecurity     Food Insecurity: Never true   Transportation Needs: No Transportation Needs (7/13/2024)    Transportation Needs     Lack of Transportation: No    [facility]    Social Connections   Housing Stability: Low Risk  (7/13/2024)    Housing Stability     Housing Instability: No                   Physical Exam     ED Triage Vitals [09/28/24 1820]   /77   Pulse 77   Resp 19   Temp 98.1 °F (36.7 °C)   Temp src Oral   SpO2 95 %   O2 Device None (Room air)       Physical Exam  Constitutional:       Appearance: She is not ill-appearing.   Cardiovascular:      Rate and Rhythm: Normal rate.   Pulmonary:      Effort: Pulmonary effort is normal.   Musculoskeletal:      Cervical back: Normal range of motion and neck supple.      Comments: Ranging joints without difficulty.   Skin:     Comments: Scattered excoriations with overlying scratch marks along bilateral upper and lower extremities, upper chest and upper back.  Lower extremity has some surrounding erythema without purulent drainage or induration.   Neurological:      Mental Status: She is alert.              ED Course     Labs Reviewed - No data to display  No results found.        MDM     Vitals:    09/28/24 1820   BP: 120/77   Pulse: 77   Resp: 19   Temp: 98.1 °F (36.7 °C)   TempSrc: Oral   SpO2: 95%   Weight: 77.1 kg   Height: 152.4 cm (5')       Acute on chronic pruritic  dermatitis with excoriations.  Distribution not consistent with infection such as scabies.  Advised patient she can continue her steroid ointment, antihistamines for pruritus, will start patient on a course of antibiotics to help cover for cellulitis that she has had previously per chart review.  Referral to dermatology given.             Disposition and Plan     Clinical Impression:  1. Cellulitis, unspecified cellulitis site    2. Excoriated rash        Disposition:  Discharge    Follow-up:  Live Contreras MD  [address] [facility]  [address]  [facility] [id number] [phone]    Follow up        Medications Prescribed:  Discharge Medication List as of 9/28/2024  7:06 PM        START taking these medications    Details   !! hydrOXYzine 25 MG Oral Tab Take 1 tablet (25 mg total) by mouth every 6 (six) hours as needed for Itching., Normal, Disp-20 tablet, R-0      !! cephALEXin 500 MG Oral Cap Take 1 capsule (500 mg total) by mouth 4 (four) times daily for 7 days., Normal, Disp-28 capsule, R-0       !! - Potential duplicate medications found. Please discuss with provider.

## 2024-09-29 NOTE — CM/SW NOTE
CHRISTINE De La Cruz requested transportation assistance for patient ready to discharge but has no money and nobody to pick her up. EDCM tried calling patient's  daughter Daren but she didn't answer and no voicemail set up. Patient is going home to address listed on the face sheet.    Courtesy Florentin elizabeth arranged, ETA 7 minutes, [employer] [id number] driven by Maury. Ride details given to CHRISTINE De La Cruz.

## 2024-10-04 ENCOUNTER — TELEPHONE (OUTPATIENT)
Dept: FAMILY MEDICINE CLINIC | Facility: CLINIC | Age: 59
End: 2024-10-04
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
## 2024-10-07 DIAGNOSIS — F41.1 GENERALIZED ANXIETY DISORDER: ICD-10-CM

## 2024-10-08 RX ORDER — METHOCARBAMOL 500 MG/1
500 TABLET, FILM COATED ORAL 4 TIMES DAILY
Qty: 120 TABLET | Refills: 0 | Status: SHIPPED | OUTPATIENT
Start: 2024-10-08

## 2024-10-08 RX ORDER — ALPRAZOLAM 1 MG
1 TABLET ORAL 3 TIMES DAILY
Qty: 90 TABLET | Refills: 0 | Status: SHIPPED | OUTPATIENT
Start: 2024-10-08

## 2024-10-08 NOTE — TELEPHONE ENCOUNTER
Message noted: Chart reviewed and may refill medication as requested. Script sent to listed pharmacy by secure method.    Pt notified through [employer]

## 2024-11-05 ENCOUNTER — TELEPHONE (OUTPATIENT)
Dept: FAMILY MEDICINE CLINIC | Facility: CLINIC | Age: 59
End: 2024-11-05

## 2024-11-05 DIAGNOSIS — F41.1 GENERALIZED ANXIETY DISORDER: ICD-10-CM

## 2024-11-05 DIAGNOSIS — B18.2 CHRONIC HEPATITIS C WITHOUT HEPATIC COMA (HCC): ICD-10-CM

## 2024-11-05 DIAGNOSIS — L29.9 ITCHING: ICD-10-CM

## 2024-11-06 RX ORDER — FUROSEMIDE 20 MG/1
20 TABLET ORAL DAILY
Qty: 30 TABLET | Refills: 0 | Status: SHIPPED | OUTPATIENT
Start: 2024-11-06 | End: 2024-12-02

## 2024-11-06 RX ORDER — ALPRAZOLAM 1 MG/1
1 TABLET ORAL 3 TIMES DAILY
Qty: 90 TABLET | Refills: 0 | Status: SHIPPED | OUTPATIENT
Start: 2024-11-06

## 2024-11-06 RX ORDER — HYDROXYZINE HYDROCHLORIDE 25 MG/1
25 TABLET, FILM COATED ORAL 3 TIMES DAILY PRN
Qty: 30 TABLET | Refills: 0 | Status: SHIPPED | OUTPATIENT
Start: 2024-11-06

## 2024-11-06 RX ORDER — ALPRAZOLAM 1 MG/1
1 TABLET ORAL 3 TIMES DAILY
Qty: 90 TABLET | Refills: 0 | OUTPATIENT
Start: 2024-11-06

## 2024-11-06 RX ORDER — IBUPROFEN 800 MG/1
800 TABLET, FILM COATED ORAL DAILY PRN
Qty: 30 TABLET | Refills: 0 | OUTPATIENT
Start: 2024-11-06

## 2024-11-06 RX ORDER — FUROSEMIDE 20 MG/1
20 TABLET ORAL DAILY
Qty: 90 TABLET | Refills: 1 | OUTPATIENT
Start: 2024-11-06

## 2024-11-06 RX ORDER — HYDROXYZINE HYDROCHLORIDE 25 MG/1
25 TABLET, FILM COATED ORAL 3 TIMES DAILY PRN
Qty: 30 TABLET | Refills: 0 | OUTPATIENT
Start: 2024-11-06

## 2024-11-06 NOTE — TELEPHONE ENCOUNTER
Please contact pt about medications as ? On hydroxyzine and alprazolam/ needs appointment to review medications. Refilled furosemide.

## 2024-11-06 NOTE — TELEPHONE ENCOUNTER
Message noted. May refill alprazolam as requested. Erx sent to listed pharmacy. Not to at same time with opiate or atarax medication Please notify patient

## 2024-11-06 NOTE — TELEPHONE ENCOUNTER
Message noted: Chart reviewed and may refill medication as requested times one. Prescription sent to listed pharmacy. Please notify patient to make follow up appointment for further refills

## 2024-11-06 NOTE — TELEPHONE ENCOUNTER
Advised patient of Dr. Braswell's note. Patient verbalized understanding and stated what about the alprazolam? Needed a refill on that as well. States she is taking alprazolam 1mg  3 times daily. Medication pended.

## 2024-11-06 NOTE — TELEPHONE ENCOUNTER
Advised patient of Dr. Brasewll's note. Patient verbalized understanding. Stated that she takes the hydroxyzine daily for itching. States she has hepatic cirrhosis. Stated that she has been taking the medication for a while. Initially prescribed by dermatologist. Last saw Dr Braswell in the office on 9/28/2024.

## 2024-11-07 NOTE — TELEPHONE ENCOUNTER
Called patient and verified name and , informed her of medication being sent in and instructed not to take with any opiates or atarax. Patient verbalized understanding.

## 2024-12-01 DIAGNOSIS — B18.2 CHRONIC HEPATITIS C WITHOUT HEPATIC COMA (HCC): ICD-10-CM

## 2024-12-02 RX ORDER — FUROSEMIDE 20 MG/1
20 TABLET ORAL DAILY
Qty: 30 TABLET | Refills: 0 | Status: SHIPPED | OUTPATIENT
Start: 2024-12-02

## 2024-12-08 DIAGNOSIS — F41.1 GENERALIZED ANXIETY DISORDER: ICD-10-CM

## 2024-12-09 RX ORDER — ALPRAZOLAM 1 MG/1
1 TABLET ORAL 3 TIMES DAILY
Qty: 90 TABLET | Refills: 0 | Status: SHIPPED | OUTPATIENT
Start: 2024-12-09

## 2024-12-09 NOTE — TELEPHONE ENCOUNTER
Message noted: Chart reviewed and may refill medication as requested. Script sent to listed pharmacy by secure method.    Pt notified through [employer]

## 2024-12-09 NOTE — TELEPHONE ENCOUNTER
Please review; protocol failed/No Protocol    Recent Fills: 09/06/2024, 10/08/2024, 11/06/2024    Last Rx Written: 11/06/2024    Last Office Visit: Telemedicine 03/26/2024    Requested Prescriptions   Pending Prescriptions Disp Refills    ALPRAZOLAM 1 MG Oral Tab [Pharmacy Med Name: ALPRAZOLAM 1MG TABLETS] 90 tablet 0     Sig: TAKE 1 TABLET(1 MG) BY MOUTH THREE TIMES DAILY       Controlled Substance Medication Failed - 12/9/2024  2:29 PM        Failed - This medication is a controlled substance - forward to provider to refill             Recent [facility]              [facility]    [facility]    7 months ago Pelvic [facility]    [facility] Rowena Khan APR[facility]    Office Visit    8 months ago Rash and nonspecific skin eruption    [facility]    Telemedicine    9 months ago Pelvic pain    [facility] Vicky Dunham DO    Office Visit    10 months ago Pain passing urine    [facility]    Office Visit

## 2024-12-09 NOTE — TELEPHONE ENCOUNTER
Patient called once again regarding the medication refill for ALPRAZolam 1 MG Oral Tab   Patient states they are out of medication.

## 2024-12-28 DIAGNOSIS — L29.9 ITCHING: ICD-10-CM

## 2024-12-29 RX ORDER — HYDROXYZINE HYDROCHLORIDE 25 MG/1
25 TABLET, FILM COATED ORAL 3 TIMES DAILY PRN
Qty: 30 TABLET | Refills: 0 | Status: SHIPPED | OUTPATIENT
Start: 2024-12-29

## 2024-12-29 RX ORDER — IBUPROFEN 800 MG/1
800 TABLET, FILM COATED ORAL DAILY PRN
Qty: 30 TABLET | Refills: 0 | Status: SHIPPED | OUTPATIENT
Start: 2024-12-29

## 2024-12-29 NOTE — TELEPHONE ENCOUNTER
Message noted: Chart reviewed and may refill medication as requested. Prescription sent to listed pharmacy. Pharmacy to notify patient. Pt notified through [employer]

## 2025-01-06 DIAGNOSIS — B18.2 CHRONIC HEPATITIS C WITHOUT HEPATIC COMA (HCC): ICD-10-CM

## 2025-01-06 DIAGNOSIS — L29.9 ITCHING: ICD-10-CM

## 2025-01-06 DIAGNOSIS — F41.1 GENERALIZED ANXIETY DISORDER: ICD-10-CM

## 2025-01-07 RX ORDER — HYDROXYZINE HYDROCHLORIDE 25 MG/1
25 TABLET, FILM COATED ORAL 3 TIMES DAILY PRN
Qty: 30 TABLET | Refills: 0 | Status: SHIPPED | OUTPATIENT
Start: 2025-01-07

## 2025-01-07 RX ORDER — ALPRAZOLAM 1 MG/1
1 TABLET ORAL 3 TIMES DAILY
Qty: 90 TABLET | Refills: 0 | Status: SHIPPED | OUTPATIENT
Start: 2025-01-07

## 2025-01-07 RX ORDER — FUROSEMIDE 20 MG/1
20 TABLET ORAL DAILY
Qty: 30 TABLET | Refills: 0 | Status: SHIPPED | OUTPATIENT
Start: 2025-01-07

## 2025-01-07 NOTE — TELEPHONE ENCOUNTER
Message noted: Chart reviewed and may refill medication as requested. Script sent to listed pharmacy by secure method.    Pt notified through [employer]

## 2025-01-09 ENCOUNTER — OFFICE VISIT (OUTPATIENT)
Dept: INTERNAL MEDICINE CLINIC | Facility: CLINIC | Age: 60
End: 2025-01-09

## 2025-01-09 ENCOUNTER — NURSE TRIAGE (OUTPATIENT)
Dept: FAMILY MEDICINE CLINIC | Facility: CLINIC | Age: 60
End: 2025-01-09

## 2025-01-09 VITALS
SYSTOLIC BLOOD PRESSURE: 120 MMHG | HEART RATE: 72 BPM | HEIGHT: 60 IN | TEMPERATURE: 98 F | WEIGHT: 183 LBS | RESPIRATION RATE: 16 BRPM | DIASTOLIC BLOOD PRESSURE: 78 MMHG | BODY MASS INDEX: 35.93 KG/M2

## 2025-01-09 DIAGNOSIS — L03.116 BILATERAL LOWER LEG CELLULITIS: ICD-10-CM

## 2025-01-09 DIAGNOSIS — R50.9 FEVER, UNSPECIFIED FEVER CAUSE: ICD-10-CM

## 2025-01-09 DIAGNOSIS — L03.115 BILATERAL LOWER LEG CELLULITIS: ICD-10-CM

## 2025-01-09 DIAGNOSIS — L29.9 PRURITUS: ICD-10-CM

## 2025-01-09 DIAGNOSIS — R21 RASH IN ADULT: Primary | ICD-10-CM

## 2025-01-09 PROCEDURE — 99213 OFFICE O/P EST LOW 20 MIN: CPT | Performed by: NURSE PRACTITIONER

## 2025-01-09 RX ORDER — DOXYCYCLINE 100 MG/1
100 CAPSULE ORAL 2 TIMES DAILY
Qty: 14 CAPSULE | Refills: 0 | Status: SHIPPED | OUTPATIENT
Start: 2025-01-09

## 2025-01-09 NOTE — TELEPHONE ENCOUNTER
Action Requested: Summary for Provider     []  Critical Lab, Recommendations Needed  [] Need Additional Advice  [x]   FYI    []   Need Orders  [] Need Medications Sent to Pharmacy  []  Other     SUMMARY: Per protocol, patient should go to the office now. Appointment scheduled.    Future Appointments   Date Time Provider Department Center   1/9/2025  4:20 PM Dian Rodriguez APRN [facility]     Reason for call: Rash  Onset: 10/2024    Daughter reports patient has worsening generalized rashes in the body and the face. Daughter states she does not know what it is, \"It's got things inside the rash.\"    States patient used to live with her but moved to [place] last year, has no income, and apartment is provided by the [facility].    Rash started a few months ago, started with just a bump and then got worse over time. Reports rashes looks like sores or boils that patient is picking and scratching. Might even have something inside it, daughter is unsure. States patient has a feral cat that she is trying to domesticate that most likely comes in and out of the apartment. Patient has this cat for about six months now. Daughter unsure if this cat has some disease that could have caused patient's skin problem.    Patient also sees a liver doctor and was told before that rashes could be from liver failure as well. Daughter states patient complained of high fevers, sweating, fatigue, and cannot stay awake lately, Patient is on methadone treatment and is seeing the provider who manages this currently.     Patient is just applying neosporin ointment to the rashes. Daughter states patient is not good with hygiene and has not been bathing as much as she should. Advised to schedule an appointment for evaluation. Care advice given per protocol. Patient verbalized understanding and agreed with plan of care.    Reason for Disposition   Large or small blisters on skin (i.e., fluid filled bubbles or sacs)    Protocols used: Rash or  Redness - Widespread-A-OH

## 2025-01-09 NOTE — PROGRESS NOTES
Anastasiia Hobbs is a 59 year old female.  Chief Complaint   Patient presents with    Rash     Entire body with blisters     HPI:   She presents to the office  with a rash located on her face, chest, legs and arms. She has a history of pruritis and cirrhosis. She is taking hydroxine as needed.     She has a ferral cat that is living with her. He has the same \"bumps\" as she has. She is sleeping in the same area as the cat.  She has had the rash for a few months. The rash is worsening. She is itching the rash which is causing open sores.     She takes methadone daily. She is sleepy in office. She typically goes to bed now.     She is having fevers daily. No fever in office today.      Current Outpatient Medications   Medication Sig Dispense Refill    doxycycline 100 MG Oral Cap Take 1 capsule (100 mg total) by mouth 2 (two) times daily. 14 capsule 0    HYDROXYZINE 25 MG Oral Tab TAKE 1 TABLET(25 MG) BY MOUTH THREE TIMES DAILY AS NEEDED FOR ANXIETY OR ITCHING 30 tablet 0    FUROSEMIDE 20 MG Oral Tab TAKE 1 TABLET(20 MG) BY MOUTH DAILY 30 tablet 0    ALPRAZOLAM 1 MG Oral Tab TAKE 1 TABLET(1 MG) BY MOUTH THREE TIMES DAILY 90 tablet 0    IBUPROFEN 800 MG Oral Tab TAKE 1 TABLET(800 MG) BY MOUTH DAILY AS NEEDED 30 tablet 0    METHOCARBAMOL 500 MG Oral Tab TAKE 1 TABLET(500 MG) BY MOUTH FOUR TIMES DAILY 120 tablet 0    FLUoxetine 20 MG Oral Cap TAKE 3 CAPSULES(60 MG) BY MOUTH DAILY 270 capsule 0    oxyCODONE 10 MG Oral Tab Take 1 tablet (10 mg total) by mouth every 4 (four) hours as needed. 30 tablet 0    Methadone HCl 10 MG/5ML Oral Solution Take 65 mL (130 mg total) by mouth daily.      albuterol 108 (90 Base) MCG/ACT Inhalation Aero Soln Inhale 2 puffs into the lungs every 4 (four) hours as needed. 8.5 g 5    clobetasol 0.05 % External Ointment APPLY TO THE AFFECTED AREA TWICE DAILY MONDAY-FRIDAY 60 g 1    ipratropium-albuterol 0.5-2.5 (3) MG/3ML Inhalation Solution Take 3 mL by nebulization in the morning and 3 mL at noon  and 3 mL in the evening. 120 each 1    fluticasone propionate 50 MCG/ACT Nasal Suspension 1 spray by Nasal route daily as needed for Rhinitis or Allergies. 16 g 1    cetirizine (ZYRTEC ALLERGY) 10 MG Oral Tab Take 1 tablet (10 mg total) by mouth daily as needed for Allergies or Rhinitis. 30 tablet 1    pantoprazole 40 MG Oral Tab EC Take 1 tablet (40 mg total) by mouth every morning before breakfast.  0    ondansetron 4 MG Oral Tablet Dispersible Take 1 tablet (4 mg total) by mouth every 4 (four) hours as needed for Nausea. 15 tablet 0    potassium chloride 10 MEQ Oral Tab CR Take 1 tablet (10 mEq total) by mouth 3 (three) times a week. 15 tablet 0    Naloxone HCl 4 MG/0.1ML Nasal Liquid 4 mg by Nasal route as needed. If patient remains unresponsive, repeat dose in other nostril 2-5 minutes after first dose. 1 kit 0    hydrOXYzine 25 MG Oral Tab Take 1 tablet (25 mg total) by mouth every 6 (six) hours as needed for Itching. 20 tablet 0      Past Medical History:    Anxiety    Bipolar affective disorder (HCC)    Cirrhosis of liver (HCC)    Colitis    ulcerative colitis    COPD (chronic obstructive pulmonary disease) (HCC)    Coronary atherosclerosis    Crack cocaine use    Depression    Essential hypertension    JOHN (generalized anxiety disorder)    Hepatitis    Hep C    Hepatitis C    1989    High blood pressure    History of blood transfusion    no reactions    Methadone use    Migraines    Nausea & vomiting    Osteoarthritis    Pneumonia due to organism    Polysubstance dependence including opioid drug with daily use (HCC)    PTSD (post-traumatic stress disorder)    Sleep apnea    NO CPAP      Past Surgical History:   Procedure Laterality Date    Angioplasty (coronary)  2019    3 total    Appendectomy      2012    Cath percutaneous  transluminal coronary angioplasty      Lap supracerv hysterectomy  2010    supracervical hysterectomy    Tonsillectomy        Social History:  Social History     Socioeconomic History     Marital status:    Tobacco Use    Smoking status: Every Day     Current packs/day: 0.50     Average packs/day: 0.5 packs/day for 42.0 years (21.0 ttl pk-yrs)     Types: Cigarettes    Smokeless tobacco: Never   Vaping Use    Vaping status: Every Day    Substances: THC, CBD   Substance and Sexual Activity    Alcohol use: Not Currently    Drug use: Yes     Frequency: 7.0 times per week     Types: Cannabis     Comment: vapes daily    Sexual activity: Not Currently     Partners: Male   Other Topics Concern    Reaction to local anesthetic No    Pt has a pacemaker No    Pt has a defibrillator No    Blood Transfusions Yes     Social Drivers of Health     Financial Resource Strain: Low Risk  (2/22/2022)    Received from [facility], [facility]    Overall Financial Resource Strain (CARDIA)     Difficulty of Paying Living Expenses: Not very hard   Food Insecurity: No Food Insecurity (7/13/2024)    Food Insecurity     Food Insecurity: Never true   Transportation Needs: No Transportation Needs (7/13/2024)    Transportation Needs     Lack of Transportation: No    Received from [facility], [facility]    Social Connections   Housing Stability: Low Risk  (7/13/2024)    Housing Stability     Housing Instability: No      Family History   Problem Relation Age of Onset    Cancer Father         brain    Hypertension Father     Cancer Mother         lung, metastatic    COPD Mother       Allergies[1]     REVIEW OF SYSTEMS:     Review of Systems   Constitutional:  Positive for fever.   HENT: Negative.     Cardiovascular:  Negative for chest pain.   Gastrointestinal: Negative.    Genitourinary: Negative.    Musculoskeletal:  Positive for arthralgias.   Skin:  Positive for rash.   Neurological: Negative.    Psychiatric/Behavioral: Negative.        Wt Readings from Last 5 Encounters:   01/09/25 183 lb (83 kg)   09/28/24 170 lb (77.1 kg)   08/12/24 180 lb (81.6 kg)   07/13/24  193 lb 6.4 oz (87.7 kg)   04/18/24 184 lb 6.4 oz (83.6 kg)     Body mass index is 35.74 kg/m².      EXAM:   /78 (BP Location: Right arm, Patient Position: Sitting, Cuff Size: adult)   Pulse 72   Temp 98.2 °F (36.8 °C)   Resp 16   Ht 5' (1.524 m)   Wt 183 lb (83 kg)   BMI 35.74 kg/m²     Physical Exam  Vitals reviewed.   Constitutional:       Appearance: Normal appearance.   HENT:      Head: Normocephalic.   Cardiovascular:      Rate and Rhythm: Normal rate and regular rhythm.      Pulses: Normal pulses.   Pulmonary:      Breath sounds: Normal breath sounds. No wheezing.   Musculoskeletal:         General: No swelling. Normal range of motion.   Skin:     General: Skin is warm and dry.      Findings: Rash present.   Neurological:      Mental Status: She is alert and oriented to person, place, and time.   Psychiatric:         Mood and Affect: Mood normal.         Behavior: Behavior normal.            ASSESSMENT AND PLAN:   1. Rash in adult  - Derm Referral - Igor Snyder)    2. Bilateral lower leg cellulitis  - secondary to the rash and pruritus   - doxycycline 100 MG Oral Cap; Take 1 capsule (100 mg total) by mouth 2 (two) times daily.  Dispense: 14 capsule; Refill: 0    3. Fever, unspecified fever cause  - could be related to #2  - CBC With Differential With Platelet  - Comp Metabolic Panel (14)  - Urinalysis with Culture Reflex    4. Pruritus  - continue hydroxyzine as needed       The patient indicates understanding of these issues and agrees to the plan.  Return for make an appointment with dermatology.         [1] No Known Allergies

## 2025-01-14 NOTE — TELEPHONE ENCOUNTER
Message noted: Chart reviewed and may refill medication as requested. Prescription sent to listed pharmacy. Pharmacy to notify patient. Pt notified through [employer]

## 2025-01-23 ENCOUNTER — OFFICE VISIT (OUTPATIENT)
Dept: DERMATOLOGY CLINIC | Facility: CLINIC | Age: 60
End: 2025-01-23

## 2025-01-23 DIAGNOSIS — L29.9 PRURITUS: Primary | ICD-10-CM

## 2025-01-23 PROCEDURE — 99214 OFFICE O/P EST MOD 30 MIN: CPT | Performed by: STUDENT IN AN ORGANIZED HEALTH CARE EDUCATION/TRAINING PROGRAM

## 2025-01-23 RX ORDER — URSODIOL 250 MG/1
500 TABLET, FILM COATED ORAL 2 TIMES DAILY
Qty: 120 TABLET | Refills: 3 | Status: SHIPPED | OUTPATIENT
Start: 2025-01-23

## 2025-01-23 NOTE — PROGRESS NOTES
Established patient      CHIEF COMPLAINT: Rash    HISTORY OF PRESENT ILLNESS: Anastasiia Hobbs is a 59 year old female here for evaluation of rash.    Location: All over the body  Duration: 6 months  Improving, worsening, or stable?: Worsening  Scaly?:Yes  Itchy?:Yes  Current treatment: Clobetasol  Past treatments: Clobetasol, took Doxycycline cleared up the rash and reappeared when she stopped.    Personal Dermatologic History  History of chronic skin disease: No  Family History  History autoimmune diseases:  No    Past Medical History  Past Medical History:    Anxiety    Bipolar affective disorder (HCC)    Cirrhosis of liver (HCC)    Colitis    ulcerative colitis    COPD (chronic obstructive pulmonary disease) (HCC)    Coronary atherosclerosis    Crack cocaine use    Depression    Essential hypertension    JOHN (generalized anxiety disorder)    Hepatitis    Hep C    Hepatitis C    1989    High blood pressure    History of blood transfusion    no reactions    Methadone use    Migraines    Nausea & vomiting    Osteoarthritis    Pneumonia due to organism    Polysubstance dependence including opioid drug with daily use (HCC)    PTSD (post-traumatic stress disorder)    Sleep apnea    NO CPAP       REVIEW OF SYSTEMS:  Constitutional: Denies fever, chills, unintentional weight loss.   Skin as per HPI    Medications  Current Outpatient Medications   Medication Sig Dispense Refill    FLUoxetine 20 MG Oral Cap TAKE 3 CAPSULES(60 MG) BY MOUTH DAILY 270 capsule 1    doxycycline 100 MG Oral Cap Take 1 capsule (100 mg total) by mouth 2 (two) times daily. 14 capsule 0    HYDROXYZINE 25 MG Oral Tab TAKE 1 TABLET(25 MG) BY MOUTH THREE TIMES DAILY AS NEEDED FOR ANXIETY OR ITCHING 30 tablet 0    FUROSEMIDE 20 MG Oral Tab TAKE 1 TABLET(20 MG) BY MOUTH DAILY 30 tablet 0    ALPRAZOLAM 1 MG Oral Tab TAKE 1 TABLET(1 MG) BY MOUTH THREE TIMES DAILY 90 tablet 0    IBUPROFEN 800 MG Oral Tab TAKE 1 TABLET(800 MG) BY MOUTH DAILY AS NEEDED 30  tablet 0    METHOCARBAMOL 500 MG Oral Tab TAKE 1 TABLET(500 MG) BY MOUTH FOUR TIMES DAILY 120 tablet 0    hydrOXYzine 25 MG Oral Tab Take 1 tablet (25 mg total) by mouth every 6 (six) hours as needed for Itching. 20 tablet 0    oxyCODONE 10 MG Oral Tab Take 1 tablet (10 mg total) by mouth every 4 (four) hours as needed. 30 tablet 0    Methadone HCl 10 MG/5ML Oral Solution Take 65 mL (130 mg total) by mouth daily.      albuterol 108 (90 Base) MCG/ACT Inhalation Aero Soln Inhale 2 puffs into the lungs every 4 (four) hours as needed. 8.5 g 5    clobetasol 0.05 % External Ointment APPLY TO THE AFFECTED AREA TWICE DAILY MONDAY-FRIDAY 60 g 1    ipratropium-albuterol 0.5-2.5 (3) MG/3ML Inhalation Solution Take 3 mL by nebulization in the morning and 3 mL at noon and 3 mL in the evening. 120 each 1    fluticasone propionate 50 MCG/ACT Nasal Suspension 1 spray by Nasal route daily as needed for Rhinitis or Allergies. 16 g 1    cetirizine (ZYRTEC ALLERGY) 10 MG Oral Tab Take 1 tablet (10 mg total) by mouth daily as needed for Allergies or Rhinitis. 30 tablet 1    pantoprazole 40 MG Oral Tab EC Take 1 tablet (40 mg total) by mouth every morning before breakfast.  0    ondansetron 4 MG Oral Tablet Dispersible Take 1 tablet (4 mg total) by mouth every 4 (four) hours as needed for Nausea. 15 tablet 0    potassium chloride 10 MEQ Oral Tab CR Take 1 tablet (10 mEq total) by mouth 3 (three) times a week. 15 tablet 0    Naloxone HCl 4 MG/0.1ML Nasal Liquid 4 mg by Nasal route as needed. If patient remains unresponsive, repeat dose in other nostril 2-5 minutes after first dose. 1 kit 0       PHYSICAL EXAM:  General: awake, alert, no acute distress  Skin: Skin exam was performed today including the following: face and trunk/extremities. Pertinent findings include:   - with stellate erosions    ASSESSMENT & PLAN:  Pathophysiology of diagnoses discussed with patient.  Therapeutic options reviewed. Risks, benefits, and alternatives  discussed with patient. Instructions reviewed at length.    #Erosions  #Pruritus - hepatic etiology in setting of hep c  #Prurigo nodularis   - Recommended Cerave itch   - start ursodiol 500mg twice daily   - In reserve: dupixent    Return to clinic:  8 weeks  or sooner if something concerning arises    Cale Lira MD

## 2025-01-24 ENCOUNTER — TELEPHONE (OUTPATIENT)
Dept: DERMATOLOGY CLINIC | Facility: CLINIC | Age: 60
End: 2025-01-24

## 2025-01-27 NOTE — TELEPHONE ENCOUNTER
Fax from [facility] - [place] denied for Ursodiol Oral Tablet 250 mg. Medication not supported for Pruritus.  Denial letter in your office for review.

## 2025-01-28 DIAGNOSIS — K74.60 CIRRHOSIS OF LIVER WITHOUT ASCITES, UNSPECIFIED HEPATIC CIRRHOSIS TYPE (HCC): Primary | ICD-10-CM

## 2025-01-28 DIAGNOSIS — L29.9 PRURITUS: ICD-10-CM

## 2025-01-28 RX ORDER — URSODIOL 250 MG/1
500 TABLET, FILM COATED ORAL 2 TIMES DAILY
Qty: 120 TABLET | Refills: 3 | Status: SHIPPED | OUTPATIENT
Start: 2025-01-28

## 2025-02-05 ENCOUNTER — TELEPHONE (OUTPATIENT)
Dept: FAMILY MEDICINE CLINIC | Facility: CLINIC | Age: 60
End: 2025-02-05

## 2025-02-05 DIAGNOSIS — F41.1 GENERALIZED ANXIETY DISORDER: ICD-10-CM

## 2025-02-05 RX ORDER — ALPRAZOLAM 1 MG/1
1 TABLET ORAL 3 TIMES DAILY
Qty: 90 TABLET | Refills: 0 | Status: SHIPPED | OUTPATIENT
Start: 2025-02-05

## 2025-02-05 NOTE — TELEPHONE ENCOUNTER
Message noted: Chart reviewed and may refill medication as requested. Script sent to listed pharmacy by secure method.    Pt notified through [email or web address]

## 2025-02-05 NOTE — TELEPHONE ENCOUNTER
Please review. Protocol Failed; No Protocol      Recent fills: 12/9/2024, 1/7/2025  Last Rx written: 1/7/2025  Last office visit: 12/2/2023    [employer] message sent to patient to schedule an office visit with Provider and/or  Routed to Patient  for assistance with appointment.     Recent Visits  Date Type Provider Dept   12/02/23 Office Visit Zacarias Godinez DO [facility]   12/01/23 Appointment Nicole Hinson APRN [facility]   Showing recent visits within past 540 days with a meds authorizing provider and meeting all other requirements  Future Appointments  No visits were found meeting these conditions.  Showing future appointments within next 150 days with a meds authorizing provider and meeting all other requirements        Requested Prescriptions   Pending Prescriptions Disp Refills    ALPRAZOLAM 1 MG Oral Tab [Pharmacy Med Name: ALPRAZOLAM 1MG TABLETS] 90 tablet 0     Sig: TAKE 1 TABLET(1 MG) BY MOUTH THREE TIMES DAILY       Controlled Substance Medication Failed - 2/5/2025 11:52 AM        Failed - This medication is a controlled substance - forward to provider to refill        Passed - Medication is active on med list                 Recent Outpatient Visits              [facility]    [facility] Visit    3 weeks ago Rash in adult    [facility], Dian Allen APRN    Office Visit    4 months [facility]    Office Visit    9 months ago Pelvic pain    [facility] Rowena Khan [facility]    Office Visit    10 months ago Rash and nonspecific skin eruption    [facility]    Telemedicine

## 2025-02-06 NOTE — TELEPHONE ENCOUNTER
Tried to call patient but phone on file is just ringing and to Daren (JERMAINE) but phone dropped and when tried to call her back, it went to her voice mail but it is full...  See message below.

## 2025-03-05 DIAGNOSIS — F41.1 GENERALIZED ANXIETY DISORDER: ICD-10-CM

## 2025-03-06 ENCOUNTER — OFFICE VISIT (OUTPATIENT)
Dept: FAMILY MEDICINE CLINIC | Facility: CLINIC | Age: 60
End: 2025-03-06

## 2025-03-06 VITALS
HEIGHT: 60 IN | DIASTOLIC BLOOD PRESSURE: 65 MMHG | OXYGEN SATURATION: 96 % | WEIGHT: 178 LBS | SYSTOLIC BLOOD PRESSURE: 103 MMHG | BODY MASS INDEX: 34.95 KG/M2 | HEART RATE: 71 BPM

## 2025-03-06 DIAGNOSIS — M25.50 ARTHRALGIA, UNSPECIFIED JOINT: ICD-10-CM

## 2025-03-06 DIAGNOSIS — B18.2 CHRONIC HEPATITIS C WITHOUT HEPATIC COMA (HCC): ICD-10-CM

## 2025-03-06 DIAGNOSIS — L29.9 PRURITUS: ICD-10-CM

## 2025-03-06 DIAGNOSIS — R41.89 COGNITIVE DECLINE: ICD-10-CM

## 2025-03-06 DIAGNOSIS — R21 RASH AND NONSPECIFIC SKIN ERUPTION: Primary | ICD-10-CM

## 2025-03-06 PROCEDURE — 99214 OFFICE O/P EST MOD 30 MIN: CPT | Performed by: FAMILY MEDICINE

## 2025-03-06 RX ORDER — ALPRAZOLAM 1 MG/1
1 TABLET ORAL 3 TIMES DAILY
Qty: 90 TABLET | Refills: 0 | Status: SHIPPED | OUTPATIENT
Start: 2025-03-06

## 2025-03-06 NOTE — TELEPHONE ENCOUNTER
Message noted: Chart reviewed and may refill medication as requested. Script sent to listed pharmacy by secure method.    Pt notified through [employer]

## 2025-03-06 NOTE — PROGRESS NOTES
Subjective:   Patient ID: Anastasiia Hobbs is a 59 year old female.    Pt presents with a itchy rash.  Has seen dermatology for this. Pt thinks she may have \"mites\" and has been treated with topical agents and antibiotics. Pt would like to see another dermatologist.  Daughter states pt has been more forgetful and also has not seen her liver specialist in some time. Has hx of chronic hepatitis C.   Short term memory has been worse over the last year and pt is forgetting to go to appointments.   Pt also has had sig joint aches and left shoulder discomfort. No injury or trauma.          History/Other:   Review of Systems   Constitutional:  Negative for fever.   Skin:  Positive for rash.     Current Outpatient Medications   Medication Sig Dispense Refill    ALPRAZOLAM 1 MG Oral Tab TAKE 1 TABLET(1 MG) BY MOUTH THREE TIMES DAILY 90 tablet 0    Ursodiol 250 MG Oral Tab Take 2 tablets (500 mg total) by mouth in the morning and 2 tablets (500 mg total) before bedtime. 120 tablet 3    FLUoxetine 20 MG Oral Cap TAKE 3 CAPSULES(60 MG) BY MOUTH DAILY 270 capsule 1    doxycycline 100 MG Oral Cap Take 1 capsule (100 mg total) by mouth 2 (two) times daily. 14 capsule 0    HYDROXYZINE 25 MG Oral Tab TAKE 1 TABLET(25 MG) BY MOUTH THREE TIMES DAILY AS NEEDED FOR ANXIETY OR ITCHING 30 tablet 0    FUROSEMIDE 20 MG Oral Tab TAKE 1 TABLET(20 MG) BY MOUTH DAILY 30 tablet 0    IBUPROFEN 800 MG Oral Tab TAKE 1 TABLET(800 MG) BY MOUTH DAILY AS NEEDED 30 tablet 0    METHOCARBAMOL 500 MG Oral Tab TAKE 1 TABLET(500 MG) BY MOUTH FOUR TIMES DAILY 120 tablet 0    oxyCODONE 10 MG Oral Tab Take 1 tablet (10 mg total) by mouth every 4 (four) hours as needed. 30 tablet 0    Methadone HCl 10 MG/5ML Oral Solution Take 65 mL (130 mg total) by mouth daily.      albuterol 108 (90 Base) MCG/ACT Inhalation Aero Soln Inhale 2 puffs into the lungs every 4 (four) hours as needed. 8.5 g 5    ipratropium-albuterol 0.5-2.5 (3) MG/3ML Inhalation Solution Take 3 mL by  nebulization in the morning and 3 mL at noon and 3 mL in the evening. 120 each 1    fluticasone propionate 50 MCG/ACT Nasal Suspension 1 spray by Nasal route daily as needed for Rhinitis or Allergies. 16 g 1    cetirizine (ZYRTEC ALLERGY) 10 MG Oral Tab Take 1 tablet (10 mg total) by mouth daily as needed for Allergies or Rhinitis. 30 tablet 1    pantoprazole 40 MG Oral Tab EC Take 1 tablet (40 mg total) by mouth every morning before breakfast.  0    ondansetron 4 MG Oral Tablet Dispersible Take 1 tablet (4 mg total) by mouth every 4 (four) hours as needed for Nausea. 15 tablet 0    potassium chloride 10 MEQ Oral Tab CR Take 1 tablet (10 mEq total) by mouth 3 (three) times a week. 15 tablet 0    Naloxone HCl 4 MG/0.1ML Nasal Liquid 4 mg by Nasal route as needed. If patient remains unresponsive, repeat dose in other nostril 2-5 minutes after first dose. 1 kit 0    clobetasol 0.05 % External Ointment APPLY TO THE AFFECTED AREA TWICE DAILY MONDAY-FRIDAY (Patient not taking: Reported on 3/6/2025) 60 g 1     Allergies:Allergies[1]    Objective:   Physical Exam  Constitutional:       Appearance: Normal appearance.   Cardiovascular:      Rate and Rhythm: Normal rate and regular rhythm.      Heart sounds: Normal heart sounds.   Pulmonary:      Effort: Pulmonary effort is normal.      Breath sounds: Normal breath sounds.   Abdominal:      General: Abdomen is flat. There is no distension.      Tenderness: There is no abdominal tenderness.   Skin:     Findings: Rash present.      Comments: Scabbed areas of skin for pt scratching.   Neurological:      Mental Status: She is alert and oriented to person, place, and time.   Psychiatric:         Mood and Affect: Mood normal.         Behavior: Behavior normal.         Assessment & Plan:   1. Rash and nonspecific skin eruption /   2. Pruritus:  - After discussion, will send to dermatology for further evaluation and treatment; To call if any significant symptoms.      3. Chronic  hepatitis C without hepatic coma:  - After discussion, will send to Dr. Dalton for further evaluation and treatment; To call if any significant symptoms.      4. Arthralgia, unspecified joint:  - After discussion with family and patient, will send to rheumatology for further evaluation and treatment; To call if any significant symptoms.      5. Cognitive decline:  - After discussion with family and patient, will send to neurology for further evaluation and treatment; To call if any significant symptoms.          No orders of the defined types were placed in this encounter.      Meds This Visit:  Requested Prescriptions      No prescriptions requested or ordered in this encounter       Imaging & Referrals:  DERM - INTERNAL  HEPATOLOGY - INTERNAL  NEURO - INTERNAL  RHEUMATOLOGY - INTERNAL         [1] No Known Allergies

## 2025-03-20 ENCOUNTER — TELEPHONE (OUTPATIENT)
Age: 60
End: 2025-03-20

## 2025-03-20 ENCOUNTER — OFFICE VISIT (OUTPATIENT)
Age: 60
End: 2025-03-20

## 2025-03-20 VITALS
HEART RATE: 54 BPM | BODY MASS INDEX: 34.95 KG/M2 | WEIGHT: 178 LBS | HEIGHT: 60 IN | SYSTOLIC BLOOD PRESSURE: 123 MMHG | DIASTOLIC BLOOD PRESSURE: 75 MMHG

## 2025-03-20 DIAGNOSIS — G89.29 CHRONIC PAIN OF BOTH KNEES: ICD-10-CM

## 2025-03-20 DIAGNOSIS — M15.9 GENERALIZED OSTEOARTHRITIS: ICD-10-CM

## 2025-03-20 DIAGNOSIS — M25.561 CHRONIC PAIN OF BOTH KNEES: ICD-10-CM

## 2025-03-20 DIAGNOSIS — M17.11 PRIMARY OSTEOARTHRITIS OF RIGHT KNEE: ICD-10-CM

## 2025-03-20 DIAGNOSIS — M25.562 CHRONIC PAIN OF BOTH KNEES: ICD-10-CM

## 2025-03-20 DIAGNOSIS — M25.50 POLYARTHRALGIA: Primary | ICD-10-CM

## 2025-03-20 DIAGNOSIS — R21 RASH: ICD-10-CM

## 2025-03-20 LAB
BASOPHILS NFR FLD: 0 %
COLOR FLD: YELLOW
EOSINOPHIL NFR FLD: 0 %
LYMPHOCYTES NFR FLD: 87 %
MONOS+MACROS NFR FLD: 8 %
NEUTROPHILS NFR FLD: 5 %
RBC # FLD: 197 /CUMM (ref ?–1)
TOTAL CELLS COUNTED FLD: 100
TOTAL CELLS COUNTED FLD: 240 /CUMM (ref ?–1)
WBC # FLD: 240 /CUMM

## 2025-03-20 PROCEDURE — 20610 DRAIN/INJ JOINT/BURSA W/O US: CPT | Performed by: INTERNAL MEDICINE

## 2025-03-20 PROCEDURE — 99205 OFFICE O/P NEW HI 60 MIN: CPT | Performed by: INTERNAL MEDICINE

## 2025-03-20 RX ORDER — TRIAMCINOLONE ACETONIDE 40 MG/ML
40 INJECTION, SUSPENSION INTRA-ARTICULAR; INTRAMUSCULAR ONCE
Status: COMPLETED | OUTPATIENT
Start: 2025-03-20 | End: 2025-03-20

## 2025-03-20 NOTE — PATIENT INSTRUCTIONS
Seen today for diffuse joint pain  Plan to get more workup to evaluate for anything autoimmune but my suspicion is low  X-rays of your hands, foot and knees in the past have shown significant osteoarthritis which is wear-and-tear in your joints  We injected your right knee with cortisone  Plan to get further x-rays and blood work
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
## 2025-03-20 NOTE — PROCEDURES
With paitent's consent, I injected pt's Right knee with 1ml lidocaine 1 % and 1 ml kenalog 40. It was done under sterile technique using iodine and alcohol swabs and ethyl chloride was used as an anaesthetic spray. Pt.  tolerated it well.  Right knee aspirated approximately 17 cc of yellow synovial fluid was removed

## 2025-04-01 NOTE — TELEPHONE ENCOUNTER
Pt order mail to pt home as requested to the number listed in the system. No further action needed.  Also , letter fax  DISPLAY PLAN FREE TEXT DISPLAY PLAN FREE TEXT within functional limits

## 2025-04-05 DIAGNOSIS — F41.1 GENERALIZED ANXIETY DISORDER: ICD-10-CM

## 2025-04-07 RX ORDER — ALPRAZOLAM 1 MG/1
1 TABLET ORAL 3 TIMES DAILY
Qty: 90 TABLET | Refills: 0 | Status: SHIPPED | OUTPATIENT
Start: 2025-04-07

## 2025-04-07 NOTE — TELEPHONE ENCOUNTER
Message noted: Chart reviewed and may refill medication as requested. Script sent to listed pharmacy by secure method.    Pt notified through [employer]

## 2025-04-08 ENCOUNTER — APPOINTMENT (OUTPATIENT)
Dept: GENERAL RADIOLOGY | Facility: HOSPITAL | Age: 60
End: 2025-04-08
Attending: EMERGENCY MEDICINE
Payer: MEDICAID

## 2025-04-08 ENCOUNTER — APPOINTMENT (OUTPATIENT)
Dept: CT IMAGING | Facility: HOSPITAL | Age: 60
End: 2025-04-08
Attending: EMERGENCY MEDICINE
Payer: MEDICAID

## 2025-04-08 ENCOUNTER — HOSPITAL ENCOUNTER (EMERGENCY)
Facility: HOSPITAL | Age: 60
Discharge: HOME | End: 2025-04-09
Attending: EMERGENCY MEDICINE
Payer: MEDICAID

## 2025-04-08 VITALS
OXYGEN SATURATION: 97 % | HEART RATE: 80 BPM | RESPIRATION RATE: 20 BRPM | TEMPERATURE: 97 F | SYSTOLIC BLOOD PRESSURE: 127 MMHG | DIASTOLIC BLOOD PRESSURE: 81 MMHG

## 2025-04-08 DIAGNOSIS — R07.89 CHEST PAIN, ATYPICAL: Primary | ICD-10-CM

## 2025-04-08 DIAGNOSIS — L03.119 CELLULITIS OF LOWER EXTREMITY, UNSPECIFIED LATERALITY: ICD-10-CM

## 2025-04-08 LAB
ANION GAP SERPL CALC-SCNC: 8 MMOL/L (ref 0–18)
BASOPHILS # BLD AUTO: 0.01 X10(3) UL (ref 0–0.2)
BASOPHILS NFR BLD AUTO: 0.2 %
BUN BLD-MCNC: 9 MG/DL (ref 9–23)
BUN/CREAT SERPL: 11.5 (ref 10–20)
CALCIUM BLD-MCNC: 9.3 MG/DL (ref 8.7–10.4)
CHLORIDE SERPL-SCNC: 104 MMOL/L (ref 98–112)
CO2 SERPL-SCNC: 31 MMOL/L (ref 21–32)
CREAT BLD-MCNC: 0.78 MG/DL
D DIMER PPP FEU-MCNC: 0.89 UG/ML FEU (ref ?–0.59)
DEPRECATED RDW RBC AUTO: 38.2 FL (ref 35.1–46.3)
EGFRCR SERPLBLD CKD-EPI 2021: 87 ML/MIN/1.73M2 (ref 60–?)
EOSINOPHIL # BLD AUTO: 0.07 X10(3) UL (ref 0–0.7)
EOSINOPHIL NFR BLD AUTO: 1.3 %
ERYTHROCYTE [DISTWIDTH] IN BLOOD BY AUTOMATED COUNT: 12.3 % (ref 11–15)
FLUAV + FLUBV RNA SPEC NAA+PROBE: NEGATIVE
FLUAV + FLUBV RNA SPEC NAA+PROBE: NEGATIVE
GLUCOSE BLD-MCNC: 126 MG/DL (ref 70–99)
HCT VFR BLD AUTO: 35.3 %
HGB BLD-MCNC: 12.8 G/DL
IMM GRANULOCYTES # BLD AUTO: 0.01 X10(3) UL (ref 0–1)
IMM GRANULOCYTES NFR BLD: 0.2 %
LACTATE SERPL-SCNC: 1.5 MMOL/L (ref 0.5–2)
LYMPHOCYTES # BLD AUTO: 1.58 X10(3) UL (ref 1–4)
LYMPHOCYTES NFR BLD AUTO: 28.3 %
MCH RBC QN AUTO: 31.2 PG (ref 26–34)
MCHC RBC AUTO-ENTMCNC: 36.3 G/DL (ref 31–37)
MCV RBC AUTO: 86.1 FL
MONOCYTES # BLD AUTO: 0.27 X10(3) UL (ref 0.1–1)
MONOCYTES NFR BLD AUTO: 4.8 %
NEUTROPHILS # BLD AUTO: 3.65 X10 (3) UL (ref 1.5–7.7)
NEUTROPHILS # BLD AUTO: 3.65 X10(3) UL (ref 1.5–7.7)
NEUTROPHILS NFR BLD AUTO: 65.2 %
OSMOLALITY SERPL CALC.SUM OF ELEC: 296 MOSM/KG (ref 275–295)
PLATELET # BLD AUTO: 207 10(3)UL (ref 150–450)
POTASSIUM SERPL-SCNC: 3.2 MMOL/L (ref 3.5–5.1)
RBC # BLD AUTO: 4.1 X10(6)UL
RSV RNA SPEC NAA+PROBE: NEGATIVE
SARS-COV-2 RNA RESP QL NAA+PROBE: NOT DETECTED
SODIUM SERPL-SCNC: 143 MMOL/L (ref 136–145)
TROPONIN I SERPL HS-MCNC: 3 NG/L
WBC # BLD AUTO: 5.6 X10(3) UL (ref 4–11)

## 2025-04-08 PROCEDURE — 96374 THER/PROPH/DIAG INJ IV PUSH: CPT

## 2025-04-08 PROCEDURE — 80048 BASIC METABOLIC PNL TOTAL CA: CPT | Performed by: EMERGENCY MEDICINE

## 2025-04-08 PROCEDURE — 93005 ELECTROCARDIOGRAM TRACING: CPT

## 2025-04-08 PROCEDURE — 71260 CT THORAX DX C+: CPT | Performed by: EMERGENCY MEDICINE

## 2025-04-08 PROCEDURE — 83605 ASSAY OF LACTIC ACID: CPT | Performed by: EMERGENCY MEDICINE

## 2025-04-08 PROCEDURE — 99285 EMERGENCY DEPT VISIT HI MDM: CPT

## 2025-04-08 PROCEDURE — 93010 ELECTROCARDIOGRAM REPORT: CPT

## 2025-04-08 PROCEDURE — 85379 FIBRIN DEGRADATION QUANT: CPT | Performed by: EMERGENCY MEDICINE

## 2025-04-08 PROCEDURE — 85025 COMPLETE CBC W/AUTO DIFF WBC: CPT | Performed by: EMERGENCY MEDICINE

## 2025-04-08 PROCEDURE — 71045 X-RAY EXAM CHEST 1 VIEW: CPT | Performed by: EMERGENCY MEDICINE

## 2025-04-08 PROCEDURE — 84484 ASSAY OF TROPONIN QUANT: CPT | Performed by: EMERGENCY MEDICINE

## 2025-04-08 PROCEDURE — 0241U SARS-COV-2/FLU A AND B/RSV BY PCR (GENEXPERT): CPT | Performed by: EMERGENCY MEDICINE

## 2025-04-08 RX ORDER — CEFADROXIL 500 MG/1
500 CAPSULE ORAL 2 TIMES DAILY
Qty: 20 CAPSULE | Refills: 0 | Status: SHIPPED | OUTPATIENT
Start: 2025-04-08 | End: 2025-04-18

## 2025-04-09 LAB
ATRIAL RATE: 97 BPM
P AXIS: 52 DEGREES
P-R INTERVAL: 136 MS
Q-T INTERVAL: 380 MS
QRS DURATION: 82 MS
QTC CALCULATION (BEZET): 482 MS
R AXIS: 59 DEGREES
T AXIS: 30 DEGREES
VENTRICULAR RATE: 97 BPM

## 2025-04-09 NOTE — ED PROVIDER NOTES
Patient Seen in: [facility] Emergency Department      History     Chief Complaint   Patient presents with    Fatigue    Chest Pain Angina     Stated Complaint: weakness/ R arm pain    Subjective:   HPI      Patient is a 59-year-old female who presents with 2 weeks of fatigue and lethargy.  She feels weak.  Denies dizziness.  Positive subjective fevers and productive cough over the last week.  She does have COPD and occasionally uses nebulizers.  She states she quit smoking 6 months ago.  She states for the last week she has had left-sided constant chest pressure and shortness of breath.  Denies any nausea or vomiting.  No recent hospitalizations.    Objective:     Past Medical History:    Anxiety    Bipolar affective disorder (HCC)    Cirrhosis of liver (HCC)    Colitis    ulcerative colitis    COPD (chronic obstructive pulmonary disease) (HCC)    Coronary atherosclerosis    Crack cocaine use    Depression    Essential hypertension    JOHN (generalized anxiety disorder)    Hepatitis    Hep C    Hepatitis C    1989    High blood pressure    History of blood transfusion    no reactions    Methadone use    Migraines    Nausea & vomiting    Osteoarthritis    Pneumonia due to organism    Polysubstance dependence including opioid drug with daily use (HCC)    PTSD (post-traumatic stress disorder)    Sleep apnea    NO CPAP              Past Surgical History:   Procedure Laterality Date    Angioplasty (coronary)  2019    3 total    Appendectomy      2012    Cath percutaneous  transluminal coronary angioplasty      Lap supracerv hysterectomy  2010    supracervical hysterectomy    Tonsillectomy                  Social History     Socioeconomic History    Marital status:    Tobacco Use    Smoking status: Former     Current packs/day: 0.50     Average packs/day: 0.5 packs/day for 42.0 years (21.0 ttl pk-yrs)     Types: Cigarettes    Smokeless tobacco: Never   Vaping Use    Vaping status: Every Day    Substances:  THC, CBD   Substance and Sexual Activity    Alcohol use: Not Currently    Drug use: Yes     Frequency: 7.0 times per week     Types: Cannabis     Comment: vapes daily    Sexual activity: Not Currently     Partners: Male   Other Topics Concern    Reaction to local anesthetic No    Pt has a pacemaker No    Pt has a defibrillator No    Blood Transfusions Yes     Social Drivers of Health     Food Insecurity: No Food Insecurity (7/13/2024)    Food Insecurity     Food Insecurity: Never true   Transportation Needs: No Transportation Needs (7/13/2024)    Transportation Needs     Lack of Transportation: No   Housing Stability: Low Risk  (7/13/2024)    Housing Stability     Housing Instability: No                  Physical Exam     ED Triage Vitals [04/08/25 2032]   /80   Pulse 103   Resp 18   Temp 97.1 °F (36.2 °C)   Temp src Temporal   SpO2 95 %   O2 Device None (Room air)       Current Vitals:   Vital Signs  BP: 127/81  Pulse: 80  Resp: 20  Temp: 97.1 °F (36.2 °C)  Temp src: Temporal  MAP (mmHg): 95    Oxygen Therapy  SpO2: 97 %  O2 Device: None (Room air)        Physical Exam  GENERAL: No acute distress, awake and alert  HEENT: EOMI, PERRL  Neck: supple, no jvd  CV: RRR, no murmurs  Resp: CTAB, no wheezes or retractions  Ab: soft, nontender, no distension  Extremities: FROM of all extremities, trace edema BLE. +left 3rd finger paronychia  Neuro: CN intact, normal speech, normal gait, 5/5 motor strength in all extremities, no focal deficits  SKIN: warm, dry, +erythema BLE, warm to touch. +multiple eschars/scabbing lesions on face, arms, legs      ED Course     Labs Reviewed   BASIC METABOLIC PANEL (8) - Abnormal; Notable for the following components:       Result Value    Glucose 126 (*)     Potassium 3.2 (*)     Calculated Osmolality 296 (*)     All other components within normal limits   D-DIMER - Abnormal; Notable for the following components:    D-Dimer 0.89 (*)     All other components within normal limits    TROPONIN I HIGH SENSITIVITY - Normal   LACTIC ACID, PLASMA - Normal   SARS-COV-2/FLU A AND B/RSV BY PCR (GENEXPERT) - Normal    Narrative:     This test is intended for the qualitative detection and differentiation of SARS-CoV-2, influenza A, influenza B, and respiratory syncytial virus (RSV) viral RNA in nasopharyngeal or nares swabs from individuals suspected of respiratory viral infection consistent with COVID-19 by their healthcare provider. Signs and symptoms of respiratory viral infection due to SARS-CoV-2, influenza, and RSV can be similar.    Test performed using the Xpert Xpress SARS-CoV-2/FLU/RSV (real time RT-PCR)  assay on the GeneXpert instrument, [employer], [place] [address].   This test is being used under the Food and Drug Administration's Emergency Use Authorization.    The authorized Fact Sheet for Healthcare Providers for this assay is available upon request from the laboratory.   CBC WITH DIFFERENTIAL WITH PLATELET     EKG    Rate, intervals and axes as noted on EKG Report.  Rate: 97  Rhythm: Sinus Rhythm  Reading: no LARRY, normal         MDM    Medical Decision Making  Pt with chest pains, cough, fevers-ddx includes pneumonia, sepsis, cellulitis  Labs reassuring  Vitals stable  Suspect cellulitis changes in lower extremities causing some of symptoms. Pt given ancef and feels comfortable with discharge with oral antibiotics and close f/u     Amount and/or Complexity of Data Reviewed  External Data Reviewed: notes.     Details: Recent pcp notes 2025 reviewed  Labs: ordered.  Radiology: ordered.     Details: XR CHEST AP PORTABLE  (CPT=71045)    Result Date: 4/8/2025  CONCLUSION:   No new focal opacity, pleural effusion, or pneumothorax.    Dictated by (CST): Damian Warren MD on 4/08/2025 at 10:11 PM     Finalized by (CST): Damian Warren MD on 4/08/2025 at 10:13 PM            Comparison October 31, 2022    CTA chest with IV contrast    IMPRESSION:    No pulmonary embolus. Technically adequate  study. No thoracic aneurysm or dissection.    Lungs are clear. No suspicious nodules or masses.    Mediastinal and cardiac structures are unremarkable.  The stomach is moderately distended.  Mild thoracic spondylosis.        Risk  Prescription drug management.        Disposition and Plan     Clinical Impression:  1. Chest pain, atypical    2. Cellulitis of lower extremity, unspecified laterality         Disposition:  Discharge  4/8/2025 11:09 pm    Follow-up:  Jose Braswell MD  [address] [place] [id number] [phone]    Follow up in 2 day(s)            Medications Prescribed:  Current Discharge Medication List        START taking these medications    Details   cefadroxil 500 MG Oral Cap Take 1 capsule (500 mg total) by mouth 2 (two) times daily for 10 days.  Qty: 20 capsule, Refills: 0                 Supplementary Documentation:

## 2025-05-05 DIAGNOSIS — L29.9 ITCHING: ICD-10-CM

## 2025-05-05 DIAGNOSIS — F41.1 GENERALIZED ANXIETY DISORDER: ICD-10-CM

## 2025-05-05 RX ORDER — ALPRAZOLAM 1 MG/1
1 TABLET ORAL 3 TIMES DAILY
Qty: 90 TABLET | Refills: 0 | Status: SHIPPED | OUTPATIENT
Start: 2025-05-05

## 2025-05-05 RX ORDER — ALBUTEROL SULFATE 90 UG/1
2 INHALANT RESPIRATORY (INHALATION) EVERY 4 HOURS PRN
Qty: 8.5 G | Refills: 5 | Status: SHIPPED | OUTPATIENT
Start: 2025-05-05

## 2025-05-05 RX ORDER — IBUPROFEN 800 MG/1
800 TABLET, FILM COATED ORAL DAILY PRN
Qty: 30 TABLET | Refills: 0 | Status: SHIPPED | OUTPATIENT
Start: 2025-05-05

## 2025-05-05 RX ORDER — HYDROXYZINE HYDROCHLORIDE 25 MG/1
25 TABLET, FILM COATED ORAL 3 TIMES DAILY PRN
Qty: 30 TABLET | Refills: 0 | Status: SHIPPED | OUTPATIENT
Start: 2025-05-05

## 2025-05-05 NOTE — TELEPHONE ENCOUNTER
Message noted: Chart reviewed and may refill medication as requested. Prescription sent to listed pharmacy. Pharmacy to notify patient. Pt notified through [employer]

## 2025-06-04 DIAGNOSIS — F41.1 GENERALIZED ANXIETY DISORDER: ICD-10-CM

## 2025-06-04 RX ORDER — ALPRAZOLAM 1 MG/1
1 TABLET ORAL 3 TIMES DAILY
Qty: 90 TABLET | Refills: 0 | Status: SHIPPED | OUTPATIENT
Start: 2025-06-04

## 2025-06-04 NOTE — TELEPHONE ENCOUNTER
Message noted: Chart reviewed and may refill medication as requested. Script sent to listed pharmacy by secure method.    Pt notified through [employer]

## 2025-06-11 DIAGNOSIS — B18.2 CHRONIC HEPATITIS C WITHOUT HEPATIC COMA (HCC): ICD-10-CM

## 2025-06-11 DIAGNOSIS — L29.9 ITCHING: ICD-10-CM

## 2025-06-12 RX ORDER — IBUPROFEN 800 MG/1
800 TABLET, FILM COATED ORAL DAILY PRN
Qty: 30 TABLET | Refills: 0 | Status: SHIPPED | OUTPATIENT
Start: 2025-06-12

## 2025-06-12 RX ORDER — HYDROXYZINE HYDROCHLORIDE 25 MG/1
25 TABLET, FILM COATED ORAL 3 TIMES DAILY PRN
Qty: 30 TABLET | Refills: 0 | Status: SHIPPED | OUTPATIENT
Start: 2025-06-12

## 2025-06-12 RX ORDER — FUROSEMIDE 20 MG/1
20 TABLET ORAL DAILY
Qty: 30 TABLET | Refills: 0 | Status: SHIPPED | OUTPATIENT
Start: 2025-06-12

## 2025-06-12 NOTE — TELEPHONE ENCOUNTER
Message noted: Chart reviewed and may refill medication as requested. Prescription sent to listed pharmacy. Pharmacy to notify patient. Pt notified through [employer]

## 2025-06-23 ENCOUNTER — HOSPITAL ENCOUNTER (EMERGENCY)
Facility: HOSPITAL | Age: 60
Discharge: LEFT BEFORE DISCHARGE | End: 2025-06-23
Payer: MEDICAID

## 2025-06-23 ENCOUNTER — HOSPITAL ENCOUNTER (OUTPATIENT)
Age: 60
Discharge: ANOTHER HOSPITAL | End: 2025-06-23
Payer: MEDICAID

## 2025-06-23 VITALS
TEMPERATURE: 98 F | RESPIRATION RATE: 20 BRPM | DIASTOLIC BLOOD PRESSURE: 71 MMHG | OXYGEN SATURATION: 97 % | HEART RATE: 59 BPM | SYSTOLIC BLOOD PRESSURE: 119 MMHG

## 2025-06-23 VITALS
OXYGEN SATURATION: 95 % | DIASTOLIC BLOOD PRESSURE: 57 MMHG | HEART RATE: 59 BPM | TEMPERATURE: 98 F | BODY MASS INDEX: 34 KG/M2 | WEIGHT: 175 LBS | RESPIRATION RATE: 18 BRPM | SYSTOLIC BLOOD PRESSURE: 106 MMHG

## 2025-06-23 DIAGNOSIS — R11.2 NAUSEA AND VOMITING IN ADULT: ICD-10-CM

## 2025-06-23 DIAGNOSIS — R10.11 RUQ PAIN: Primary | ICD-10-CM

## 2025-06-23 DIAGNOSIS — R39.9 UTI SYMPTOMS: ICD-10-CM

## 2025-06-23 LAB
BILIRUB UR QL STRIP: NEGATIVE
CLARITY UR: CLEAR
COLOR UR: YELLOW
GLUCOSE UR STRIP-MCNC: NEGATIVE MG/DL
HGB UR QL STRIP: NEGATIVE
KETONES UR STRIP-MCNC: NEGATIVE MG/DL
LEUKOCYTE ESTERASE UR QL STRIP: NEGATIVE
NITRITE UR QL STRIP: NEGATIVE
PH UR STRIP: 7 [PH]
PROT UR STRIP-MCNC: NEGATIVE MG/DL
SP GR UR STRIP: 1.02
UROBILINOGEN UR STRIP-ACNC: <2 MG/DL

## 2025-06-23 PROCEDURE — 99215 OFFICE O/P EST HI 40 MIN: CPT | Performed by: PHYSICIAN ASSISTANT

## 2025-06-23 PROCEDURE — 81002 URINALYSIS NONAUTO W/O SCOPE: CPT | Performed by: PHYSICIAN ASSISTANT

## 2025-06-23 NOTE — ED PROVIDER NOTES
Chief Complaint   Patient presents with    Urinary Symptoms       History obtained from patient   services not used    HPI:     Anastasiia Hobbs is a 59 year old female who presents with multiple complaints. Patient endorses increasing urinary frequency and pain in bladder x few weeks. Patient states she was found to have a tumor in her bladder \"a couple years back\" on imaging and had a biopsy done and was told it was not cancerous. Patient states it feels like tumor has gotten bigger and also states that she can \"smell blood\" in her urine. Patient also reports \"gallbladder flare up starting a few days ago.\" Patient reports nausea, vomiting, and fevers.  Patient has a history of liver failure secondary to hep C.  Patient continues to eat and drink normally and have normal bowel movements.  Patient states she is compliant with her medications.  Denies chest pain, shortness of breath, hematemesis, blood in stool.    PMH  Past Medical History[1]    PFSH    PFSH asessment screens reviewed and agree.  Nurses notes reviewed I agree with documentation.    Family History[2]  Family history reviewed with patient/caregiver and is not pertinent to presenting problem.  Social History     Socioeconomic History    Marital status:      Spouse name: Not on file    Number of children: Not on file    Years of education: Not on file    Highest education level: Not on file   Occupational History    Not on file   Tobacco Use    Smoking status: Former     Current packs/day: 0.50     Average packs/day: 0.5 packs/day for 42.0 years (21.0 ttl pk-yrs)     Types: Cigarettes    Smokeless tobacco: Never   Vaping Use    Vaping status: Every Day    Substances: THC, CBD   Substance and Sexual Activity    Alcohol use: Not Currently    Drug use: Yes     Frequency: 7.0 times per week     Types: Cannabis     Comment: vapes daily    Sexual activity: Not Currently     Partners: Male   Other Topics Concern    Grew up on a farm Not Asked     History of tanning Not Asked    Outdoor occupation Not Asked    Breast feeding Not Asked    Reaction to local anesthetic No    Pt has a pacemaker No    Pt has a defibrillator No     Service Not Asked    Blood Transfusions Yes    Caffeine Concern Not Asked    Occupational Exposure Not Asked    Hobby Hazards Not Asked    Sleep Concern Not Asked    Stress Concern Not Asked    Weight Concern Not Asked    Special Diet Not Asked    Back Care Not Asked    Exercise Not Asked    Bike Helmet Not Asked    Seat Belt Not Asked    Self-Exams Not Asked   Social History Narrative    Not on file     Social Drivers of Health     Food Insecurity: No Food Insecurity (7/13/2024)    Food Insecurity     Food Insecurity: Never true   Transportation Needs: No Transportation Needs (7/13/2024)    Transportation Needs     Lack of Transportation: No     Car Seat: Not on file   Housing Stability: Low Risk  (7/13/2024)    Housing Stability     Housing Instability: No     Housing Instability Emergency: Not on file     Crib or Bassinette: Not on file         ROS:   Positive for stated complaint: Urinary frequency, bladder pain, hematuria, nausea, vomiting, fevers, chills  Other systems are as noted in HPI.   All other systems reviewed and negative except as noted above.    Physical Exam:   Vital signs and nursing note reviewed.       /71   Pulse 59   Temp 98.3 °F (36.8 °C) (Oral)   Resp 20   SpO2 97%     GENERAL: well developed, no acute distress, non-toxic appearing   SKIN: good skin turgor, no obvious rashes  HEAD: normocephalic, atraumatic  EYES: sclera non-icteric bilaterally, conjunctiva clear bilaterally  OROPHARYNX: MMM, maintaining airway and secretions  NECK: no nuchal rigidity, no trismus, no edema, phonation normal    CARDIO: RRR, normal heart sounds   LUNGS: clear to auscultation bilaterally, no increased WOB, no rales, rhonchi, or wheezes  GI: normoactive bowel sounds, abdomen soft, epigastric and RUQ  tenderness  EXTREMITIES: no cyanosis, bilateral lower extremity edema, DOLAN without difficulty  NEURO: no focal deficits  PSYCH: alert and oriented x3, answering questions appropriately, mood appropriate    MDM/Assessment/Plan:   Orders for this encounter:    Orders Placed This Encounter    POCT Urinalysis Dipstick    POCT Urine Dip       Labs performed this visit:  Recent Results (from the past 10 hours)   POCT Urinalysis Dipstick    Collection Time: 06/23/25 12:14 PM   Result Value Ref Range    Urine Color Yellow Yellow    Urine Clarity Clear Clear    Specific Gravity, Urine 1.020 1.005 - 1.030    PH, Urine 7.0 5.0 - 8.0    Protein urine Negative Negative mg/dL    Glucose, Urine Negative Negative mg/dL    Ketone, Urine Negative Negative mg/dL    Bilirubin, Urine Negative Negative    Blood, Urine Negative Negative    Nitrite Urine Negative Negative    Urobilinogen urine <2.0 <2.0 mg/dL    Leukocyte esterase urine Negative Negative       Imaging performed this visit:  No orders to display       Medical Decision Making  DDx includes UTI versus interstitial cystitis versus overactive bladder versus cholecystitis versus cholelithiasis versus acute liver injury versus gastritis versus other.  Patient is overall well-appearing with stable vitals presenting with multiple complaints today.  Urine dipstick analysis reviewed, grossly unremarkable without evidence of hematuria or infection.  Discussed these results with patient.  Discussed limitations of IC setting in further evaluating patient's symptoms especially her right upper quadrant tenderness on exam.  Recommend patient be seen in ER for further evaluation.  Patient verbalizes understanding and states she will try to call her PCP otherwise go to [facility].  Patient declines EMS transport.  Patient seen ambulating out of IC with steady gait.    Discussed case with supervising attending Dr. Rodas who is in agreement with assessment and plan.          Diagnosis:     ICD-10-CM    1. RUQ pain  R10.11       2. Nausea and vomiting in adult  R11.2       3. UTI symptoms  R39.9           All results reviewed and discussed with patient/patient's family. Patient/patient's family verbalize excellent understanding of instructions and feels comfortable with plan. All of patient's/patient's family's questions were addressed.     Note: This document was dictated using Dragon medical dictation software.  Proofreading was performed to the best of my ability, but errors may be present.    Mildred Thomason PA-C       [1]   Past Medical History:   Anxiety    Bipolar affective disorder (HCC)    Cirrhosis of liver (HCC)    Colitis    ulcerative colitis    COPD (chronic obstructive pulmonary disease) (HCC)    Coronary atherosclerosis    Crack cocaine use    Depression    Essential hypertension    OJHN (generalized anxiety disorder)    Hepatitis    Hep C    Hepatitis C    1989    High blood pressure    History of blood transfusion    no reactions    Methadone use    Migraines    Nausea & vomiting    Osteoarthritis    Pneumonia due to organism    Polysubstance dependence including opioid drug with daily use (HCC)    PTSD (post-traumatic stress disorder)    Sleep apnea    NO CPAP   [2]   Family History  Problem Relation Age of Onset    Cancer Father         brain    Hypertension Father     Cancer Mother         lung, metastatic    COPD Mother

## 2025-06-23 NOTE — ED QUICK NOTES
Rn and ed tech tried called x 4 in waiting area, rn called phone on file who stated was incorrect number at this time. Unable to reach patient, considered lwbs

## 2025-06-23 NOTE — ED INITIAL ASSESSMENT (HPI)
Pt states she has a tumor in her bladder.  Increase urinary frequency worsening over the last month.  +fever and chills.  Pt states she can smell blood in her urine.  +vomiting.  Pt feels that she needs her gallbladder out.

## 2025-06-23 NOTE — ED INITIAL ASSESSMENT (HPI)
Pt arrives ambulatory to ED for fevers and urinary urgency. Pt states she has a tumor in her bladder. Aox4, speaking in full sentences.

## 2025-06-24 ENCOUNTER — HOSPITAL ENCOUNTER (EMERGENCY)
Facility: HOSPITAL | Age: 60
Discharge: HOME | End: 2025-06-24
Attending: EMERGENCY MEDICINE
Payer: MEDICAID

## 2025-06-24 VITALS
HEIGHT: 61 IN | OXYGEN SATURATION: 98 % | DIASTOLIC BLOOD PRESSURE: 72 MMHG | SYSTOLIC BLOOD PRESSURE: 118 MMHG | BODY MASS INDEX: 32.1 KG/M2 | TEMPERATURE: 98 F | RESPIRATION RATE: 20 BRPM | WEIGHT: 170 LBS | HEART RATE: 64 BPM

## 2025-06-24 DIAGNOSIS — R35.0 URINARY FREQUENCY: Primary | ICD-10-CM

## 2025-06-24 DIAGNOSIS — R10.84 ABDOMINAL PAIN, GENERALIZED: ICD-10-CM

## 2025-06-24 LAB
ALBUMIN SERPL-MCNC: 4.3 G/DL (ref 3.2–4.8)
ALBUMIN/GLOB SERPL: 1.8 {RATIO} (ref 1–2)
ALP LIVER SERPL-CCNC: 60 U/L (ref 46–118)
ALT SERPL-CCNC: <7 U/L (ref 10–49)
ANION GAP SERPL CALC-SCNC: 6 MMOL/L (ref 0–18)
AST SERPL-CCNC: 18 U/L (ref ?–34)
BASOPHILS # BLD AUTO: 0.01 X10(3) UL (ref 0–0.2)
BASOPHILS NFR BLD AUTO: 0.2 %
BILIRUB SERPL-MCNC: 0.6 MG/DL (ref 0.3–1.2)
BILIRUB UR QL: NEGATIVE
BUN BLD-MCNC: 16 MG/DL (ref 9–23)
BUN/CREAT SERPL: 20.3 (ref 10–20)
CALCIUM BLD-MCNC: 9.2 MG/DL (ref 8.7–10.4)
CHLORIDE SERPL-SCNC: 105 MMOL/L (ref 98–112)
CLARITY UR: CLEAR
CO2 SERPL-SCNC: 27 MMOL/L (ref 21–32)
COLOR UR: YELLOW
CREAT BLD-MCNC: 0.79 MG/DL (ref 0.55–1.02)
DEPRECATED RDW RBC AUTO: 39.2 FL (ref 35.1–46.3)
EGFRCR SERPLBLD CKD-EPI 2021: 86 ML/MIN/1.73M2 (ref 60–?)
EOSINOPHIL # BLD AUTO: 0.06 X10(3) UL (ref 0–0.7)
EOSINOPHIL NFR BLD AUTO: 1.2 %
ERYTHROCYTE [DISTWIDTH] IN BLOOD BY AUTOMATED COUNT: 12.4 % (ref 11–15)
GLOBULIN PLAS-MCNC: 2.4 G/DL (ref 2–3.5)
GLUCOSE BLD-MCNC: 69 MG/DL (ref 70–99)
GLUCOSE BLDC GLUCOMTR-MCNC: 78 MG/DL (ref 70–99)
GLUCOSE UR-MCNC: NEGATIVE MG/DL
HCT VFR BLD AUTO: 35.2 % (ref 35–48)
HGB BLD-MCNC: 12.5 G/DL (ref 12–16)
HGB UR QL STRIP.AUTO: NEGATIVE
IMM GRANULOCYTES # BLD AUTO: 0.01 X10(3) UL (ref 0–1)
IMM GRANULOCYTES NFR BLD: 0.2 %
KETONES UR-MCNC: NEGATIVE MG/DL
LEUKOCYTE ESTERASE UR QL STRIP.AUTO: NEGATIVE
LYMPHOCYTES # BLD AUTO: 1.33 X10(3) UL (ref 1–4)
LYMPHOCYTES NFR BLD AUTO: 27.4 %
MCH RBC QN AUTO: 30.8 PG (ref 26–34)
MCHC RBC AUTO-ENTMCNC: 35.5 G/DL (ref 31–37)
MCV RBC AUTO: 86.7 FL (ref 80–100)
MONOCYTES # BLD AUTO: 0.28 X10(3) UL (ref 0.1–1)
MONOCYTES NFR BLD AUTO: 5.8 %
NEUTROPHILS # BLD AUTO: 3.16 X10 (3) UL (ref 1.5–7.7)
NEUTROPHILS # BLD AUTO: 3.16 X10(3) UL (ref 1.5–7.7)
NEUTROPHILS NFR BLD AUTO: 65.2 %
NITRITE UR QL STRIP.AUTO: NEGATIVE
OSMOLALITY SERPL CALC.SUM OF ELEC: 286 MOSM/KG (ref 275–295)
PH UR: 7 [PH] (ref 5–8)
PLATELET # BLD AUTO: 137 10(3)UL (ref 150–450)
PLATELETS.RETICULATED NFR BLD AUTO: 4.4 % (ref 0–7)
POTASSIUM SERPL-SCNC: 4.4 MMOL/L (ref 3.5–5.1)
PROT SERPL-MCNC: 6.7 G/DL (ref 5.7–8.2)
PROT UR-MCNC: NEGATIVE MG/DL
RBC # BLD AUTO: 4.06 X10(6)UL (ref 3.8–5.3)
SODIUM SERPL-SCNC: 138 MMOL/L (ref 136–145)
SP GR UR STRIP: 1.01 (ref 1–1.03)
UROBILINOGEN UR STRIP-ACNC: 0.2
WBC # BLD AUTO: 4.9 X10(3) UL (ref 4–11)

## 2025-06-24 PROCEDURE — 82962 GLUCOSE BLOOD TEST: CPT

## 2025-06-24 PROCEDURE — 85025 COMPLETE CBC W/AUTO DIFF WBC: CPT | Performed by: EMERGENCY MEDICINE

## 2025-06-24 PROCEDURE — 99284 EMERGENCY DEPT VISIT MOD MDM: CPT

## 2025-06-24 PROCEDURE — 99283 EMERGENCY DEPT VISIT LOW MDM: CPT

## 2025-06-24 PROCEDURE — 80053 COMPREHEN METABOLIC PANEL: CPT | Performed by: EMERGENCY MEDICINE

## 2025-06-24 PROCEDURE — 36415 COLL VENOUS BLD VENIPUNCTURE: CPT

## 2025-06-24 PROCEDURE — 81003 URINALYSIS AUTO W/O SCOPE: CPT | Performed by: EMERGENCY MEDICINE

## 2025-06-24 PROCEDURE — 85025 COMPLETE CBC W/AUTO DIFF WBC: CPT

## 2025-06-24 PROCEDURE — 80053 COMPREHEN METABOLIC PANEL: CPT

## 2025-06-24 NOTE — ED QUICK NOTES
The patient is cleared for discharge per Emergency Department provider. Discharge instructions were reviewed with patient including when and how to follow up with healthcare providers as well as when to seek emergency care.  The patient dressed self and ambulated to exit with steady gait.

## 2025-06-24 NOTE — ED INITIAL ASSESSMENT (HPI)
Patient sent by IC. C/o urinary retention, urgency, abd pain, frequency, and R flank pain. Also co increased BLE swelling. +vomiting. +cough.   Hx. Bladder tumor

## 2025-06-25 NOTE — ED PROVIDER NOTES
Patient Seen in: [facility] Emergency Department    History     Chief Complaint   Patient presents with    Abdomen/Flank Pain       HPI    Patient presents to the ED complaining of some urinary frequency, intermittent diffuse abdominal cramps and bilateral lower leg swelling, vomiting and occasional coughing.  She denies other complaints.    History reviewed. Past Medical History[1]    History reviewed. Past Surgical History[2]      Medications :  Prescriptions Prior to Admission[3]     Family History[4]    Smoking Status: Social Hx on file[5]    Constitutional and vital signs reviewed.      Social History and Family History elements reviewed from today, pertinent positives to the presenting problem noted.    Physical Exam     ED Triage Vitals [06/24/25 1324]   /63   Pulse 60   Resp 20   Temp 97.8 °F (36.6 °C)   Temp src Temporal   SpO2 95 %   O2 Device None (Room air)       All measures to prevent infection transmission during my interaction with the patient were taken. Handwashing was performed prior to and after the exam.  Stethoscope and any equipment used during my examination was cleaned with super sani-cloth germicidal wipes following the exam.     Physical Exam  Vitals and nursing note reviewed.   Constitutional:       General: She is not in acute distress.     Appearance: She is well-developed. She is not ill-appearing or toxic-appearing.   HENT:      Head: Normocephalic and atraumatic.   Eyes:      General:         Right eye: No discharge.         Left eye: No discharge.      Conjunctiva/sclera: Conjunctivae normal.   Neck:      Trachea: No tracheal deviation.   Cardiovascular:      Rate and Rhythm: Normal rate and regular rhythm.   Pulmonary:      Effort: Pulmonary effort is normal. No respiratory distress.      Breath sounds: Normal breath sounds. No stridor.   Abdominal:      General: There is no distension.      Palpations: Abdomen is soft.      Tenderness: There is no abdominal  tenderness.   Musculoskeletal:         General: No deformity.   Skin:     General: Skin is warm and dry.   Neurological:      Mental Status: She is alert and oriented to person, place, and time.   Psychiatric:         Mood and Affect: Mood normal.         Behavior: Behavior normal.         ED Course        Labs Reviewed   CBC WITH DIFFERENTIAL WITH PLATELET - Abnormal; Notable for the following components:       Result Value    .0 (*)     All other components within normal limits   COMP METABOLIC PANEL (14) - Abnormal; Notable for the following components:    Glucose 69 (*)     BUN/CREA Ratio 20.3 (*)     ALT <7 (*)     All other components within normal limits   POCT GLUCOSE - Normal   URINALYSIS WITH CULTURE REFLEX   RAINBOW DRAW LAVENDER   RAINBOW DRAW LIGHT GREEN   RAINBOW DRAW BLUE   RAINBOW DRAW GOLD       As Interpreted by me    Imaging Results Available and Reviewed while in ED: No results found.  ED Medications Administered: Medications - No data to display      MDM     Vitals:    06/24/25 1324 06/24/25 1606   BP: 102/63 118/72   Pulse: 60 64   Resp: 20 20   Temp: 97.8 °F (36.6 °C)    TempSrc: Temporal    SpO2: 95% 98%   Weight: 77.1 kg    Height: 154.9 cm (5' 1\")      *I personally reviewed and interpreted all ED vitals.    Pulse Ox: 98%, Room air, Normal     Monitor Interpretation:   normal sinus rhythm as interpreted by me.  The cardiac monitor was ordered monitor heart rate.    Differential Diagnosis/ Diagnostic Considerations: UTI, urolithiasis, viral syndrome, intra-abdominal infection, other    Complicating Factors: The patient already has does not have any pertinent problems on file. to contribute to the complexity of this ED evaluation.    Medical Decision Making  Patient presents to the ED with multiple complaints.  Unremarkable examination, no abdominal tenderness, patient well-appearing on exam.  Laboratory testing without concerning findings.  No significant leukocytosis and normal  urinalysis.  Patient reassured and I feel stable for discharge with outpatient PCP follow-up.  Will refer back to urology as she states she never followed up after a biopsy years ago.    Problems Addressed:  Abdominal pain, generalized: acute illness or injury that poses a threat to life or bodily functions  Urinary frequency: acute illness or injury    Amount and/or Complexity of Data Reviewed  Labs: ordered. Decision-making details documented in ED Course.        Condition upon leaving the department: Stable    Disposition and Plan     Clinical Impression:  1. Urinary frequency    2. Abdominal pain, generalized        Disposition:  Discharge    Follow-up:  Jose Braswell MD  [address] [id number] [phone]    Schedule an appointment as soon as possible for a visit in 3 day(s)      Victorino Driscoll MD  [address]  [facility] [address] [phone]    Schedule an appointment as soon as possible for a visit in 1 week(s)        Medications Prescribed:  Discharge Medication List as of 6/24/2025  4:48 PM                           [1]   Past Medical History:   Anxiety    Bipolar affective disorder (HCC)    Cirrhosis of liver (HCC)    Colitis    ulcerative colitis    COPD (chronic obstructive pulmonary disease) (HCC)    Coronary atherosclerosis    Crack cocaine use    Depression    Essential hypertension    JOHN (generalized anxiety disorder)    Hepatitis    Hep C    Hepatitis C    1989    High blood pressure    History of blood transfusion    no reactions    Methadone use    Migraines    Nausea & vomiting    Osteoarthritis    Pneumonia due to organism    Polysubstance dependence including opioid drug with daily use (HCC)    PTSD (post-traumatic stress disorder)    Sleep apnea    NO CPAP   [2]   Past Surgical History:  Procedure Laterality Date    Angioplasty (coronary)  2019    3 total    Appendectomy      2012    Cath percutaneous  transluminal coronary angioplasty      Lap supracerv  hysterectomy  2010    supracervical hysterectomy    Tonsillectomy     [3] (Not in a hospital admission)  [4]   Family History  Problem Relation Age of Onset    Cancer Father         brain    Hypertension Father     Cancer Mother         lung, metastatic    COPD Mother    [5]   Social History  Socioeconomic History    Marital status:    Tobacco Use    Smoking status: Former     Current packs/day: 0.50     Average packs/day: 0.5 packs/day for 42.0 years (21.0 ttl pk-yrs)     Types: Cigarettes    Smokeless tobacco: Never   Vaping Use    Vaping status: Every Day    Substances: THC, CBD   Substance and Sexual Activity    Alcohol use: Not Currently    Drug use: Yes     Frequency: 7.0 times per week     Types: Cannabis     Comment: vapes daily    Sexual activity: Not Currently     Partners: Male   Other Topics Concern    Reaction to local anesthetic No    Pt has a pacemaker No    Pt has a defibrillator No    Blood Transfusions Yes

## 2025-07-06 DIAGNOSIS — F41.1 GENERALIZED ANXIETY DISORDER: ICD-10-CM

## 2025-07-07 RX ORDER — ALPRAZOLAM 1 MG/1
1 TABLET ORAL 3 TIMES DAILY
Qty: 90 TABLET | Refills: 0 | Status: SHIPPED | OUTPATIENT
Start: 2025-07-07

## 2025-07-07 NOTE — TELEPHONE ENCOUNTER
Message noted: Chart reviewed and may refill medication as requested. Script sent to listed pharmacy by secure method.    Pt notified through [employer]

## 2025-07-10 ENCOUNTER — LAB ENCOUNTER (OUTPATIENT)
Dept: LAB | Facility: HOSPITAL | Age: 60
End: 2025-07-10
Attending: INTERNAL MEDICINE
Payer: MEDICAID

## 2025-07-10 ENCOUNTER — OFFICE VISIT (OUTPATIENT)
Facility: CLINIC | Age: 60
End: 2025-07-10

## 2025-07-10 ENCOUNTER — TELEPHONE (OUTPATIENT)
Facility: CLINIC | Age: 60
End: 2025-07-10

## 2025-07-10 DIAGNOSIS — M79.89 SWELLING OF LOWER EXTREMITY: ICD-10-CM

## 2025-07-10 DIAGNOSIS — K74.60 HEPATIC CIRRHOSIS, UNSPECIFIED HEPATIC CIRRHOSIS TYPE, UNSPECIFIED WHETHER ASCITES PRESENT (HCC): Primary | ICD-10-CM

## 2025-07-10 LAB
AFP-TM SERPL-MCNC: 2.2 NG/ML (ref ?–8)
ALBUMIN SERPL-MCNC: 4.5 G/DL (ref 3.2–4.8)
ALBUMIN/GLOB SERPL: 1.9 {RATIO} (ref 1–2)
ALP LIVER SERPL-CCNC: 72 U/L (ref 46–118)
ALT SERPL-CCNC: <7 U/L (ref 10–49)
ANION GAP SERPL CALC-SCNC: 8 MMOL/L (ref 0–18)
AST SERPL-CCNC: 15 U/L (ref ?–34)
BASOPHILS # BLD AUTO: 0.01 X10(3) UL (ref 0–0.2)
BASOPHILS NFR BLD AUTO: 0.2 %
BILIRUB SERPL-MCNC: 0.3 MG/DL (ref 0.3–1.2)
BUN BLD-MCNC: 16 MG/DL (ref 9–23)
BUN/CREAT SERPL: 19 (ref 10–20)
CALCIUM BLD-MCNC: 9.4 MG/DL (ref 8.7–10.4)
CHLORIDE SERPL-SCNC: 102 MMOL/L (ref 98–112)
CO2 SERPL-SCNC: 28 MMOL/L (ref 21–32)
CREAT BLD-MCNC: 0.84 MG/DL (ref 0.55–1.02)
DEPRECATED RDW RBC AUTO: 40.1 FL (ref 35.1–46.3)
EGFRCR SERPLBLD CKD-EPI 2021: 80 ML/MIN/1.73M2 (ref 60–?)
EOSINOPHIL # BLD AUTO: 0.08 X10(3) UL (ref 0–0.7)
EOSINOPHIL NFR BLD AUTO: 1.7 %
ERYTHROCYTE [DISTWIDTH] IN BLOOD BY AUTOMATED COUNT: 12.2 % (ref 11–15)
FASTING STATUS PATIENT QL REPORTED: YES
GLOBULIN PLAS-MCNC: 2.4 G/DL (ref 2–3.5)
GLUCOSE BLD-MCNC: 110 MG/DL (ref 70–99)
HCT VFR BLD AUTO: 38.2 % (ref 35–48)
HGB BLD-MCNC: 13.4 G/DL (ref 12–16)
IMM GRANULOCYTES # BLD AUTO: 0.01 X10(3) UL (ref 0–1)
IMM GRANULOCYTES NFR BLD: 0.2 %
INR BLD: 1.2 (ref 0.8–1.2)
LYMPHOCYTES # BLD AUTO: 1.6 X10(3) UL (ref 1–4)
LYMPHOCYTES NFR BLD AUTO: 33.6 %
MCH RBC QN AUTO: 31.5 PG (ref 26–34)
MCHC RBC AUTO-ENTMCNC: 35.1 G/DL (ref 31–37)
MCV RBC AUTO: 89.7 FL (ref 80–100)
MONOCYTES # BLD AUTO: 0.21 X10(3) UL (ref 0.1–1)
MONOCYTES NFR BLD AUTO: 4.4 %
NEUTROPHILS # BLD AUTO: 2.85 X10 (3) UL (ref 1.5–7.7)
NEUTROPHILS # BLD AUTO: 2.85 X10(3) UL (ref 1.5–7.7)
NEUTROPHILS NFR BLD AUTO: 59.9 %
OSMOLALITY SERPL CALC.SUM OF ELEC: 288 MOSM/KG (ref 275–295)
PLATELET # BLD AUTO: 162 10(3)UL (ref 150–450)
POTASSIUM SERPL-SCNC: 3.8 MMOL/L (ref 3.5–5.1)
PROT SERPL-MCNC: 6.9 G/DL (ref 5.7–8.2)
PROTHROMBIN TIME: 15.9 SECONDS (ref 11.6–14.8)
RBC # BLD AUTO: 4.26 X10(6)UL (ref 3.8–5.3)
SODIUM SERPL-SCNC: 138 MMOL/L (ref 136–145)
WBC # BLD AUTO: 4.8 X10(3) UL (ref 4–11)

## 2025-07-10 PROCEDURE — 85610 PROTHROMBIN TIME: CPT | Performed by: INTERNAL MEDICINE

## 2025-07-10 PROCEDURE — 80053 COMPREHEN METABOLIC PANEL: CPT | Performed by: INTERNAL MEDICINE

## 2025-07-10 PROCEDURE — 82105 ALPHA-FETOPROTEIN SERUM: CPT | Performed by: INTERNAL MEDICINE

## 2025-07-10 PROCEDURE — 85025 COMPLETE CBC W/AUTO DIFF WBC: CPT | Performed by: INTERNAL MEDICINE

## 2025-07-10 PROCEDURE — 36415 COLL VENOUS BLD VENIPUNCTURE: CPT | Performed by: INTERNAL MEDICINE

## 2025-07-10 NOTE — TELEPHONE ENCOUNTER
Nursing:  Pt being referred by Dr. Blue. Needs cln/egd.   Recommend clinic visit. Can you please call the pt to schedule?    Thanks,  Jes    Known to Meme Hunter PA-C,  2022, Dr. Rojas 2021    HCV (GT1a/1b, Rx naive) cirrhosis without complications. Told of HCV around 1994   Completed  Epclusa (sofosbuvir 400mg / velpatasvir 100mg) 12 week regimen; SVR May '22     Egd 2/2022 w/ Dr. Cordova    IMPRESSION:  3+ cm hiatal hernia with significant acute and chronic reflux/vomiting esophagitis changes above it GE junction and distal esophagus as described above.  Normal stomach pylorus duodenum; no obstructing process or explanation seen on this exam for nausea and vomiting and epigastric abdominal pain.     RECOMMENDATIONS:     Continue high-dose pantoprazole 40 mg IV q12hrs  Advance diet    EGD/EUS Dr. Rojas 2021    Impression:  1. No choledocholithiasis  2. Dilated bile duct  3. Gastric ulcer  2. Nonspecific gastropathy  3. Small hiatal hernia  4. Candida esophagitis     Recommend:  1. Await pathology results  2. Pantoprazole BID  3. Diet as tolerated    Final Diagnosis:      A. Gastric ulcer; biopsy:  Oxyntic mucosa demonstrating chronic active gastritis with focal ulceration.  Diff-Quik stain negative for Helicobacter pylori organisms (appropriate control).  No intestinal metaplasia, dysplasia or malignancy identified.     B. Stomach; random biopsy:  Oxyntic mucosa demonstrating mild chronic inactive gastritis.  Diff-Quik stain negative for Helicobacter pylori organisms (appropriate control).  No intestinal metaplasia, dysplasia or malignancy identified.     C. Esophagus; biopsy:   Squamous mucosa with acute and chronic inflammation and numerous invasive fungal forms consistent with Candida species.  No goblet cell metaplasia or dysplasia identified.

## 2025-07-16 NOTE — H&P
[facility] - Gastroenterology                                                                                                               Reason for consult: eval    Requesting physician or provider: Jose Braswell MD    Chief Complaint   Patient presents with    Follow - Up     For abdominal pain, liver failure/ cirrhoisis       HPI:   Anastasiia Hobbs is a 59 year old year-old female with history of PMHx of Hep C cirrhosis [dx 1994, started treatment with Dr. Dalton 11/20/2021 completed 3 days prior], anxiety, PTSD, COPD, CAD, h/o opiate overdose, polysubstance abuse on methadone, sleep apnea w/o use of CPAP, carries diagnosis of ?ulcerative colitis although no treatment noted, current tobacco use, cannabis utilization and migraines:    she is here today for evaluation  Referral Dr. Blue to discuss cln/egd    Known to Meme Hunter PA-C,  2022, Dr. Rojas 2021     HCV (GT1a/1b, Rx naive) cirrhosis without complications. Told of HCV around 1994   Completed  Epclusa (sofosbuvir 400mg / velpatasvir 100mg) 12 week regimen; SVR May '22      Egd 2/2022 w/ Dr. Cordova    IMPRESSION:  3+ cm hiatal hernia with significant acute and chronic reflux/vomiting esophagitis changes above it GE junction and distal esophagus as described above.  Normal stomach pylorus duodenum; no obstructing process or explanation seen on this exam for nausea and vomiting and epigastric abdominal pain.     RECOMMENDATIONS:     Continue high-dose pantoprazole 40 mg IV q12hrs  Advance diet     EGD/EUS Dr. Rojas 2021    Impression:  1. No choledocholithiasis  2. Dilated bile duct  3. Gastric ulcer  2. Nonspecific gastropathy  3. Small hiatal hernia  4. Candida esophagitis     Recommend:  1. Await pathology results  2. Pantoprazole BID  3. Diet as tolerated     Final Diagnosis:      A. Gastric ulcer; biopsy:  Oxyntic mucosa demonstrating chronic  active gastritis with focal ulceration.  Diff-Quik stain negative for Helicobacter pylori organisms (appropriate control).  No intestinal metaplasia, dysplasia or malignancy identified.     B. Stomach; random biopsy:  Oxyntic mucosa demonstrating mild chronic inactive gastritis.  Diff-Quik stain negative for Helicobacter pylori organisms (appropriate control).  No intestinal metaplasia, dysplasia or malignancy identified.     C. Esophagus; biopsy:   Squamous mucosa with acute and chronic inflammation and numerous invasive fungal forms consistent with Candida species.  No goblet cell metaplasia or dysplasia identified.     biliary ductal dilation thought to be related to chronic methadone/pain med use     Ultrasound liver ordered by hep    she has h/o constipation on methadone.  A few weeks ago ate carrots and had passage of large stool x 3. With first bm had low volume bleeding. No rectal pain. No clots.  No melena. Has had daily bm since. No diarrhea  Says was diagnosed w/ U.c. a long time ago. Says was in study.    Reflux w/ foods like fruit juice. Sx 3-4 x/week.  Doesn't like taking ppi, but can't remember why.  She says didn't work. she denies dysphagia, odynophagia and/or globus. she denies abdominal pain. Has nausea. No vomiting.- she says 2/2 gb attacks, but won't take out gb bc of aneurysm  she denies recent change in appetite and/or unintentional weight loss.    Tte pending    She says last c-scope over 10 years ago and recalls removal of polyps    NSAIDS: as needed  Tobacco: vape  Alcohol: no  Marijuana: concentrate  Illicit drugs: no    No FH GI malignancy, ibd    No history of adverse reaction to sedation  No AMANDA  No anticoagulants  No pacemaker/defibrillator  No pain medications and/or sleep aides    Wt Readings from Last 6 Encounters:   07/21/25 177 lb 4.8 oz (80.4 kg)   06/24/25 170 lb (77.1 kg)   03/20/25 178 lb (80.7 kg)   03/06/25 178 lb (80.7 kg)   01/09/25 183 lb (83 kg)   09/28/24 170 lb (77.1  kg)        History, Medications, Allergies, ROS:      Past Medical History[1]   Past Surgical History[2]   Family Hx: Family History[3]   Social History: Short Social Hx on File[4]     Medications (Active prior to today's visit):  Current Medications[5]    Allergies:  Allergies[6]    ROS:   CONSTITUTIONAL: negative for fevers, chills, sweats and weight loss  EYES Negative for red eyes, yellow eyes, changes in vision  HEENT: Negative for dysphagia and hoarseness  RESPIRATORY: Negative for cough and shortness of breath  CARDIOVASCULAR: Negative for chest pain, palpitations  GASTROINTESTINAL: See HPI  GENITOURINARY: Negative for dysuria and frequency  MUSCULOSKELETAL: Negative for arthralgias and myalgias  NEUROLOGICAL: Negative for dizziness and headaches  BEHAVIOR/PSYCH: Negative for anxiety and poor appetite    PHYSICAL EXAM:   Blood pressure 98/70, pulse 60, height 5' 1\" (1.549 m), weight 177 lb 4.8 oz (80.4 kg).    GEN: WD/WN, NAD  HEENT: Supple symmetrical, trachea midline  CV: RRR, the extremities are warm and well perfused   LUNGS: No increased work of breathing  ABDOMEN: No scars, normal bowel sounds, soft, non-tender, non-distended no rebound or guarding, no masses, no hepatomegaly  MSK: No redness, no warmth, no swelling of joints  SKIN: No jaundice, no erythema, no rashes  HEMATOLOGIC: No bleeding, no bruising  NEURO: Alert and interactive, normal gait    Labs/Imaging/Procedures:     Patient's pertinent labs and imaging were reviewed and discussed with patient today.        .  ASSESSMENT/PLAN:   Anastasiia Hobbs is a 59 year old year-old female with history of PMHx of Hep C cirrhosis [dx 1994, started treatment with Dr. Dalton 11/20/2021 completed 3 days prior], anxiety, PTSD, COPD, CAD, h/o opiate overdose, polysubstance abuse on methadone, sleep apnea w/o use of CPAP, carries diagnosis of ?ulcerative colitis although no treatment noted, current tobacco use, cannabis utilization and migraines:    #crc  screening  #constipation  #brbpr  She reports chronic h/o constipation on methadone.  A few weeks ago had low volume bleeding w/ passage of large bm. No clots, No melena. Has since had daily BM. Not anemic on cbc 7/10/25.  She reports dx of U.c. made many years ago. NO diarrhea, abd pain.  Last c[facility] 10+ years ago [facility] and she recalls having polyps removed.  No fhx gi malignancy, IBD plan as below.     #gerd  #n/v  #hep c cirrhosis  Gerd w/ dietary triggers of fruit juice. Has rx for pantoprazole, but says doesn't work.  No dysphagia, abd pain. N/V seemingly related to gallstones.  Ultrasound liver ordered by pcp - results pending.  EGD 2/18/22 hiatal hernia and ulcer noted; findings suggest gastroesophageal reflux disease and/or reflux esophagitis. Plan for procedure for EV screening.      -continue to follow with hepatology  -complete echo prior to procedure  -miralax for constipation  -reflux diet modification  -stop vaping  -stop pantoprazole  -start omeprazole    1. Schedule colonoscopy/egd with MAC w/ General pool MD [Diagnosis: crc screening, cirrhosis, gerd ]    2.  bowel prep from pharmacy (split golytely)    3.   For cardiology patients and patients on blood thinners:  Please contact your cardiology clinic for clearance to proceed with the endoscopic procedure. If you are on blood thinners, please also confirm with your cardiologic clinic that you are able to hold the blood thinner per our recommendations.\"    BLOOD THINNER ORDERS:  -Hold for 48 hours (Xarelto, Eliquis, Pradaxa, Savaysa)  -Hold for 3 days (Pletal)  -Hold for 5 days (Coumadin, Plavix, Brilinta, Aggrenox)  -Hold for 7 days (Effient)     For endocrinology insulin patients:    Please contact your endocrinology clinic for insulin adjustment orders prior to your endoscopic procedure.    4. Read all bowel prep instructions carefully    5. AVOID seeds, nuts, popcorn, raw fruits and vegetables (cooked is okay) for 2-3 days before  procedure    6.   If you start any NEW medication after your visit today, please notify us. Certain medications will need to be held before the procedure, or the procedure cannot be performed.     >>>Please note: if you were prescribed Suprep for the bowel prep and it is too expensive or not covered by insurance, it is okay to substitute Trilyte (or any similar generic prep). This can be done by notifying the pharmacy or calling our office.      Orders This Visit:  No orders of the defined types were placed in this encounter.      Meds This Visit:  Requested Prescriptions     Signed Prescriptions Disp Refills    Polyethylene Glycol 3350 (MIRALAX) 17 g Oral Powd Pack 30 packet 3     Sig: Take 17 g by mouth daily.    Omeprazole 40 MG Oral Capsule Delayed Release 30 capsule 11     Sig: Take 1 capsule (40 mg total) by mouth daily. Take 1 capsule by mouth daily before breakfast.    polyethylene glycol, PEG 3350-KCl-NaBcb-NaCl-NaSulf, (GOLYTELY) 236 g Oral Recon Soln 4000 mL 0     Sig: Take 4,000 mL by mouth once for 1 dose.       Imaging & Referrals:  None    ENDOSCOPIC RISK BENEFIT DISCUSSION: I described the procedure in great detail with the patient. I discussed the risks and benefits, including but not limited to: bleeding, perforation, infection, anesthesia complications, and even death. Patient will be NPO after midnight and will have a person physically present at time of pick-up to drive patient home. Patient verbalized understanding and agrees to proceed with procedure as planned.    Jes Mata, APRN   7/16/2025        This note was partially prepared using Dragon Medical voice recognition dictation software. As a result, errors may occur. When identified, these errors have been corrected. While every attempt is made to correct errors during dictation, discrepancies may still exist.          [1]   Past Medical History:   Anxiety    Bipolar affective disorder (HCC)    Cirrhosis of liver (HCC)    Colitis     ulcerative colitis    COPD (chronic obstructive pulmonary disease) (HCC)    Coronary atherosclerosis    Crack cocaine use    Depression    Essential hypertension    JOHN (generalized anxiety disorder)    Hepatitis    Hep C    Hepatitis C    1989    High blood pressure    History of blood transfusion    no reactions    Methadone use    Migraines    Nausea & vomiting    Osteoarthritis    Pneumonia due to organism    Polysubstance dependence including opioid drug with daily use (HCC)    PTSD (post-traumatic stress disorder)    Sleep apnea    NO CPAP   [2]   Past Surgical History:  Procedure Laterality Date    Abdominal surgery      Angioplasty (coronary)  2019    3 total    Appendectomy      2012    Cath percutaneous  transluminal coronary angioplasty      Hysterectomy  2010    Lap supracerv hysterectomy  2010    supracervical hysterectomy    Oophorectomy      Tonsillectomy     [3]   Family History  Problem Relation Age of Onset    Cancer Father         brain    Hypertension Father     Cancer Mother         lung, metastatic    COPD Mother    [4]   Social History  Socioeconomic History    Marital status:    Tobacco Use    Smoking status: Former     Current packs/day: 0.50     Average packs/day: 0.5 packs/day for 42.0 years (21.0 ttl pk-yrs)     Types: Cigarettes    Smokeless tobacco: Never   Vaping Use    Vaping status: Every Day    Substances: THC, CBD   Substance and Sexual Activity    Alcohol use: Not Currently    Drug use: Yes     Frequency: 7.0 times per week     Types: Cannabis     Comment: vapes daily    Sexual activity: Not Currently     Partners: Male   Other Topics Concern    Reaction to local anesthetic No    Pt has a pacemaker No    Pt has a defibrillator No    Blood Transfusions Yes     Social Drivers of Health     Food Insecurity: No Food Insecurity (7/13/2024)    Food Insecurity     Food Insecurity: Never true   Transportation Needs: No Transportation Needs (7/13/2024)    Transportation Needs      Lack of Transportation: No   Housing Stability: Low Risk  (7/13/2024)    Housing Stability     Housing Instability: No   [5]   Current Outpatient Medications   Medication Sig Dispense Refill    Polyethylene Glycol 3350 (MIRALAX) 17 g Oral Powd Pack Take 17 g by mouth daily. 30 packet 3    Omeprazole 40 MG Oral Capsule Delayed Release Take 1 capsule (40 mg total) by mouth daily. Take 1 capsule by mouth daily before breakfast. 30 capsule 11    polyethylene glycol, PEG 3350-KCl-NaBcb-NaCl-NaSulf, (GOLYTELY) 236 g Oral Recon Soln Take 4,000 mL by mouth once for 1 dose. 4000 mL 0    ALPRAZOLAM 1 MG Oral Tab TAKE 1 TABLET(1 MG) BY MOUTH THREE TIMES DAILY 90 tablet 0    FUROSEMIDE 20 MG Oral Tab TAKE 1 TABLET(20 MG) BY MOUTH DAILY 30 tablet 0    HYDROXYZINE 25 MG Oral Tab TAKE 1 TABLET(25 MG) BY MOUTH THREE TIMES DAILY AS NEEDED FOR ANXIETY OR ITCHING 30 tablet 0    IBUPROFEN 800 MG Oral Tab TAKE 1 TABLET(800 MG) BY MOUTH DAILY AS NEEDED 30 tablet 0    ALBUTEROL 108 (90 Base) MCG/ACT Inhalation Aero Soln INHALE 2 PUFFS INTO THE LUNGS EVERY 4 HOURS AS NEEDED 8.5 g 5    FLUoxetine 20 MG Oral Cap TAKE 3 CAPSULES(60 MG) BY MOUTH DAILY 270 capsule 1    Methadone HCl 10 MG/5ML Oral Solution Take 65 mL (130 mg total) by mouth daily.      ipratropium-albuterol 0.5-2.5 (3) MG/3ML Inhalation Solution Take 3 mL by nebulization in the morning and 3 mL at noon and 3 mL in the evening. 120 each 1    fluticasone propionate 50 MCG/ACT Nasal Suspension 1 spray by Nasal route daily as needed for Rhinitis or Allergies. 16 g 1    cetirizine (ZYRTEC ALLERGY) 10 MG Oral Tab Take 1 tablet (10 mg total) by mouth daily as needed for Allergies or Rhinitis. 30 tablet 1    ondansetron 4 MG Oral Tablet Dispersible Take 1 tablet (4 mg total) by mouth every 4 (four) hours as needed for Nausea. 15 tablet 0    Naloxone HCl 4 MG/0.1ML Nasal Liquid 4 mg by Nasal route as needed. If patient remains unresponsive, repeat dose in other nostril 2-5 minutes  after first dose. 1 kit 0    Ursodiol 250 MG Oral Tab Take 2 tablets (500 mg total) by mouth in the morning and 2 tablets (500 mg total) before bedtime. (Patient not taking: Reported on 7/21/2025) 120 tablet 3    doxycycline 100 MG Oral Cap Take 1 capsule (100 mg total) by mouth 2 (two) times daily. (Patient not taking: Reported on 7/21/2025) 14 capsule 0    METHOCARBAMOL 500 MG Oral Tab TAKE 1 TABLET(500 MG) BY MOUTH FOUR TIMES DAILY (Patient not taking: Reported on 7/21/2025) 120 tablet 0    oxyCODONE 10 MG Oral Tab Take 1 tablet (10 mg total) by mouth every 4 (four) hours as needed. (Patient not taking: Reported on 7/21/2025) 30 tablet 0    clobetasol 0.05 % External Ointment APPLY TO THE AFFECTED AREA TWICE DAILY MONDAY-FRIDAY (Patient not taking: Reported on 7/21/2025) 60 g 1    potassium chloride 10 MEQ Oral Tab CR Take 1 tablet (10 mEq total) by mouth 3 (three) times a week. (Patient not taking: Reported on 7/21/2025) 15 tablet 0   [6] No Known Allergies

## 2025-07-21 ENCOUNTER — OFFICE VISIT (OUTPATIENT)
Facility: CLINIC | Age: 60
End: 2025-07-21

## 2025-07-21 ENCOUNTER — TELEPHONE (OUTPATIENT)
Facility: CLINIC | Age: 60
End: 2025-07-21

## 2025-07-21 VITALS
HEART RATE: 60 BPM | DIASTOLIC BLOOD PRESSURE: 70 MMHG | HEIGHT: 61 IN | BODY MASS INDEX: 33.48 KG/M2 | SYSTOLIC BLOOD PRESSURE: 98 MMHG | WEIGHT: 177.31 LBS

## 2025-07-21 DIAGNOSIS — K74.60 HEPATIC CIRRHOSIS, UNSPECIFIED HEPATIC CIRRHOSIS TYPE, UNSPECIFIED WHETHER ASCITES PRESENT (HCC): ICD-10-CM

## 2025-07-21 DIAGNOSIS — K74.60 HEPATIC CIRRHOSIS, UNSPECIFIED HEPATIC CIRRHOSIS TYPE (HCC): ICD-10-CM

## 2025-07-21 DIAGNOSIS — K59.00 CONSTIPATION, UNSPECIFIED CONSTIPATION TYPE: ICD-10-CM

## 2025-07-21 DIAGNOSIS — Z12.11 COLON CANCER SCREENING: Primary | ICD-10-CM

## 2025-07-21 DIAGNOSIS — K21.9 GASTROESOPHAGEAL REFLUX DISEASE, UNSPECIFIED WHETHER ESOPHAGITIS PRESENT: Primary | ICD-10-CM

## 2025-07-21 DIAGNOSIS — Z12.11 SCREEN FOR COLON CANCER: ICD-10-CM

## 2025-07-21 DIAGNOSIS — R11.2 NAUSEA AND VOMITING, UNSPECIFIED VOMITING TYPE: ICD-10-CM

## 2025-07-21 DIAGNOSIS — K21.9 GASTROESOPHAGEAL REFLUX DISEASE, UNSPECIFIED WHETHER ESOPHAGITIS PRESENT: ICD-10-CM

## 2025-07-21 DIAGNOSIS — K62.5 BRBPR (BRIGHT RED BLOOD PER RECTUM): ICD-10-CM

## 2025-07-21 RX ORDER — POLYETHYLENE GLYCOL 3350, SODIUM SULFATE ANHYDROUS, SODIUM BICARBONATE, SODIUM CHLORIDE, POTASSIUM CHLORIDE 236; 22.74; 6.74; 5.86; 2.97 G/4L; G/4L; G/4L; G/4L; G/4L
4000 POWDER, FOR SOLUTION ORAL ONCE
Qty: 4000 ML | Refills: 0 | Status: SHIPPED | OUTPATIENT
Start: 2025-07-21 | End: 2025-07-21

## 2025-07-21 RX ORDER — POLYETHYLENE GLYCOL 3350 17 G/17G
17 POWDER, FOR SOLUTION ORAL DAILY
Qty: 30 PACKET | Refills: 3 | Status: SHIPPED | OUTPATIENT
Start: 2025-07-21 | End: 2025-08-20

## 2025-07-21 RX ORDER — OMEPRAZOLE 40 MG/1
40 CAPSULE, DELAYED RELEASE ORAL DAILY
Qty: 30 CAPSULE | Refills: 11 | Status: SHIPPED | OUTPATIENT
Start: 2025-07-21

## 2025-07-21 NOTE — PATIENT INSTRUCTIONS
-continue to follow with hepatology  -complete echo prior to procedure  -miralax for constipation  -reflux diet modification  -stop vaping  -stop pantoprazole  -start omeprazole    1. Schedule colonoscopy/egd with MAC w/ General pool MD [Diagnosis: crc screening, cirrhosis, gerd ]    2.  bowel prep from pharmacy (split golytely)    3.   For cardiology patients and patients on blood thinners:  Please contact your cardiology clinic for clearance to proceed with the endoscopic procedure. If you are on blood thinners, please also confirm with your cardiologic clinic that you are able to hold the blood thinner per our recommendations.\"    BLOOD THINNER ORDERS:  -Hold for 48 hours (Xarelto, Eliquis, Pradaxa, Savaysa)  -Hold for 3 days (Pletal)  -Hold for 5 days (Coumadin, Plavix, Brilinta, Aggrenox)  -Hold for 7 days (Effient)     For endocrinology insulin patients:    Please contact your endocrinology clinic for insulin adjustment orders prior to your endoscopic procedure.    4. Read all bowel prep instructions carefully    5. AVOID seeds, nuts, popcorn, raw fruits and vegetables (cooked is okay) for 2-3 days before procedure    6.   If you start any NEW medication after your visit today, please notify us. Certain medications will need to be held before the procedure, or the procedure cannot be performed.     >>>Please note: if you were prescribed Suprep for the bowel prep and it is too expensive or not covered by insurance, it is okay to substitute Trilyte (or any similar generic prep). This can be done by notifying the pharmacy or calling our office.      Tips to Control Acid Reflux    To control acid reflux, you’ll need to make some basic diet and lifestyle changes. The simple steps outlined below may be all you’ll need to ease discomfort.   Watch what you eat  Don't have fatty foods or spicy foods.  Eat fewer acidic foods, such as citrus and tomato-based foods. These can increase symptoms.  Limit drinking  alcohol, caffeine, and fizzy beverages. All increase acid reflux.  Try limiting chocolate, peppermint, and spearmint. These can make acid reflux worse in some people.     Watch when you eat  Don't lie down for 3 hours after eating.  Don't snack before going to bed.     Raise your head  Raising your head and upper body by 4 to 6 inches helps limit reflux when you’re lying down. Put blocks under the head of your bed frame or a wedge under your mattress to raise it.   Other changes  Lose weight, if you need to  Don’t exercise near bedtime  Don't wear tight-fitting clothes  Limit aspirin and ibuprofen  Stop smoking     [employer] last reviewed this educational content on 6/1/2019  © 8548-2896 [employer] [address] [place] [address], [place] [address]. All rights reserved. This information is not intended as a substitute for professional medical care. Always follow your healthcare professional's instructions.

## 2025-07-21 NOTE — TELEPHONE ENCOUNTER
Patient was seen in office today (7/21/2025) by ONEIDA Andre. Provided patient with office number and prep instructions. Reviewed prep instructions with patient in office, verbalized understanding. Patient aware GI schedulers will call patient to schedule the procedure.      Procedure orders:    1. Schedule colonoscopy/egd with MAC w/ General pool MD [Diagnosis: crc screening, cirrhosis, gerd ]     2.  bowel prep from pharmacy (split golytely)     3.   For cardiology patients and patients on blood thinners:  Please contact your cardiology clinic for clearance to proceed with the endoscopic procedure. If you are on blood thinners, please also confirm with your cardiologic clinic that you are able to hold the blood thinner per our recommendations.\"     BLOOD THINNER ORDERS:  -Hold for 48 hours (Xarelto, Eliquis, Pradaxa, Savaysa)  -Hold for 3 days (Pletal)  -Hold for 5 days (Coumadin, Plavix, Brilinta, Aggrenox)  -Hold for 7 days (Effient)      For endocrinology insulin patients:     Please contact your endocrinology clinic for insulin adjustment orders prior to your endoscopic procedure.     4. Read all bowel prep instructions carefully     5. AVOID seeds, nuts, popcorn, raw fruits and vegetables (cooked is okay) for 2-3 days before procedure     6.   If you start any NEW medication after your visit today, please notify us. Certain medications will need to be held before the procedure, or the procedure cannot be performed.

## 2025-07-22 NOTE — TELEPHONE ENCOUNTER
Called patient notified new prep sent to the pharmacy     Voicemail not set up yet       If patient calls back please transfer to GI

## 2025-07-22 NOTE — TELEPHONE ENCOUNTER
Scheduled for:  Colonoscopy 54368 EGD [id number]  Provider Name:     Date:  Friday, 12/01/2025  Location:  [facility]   Sedation:  MAC   Time:  (pt is aware ENDO will call with arrival time     Prep:  (split golytely   Meds/Allergies Reconciled?:  Yes    Diagnosis with codes:  crc screening, Z12.11 cirrhosis, gerd K21.9     Was patient informed to call insurance with codes (Y/N):  Yes     Referral sent?:  Referral was sent at the time of electronic surgical scheduling.    EM or [facility] notified?:  I sent an electronic request to Endo Scheduling and received a confirmation today.       Medication Orders:  None  Misc Orders:  None     Further instructions given by staff:  I discussed the prep instructions with the patient which she verbally understood and is aware that I will send the instructions today.via [employer]

## 2025-07-27 DIAGNOSIS — B18.2 CHRONIC HEPATITIS C WITHOUT HEPATIC COMA (HCC): ICD-10-CM

## 2025-07-28 RX ORDER — FUROSEMIDE 20 MG/1
20 TABLET ORAL DAILY
Qty: 30 TABLET | Refills: 0 | Status: SHIPPED | OUTPATIENT
Start: 2025-07-28

## 2025-07-28 NOTE — TELEPHONE ENCOUNTER
Message noted: Chart reviewed and may refill medication as requested. Prescription sent to listed pharmacy. Pharmacy to notify patient. Pt notified through [employer]

## 2025-08-04 ENCOUNTER — HOSPITAL ENCOUNTER (OUTPATIENT)
Dept: ULTRASOUND IMAGING | Facility: HOSPITAL | Age: 60
Discharge: HOME | End: 2025-08-04
Attending: INTERNAL MEDICINE

## 2025-08-04 DIAGNOSIS — K74.60 HEPATIC CIRRHOSIS, UNSPECIFIED HEPATIC CIRRHOSIS TYPE, UNSPECIFIED WHETHER ASCITES PRESENT (HCC): ICD-10-CM

## 2025-08-04 PROCEDURE — 76705 ECHO EXAM OF ABDOMEN: CPT | Performed by: INTERNAL MEDICINE

## 2025-08-05 DIAGNOSIS — F41.1 GENERALIZED ANXIETY DISORDER: ICD-10-CM

## 2025-08-05 RX ORDER — ALPRAZOLAM 1 MG/1
1 TABLET ORAL 3 TIMES DAILY
Qty: 90 TABLET | Refills: 0 | Status: SHIPPED | OUTPATIENT
Start: 2025-08-05

## 2025-08-30 ENCOUNTER — HOSPITAL ENCOUNTER (OUTPATIENT)
Dept: GENERAL RADIOLOGY | Facility: HOSPITAL | Age: 60
Discharge: HOME | End: 2025-08-30
Attending: INTERNAL MEDICINE

## 2025-08-30 DIAGNOSIS — R21 RASH: ICD-10-CM

## 2025-08-30 DIAGNOSIS — M25.50 POLYARTHRALGIA: ICD-10-CM

## 2025-08-30 DIAGNOSIS — G89.29 CHRONIC PAIN OF BOTH KNEES: ICD-10-CM

## 2025-08-30 DIAGNOSIS — M25.561 CHRONIC PAIN OF BOTH KNEES: ICD-10-CM

## 2025-08-30 DIAGNOSIS — M25.562 CHRONIC PAIN OF BOTH KNEES: ICD-10-CM

## 2025-08-30 PROCEDURE — 73502 X-RAY EXAM HIP UNI 2-3 VIEWS: CPT | Performed by: INTERNAL MEDICINE

## (undated) DEVICE — LINE MNTR ADLT SET O2 INTMD

## (undated) DEVICE — 35 ML SYRINGE REGULAR TIP: Brand: MONOJECT

## (undated) DEVICE — PACK CDS UPPER EXTREMITY

## (undated) DEVICE — CYSTO PACK: Brand: MEDLINE INDUSTRIES, INC.

## (undated) DEVICE — SUT PROL 3-0 18IN N ABSRB BLU L19MM PS-2

## (undated) DEVICE — GAMMEX® PI HYBRID SIZE 6.5, STERILE POWDER-FREE SURGICAL GLOVE, POLYISOPRENE AND NEOPRENE BLEND: Brand: GAMMEX

## (undated) DEVICE — SHOE POSTOP M BLK NYL CUSH ROCK SOLE HEEL TOE

## (undated) DEVICE — INTENDED TO BE USED TO OCCLUDE, RETRACT AND IDENTIFY ARTERIES, VEINS, TENDONS AND NERVES IN SURGICAL PROCEDURES: Brand: STERION®  VESSEL LOOP

## (undated) DEVICE — ENDOSCOPIC ULTRASOUND ASPIRATION NEEDLE: Brand: EXPECT SLIMLINE SL

## (undated) DEVICE — ENDOSCOPIC ULTRASOUND FINE NEEDLE BIOPSY (FNB) DEVICE: Brand: ACQUIRE

## (undated) DEVICE — KIT CLEAN ENDOKIT 1.1OZ GOWNX2

## (undated) DEVICE — SOL  .9 3000ML

## (undated) DEVICE — CONMED SCOPE SAVER BITE BLOCK, 20X27 MM: Brand: SCOPE SAVER

## (undated) DEVICE — PADDING,UNDERCAST,COTTON, 3X4YD STERILE: Brand: MEDLINE

## (undated) DEVICE — GAMMEX® NON-LATEX PI ORTHO SIZE 7, STERILE POLYISOPRENE POWDER-FREE SURGICAL GLOVE: Brand: GAMMEX

## (undated) DEVICE — DEV BIOP 25GA STRL DISP ENDO

## (undated) DEVICE — ENCORE® LATEX ACCLAIM SIZE 7.5, STERILE LATEX POWDER-FREE SURGICAL GLOVE: Brand: ENCORE

## (undated) DEVICE — GAMMEX® PI HYBRID SIZE 8, STERILE POWDER-FREE SURGICAL GLOVE, POLYISOPRENE AND NEOPRENE BLEND: Brand: GAMMEX

## (undated) DEVICE — SOL H2O 1000ML BTL

## (undated) DEVICE — LOCKING DEVICE RX & BIOPSY CAP

## (undated) DEVICE — Device: Brand: CUSTOM PROCEDURE KIT

## (undated) DEVICE — Device

## (undated) DEVICE — SUT ETHLN 3-0 30IN FS-1 NABSRB BLK 24MM 3/8 C

## (undated) DEVICE — MEDI-VAC NON-CONDUCTIVE SUCTION TUBING: Brand: CARDINAL HEALTH

## (undated) DEVICE — SOLUTION IRRIG 1000ML 0.9% NACL USP BTL

## (undated) DEVICE — SPONGE GZ 4X4IN COT 12 PLY TYP VII WVN C

## (undated) DEVICE — MEDI-VAC NON-CONDUCTIVE SUCTION TUBING 6MM X 1.8M (6FT.) L: Brand: CARDINAL HEALTH

## (undated) DEVICE — STERILE LATEX POWDER-FREE SURGICAL GLOVESWITH NITRILE COATING: Brand: PROTEXIS

## (undated) DEVICE — Device: Brand: DEFENDO AIR/WATER/SUCTION AND BIOPSY VALVE

## (undated) DEVICE — KIT ENDO ORCAPOD 160/180/190

## (undated) DEVICE — UROLOGY DRAIN BAG

## (undated) DEVICE — SUCTION CANISTER, 3000CC,SAFELINER: Brand: DEROYAL

## (undated) DEVICE — BANDAGE,GAUZE,CONFORMING,3"X75",STRL,LF: Brand: MEDLINE

## (undated) DEVICE — KIT INCIS MGMT PREVENA DRAIN 13CM PEEL AND PLC DISP

## (undated) DEVICE — FORCEP RADIAL JAW 4

## (undated) DEVICE — SOL H2O 3000ML IRRIG

## (undated) DEVICE — YANKAUER SUCTION INSTRUMENT NO CONTROL VENT, BULB TIP, CLEAR: Brand: YANKAUER

## (undated) NOTE — LETTER
3/26/2024          To Whom It May Concern:    Anastasiia Hobbs is currently under my medical care.  Please be advised that the patient has been ill with a medical issue and is unable to fly and travel at this time.       If you require additional information please contact our office.        Sincerely,    Jose Braswell MD          Document generated by:  Jose Braswell MD

## (undated) NOTE — LETTER
[facility]  Administration of Anesthesia  Anastasiia LEDBETTER agree to be cared for by a physician anesthesiologist alone and/or with a nurse anesthetist, who is specially trained to monitor me and give me medicine to put me to sleep or keep me comfortable during my procedure    I understand that my anesthesiologist and/or anesthetist is not an employee or agent of [facility] or [employer]. He or she works for [facility]    As the patient asking for anesthesia services, I agree to:  Allow the anesthesiologist (anesthesia doctor) to give me medicine and do additional procedures as necessary. Some examples are: Starting or using an “IV” to give me medicine, fluids or blood during my procedure, and having a breathing tube placed to help me breathe when I’m asleep (intubation). In the event that my heart stops working properly, I understand that my anesthesiologist will make every effort to sustain my life, unless otherwise directed by [facility] Do Not Resuscitate documents.  Tell my anesthesia doctor before my procedure:  If I am pregnant.  The last time that I ate or drank.  iii. All of the medicines I take (including prescriptions, herbal supplements, and pills I can buy without a prescription (including street drugs/illegal medications). Failure to inform my anesthesiologist about these medicines may increase my risk of anesthetic complications.  iv.If I am allergic to anything or have had a reaction to anesthesia before.  I understand how the anesthesia medicine will help me (benefits).  I understand that with any type of anesthesia medicine there are risks:  The most common risks are: nausea, vomiting, sore throat, muscle soreness, damage to my eyes, mouth, or teeth (from breathing tube placement).  Rare risks include: remembering what happened during my procedure, allergic reactions to medications, injury to my airway, heart, lungs, vision, nerves, or  muscles and in extremely rare instances death.  My doctor has explained to me other choices available to me for my care (alternatives).  Pregnant Patients (“epidural”):  I understand that the risks of having an epidural (medicine given into my back to help control pain during labor), include itching, low blood pressure, difficulty urinating, headache or slowing of the baby’s heart. Very rare risks include infection, bleeding, seizure, irregular heart rhythms and nerve injury.  Regional Anesthesia (“spinal”, “epidural”, & “nerve blocks”):  I understand that rare but potential complications include headache, bleeding, infection, seizure, irregular heart rhythms, and nerve injury.    _____________________________________________________________________________  Patient (or Representative) Signature/Relationship to Patient  Date   Time    _____________________________________________________________________________   Name (if used)    Language/Organization   Time    _____________________________________________________________________________  Nurse Anesthetist Signature     Date   Time  _____________________________________________________________________________  Anesthesiologist Signature     Date   Time  I have discussed the procedure and information above with the patient (or patient’s representative) and answered their questions. The patient or their representative has agreed to have anesthesia services.    _____________________________________________________________________________  Witness        Date   Time  I have verified that the signature is that of the patient or patient’s representative, and that it was signed before the procedure  Patient Name: Anastasiia Hobbs     : 1965                 Printed: 2024 at 4:55 PM    Medical Record #: [id number]                                            Page 1 of 1  ----------ANESTHESIA CONSENT----------

## (undated) NOTE — LETTER
1/16/2020              Paulino Pepe        [address] [address]         Dear Surya Sosa,    This letter is to inform you that our office has made several attempts to reach you by phone without success. We were attempting to contact you by phone regarding illness or problem    Please contact our office at the number listed below as soon as you receive this letter to discuss this issue and to make the necessary changes in our system to your contact information. Thank you for your cooperation.         Sincerely,    Rosa Salas MD  [address] [id number] [phone]

## (undated) NOTE — MR AVS SNAPSHOT
After Visit Summary   3/5/2024    Anastasiia Hobbs   MRN: [id number]           Visit Information     Date & Time  3/5/2024 12:00 PM Provider  Vicky Dunham DO [facility] - OB/GYN Dept. Phone  [phone]      Your Vitals Were  Most recent update: 3/5/2024 12:28 PM    BP   110/64    Wt   171 lb    BMI   31.28 kg/m²           Allergies as of 3/5/2024  Review status set to Review Complete on 3/5/2024   No Known Allergies     Your Current Medications        Dosage    ALPRAZolam 1 MG Oral Tab TAKE 1 TABLET BY MOUTH THREE TIMES DAILY    hydrOXYzine 25 MG Oral Tab Take 1 tablet (25 mg total) by mouth 3 (three) times daily as needed for Anxiety or Itching.    IBUPROFEN 800 MG Oral Tab TAKE 1 TABLET(800 MG) BY MOUTH DAILY AS NEEDED    furosemide 20 MG Oral Tab Take 1 tablet (20 mg total) by mouth daily.    ipratropium (ATROVENT HFA) 17 MCG/ACT Inhalation Aero Soln Inhale 2 puffs into the lungs 4 (four) times daily.    albuterol 108 (90 Base) MCG/ACT Inhalation Aero Soln Inhale 2 puffs into the lungs every 4 (four) hours as needed.    clobetasol 0.05 % External Ointment APPLY TO THE AFFECTED AREA TWICE DAILY MONDAY-FRIDAY    methocarbamol 500 MG Oral Tab Take 1 tablet (500 mg total) by mouth 4 (four) times daily.    Methadone HCl 10 MG/5ML Oral Solution Take 40 mL (80 mg total) by mouth daily.    ipratropium-albuterol 0.5-2.5 (3) MG/3ML Inhalation Solution Take 3 mL by nebulization in the morning and 3 mL at noon and 3 mL in the evening.    FLUoxetine 20 MG Oral Cap Take 3 capsules (60 mg total) by mouth daily.    Spacer/Aero-Holding Chambers Does not apply Device Use as needed with inhaler    Respiratory Therapy Supplies (NEBULIZER/TUBING/MOUTHPIECE) Does not apply Kit Use with nebulizer solution as needed    Respiratory Therapy Supplies (NEBULIZER/TUBING/MOUTHPIECE) Does not apply Kit For use with Albuterol solution as directed    promethazine 6.25 MG/5ML Oral  Syrup 1 TEASPOONFUL 3 TIMES A DAY    cholecalciferol 25 MCG Oral Tab Take 2 tablets (2,000 Units total) by mouth daily.    Meloxicam 15 MG Oral Tab Take 1 tablet (15 mg total) by mouth daily as needed for Pain.    fluticasone propionate 50 MCG/ACT Nasal Suspension 1 spray by Nasal route daily as needed for Rhinitis or Allergies.    cetirizine (ZYRTEC ALLERGY) 10 MG Oral Tab Take 1 tablet (10 mg total) by mouth daily as needed for Allergies or Rhinitis.    alum-mag hydroxide-simethicone 200-200-20 MG/5ML Oral Suspension Take 30 mL by mouth 4 (four) times daily as needed for Indigestion.    pantoprazole 40 MG Oral Tab EC Take 1 tablet (40 mg total) by mouth every morning before breakfast.    ondansetron 4 MG Oral Tablet Dispersible Take 1 tablet (4 mg total) by mouth every 4 (four) hours as needed for Nausea.    acetaminophen 500 MG Oral Tab Take 1 tablet (500 mg total) by mouth 2 (two) times daily as needed for Pain.    potassium chloride 10 MEQ Oral Tab CR Take 1 tablet (10 mEq total) by mouth 3 (three) times a week.    Naloxone HCl 4 MG/0.1ML Nasal Liquid 4 mg by Nasal route as needed. If patient remains unresponsive, repeat dose in other nostril 2-5 minutes after first dose.      Diagnoses for This Visit    Pelvic pain   [730613]  -  Primary  Screening for cervical cancer   [601259]    Vaginal discharge   [986875]    History of abnormal cervical Pap smear   [543491]             Follow-up    Return if symptoms worsen or fail to improve.     We Ordered the Following     Normal Orders This Visit    Hpv Dna  High Risk , Thin Prep Collect [FEK5840 CUSTOM]     THINPREP PAP SMEAR ONLY [[id number] CUSTOM]     ThinPrep PAP Smear [[id number] CUSTOM]     Urine Culture, Routine [6303752 CUSTOM]     Urine Dip in office [47016] [89537 CPT(R)]     Future Labs/Procedures Expected by Expires    Hpv Dna  High Risk , Thin Prep Collect [[id number] CUSTOM]  3/5/2024 3/5/2025    ThinPrep PAP Smear [[id number] CUSTOM]  3/5/2024 3/5/2025    CHERELLE  VAGINAL ID [[id number] CUSTOM]  3/5/2024 (Approximate) 3/5/2025    US PELVIS (TRANSABDOMINAL AND TRANSVAGINAL) (CPT=76856/33225) [31234 CPT(R)]  3/12/2024 (Approximate) 3/5/2025    Pelvic US Complete GYNE Only [45871/12116] [[id number] CPT(R)]  3/19/2024 (Approximate) 3/5/2025      Future Appointments        Provider Department    4/5/2024 12:00 PM [facility] ULTRASOUND [facility] - OB/GYN    4/18/2024 2:00 PM Rowena Khan [facility] OB/GYN      Follow-up Instructions    Return if symptoms worsen or fail to improve.     Imaging Scheduling Instructions     Around March 12, 2024   Imaging:   US PELVIS (TRANSABDOMINAL AND TRANSVAGINAL) (CPT=76856/52802)    Instructions: To schedule an appointment for your radiology test please call [facility] Scheduling at [phone].        Around March 19, 2024   Imaging:   Pelvic US Complete GYNE Only [14502/66669]                    Did you know that [facility] primary care physicians now offer Video Visits through [employer] for adult patients for a variety of conditions such as allergies, back pain and cold symptoms? Skip the drive and waiting room and online chat with a doctor face-to-face using your web-cam enabled computer or mobile device wherever you are. Video Visits cost $50 and can be paid hassle-free using a credit, debit, or health savings card.  Not active on [employer]? Ask us how to get signed up today!          If you receive a survey from [facility], please take a few minutes to complete it and provide feedback. We strive to deliver the best patient experience and are looking for ways to make improvements. Your feedback will help us do so. For more information on [facility], please visit [email or web address]           No text in SmartText           No text in SmartText

## (undated) NOTE — Clinical Note
HFU call completed. A HFU appointment is scheduled for 8/26/2021. The patient declined a sooner appointment. NCM provided triage with a condition update on the patient's chest congestion/cough/wheezing/chills/sweats. Thank you.

## (undated) NOTE — MR AVS SNAPSHOT
After Visit Summary   4/18/2020    Dallas Domingo    MRN: [id number]           Visit Information     Date & Time  4/18/2020 10:30 AM Provider  Veronica Rodriguez  Department  [facility], [address], [employer]  Phone  [phone] PCB (post coital bleeding)   [203490]    Vaginal discharge   [546940]    Post-menopausal bleeding   [532742]             We Ordered the Following     Normal Orders This Visit    CHLAMYDIA/GONOCOCCUS, MARILEE [[id number] CUSTOM]     GENITAL VAGINOSIS SCREEN [TYW15 cannot be changed, so think of one that is secure and easy to remember. 6. Create a [employer] password. You can change your password at any time. 7. Choose a Security Question and enter your Answer and click Next.  This can be used at a later time if you fo Available at primary care offices    AFTER HOURS CARE  [place]  OFFICE VISIT   Primary Care Providers  Treatment for mild illness or injury that does not require immediate attention.  Average cost  $70*   [facility] – [address] [place] [address],[address]

## (undated) NOTE — LETTER
[place] [facility]  Administration of Anesthesia  1. I, Chelo Iniguez, or _________________________________ acting on her behalf, (Patient) (Dependent/Representative) request to receive anesthesia for my pending procedure/operation/treatment. A physician (anesthesiologist) alone or an anesthesiologist working with a nurse anesthetist may administer my anesthesia. 2. I understand that my anesthesiologist is not an employee or agent of the hospital, but is an independent medical practitioner who has been permitted to use its facilities for the care and treatment of his/her patients. 3. I acknowledge that a physician from [facility] or their designate(s), recommended anesthesia for me using her/his medical judgment. The type(s) of anesthesia I may receive include:                a) General Anesthesia, b) Spinal/Epidural Anesthesia, c) Regional Anesthesia or d) Monitored Anesthesia Care. 4. If my spinal, regional or monitored anesthesia care (local) is not satisfactory for my comfort, or if my medical condition requires, I consent to the administration of general anesthesia. 5. I am aware that the practice of anesthesiology is not an exact science and that some foreseeable risks or consequences may occur. Some common risks/consequences include sore throat and hoarseness, nausea and vomiting, muscle soreness, backache, damage to the mouth/teeth/vocal cords and eye injury. I understand that more rare but serious potential risks of anesthesia include blood pressure changes, drug reactions, cardiac arrest, brain damage, paralysis or death. These risks apply to whether I have general, spinal/epidural, regional or monitored anesthesia care. 6. OBSTETRIC PATIENTS: Specific risks/consequences of spinal/epidural anesthesia may include itching, low blood pressure, difficulty urinating, slowing of the baby's heart rate and headache.  Rare risks include infections, high spinal block, spinal bleeding, seizure, cardiac arrest and death. 7. AWARENESS: I understand that it is possible (but unlikely) to have explicit memory of events from the operating room while under general anesthesia. 8. ELECTROCONVULSIVE THERAPY PATIENTS: This consent serves for all treatments in a single course of therapy. 9. I understand that I must inform my anesthesiologist when I last ate and/or drank to minimize the risk of anesthesia. 10. If I am pregnant, or may pregnant, I understand that elective surgery should be postponed until after the baby is born. Anesthetics cross the placenta and may temporarily anesthetize the baby. Although fetal complications of anesthesia during pregnancy are rare, they may include birth defects, premature labor, brain damage and death. 11. I certify that I informed the anesthesiologist, to the best of my ability, about medication I take including blood thinners, anticoagulants, herbal remedies, narcotics and recreational drugs (e.g. cocaine, marijuana, PCP). Failure to inform my anesthesiologist about these medicines may increase my risk of anesthetic complications. The nature and purpose of my anesthetic management was explained to me. I had the opportunity to ask questions and the answers and information provided meet my satisfaction.   I retain the right to withdraw this consent at any time prior to the administration of said anesthetic.    ___________________________________________________           _____________________________________________________  Patient Signature                                                                                      Witness Signature                ___________________________________________________           _____________________________________________________  Date/Time                                                                                               Responsible person in case of minor/ unconscious pt /Relationship    My signature below affirms that prior to the time of the procedure, I have explained to the patient and/or his/her guardian, the risks and benefits of undergoing anesthesia, as well as any reasonable alternatives.     ___________________________________________________            _____________________________________________________  Physician Signature                            Date/Time  Patient Name: Filipe Cancer     : 1965     Printed: 2022      Medical Record #: [id number]                              Page 1 of 1    ----------ANESTHESIA CONSENT----------

## (undated) NOTE — LETTER
[facility]  Administration of Anesthesia  1. I, Debra Mesa, or _________________________________ acting on her behalf, (Patient) (Dependent/Representative) request to receive anesthesia for my pending procedure/operation/treatment.   A valdo bleeding, seizure, cardiac arrest and death. 7. AWARENESS: I understand that it is possible (but unlikely) to have explicit memory of events from the operating room while under general anesthesia.   8. ELECTROCONVULSIVE THERAPY PATIENTS: This consent serve below affirms that prior to the time of the procedure, I have explained to the patient and/or his/her guardian, the risks and benefits of undergoing anesthesia, as well as any reasonable alternatives.     ___________________________________________________

## (undated) NOTE — LETTER
Date & Time: 9/28/2024, 7:14 PM  Patient: Anastasiia Hobbs  Encounter Provider(s):    eDrian Bhakta MD         This certifies that I, Anastasiia Hobbs, a patient at an [facility], am leaving the facility voluntarily and against the advice of my physician.    I acknowledge that I have been:    1. informed that my physician believes that I need to receive care here;  2. informed that if I leave, I could become sicker or even die; and  3. provided discharge instructions consistent with my current diagnosis.    I hereby release my physician, the facility, and its employees from all responsibility for any ill effects which may result from this action.        __________________________________  Patient or authorized caregiver signature    __________________________________  RN signature    If no patient or patient representative signature was obtained, sign below to acknowledge that the form was reviewed with the patient and that the patient refused to sign.    __________________________________  RN signature

## (undated) NOTE — LETTER
[facility], [place]  Authorization for Invasive Procedures  1.  I hereby authorize Dr. Irene Jiang , my physician and whomever may be designated as the doctor's assistant, to perform the following operation and/or procedure:  Javier Keen testing and screening of blood and blood products, I may still be subject to ill effects as a result of recieving a blood transfusion an/or blood producst. The following are some, but not all, of the potential risks that can occur: fever and allergic react that my physician has explained sedation/analgesia administration to me including the risks and benefits. I consent to the administration of sedation/analgesia as may be necessary or desirable in the judgment of my physician.      Signature of Patient:  ___

## (undated) NOTE — LETTER
[facility]  [address], [place], [place]    Authorization for Surgical Operation and Procedure                               I hereby authorize Anthony Harrison MD, my physician and his/her assistants (if applicable), which may include medical students, residents, and/or fellows, to perform the following surgical operation/ procedure and administer such anesthesia as may be determined necessary by my physician: Operation/Procedure name (s) RIGHT ARM FASCIIOTOMY on Anastasiia Hobbs   2.   I recognize that during the surgical operation/procedure, unforeseen conditions may necessitate additional or different procedures than those listed above.  I, therefore, further authorize and request that the above-named surgeon, assistants, or designees perform such procedures as are, in their judgment, necessary and desirable.    3.   My surgeon/physician has discussed prior to my surgery the potential benefits, risks and side effects of this procedure; the likelihood of achieving goals; and potential problems that might occur during recuperation.  They also discussed reasonable alternatives to the procedure, including risks, benefits, and side effects related to the alternatives and risks related to not receiving this procedure.  I have had all my questions answered and I acknowledge that no guarantee has been made as to the result that may be obtained.    4.   Should the need arise during my operation/procedure, which includes change of level of care prior to discharge, I also consent to the administration of blood and/or blood products.  Further, I understand that despite careful testing and screening of blood or blood products by collecting agencies, I may still be subject to ill effects as a result of receiving a blood transfusion and/or blood products.  The following are some, but not all, of the potential risks that can occur: fever and allergic reactions, hemolytic reactions, transmission of diseases  such as Hepatitis, AIDS and Cytomegalovirus (CMV) and fluid overload.  In the event that I wish to have an autologous transfusion of my own blood, or a directed donor transfusion, I will discuss this with my physician.  Check only if Refusing Blood or Blood Products  I understand refusal of blood or blood products as deemed necessary by my physician may have serious consequences to my condition to include possible death. I hereby assume responsibility for my refusal and release the hospital, its personnel, and my physicians from any responsibility for the consequences of my refusal.    o  Refuse   5.   I authorize the use of any specimen, organs, tissues, body parts or foreign objects that may be removed from my body during the operation/procedure for diagnosis, research or teaching purposes and their subsequent disposal by hospital authorities.  I also authorize the release of specimen test results and/or written reports to my treating physician on the hospital medical staff or other referring or consulting physicians involved in my care, at the discretion of the Pathologist or my treating physician.    6.   I consent to the photographing or videotaping of the operations or procedures to be performed, including appropriate portions of my body for medical, scientific, or educational purposes, provided my identity is not revealed by the pictures or by descriptive texts accompanying them.  If the procedure has been photographed/videotaped, the surgeon will obtain the original picture, image, videotape or CD.  The hospital will not be responsible for storage, release or maintenance of the picture, image, tape or CD.    7.   I consent to the presence of a  or observers in the operating room as deemed necessary by my physician or their designees.    8.   I recognize that in the event my procedure results in extended X-Ray/fluoroscopy time, I may develop a skin reaction.    9. If I have a Do Not Attempt  Resuscitation (DNAR) order in place, that status will be suspended while in the operating room, procedural suite, and during the recovery period unless otherwise explicitly stated by me (or a person authorized to consent on my behalf). The surgeon or my attending physician will determine when the applicable recovery period ends for purposes of reinstating the DNAR order.  10. Patients having a sterilization procedure: I understand that if the procedure is successful the results will be permanent and it will therefore be impossible for me to inseminate, conceive, or bear children.  I also understand that the procedure is intended to result in sterility, although the result has not been guaranteed.   11. I acknowledge that my physician has explained sedation/analgesia administration to me including the risk and benefits I consent to the administration of sedation/analgesia as may be necessary or desirable in the judgment of my physician.    I CERTIFY THAT I HAVE READ AND FULLY UNDERSTAND THE ABOVE CONSENT TO OPERATION and/or OTHER PROCEDURE.     ____________________________________  _________________________________        ______________________________  Signature of Patient    Signature of Responsible Person                Printed Name of Responsible Person                                      ____________________________________  _____________________________                ________________________________  Signature of Witness        Date  Time         Relationship to Patient    STATEMENT OF PHYSICIAN My signature below affirms that prior to the time of the procedure; I have explained to the patient and/or his/her legal representative, the risks and benefits involved in the proposed treatment and any reasonable alternative to the proposed treatment. I have also explained the risks and benefits involved in refusal of the proposed treatment and alternatives to the proposed treatment and have answered the patient's  questions. If I have a significant financial interest in a co-management agreement or a significant financial interest in any product or implant, or other significant relationship used in this procedure/surgery, I have disclosed this and had a discussion with my patient.     _____________________________________________________              _____________________________  (Signature of Physician)                                                                                         (Date)                                   (Time)  Patient Name: Anastasiia Hobbs      : 1965      Printed: 2024     Medical Record #: [id number]                                      Page 1 of 1

## (undated) NOTE — LETTER
[address], [place]  Authorization for Invasive Procedures  1. I hereby authorize Dr. Yang Siegel, my physician and whomever may be designated as the doctor's assistant, to perform the following operation and/or procedure: Esophagogastroduodenoscopy with possible biopsy, polypectomy, control of bleeding on Cirilo Mo at [facility].    2. My physician has explained to me the nature and purpose of the operation or other procedure, possible alternative methods of treatment, the risks involved and the possibility of complications to me. I understand the probable consequences of declining the recommended procedure and the alternative methods of treatment. I acknowledge that no guarantee has been made as to the result that may be obtained. 3. I recognize that during the course of this operation or other procedure, unforeseen conditions may necessitate additional or different procedures than those listed above. I, therefore, further authorize and request that the above-named physician, his/her physician assistants, or designees perform such procedures as are, in his/her professional opinion, necessary and desirable. If I have a Do Not Attempt Resuscitation (DNAR) order in place, that status will be suspended while in the operating room, procedural suite, and during the recovery period unless otherwise explicitly stated by me (or a person authorized to consent on my behalf). The surgeon or my attending physician will determine when the applicable recovery period ends for purposes of reinstating the DNAR order. 4. Should the need arise during my operation or immediate post-operative period; I also consent to the administration of blood and/or blood products.  Further, I understand that despite careful testing and screening of blood and blood products, I may still be subject to ill effects as a result of recieving a blood transfusion an/or blood producst. The following are some, but not all, of the potential risks that can occur: fever and allergic reactions, hemolytic reactions, transmission of disease such as hepatitis, AIDS, cytomegalovirus (CMV), and flluid overload. In the event that I wish to have autologous transfusions of my own blood, or a directed donor transfusion, I will discuss this with my physician. 5. I consent to the photographing of the operations or procedures to be performed for the purposes of advancing medicine, science, and/or education, provided my identity is not revealed. If the procedure has been videotaped, the physician/surgeon will obtain the original videotape. [facility] will not be responsible for storage or maintenance of this tape. 6. I consent to the presence of a  or observer as deemed necessary by my physician or his designee. 7. Any tissues or organs removed in the operation or other procedure may be disposed of by and at the discretion of [facility].    8. I understand that the physician and his/her physician assistants may not be employees or agents of [facility], [facility], nor [facility], but are independent medical practitioners who have been permitted to use its facilities for the care and treatment of their patients. 9. Patients having a sterilization procedure: I understand that if the procedure is successful the results will be permanent and it will therefore be impossible for me to inseminate, conceive or bear children. I also understand that the procedure is intended to result in sterility, although the result has not been guaranteed. 10. I CERTIFY THAT I HAVE READ AND FULLY UNDERSTAND THE ABOVE CONSENT TO OPERATION and/or OTHER PROCEDURE. 11. I acknowledge that my physician has explained sedation/analgesia administration to me including the risks and benefits.  I consent to the administration of sedation/analgesia as may be necessary or desirable in the judgment of my physician. Signature of Patient:  ________________________________________________ Date: _________Time: _________    Responsible person in case of minor or unconscious: _____________________________Relationship: ____________     Witness Signature: ____________________________________________ Date: __________ Time: ___________    Statement of Physician  My signature below affirms that prior to the time of the procedure, I have explained to the patient and/or her legal representative, the risks and benefits involved in the proposed treatment and any reasonable alternative to the proposed treatment. I have also explained the risks and benefits involved in the refusal of the proposed treatment and have answered the patient's questions. If I have a significant financial interest in this procedure/surgery, I have disclosed this and had a discussion with my patient.     Signature of Physician:   ________________________________________Date: _________Time:_______ Patient Name: Cirilo Mo  : 1965   Printed: 2022    Medical Record #: [id number]

## (undated) NOTE — LETTER
[address], [place]  Authorization for Invasive Procedures  1. I hereby authorize Dr. Jacob Davidson, my physician and whomever may be designated as the doctor's assistant, to perform the following operation and/or procedure: Esophagogastroduodenoscopy with possible biopsy, polypectomy, control of bleeding on Aneita Huh at [facility].    2. My physician has explained to me the nature and purpose of the operation or other procedure, possible alternative methods of treatment, the risks involved and the possibility of complications to me. I understand the probable consequences of declining the recommended procedure and the alternative methods of treatment. I acknowledge that no guarantee has been made as to the result that may be obtained. 3. I recognize that during the course of this operation or other procedure, unforeseen conditions may necessitate additional or different procedures than those listed above. I, therefore, further authorize and request that the above-named physician, his/her physician assistants, or designees perform such procedures as are, in his/her professional opinion, necessary and desirable. If I have a Do Not Attempt Resuscitation (DNAR) order in place, that status will be suspended while in the operating room, procedural suite, and during the recovery period unless otherwise explicitly stated by me (or a person authorized to consent on my behalf). The surgeon or my attending physician will determine when the applicable recovery period ends for purposes of reinstating the DNAR order. 4. Should the need arise during my operation or immediate post-operative period; I also consent to the administration of blood and/or blood products.  Further, I understand that despite careful testing and screening of blood and blood products, I may still be subject to ill effects as a result of recieving a blood transfusion an/or blood producst. The following are some, but not all, of the potential risks that can occur: fever and allergic reactions, hemolytic reactions, transmission of disease such as hepatitis, AIDS, cytomegalovirus (CMV), and flluid overload. In the event that I wish to have autologous transfusions of my own blood, or a directed donor transfusion, I will discuss this with my physician. 5. I consent to the photographing of the operations or procedures to be performed for the purposes of advancing medicine, science, and/or education, provided my identity is not revealed. If the procedure has been videotaped, the physician/surgeon will obtain the original videotape. [facility] will not be responsible for storage or maintenance of this tape. 6. I consent to the presence of a  or observer as deemed necessary by my physician or his designee. 7. Any tissues or organs removed in the operation or other procedure may be disposed of by and at the discretion of [facility].    8. I understand that the physician and his/her physician assistants may not be employees or agents of [facility], [facility] [place] nor [facility], but are independent medical practitioners who have been permitted to use its facilities for the care and treatment of their patients. 9. Patients having a sterilization procedure: I understand that if the procedure is successful the results will be permanent and it will therefore be impossible for me to inseminate, conceive or bear children. I also understand that the procedure is intended to result in sterility, although the result has not been guaranteed. 10. I CERTIFY THAT I HAVE READ AND FULLY UNDERSTAND THE ABOVE CONSENT TO OPERATION and/or OTHER PROCEDURE. 11. I acknowledge that my physician has explained sedation/analgesia administration to me including the risks and benefits.  I consent to the administration of sedation/analgesia as may be necessary or desirable in the judgment of my physician. Signature of Patient:  ________________________________________________ Date: _________Time: _________    Responsible person in case of minor or unconscious: _____________________________Relationship: ____________     Witness Signature: ____________________________________________ Date: __________ Time: ___________    Statement of Physician  My signature below affirms that prior to the time of the procedure, I have explained to the patient and/or her legal representative, the risks and benefits involved in the proposed treatment and any reasonable alternative to the proposed treatment. I have also explained the risks and benefits involved in the refusal of the proposed treatment and have answered the patient's questions. If I have a significant financial interest in this procedure/surgery, I have disclosed this and had a discussion with my patient.     Signature of Physician:   ________________________________________Date: _________Time:_______ Patient Name: Yany Lott  : 1965   Printed: 2022    Medical Record #: [id number]

## (undated) NOTE — LETTER
Anneliese Worthy Md  [address] [place] [id number]       11/02/21        Patient: Juancarlos Ramirez   YOB: 1965   Date of Visit: 11/2/2021       Dear  Dr. Marianne Rocha MD,      Thank you for referring Juancarlos Ramirez to my practice.   Please fi

## (undated) NOTE — LETTER
[facility] [address], [address]    CONSENT TO DIAGNOSTIC PROCEDURE      Date: ___________________        A. M. Time: ___________________ P.M.       I authorize Dr. Verena Cooper and such    assistants as he/she may designate, to perform upo

## (undated) NOTE — ED AVS SNAPSHOT
Kirti Santana   MRN: [id number]    Department:  [facility] Emergency Department   Date of Visit:  9/28/2019           Disclosure     Insurance plans vary and the physician(s) referred by the ER may not be covered by your plan.  Please contact your CARE PHYSICIAN AT ONCE OR RETURN IMMEDIATELY TO THE EMERGENCY DEPARTMENT. If you have been prescribed any medication(s), please fill your prescription right away and begin taking the medication(s) as directed.   If you believe that any of the medications

## (undated) NOTE — LETTER
8/25/2021          To Whom It May Concern:    Ella Gilbert is currently under my medical care. Please be advised that the patient has a number of medical issues that make eating difficult. Patient would benefit from taking the ENSURE PLUS.   If you require

## (undated) NOTE — LETTER
[address]      Authorization for Surgical Operation and Procedure     Date:___________                                                                                                         Time:_______ reactions, transmission of diseases such as Hepatitis, AIDS and Cytomegalovirus (CMV) and fluid overload. In the event that I wish to have an autologous transfusion of my own blood, or a directed donor transfusion. I will discuss this with my physician. reinstating the DNAR order. 10. Patients having a sterilization procedure: I understand that if the procedure is successful the results will be permanent and it will therefore be impossible for me to inseminate, conceive, or bear children.   I also underst (Signature of Physician)                                                                                         (Date)                                   (Time)

## (undated) NOTE — IP AVS SNAPSHOT
[facility]            (For Outpatient Use Only) Initial Admit Date: 2022   Inpt/Obs Admit Date: Inpt: N/A / Obs: 22   Discharge Date:    Flakito Prow:  [de-identified]   MRN: [de-identified]   CSN: [id number]   CEID: [id number]        ENCOUNTER  Patient Class: Observation Admitting Provider: Barrington Stanley MD Unit: [address]   [facility] Service: Medical Attending Provider: Mable Baldwin MD   Bed: 559-A   Visit Type:   Referring Physician: No ref. provider found Billing Flag:    Admit Diagnosis: Abdominal pain, epigastric [R10.13]      PATIENT  Legal Name:   Henry Bailon    Legal Sex: Female  Gender ID:              [address] [facility] Name:    PCP:  Arleth El MD Home: [phone]   Address:  [address] [place] [address] : 1965 (56 yrs) Mobile: [phone]         City/State/Zip: [place] [facility] [address] Marital:  Language: [address] [place] [address] Drive: [employer] SSN4: [id number] Church: No Church Given-No Shabana*     Race: White Ethnicity: Non  Or [address] [place] [address]   Name Relationship Legal Guardian?  Home Phone Work Phone Mobile Phone   1. juliocesar akers  2. *No Contact Specified* Daughter            [phone]       GUARANTOR  Guarantor: Lola Pope : 1965 Home Phone: [phone]   Address: [address] [place] [address]  Sex: Female Work Phone:    City/State/Zip: [facility] [place]   Rel. to Patient: Self Guarantor ID: [id number]   [address]   Employer:  Status: NOT EMPLO*     COVERAGE  PRIMARY INSURANCE   Payor: [employer] MEDICAID Plan: [employer] [facility]*   Group Number: [id number] Insurance Type: [address] Name: Noam Bustamante : 1965   Subscriber ID: [id number] Pt Rel to Subscriber: Self   SECONDARY INSURANCE   Payor:  Plan:    Group Number:  Insurance Type:    Subscriber Name:  Subscriber :    Subscriber ID:  Pt Rel to Subscriber:    TERTIARY INSURANCE   Payor:  Plan:    Group Number:  Insurance Type:    Subscriber Name:  Cecile Barrera :    Subscriber ID:  Pt Rel to Subscriber:    Hospital Account Financial Class: Medicaid Advantage    2022

## (undated) NOTE — LETTER
[facility], [place]  Authorization for Invasive Procedures  1.  I hereby authorize Dr. Wolf Braswell , my physician and whomever may be designated as the doctor's assistant, to perform the following operation and/or procedure: right kne occur: fever and allergic reactions, hemolytic reactions, transmission of disease such as hepatitis, AIDS, cytomegalovirus (CMV), and flluid overload.  In the event that I wish to have autologous transfusions of my own blood, or a directed donor transfusion Signature of Patient:  ________________________________________________ Date: _________Time: _________    Responsible person in case of minor or unconscious: _____________________________Relationship: ____________     Witness Signature: _______________

## (undated) NOTE — ED AVS SNAPSHOT
Gutierrez Misael   MRN: [id number]    Department:  [facility] Emergency Department   Date of Visit:  8/8/2019           Disclosure     Insurance plans vary and the physician(s) referred by the ER may not be covered by your plan.  Please contact your CARE PHYSICIAN AT ONCE OR RETURN IMMEDIATELY TO THE EMERGENCY DEPARTMENT. If you have been prescribed any medication(s), please fill your prescription right away and begin taking the medication(s) as directed.   If you believe that any of the medications

## (undated) NOTE — LETTER
[address], [place]  Authorization for Invasive Procedures  1. I hereby authorize Dr. Guerda Ruth, my physician and whomever may be designated as the doctor's assistant, to perform the following operation and/or procedure: Esophagogastroduodenoscopy with possible biopsy, polypectomy, control of bleeding on [facility].    2. My physician has explained to me the nature and purpose of the operation or other procedure, possible alternative methods of treatment, the risks involved and the possibility of complications to me. I understand the probable consequences of declining the recommended procedure and the alternative methods of treatment. I acknowledge that no guarantee has been made as to the result that may be obtained. 3. I recognize that during the course of this operation or other procedure, unforeseen conditions may necessitate additional or different procedures than those listed above. I, therefore, further authorize and request that the above-named physician, his/her physician assistants, or designees perform such procedures as are, in his/her professional opinion, necessary and desirable. If I have a Do Not Attempt Resuscitation (DNAR) order in place, that status will be suspended while in the operating room, procedural suite, and during the recovery period unless otherwise explicitly stated by me (or a person authorized to consent on my behalf). The surgeon or my attending physician will determine when the applicable recovery period ends for purposes of reinstating the DNAR order. 4. Should the need arise during my operation or immediate post-operative period; I also consent to the administration of blood and/or blood products.  Further, I understand that despite careful testing and screening of blood and blood products, I may still be subject to ill effects as a result of recieving a blood transfusion an/or blood producst. The following are some, but not all, of the potential risks that can occur: fever and allergic reactions, hemolytic reactions, transmission of disease such as hepatitis, AIDS, cytomegalovirus (CMV), and flluid overload. In the event that I wish to have autologous transfusions of my own blood, or a directed donor transfusion, I will discuss this with my physician. 5. I consent to the photographing of the operations or procedures to be performed for the purposes of advancing medicine, science, and/or education, provided my identity is not revealed. If the procedure has been videotaped, the physician/surgeon will obtain the original videotape. The [facility] will not be responsible for storage or maintenance of this tape. 6. I consent to the presence of a  or observer as deemed necessary by my physician or his designee. 7. Any tissues or organs removed in the operation or other procedure may be disposed of by and at the discretion of [facility].    8. I understand that the physician and his/her physician assistants may not be employees or agents of [facility], [facility], nor [facility], but are independent medical practitioners who have been permitted to use its facilities for the care and treatment of their patients. 9. Patients having a sterilization procedure: I understand that if the procedure is successful the results will be permanent and it will therefore be impossible for me to inseminate, conceive or bear children. I also understand that the procedure is intended to result in sterility, although the result has not been guaranteed. 10. I CERTIFY THAT I HAVE READ AND FULLY UNDERSTAND THE ABOVE CONSENT TO OPERATION and/or OTHER PROCEDURE. 11. I acknowledge that my physician has explained sedation/analgesia administration to me including the risks and benefits.  I consent to the administration of sedation/analgesia as may be necessary or desirable in the judgment of my physician. Signature of Patient:  ________________________________________________ Date: _________Time: _________    Responsible person in case of minor or unconscious: _____________________________Relationship: ____________     Witness Signature: ____________________________________________ Date: __________ Time: ___________    Statement of Physician  My signature below affirms that prior to the time of the procedure, I have explained to the patient and/or her legal representative, the risks and benefits involved in the proposed treatment and any reasonable alternative to the proposed treatment. I have also explained the risks and benefits involved in the refusal of the proposed treatment and have answered the patient's questions. If I have a significant financial interest in this procedure/surgery, I have disclosed this and had a discussion with my patient.     Signature of Physician:   ________________________________________Date: _________Time:_______ Patient Name: Yessi Cid  : 1965   Printed: 2022    Medical Record #: [id number]

## (undated) NOTE — LETTER
2/8/2021          To Whom It May Concern:    Frankey Meuse is currently under my medical care. Please excuse the patient from strenuous chores like mopping as she has a history of multiple medical conditions including COPD.      If you require additional inf

## (undated) NOTE — Clinical Note
HFU call completed. A HFU appointment is scheduled for 3/1/2022. A TE was sent for prescription and supply requests. Thank you.

## (undated) NOTE — ED AVS SNAPSHOT
Santos Wheeler   MRN: [id number]    Department:  [facility] Emergency Department   Date of Visit:  1/25/2020           Disclosure     Insurance plans vary and the physician(s) referred by the ER may not be covered by your plan.  Please contact your CARE PHYSICIAN AT ONCE OR RETURN IMMEDIATELY TO THE EMERGENCY DEPARTMENT. If you have been prescribed any medication(s), please fill your prescription right away and begin taking the medication(s) as directed.   If you believe that any of the medications